# Patient Record
Sex: MALE | Race: WHITE | NOT HISPANIC OR LATINO | Employment: FULL TIME | URBAN - METROPOLITAN AREA
[De-identification: names, ages, dates, MRNs, and addresses within clinical notes are randomized per-mention and may not be internally consistent; named-entity substitution may affect disease eponyms.]

---

## 2017-01-17 ENCOUNTER — OFFICE VISIT (OUTPATIENT)
Dept: INTERNAL MEDICINE | Facility: CLINIC | Age: 47
End: 2017-01-17
Payer: COMMERCIAL

## 2017-01-17 VITALS
WEIGHT: 264 LBS | OXYGEN SATURATION: 96 % | RESPIRATION RATE: 12 BRPM | BODY MASS INDEX: 36.96 KG/M2 | DIASTOLIC BLOOD PRESSURE: 78 MMHG | SYSTOLIC BLOOD PRESSURE: 138 MMHG | HEART RATE: 68 BPM | TEMPERATURE: 96.8 F | HEIGHT: 71 IN

## 2017-01-17 DIAGNOSIS — J45.30 MILD PERSISTENT ASTHMA WITHOUT COMPLICATION: Primary | ICD-10-CM

## 2017-01-17 PROCEDURE — 99213 OFFICE O/P EST LOW 20 MIN: CPT | Performed by: NURSE PRACTITIONER

## 2017-01-17 RX ORDER — ALBUTEROL SULFATE 90 UG/1
2 AEROSOL, METERED RESPIRATORY (INHALATION) EVERY 6 HOURS PRN
Qty: 3 INHALER | Refills: 3 | Status: SHIPPED | OUTPATIENT
Start: 2017-01-17 | End: 2017-01-26

## 2017-01-17 NOTE — NURSING NOTE
"Chief Complaint   Patient presents with     Forms     FMLA paper work.        Initial Temp(Src) 96.8  F (36  C) (Oral)  Resp 12  Ht 5' 11\" (1.803 m)  Wt 264 lb (119.75 kg)  BMI 36.84 kg/m2  SpO2 96% Estimated body mass index is 36.84 kg/(m^2) as calculated from the following:    Height as of this encounter: 5' 11\" (1.803 m).    Weight as of this encounter: 264 lb (119.75 kg).  BP completed using cuff size: gina Wright LPN      "

## 2017-01-17 NOTE — PROGRESS NOTES
"  SUBJECTIVE:                                                    Thai Mancia is a 46 year old male who presents to clinic today for the following health issues:    FMLA paperwork. Asthma - sometimes has to leave work if having a flare   Works in a cooler loading milk    Taking nyquil- and did albuterol and advair this am   No HTN history   Initial blood pressure high 152/78  Recheck 138/78  Discussed blood pressure     Steroid injection right knee for arthritis and DJD - workman's comp   Still has some discomfort in knee at times                 Problem list and histories reviewed & adjusted, as indicated.  Additional history: as documented    Patient Active Problem List   Diagnosis     CARDIOVASCULAR SCREENING; LDL GOAL LESS THAN 160     Mild persistent asthma without complication     Obesity due to excess calories, unspecified obesity severity     Past Surgical History   Procedure Laterality Date     Knee surgery  1985       Social History   Substance Use Topics     Smoking status: Never Smoker      Smokeless tobacco: Never Used     Alcohol Use: 0.0 oz/week     0 Standard drinks or equivalent per week      Comment: alcohol 2 x per week     Family History   Problem Relation Age of Onset     Unknown/Adopted Mother      adopted     Unknown/Adopted Father            ROS:  Constitutional, HEENT, cardiovascular, pulmonary, GI, , musculoskeletal, neuro, skin, endocrine and psych systems are negative, except as otherwise noted.    OBJECTIVE:                                                    /78 mmHg  Pulse 68  Temp(Src) 96.8  F (36  C) (Oral)  Resp 12  Ht 5' 11\" (1.803 m)  Wt 264 lb (119.75 kg)  BMI 36.84 kg/m2  SpO2 96%  Body mass index is 36.84 kg/(m^2).  GENERAL: alert and no distress  RESP: lungs clear to auscultation - no rales, rhonchi or wheezes  CV: regular rate and rhythm, normal S1 S2, no S3 or S4, no murmur, click or rub, no peripheral edema   ABDOMEN: soft, nontender, a and bowel sounds " normal  MS: no gross musculoskeletal defects noted, no edema  NEURO: Normal strength and tone, mentation intact and speech normal  PSYCH: mentation appears normal, affect normal/bright    Diagnostic Test Results:  none      ASSESSMENT/PLAN:                                                              ICD-10-CM    1. Mild persistent asthma without complication J45.30 albuterol (PROAIR HFA/PROVENTIL HFA/VENTOLIN HFA) 108 (90 BASE) MCG/ACT Inhaler       Patient Instructions   FMLA form completed          LAYLA Adames CNP  [facility]

## 2017-01-17 NOTE — MR AVS SNAPSHOT
"              After Visit Summary   1/17/2017    Thai Mancia    MRN: [id number]           Patient Information     Date Of Birth          1970        Visit Information        Provider Department      1/17/2017 9:40 AM Sara Elias APRN CNP [facility]        Today's Diagnoses     Mild persistent asthma without complication    -  1       Care Instructions    FMLA form completed          Follow-ups after your visit        Who to contact     If you have questions or need follow up information about today's clinic visit or your schedule please contact [facility] directly at [phone].  Normal or non-critical lab and imaging results will be communicated to you by [employer], letter or phone within 4 business days after the clinic has received the results. If you do not hear from us within 7 days, please contact the clinic through Aktin[employer]art or phone. If you have a critical or abnormal lab result, we will notify you by phone as soon as possible.  Submit refill requests through [employer] or call your pharmacy and they will forward the refill request to us. Please allow 3 business days for your refill to be completed.          Additional Information About Your Visit        MyChart Information     [employer] gives you secure access to your electronic health record. If you see a primary care provider, you can also send messages to your care team and make appointments. If you have questions, please call your primary care clinic.  If you do not have a primary care provider, please call [phone] and they will assist you.        Care EveryWhere ID     This is your Care EveryWhere ID. This could be used by other organizations to access your [place] medical records  [id number]        Your Vitals Were     Pulse Temperature Respirations Height BMI (Body Mass Index) Pulse Oximetry    68 96.8  F (36  C) (Oral) 12 5' 11\" (1.803 m) 36.84 kg/m2 96%       Blood Pressure from Last 3 " Encounters:   01/17/17 138/78   09/07/16 130/84   08/26/16 126/78    Weight from Last 3 Encounters:   01/17/17 264 lb (119.75 kg)   09/07/16 278 lb (126.1 kg)   08/26/16 278 lb (126.1 kg)              Today, you had the following     No orders found for display         Where to get your medicines      These medications were sent to [place], [place] - [facility] [address]  [facility] [address]     Phone:  [phone]    - albuterol 108 (90 BASE) MCG/ACT Inhaler       Primary Care Provider Office Phone # Fax #    Kelsea Lee [phone]       [facility] [address] [facility] [address]        Thank you!     Thank you for choosing [facility]  for your care. Our goal is always to provide you with excellent care. Hearing back from our patients is one way we can continue to improve our services. Please take a few minutes to complete the written survey that you may receive in the mail after your visit with us. Thank you!             Your Updated Medication List - Protect others around you: Learn how to safely use, store and throw away your medicines at www.disposemymeds.org.          This list is accurate as of: 1/17/17 10:08 AM.  Always use your most recent med list.                   Brand Name Dispense Instructions for use    albuterol 108 (90 BASE) MCG/ACT Inhaler    PROAIR HFA/PROVENTIL HFA/VENTOLIN HFA    3 Inhaler    Inhale 2 puffs into the lungs every 6 hours as needed for shortness of breath / dyspnea       fluticasone-salmeterol 250-50 MCG/DOSE diskus inhaler    ADVAIR DISKUS    1 Inhaler    Inhale 1 puff into the lungs every 12 hours

## 2017-01-18 ASSESSMENT — ASTHMA QUESTIONNAIRES: ACT_TOTALSCORE: 17

## 2017-01-26 ENCOUNTER — OFFICE VISIT (OUTPATIENT)
Dept: INTERNAL MEDICINE | Facility: CLINIC | Age: 47
End: 2017-01-26
Payer: COMMERCIAL

## 2017-01-26 VITALS
OXYGEN SATURATION: 96 % | HEIGHT: 71 IN | SYSTOLIC BLOOD PRESSURE: 136 MMHG | WEIGHT: 260.2 LBS | HEART RATE: 86 BPM | TEMPERATURE: 97.5 F | BODY MASS INDEX: 36.43 KG/M2 | DIASTOLIC BLOOD PRESSURE: 90 MMHG

## 2017-01-26 DIAGNOSIS — R05.9 COUGH: ICD-10-CM

## 2017-01-26 DIAGNOSIS — J06.9 UPPER RESPIRATORY TRACT INFECTION, UNSPECIFIED TYPE: Primary | ICD-10-CM

## 2017-01-26 DIAGNOSIS — J98.01 ACUTE BRONCHOSPASM: ICD-10-CM

## 2017-01-26 DIAGNOSIS — J45.30 MILD PERSISTENT ASTHMA WITHOUT COMPLICATION: ICD-10-CM

## 2017-01-26 PROCEDURE — 99214 OFFICE O/P EST MOD 30 MIN: CPT | Performed by: NURSE PRACTITIONER

## 2017-01-26 RX ORDER — ALBUTEROL SULFATE 90 UG/1
2 AEROSOL, METERED RESPIRATORY (INHALATION) EVERY 6 HOURS PRN
Qty: 3 INHALER | Refills: 3 | Status: SHIPPED | OUTPATIENT
Start: 2017-01-26 | End: 2018-02-06

## 2017-01-26 RX ORDER — PREDNISONE 10 MG/1
TABLET ORAL
Qty: 18 TABLET | Refills: 0 | Status: SHIPPED
Start: 2017-01-26 | End: 2017-09-01

## 2017-01-26 RX ORDER — AZITHROMYCIN 250 MG/1
TABLET, FILM COATED ORAL
Qty: 6 TABLET | Refills: 0 | Status: SHIPPED | OUTPATIENT
Start: 2017-01-26 | End: 2017-09-01

## 2017-01-26 NOTE — MR AVS SNAPSHOT
After Visit Summary   1/26/2017    Thai Mancia    MRN: [id number]           Patient Information     Date Of Birth          1970        Visit Information        Provider Department      1/26/2017 1:20 PM Sara Elias APRN CNP [facility]        Today's Diagnoses     Upper respiratory tract infection, unspecified type    -  1     Acute bronchospasm         Cough         Mild persistent asthma without complication           Care Instructions    Will treat with Zithromax 250mg, 2 tabs by mouth day 1, then 1 tab by mouth days 2-5. He is to watch for GI upset, diarrhea or rash.    Prednisone take in morning with food   Take 3 tabs daily for 3 days   Take 2 tabs daily for 3 days   Take 1 tab daily for 3 days   Then stop       Advair as directed     Albuterol as needed     Please, call or return to clinic if signs or symptoms worsen or fail to improve as anticipated          Follow-ups after your visit        Who to contact     If you have questions or need follow up information about today's clinic visit or your schedule please contact [facility] directly at [phone].  Normal or non-critical lab and imaging results will be communicated to you by MyChart, letter or phone within 4 business days after the clinic has received the results. If you do not hear from us within 7 days, please contact the clinic through [employer] or phone. If you have a critical or abnormal lab result, we will notify you by phone as soon as possible.  Submit refill requests through [employer] or call your pharmacy and they will forward the refill request to us. Please allow 3 business days for your refill to be completed.          Additional Information About Your Visit        MyChart Information     [employer] gives you secure access to your electronic health record. If you see a primary care provider, you can also send messages to your care team and make appointments. If you have  "questions, please call your primary care clinic.  If you do not have a primary care provider, please call [phone] and they will assist you.        Care EveryWhere ID     This is your Care EveryWhere ID. This could be used by other organizations to access your [place] medical records  [id number]        Your Vitals Were     Pulse Temperature Height BMI (Body Mass Index) Pulse Oximetry       86 97.5  F (36.4  C) (Oral) 5' 11\" (1.803 m) 36.31 kg/m2 96%        Blood Pressure from Last 3 Encounters:   01/26/17 136/90   01/17/17 138/78   09/07/16 130/84    Weight from Last 3 Encounters:   01/26/17 260 lb 3.2 oz (118.026 kg)   01/17/17 264 lb (119.75 kg)   09/07/16 278 lb (126.1 kg)              Today, you had the following     No orders found for display         Today's Medication Changes          These changes are accurate as of: 1/26/17  2:04 PM.  If you have any questions, ask your nurse or doctor.               Start taking these medicines.        Dose/Directions    azithromycin 250 MG tablet   Commonly known as:  ZITHROMAX   Used for:  Upper respiratory tract infection, unspecified type, Acute bronchospasm, Cough   Started by:  Sara Elias APRN CNP        Two tablets first day, then one tablet daily for four days.   Quantity:  6 tablet   Refills:  0       predniSONE 10 MG tablet   Commonly known as:  DELTASONE   Used for:  Upper respiratory tract infection, unspecified type, Acute bronchospasm, Cough   Started by:  Sara Elias APRN CNP        Take 3 tabs daily for 3 days  Take 2 tabs daily for 3 days  Take 1 tab daily for 3 days  Then stop   Quantity:  18 tablet   Refills:  0            Where to get your medicines      These medications were sent to [facility] - [place], [place] - [address], [address]  [address], [facility], [place] [address]     Phone:  [phone]    - albuterol 108 (90 BASE) MCG/ACT Inhaler  - azithromycin 250 MG tablet  - " fluticasone-salmeterol 250-50 MCG/DOSE diskus inhaler  - predniSONE 10 MG tablet             Primary Care Provider Office Phone # Fax #    Kelsea Lee [phone]       [facility] [address] [facility] [address]        Thank you!     Thank you for choosing [facility]  for your care. Our goal is always to provide you with excellent care. Hearing back from our patients is one way we can continue to improve our services. Please take a few minutes to complete the written survey that you may receive in the mail after your visit with us. Thank you!             Your Updated Medication List - Protect others around you: Learn how to safely use, store and throw away your medicines at www.disposemymeds.org.          This list is accurate as of: 1/26/17  2:04 PM.  Always use your most recent med list.                   Brand Name Dispense Instructions for use    albuterol 108 (90 BASE) MCG/ACT Inhaler    PROAIR HFA/PROVENTIL HFA/VENTOLIN HFA    3 Inhaler    Inhale 2 puffs into the lungs every 6 hours as needed for shortness of breath / dyspnea       azithromycin 250 MG tablet    ZITHROMAX    6 tablet    Two tablets first day, then one tablet daily for four days.       fluticasone-salmeterol 250-50 MCG/DOSE diskus inhaler    ADVAIR DISKUS    1 Inhaler    Inhale 1 puff into the lungs every 12 hours       predniSONE 10 MG tablet    DELTASONE    18 tablet    Take 3 tabs daily for 3 days  Take 2 tabs daily for 3 days  Take 1 tab daily for 3 days  Then stop

## 2017-01-26 NOTE — PROGRESS NOTES
"  SUBJECTIVE:                                                    Thai Mancia is a 46 year old male who presents to clinic today for the following health issues:    Cough x 10 days.    Gets cold and it goes to chest   Usually needs a z pack     mucinex and nyquil and helps with head congestion   Cough productive yellow brown and similar out nose         Problem list and histories reviewed & adjusted, as indicated.  Additional history:     Patient Active Problem List   Diagnosis     CARDIOVASCULAR SCREENING; LDL GOAL LESS THAN 160     Mild persistent asthma without complication     Obesity due to excess calories, unspecified obesity severity     Past Surgical History   Procedure Laterality Date     Knee surgery  1985       Social History   Substance Use Topics     Smoking status: Never Smoker      Smokeless tobacco: Never Used     Alcohol Use: 0.0 oz/week     0 Standard drinks or equivalent per week      Comment: alcohol 2 x per week     Family History   Problem Relation Age of Onset     Unknown/Adopted Mother      adopted     Unknown/Adopted Father            ROS:  Constitutional, HEENT, cardiovascular, pulmonary, GI, , musculoskeletal, neuro, skin, endocrine and psych systems are negative, except as otherwise noted.    OBJECTIVE:                                                    /90 mmHg  Pulse 86  Temp(Src) 97.5  F (36.4  C) (Oral)  Ht 5' 11\" (1.803 m)  Wt 260 lb 3.2 oz (118.026 kg)  BMI 36.31 kg/m2  SpO2 96%  Body mass index is 36.31 kg/(m^2).  GENERAL: alert and no distress  HENT: ear canals and TM's normal, nose and mouth without ulcers or lesions  RESP: lungs  With exp wheezes through out - declines a neb when offered   CV: regular rate and rhythm  ABDOMEN: soft, nontender,  and bowel sounds normal  MS: no gross musculoskeletal defects noted, no edema  NEURO: Normal strength and tone, mentation intact and speech normal  PSYCH: mentation appears normal, affect normal/bright    Diagnostic Test " Results:  none      ASSESSMENT/PLAN:                                                              ICD-10-CM    1. Upper respiratory tract infection, unspecified type J06.9 azithromycin (ZITHROMAX) 250 MG tablet     predniSONE (DELTASONE) 10 MG tablet   2. Acute bronchospasm J98.01 azithromycin (ZITHROMAX) 250 MG tablet     predniSONE (DELTASONE) 10 MG tablet   3. Cough R05 azithromycin (ZITHROMAX) 250 MG tablet     predniSONE (DELTASONE) 10 MG tablet   4. Mild persistent asthma without complication J45.30 fluticasone-salmeterol (ADVAIR DISKUS) 250-50 MCG/DOSE diskus inhaler     albuterol (PROAIR HFA/PROVENTIL HFA/VENTOLIN HFA) 108 (90 BASE) MCG/ACT Inhaler       Patient Instructions   Will treat with Zithromax 250mg, 2 tabs by mouth day 1, then 1 tab by mouth days 2-5. He is to watch for GI upset, diarrhea or rash.    Prednisone take in morning with food   Take 3 tabs daily for 3 days   Take 2 tabs daily for 3 days   Take 1 tab daily for 3 days   Then stop       Advair as directed     Albuterol as needed     Please, call or return to clinic if signs or symptoms worsen or fail to improve as anticipated          LAYLA Adames [facility]

## 2017-01-26 NOTE — NURSING NOTE
"Chief Complaint   Patient presents with     Cough       Initial /90 mmHg  Pulse 86  Temp(Src) 97.5  F (36.4  C) (Oral)  Ht 5' 11\" (1.803 m)  Wt 260 lb 3.2 oz (118.026 kg)  BMI 36.31 kg/m2  SpO2 96% Estimated body mass index is 36.31 kg/(m^2) as calculated from the following:    Height as of this encounter: 5' 11\" (1.803 m).    Weight as of this encounter: 260 lb 3.2 oz (118.026 kg).  BP completed using cuff size: large    "

## 2017-02-13 ENCOUNTER — TELEPHONE (OUTPATIENT)
Dept: INTERNAL MEDICINE | Facility: CLINIC | Age: 47
End: 2017-02-13

## 2017-02-13 NOTE — LETTER
[facility] [place] [address] [place] [address]  [phone]    3/24/2017    Thai Mancia  [address]  [facility] [address]      Dear Thai    At [facility] we care about your health and well-being.  A review of your chart has indicated that you are due for a Asthma questionnaire.      Enclosed is a list of questions for you to answer. Please give us a call back to go over the questions with the nurse.      [facility] Internal Medicine  Healthcare Team

## 2017-02-13 NOTE — TELEPHONE ENCOUNTER
Panel Management Review      Patient has the following on his problem list:     Asthma review     ACT Total Scores 1/17/2017   ACT TOTAL SCORE (Goal Greater than or Equal to 20) 17   In the past 12 months, how many times did you visit the emergency room for your asthma without being admitted to the hospital? 0   In the past 12 months, how many times were you hospitalized overnight because of your asthma? 0      1. Is Asthma diagnosis on the Problem List? Yes    2. Is Asthma listed on Health Maintenance? Yes    3. Patient is due for:  ACT      Composite cancer screening  Chart review shows that this patient is due/due soon for the following None  Summary:    Patient is due/failing the following:   ACT    Action needed:   Patient needs to do ACT.    Type of outreach:    Phone, left message for patient to call back.     Questions for provider review:    None                                                                                                                                      MARY Wright       Chart routed to None .

## 2017-06-27 ENCOUNTER — TELEPHONE (OUTPATIENT)
Dept: INTERNAL MEDICINE | Facility: CLINIC | Age: 47
End: 2017-06-27

## 2017-07-28 ENCOUNTER — OFFICE VISIT (OUTPATIENT)
Dept: INTERNAL MEDICINE | Facility: CLINIC | Age: 47
End: 2017-07-28
Payer: COMMERCIAL

## 2017-07-28 ENCOUNTER — RADIANT APPOINTMENT (OUTPATIENT)
Dept: GENERAL RADIOLOGY | Facility: CLINIC | Age: 47
End: 2017-07-28
Attending: INTERNAL MEDICINE
Payer: COMMERCIAL

## 2017-07-28 VITALS
HEIGHT: 71 IN | OXYGEN SATURATION: 98 % | WEIGHT: 254.3 LBS | SYSTOLIC BLOOD PRESSURE: 134 MMHG | HEART RATE: 83 BPM | TEMPERATURE: 97.8 F | BODY MASS INDEX: 35.6 KG/M2 | DIASTOLIC BLOOD PRESSURE: 88 MMHG

## 2017-07-28 DIAGNOSIS — J45.30 MILD PERSISTENT ASTHMA WITHOUT COMPLICATION: Primary | ICD-10-CM

## 2017-07-28 DIAGNOSIS — M25.511 RIGHT SHOULDER PAIN, UNSPECIFIED CHRONICITY: ICD-10-CM

## 2017-07-28 PROCEDURE — 73030 X-RAY EXAM OF SHOULDER: CPT | Mod: RT

## 2017-07-28 PROCEDURE — 99213 OFFICE O/P EST LOW 20 MIN: CPT | Performed by: INTERNAL MEDICINE

## 2017-07-28 NOTE — NURSING NOTE
"Chief Complaint   Patient presents with     Forms       Initial /88 (BP Location: Left arm, Patient Position: Chair, Cuff Size: Adult Large)  Pulse 83  Temp 97.8  F (36.6  C) (Oral)  Ht 5' 11\" (1.803 m)  Wt 254 lb 4.8 oz (115.3 kg)  SpO2 98%  BMI 35.47 kg/m2 Estimated body mass index is 35.47 kg/(m^2) as calculated from the following:    Height as of this encounter: 5' 11\" (1.803 m).    Weight as of this encounter: 254 lb 4.8 oz (115.3 kg).  Medication Reconciliation: complete   Brandi Box MA    "

## 2017-07-28 NOTE — MR AVS SNAPSHOT
"              After Visit Summary   7/28/2017    Thai Mancia    MRN: [id number]           Patient Information     Date Of Birth          1970        Visit Information        Provider Department      7/28/2017 4:00 PM Kelsea Lee MD [facility]        Today's Diagnoses     Mild persistent asthma without complication    -  1    Right shoulder pain, unspecified chronicity           Follow-ups after your visit        Who to contact     If you have questions or need follow up information about today's clinic visit or your schedule please contact [facility] directly at [phone].  Normal or non-critical lab and imaging results will be communicated to you by [employer], letter or phone within 4 business days after the clinic has received the results. If you do not hear from us within 7 days, please contact the clinic through [employer] or phone. If you have a critical or abnormal lab result, we will notify you by phone as soon as possible.  Submit refill requests through [employer] or call your pharmacy and they will forward the refill request to us. Please allow 3 business days for your refill to be completed.          Additional Information About Your Visit        MyChart Information     [employer] gives you secure access to your electronic health record. If you see a primary care provider, you can also send messages to your care team and make appointments. If you have questions, please call your primary care clinic.  If you do not have a primary care provider, please call [phone] and they will assist you.        Care EveryWhere ID     This is your Care EveryWhere ID. This could be used by other organizations to access your [place] medical records  [id number]        Your Vitals Were     Pulse Temperature Height Pulse Oximetry BMI (Body Mass Index)       83 97.8  F (36.6  C) (Oral) 5' 11\" (1.803 m) 98% 35.47 kg/m2        Blood Pressure from Last 3 Encounters:   07/28/17 " 134/88   01/26/17 136/90   01/17/17 138/78    Weight from Last 3 Encounters:   07/28/17 254 lb 4.8 oz (115.3 kg)   01/26/17 260 lb 3.2 oz (118 kg)   01/17/17 264 lb (119.7 kg)              Today, you had the following     No orders found for display       Primary Care Provider Office Phone # Fax #    Kelsea Skip Lee [phone]       [facility] [address] [facility] [address]        Equal Access to Services     [facility] Hospital[facility]HAD : Hadii aad ku hadasho Somikey, waaxda luqadaha, qaybta kaalmada adealayna, vilma sawyer . So [facility] [phone].    ATENCIÓN: Si habla español, tiene a mcclure disposición servicios gratuitos de asistencia lingüística. Llame al [phone].    We comply with applicable federal civil rights laws and [place] laws. We do not discriminate on the basis of race, color, national origin, age, disability sex, sexual orientation or gender identity.            Thank you!     Thank you for choosing [facility]  for your care. Our goal is always to provide you with excellent care. Hearing back from our patients is one way we can continue to improve our services. Please take a few minutes to complete the written survey that you may receive in the mail after your visit with us. Thank you!             Your Updated Medication List - Protect others around you: Learn how to safely use, store and throw away your medicines at www.disposemymeds.org.          This list is accurate as of: 7/28/17 11:59 PM.  Always use your most recent med list.                   Brand Name Dispense Instructions for use Diagnosis    albuterol 108 (90 BASE) MCG/ACT Inhaler    PROAIR HFA/PROVENTIL HFA/VENTOLIN HFA    3 Inhaler    Inhale 2 puffs into the lungs every 6 hours as needed for shortness of breath / dyspnea    Mild persistent asthma without complication       azithromycin 250 MG tablet    ZITHROMAX    6 tablet    Two tablets first day, then one  tablet daily for four days.    Upper respiratory tract infection, unspecified type, Acute bronchospasm, Cough       fluticasone-salmeterol 250-50 MCG/DOSE diskus inhaler    ADVAIR DISKUS    1 Inhaler    Inhale 1 puff into the lungs every 12 hours    Mild persistent asthma without complication       predniSONE 10 MG tablet    DELTASONE    18 tablet    Take 3 tabs daily for 3 days  Take 2 tabs daily for 3 days  Take 1 tab daily for 3 days  Then stop    Upper respiratory tract infection, unspecified type, Acute bronchospasm, Cough

## 2017-07-28 NOTE — PROGRESS NOTES
"  SUBJECTIVE:                                                    Thai Mancia is a 47 year old male who presents to clinic today for the following health issues:    Asthma Follow-Up    Was ACT completed today?    Yes    ACT Total Scores 1/17/2017   ACT TOTAL SCORE -   ASTHMA ER VISITS -   ASTHMA HOSPITALIZATIONS -   ACT TOTAL SCORE (Goal Greater than or Equal to 20) 17   In the past 12 months, how many times did you visit the emergency room for your asthma without being admitted to the hospital? 0   In the past 12 months, how many times were you hospitalized overnight because of your asthma? 0       Recent asthma triggers that patient is dealing with: cold air and dog and cat dander    He works in a cooler at times, and has flares.  Sometimes this occurs 1-2 times per month.  This might occur before or during work.    Right shoulder pain.  He feels like there is a crack in the right shoulder.    He lifts heavy objects at work.  He does not feel that his arm is weak.       Amount of exercise or physical activity: None; works 5 days per week at work    Problems taking medications regularly: No    Medication side effects: none  Diet: regular (no restrictions)      PROBLEMS TO ADD ON...    Problem list and histories reviewed & adjusted, as indicated.  Additional history: as documented    Labs reviewed in EPIC    Reviewed and updated as needed this visit by clinical staffTobacco  Allergies  Med Hx  Surg Hx  Fam Hx  Soc Hx      Reviewed and updated as needed this visit by Provider         ROS:  C: NEGATIVE for fever, chills, change in weight  R: NEGATIVE for significant cough or SOB  CV: NEGATIVE for chest pain, palpitations or peripheral edema  MSK: right shoulder pain    OBJECTIVE:     /88 (BP Location: Left arm, Patient Position: Chair, Cuff Size: Adult Large)  Pulse 83  Temp 97.8  F (36.6  C) (Oral)  Ht 5' 11\" (1.803 m)  Wt 254 lb 4.8 oz (115.3 kg)  SpO2 98%  BMI 35.47 kg/m2  Body mass index is " 35.47 kg/(m^2).  GENERAL: healthy, alert and no distress  RESP: lungs clear to auscultation - no rales, rhonchi or wheezes  CV: regular rate and rhythm, normal S1 S2, no S3 or S4, no murmur, click or rub, no peripheral edema and peripheral pulses strong  MSK: no pain upon palpation; NEG NEER; MUHAMMAD slightly positive; upper arm strength intact bilaterally    ASSESSMENT/PLAN:       (J45.30) Mild persistent asthma without complication  (primary encounter diagnosis)  Comment:   Plan: continue on inhalers  FMLA form filled out for work    (M25.511) Right shoulder pain, unspecified chronicity  Comment:   Plan: CANCELED: XR Shoulder Right 2 Views                  Kelsea Lee MD  [facility]

## 2017-08-01 ASSESSMENT — ASTHMA QUESTIONNAIRES: ACT_TOTALSCORE: 21

## 2017-08-08 ENCOUNTER — TELEPHONE (OUTPATIENT)
Dept: INTERNAL MEDICINE | Facility: CLINIC | Age: 47
End: 2017-08-08

## 2017-08-08 NOTE — TELEPHONE ENCOUNTER
"Pt states regarding the FMLA, the line that asks for the date of when the pt was treated, Dr Lee stated \"as needed.\"   Pt states his Employer is asking for the actual date of office visits to be written in there.   He is going to have them fax this back to Dr Lee to addend. He is asking for her to write the last 2 or 3 OV in this line area. And then fax. .            "

## 2017-08-09 NOTE — TELEPHONE ENCOUNTER
Pt calls, checking on status of below. Found FMLA form faxed to our office requesting OV dates. Listed OV with dx of asthma or bronchitis. Faxed back to: [phone] per coversheet provided. Pt aware.

## 2017-08-28 ENCOUNTER — APPOINTMENT (OUTPATIENT)
Dept: CT IMAGING | Facility: CLINIC | Age: 47
End: 2017-08-28
Attending: EMERGENCY MEDICINE
Payer: COMMERCIAL

## 2017-08-28 ENCOUNTER — TELEPHONE (OUTPATIENT)
Dept: INTERNAL MEDICINE | Facility: CLINIC | Age: 47
End: 2017-08-28

## 2017-08-28 ENCOUNTER — HOSPITAL ENCOUNTER (EMERGENCY)
Facility: CLINIC | Age: 47
Discharge: HOME | End: 2017-08-28
Attending: EMERGENCY MEDICINE | Admitting: EMERGENCY MEDICINE
Payer: COMMERCIAL

## 2017-08-28 VITALS
SYSTOLIC BLOOD PRESSURE: 143 MMHG | BODY MASS INDEX: 34.87 KG/M2 | DIASTOLIC BLOOD PRESSURE: 76 MMHG | TEMPERATURE: 98.6 F | OXYGEN SATURATION: 97 % | RESPIRATION RATE: 18 BRPM | WEIGHT: 250 LBS

## 2017-08-28 DIAGNOSIS — K57.32 DIVERTICULITIS OF COLON: ICD-10-CM

## 2017-08-28 LAB
ALBUMIN SERPL-MCNC: 3.5 G/DL (ref 3.4–5)
ALBUMIN UR-MCNC: NEGATIVE MG/DL
ALP SERPL-CCNC: 83 U/L (ref 40–150)
ALT SERPL W P-5'-P-CCNC: 30 U/L (ref 0–70)
ANION GAP SERPL CALCULATED.3IONS-SCNC: 7 MMOL/L (ref 3–14)
APPEARANCE UR: CLEAR
AST SERPL W P-5'-P-CCNC: 15 U/L (ref 0–45)
BASOPHILS # BLD AUTO: 0 10E9/L (ref 0–0.2)
BASOPHILS NFR BLD AUTO: 0.3 %
BILIRUB SERPL-MCNC: 0.5 MG/DL (ref 0.2–1.3)
BILIRUB UR QL STRIP: NEGATIVE
BUN SERPL-MCNC: 9 MG/DL (ref 7–30)
CALCIUM SERPL-MCNC: 8.7 MG/DL (ref 8.5–10.1)
CHLORIDE SERPL-SCNC: 107 MMOL/L (ref 94–109)
CO2 SERPL-SCNC: 26 MMOL/L (ref 20–32)
COLOR UR AUTO: ABNORMAL
CREAT SERPL-MCNC: 0.88 MG/DL (ref 0.66–1.25)
DIFFERENTIAL METHOD BLD: NORMAL
EOSINOPHIL # BLD AUTO: 0.2 10E9/L (ref 0–0.7)
EOSINOPHIL NFR BLD AUTO: 1.7 %
ERYTHROCYTE [DISTWIDTH] IN BLOOD BY AUTOMATED COUNT: 13.9 % (ref 10–15)
GFR SERPL CREATININE-BSD FRML MDRD: >90 ML/MIN/1.7M2
GLUCOSE SERPL-MCNC: 156 MG/DL (ref 70–99)
GLUCOSE UR STRIP-MCNC: NEGATIVE MG/DL
HCT VFR BLD AUTO: 43.6 % (ref 40–53)
HGB BLD-MCNC: 15.1 G/DL (ref 13.3–17.7)
HGB UR QL STRIP: NEGATIVE
IMM GRANULOCYTES # BLD: 0 10E9/L (ref 0–0.4)
IMM GRANULOCYTES NFR BLD: 0.1 %
KETONES UR STRIP-MCNC: NEGATIVE MG/DL
LEUKOCYTE ESTERASE UR QL STRIP: NEGATIVE
LYMPHOCYTES # BLD AUTO: 1.6 10E9/L (ref 0.8–5.3)
LYMPHOCYTES NFR BLD AUTO: 18.4 %
MCH RBC QN AUTO: 30.8 PG (ref 26.5–33)
MCHC RBC AUTO-ENTMCNC: 34.6 G/DL (ref 31.5–36.5)
MCV RBC AUTO: 89 FL (ref 78–100)
MONOCYTES # BLD AUTO: 0.4 10E9/L (ref 0–1.3)
MONOCYTES NFR BLD AUTO: 4.6 %
NEUTROPHILS # BLD AUTO: 6.5 10E9/L (ref 1.6–8.3)
NEUTROPHILS NFR BLD AUTO: 74.9 %
NITRATE UR QL: NEGATIVE
NRBC # BLD AUTO: 0 10*3/UL
NRBC BLD AUTO-RTO: 0 /100
PH UR STRIP: 6.5 PH (ref 5–7)
PLATELET # BLD AUTO: 285 10E9/L (ref 150–450)
POTASSIUM SERPL-SCNC: 3.4 MMOL/L (ref 3.4–5.3)
PROT SERPL-MCNC: 7.6 G/DL (ref 6.8–8.8)
RBC # BLD AUTO: 4.91 10E12/L (ref 4.4–5.9)
RBC #/AREA URNS AUTO: 0 /HPF (ref 0–2)
SODIUM SERPL-SCNC: 140 MMOL/L (ref 133–144)
SOURCE: ABNORMAL
SP GR UR STRIP: 1.05 (ref 1–1.03)
UROBILINOGEN UR STRIP-MCNC: NORMAL MG/DL (ref 0–2)
WBC # BLD AUTO: 8.7 10E9/L (ref 4–11)
WBC #/AREA URNS AUTO: 0 /HPF (ref 0–2)

## 2017-08-28 PROCEDURE — 96365 THER/PROPH/DIAG IV INF INIT: CPT | Mod: 59

## 2017-08-28 PROCEDURE — 25000128 H RX IP 250 OP 636: Performed by: EMERGENCY MEDICINE

## 2017-08-28 PROCEDURE — 99285 EMERGENCY DEPT VISIT HI MDM: CPT | Mod: 25

## 2017-08-28 PROCEDURE — 25000125 ZZHC RX 250: Performed by: EMERGENCY MEDICINE

## 2017-08-28 PROCEDURE — 85025 COMPLETE CBC W/AUTO DIFF WBC: CPT | Performed by: EMERGENCY MEDICINE

## 2017-08-28 PROCEDURE — 74177 CT ABD & PELVIS W/CONTRAST: CPT

## 2017-08-28 PROCEDURE — 81001 URINALYSIS AUTO W/SCOPE: CPT | Performed by: EMERGENCY MEDICINE

## 2017-08-28 PROCEDURE — 80053 COMPREHEN METABOLIC PANEL: CPT | Performed by: EMERGENCY MEDICINE

## 2017-08-28 RX ORDER — OXYCODONE HYDROCHLORIDE 5 MG/1
5-10 TABLET ORAL EVERY 4 HOURS PRN
Qty: 15 TABLET | Refills: 0 | Status: SHIPPED | OUTPATIENT
Start: 2017-08-28 | End: 2017-10-03

## 2017-08-28 RX ORDER — CIPROFLOXACIN 500 MG/1
500 TABLET, FILM COATED ORAL 2 TIMES DAILY
Qty: 20 TABLET | Refills: 0 | Status: SHIPPED | OUTPATIENT
Start: 2017-08-28 | End: 2021-08-16

## 2017-08-28 RX ORDER — ERTAPENEM 1 G/1
1 INJECTION, POWDER, LYOPHILIZED, FOR SOLUTION INTRAMUSCULAR; INTRAVENOUS ONCE
Status: COMPLETED | OUTPATIENT
Start: 2017-08-28 | End: 2017-08-28

## 2017-08-28 RX ORDER — IOPAMIDOL 755 MG/ML
125 INJECTION, SOLUTION INTRAVASCULAR ONCE
Status: COMPLETED | OUTPATIENT
Start: 2017-08-28 | End: 2017-08-28

## 2017-08-28 RX ORDER — METRONIDAZOLE 500 MG/1
500 TABLET ORAL 3 TIMES DAILY
Qty: 30 TABLET | Refills: 0 | Status: SHIPPED | OUTPATIENT
Start: 2017-08-28 | End: 2021-08-16

## 2017-08-28 RX ADMIN — SODIUM CHLORIDE 1000 ML: 9 INJECTION, SOLUTION INTRAVENOUS at 09:58

## 2017-08-28 RX ADMIN — IOPAMIDOL 125 ML: 755 INJECTION, SOLUTION INTRAVENOUS at 10:52

## 2017-08-28 RX ADMIN — ERTAPENEM SODIUM 1 G: 1 INJECTION, POWDER, LYOPHILIZED, FOR SOLUTION INTRAMUSCULAR; INTRAVENOUS at 11:36

## 2017-08-28 RX ADMIN — SODIUM CHLORIDE 78 ML: 9 INJECTION, SOLUTION INTRAVENOUS at 10:51

## 2017-08-28 ASSESSMENT — ENCOUNTER SYMPTOMS
ABDOMINAL PAIN: 1
NAUSEA: 0
CONSTIPATION: 1
VOMITING: 0

## 2017-08-28 NOTE — ED AVS SNAPSHOT
Emergency Department    [address] [place] [id number]    Phone:  [phone]    Fax:  [phone]                                       Thai Mancia   MRN: [id number]    Department:   Emergency Department   Date of Visit:  8/28/2017           After Visit Summary Signature Page     I have received my discharge instructions, and my questions have been answered. I have discussed any challenges I see with this plan with the nurse or doctor.    ..........................................................................................................................................  Patient/Patient Representative Signature      ..........................................................................................................................................  Patient Representative Print Name and Relationship to Patient    ..................................................               ................................................  Date                                            Time    ..........................................................................................................................................  Reviewed by Signature/Title    ...................................................              ..............................................  Date                                                            Time

## 2017-08-28 NOTE — DISCHARGE INSTRUCTIONS
Fluids for the next 24-48 hours, soft bland diet if tolerated. Cipro and Flagyl for 10 days. Tylenol and/or Roxicodone as needed for pain. See your doctor in the clinic in 2 days. If you get worse with high fevers vomiting worsening pain, return to the emergency room.    Discharge Instructions  Diverticulitis  Your doctor has diagnosed you with diverticulitis.  Diverticulitis is an infection of a diverticulum, which is a tiny sack-like structure that protrudes off the wall of the colon.  These sacks are created over years of increased pressure in the colon - usually as a result of a diet without enough fruits, vegetables and whole grains. Because these sacks are small, bacteria can get trapped inside them and cause an infection.  This infection often causes abdominal pain, fever, nausea and vomiting. Diverticulitis is usually treated at home.  However, sometimes diverticulitis needs treatment in the hospital and may even need surgery.    Return to the Emergency Department if:     You get an oral temperature above 102oF or as directed by your doctor.    You have blood in your stools (bright red or black, tarry stools), or have blood in your vomit.    You keep throwing up or can t drink liquids or can t keep your medicine down.    You can t have a bowel movement or you can t pass gas.    Your stomach gets bloated or bigger.    You faint or become very weak.    Your pain is too bad to tolerate.    You have new symptoms or anything that worries you.    What can I do to help myself?    Fill any antibiotic prescriptions the doctor gave you and take them right away. Be sure to finish the whole antibiotic prescription.    For the first day or two at home drink only clear liquids.  This lets your intestines rest.    If your pain has improved after one or two days, you may start eating mild foods. Soda crackers, toast, plain noodles, gelatin, applesauce and bananas are good first choices.  Avoid foods that have acid, are  "spicy, fatty or fibrous (such as meats, coarse grains, vegetables). You may start eating these foods again in about 3-4 days when you are better.    Once you are back to normal, eat a high fiber diet of fruits, vegetables and whole grains. Some people think you should avoid eating nuts, seeds, and corn, but there is no definite proof this makes any difference in whether you will get diverticulitis again.     Pain and fever can be treated with Tylenol  (acetaminophen) or with the prescription pain medication given to you by your doctor.  If the prescription pain medication has acetaminophen in it, don t use acetaminophen with it. If you have been given a narcotic pain medication, you should not drive for 4 hours after taking it.    Probiotics: If you have been given an antibiotic, you may want to also take a probiotic pill or eat yogurt with live cultures. Probiotics have \"good bacteria\" to help your intestines stay healthy. Studies have shown that probiotics help prevent diarrhea and other intestine problems (including C. diff infection) when you take antibiotics. You can buy these without a prescription in the pharmacy section of the store.   FOLLOW UP WITH YOUR REGULAR DOCTOR IN 2 - 3 DAYS.  This is important as further testing may be needed to determine how to treat your diverticulitis in the future.  If you were given a prescription for medicine here today, be sure to read all of the information (including the package insert) that comes with your prescription.  This will include important information about the medicine, its side effects, and any warnings that you need to know about.  The pharmacist who fills the prescription can provide more information and answer questions you may have about the medicine.  If you have questions or concerns that the pharmacist cannot address, please call or return to the Emergency Department.     Opioid Medication Information    Pain medications are among the most commonly " prescribed medicines, so we are including this information for all our patients. If you did not receive pain medication or get a prescription for pain medicine, you can ignore it.     You may have been given a prescription for an opioid (narcotic) pain medicine and/or have received a pain medicine while here in the Emergency Department. These medicines can make you drowsy or impaired. You must not drive, operate dangerous equipment, or engage in any other dangerous activities while taking these medications. If you drive while taking these medications, you could be arrested for DUI, or driving under the influence. Do not drink any alcohol while you are taking these medications.     Opioid pain medications can cause addiction. If you have a history of chemical dependency of any type, you are at a higher risk of becoming addicted to pain medications.  Only take these prescribed medications to treat your pain when all other options have been tried. Take it for as short a time and as few doses as possible. Store your pain pills in a secure place, as they are frequently stolen and provide a dangerous opportunity for children or visitors in your house to start abusing these powerful medications. We will not replace any lost or stolen medicine.  As soon as your pain is better, you should flush all your remaining medication.     Many prescription pain medications contain Tylenol  (acetaminophen), including Vicodin , Tylenol #3 , Norco , Lortab , and Percocet .  You should not take any extra pills of Tylenol  if you are using these prescription medications or you can get very sick.  Do not ever take more than 3000 mg of acetaminophen in any 24 hour period.    All opioids tend to cause constipation. Drink plenty of water and eat foods that have a lot of fiber, such as fruits, vegetables, prune juice, apple juice and high fiber cereal.  Take a laxative if you don t move your bowels at least every other day. Miralax , Milk of  Magnesia, Colace , or Senna  can be used to keep you regular.      Remember that you can always come back to the Emergency Department if you are not able to see your regular doctor in the amount of time listed above, if you get any new symptoms, or if there is anything that worries you.

## 2017-08-28 NOTE — TELEPHONE ENCOUNTER
DEBBY:  Pt says he has LLQ abd pain since 8/26/17 and thinks he has a bowel obstruction.  He says he has been taking Miralax and prune juice and still hasn' t had a normal bowel movement.      He was advised that a bowel obstruction is an emergency and he needs to go to ER where they can tell if he does have one.     He said he understood.

## 2017-08-28 NOTE — ED AVS SNAPSHOT
Emergency Department    [address] [facility] [address]    Phone:  [phone]    Fax:  [phone]                                       Thai Mancia   MRN: [id number]    Department:   Emergency Department   Date of Visit:  8/28/2017           Patient Information     Date Of Birth          1970        Your diagnoses for this visit were:     Diverticulitis of colon        You were seen by Amna Cmapbell MD.      Follow-up Information     Follow up with Kelsea Lee MD. Schedule an appointment as soon as possible for a visit in 2 days.    Specialty:  Internal Medicine    Contact information:    [address]  [facility] [address]  [phone]          Discharge Instructions       Fluids for the next 24-48 hours, soft bland diet if tolerated. Cipro and Flagyl for 10 days. Tylenol and/or Roxicodone as needed for pain. See your doctor in the clinic in 2 days. If you get worse with high fevers vomiting worsening pain, return to the emergency room.    Discharge Instructions  Diverticulitis  Your doctor has diagnosed you with diverticulitis.  Diverticulitis is an infection of a diverticulum, which is a tiny sack-like structure that protrudes off the wall of the colon.  These sacks are created over years of increased pressure in the colon - usually as a result of a diet without enough fruits, vegetables and whole grains. Because these sacks are small, bacteria can get trapped inside them and cause an infection.  This infection often causes abdominal pain, fever, nausea and vomiting. Diverticulitis is usually treated at home.  However, sometimes diverticulitis needs treatment in the hospital and may even need surgery.    Return to the Emergency Department if:     You get an oral temperature above 102oF or as directed by your doctor.    You have blood in your stools (bright red or black, tarry stools), or have blood in your vomit.    You keep throwing up or can t drink  "liquids or can t keep your medicine down.    You can t have a bowel movement or you can t pass gas.    Your stomach gets bloated or bigger.    You faint or become very weak.    Your pain is too bad to tolerate.    You have new symptoms or anything that worries you.    What can I do to help myself?    Fill any antibiotic prescriptions the doctor gave you and take them right away. Be sure to finish the whole antibiotic prescription.    For the first day or two at home drink only clear liquids.  This lets your intestines rest.    If your pain has improved after one or two days, you may start eating mild foods. Soda crackers, toast, plain noodles, gelatin, applesauce and bananas are good first choices.  Avoid foods that have acid, are spicy, fatty or fibrous (such as meats, coarse grains, vegetables). You may start eating these foods again in about 3-4 days when you are better.    Once you are back to normal, eat a high fiber diet of fruits, vegetables and whole grains. Some people think you should avoid eating nuts, seeds, and corn, but there is no definite proof this makes any difference in whether you will get diverticulitis again.     Pain and fever can be treated with Tylenol  (acetaminophen) or with the prescription pain medication given to you by your doctor.  If the prescription pain medication has acetaminophen in it, don t use acetaminophen with it. If you have been given a narcotic pain medication, you should not drive for 4 hours after taking it.    Probiotics: If you have been given an antibiotic, you may want to also take a probiotic pill or eat yogurt with live cultures. Probiotics have \"good bacteria\" to help your intestines stay healthy. Studies have shown that probiotics help prevent diarrhea and other intestine problems (including C. diff infection) when you take antibiotics. You can buy these without a prescription in the pharmacy section of the store.   FOLLOW UP WITH YOUR REGULAR DOCTOR IN 2 - 3 " DAYS.  This is important as further testing may be needed to determine how to treat your diverticulitis in the future.  If you were given a prescription for medicine here today, be sure to read all of the information (including the package insert) that comes with your prescription.  This will include important information about the medicine, its side effects, and any warnings that you need to know about.  The pharmacist who fills the prescription can provide more information and answer questions you may have about the medicine.  If you have questions or concerns that the pharmacist cannot address, please call or return to the Emergency Department.     Opioid Medication Information    Pain medications are among the most commonly prescribed medicines, so we are including this information for all our patients. If you did not receive pain medication or get a prescription for pain medicine, you can ignore it.     You may have been given a prescription for an opioid (narcotic) pain medicine and/or have received a pain medicine while here in the Emergency Department. These medicines can make you drowsy or impaired. You must not drive, operate dangerous equipment, or engage in any other dangerous activities while taking these medications. If you drive while taking these medications, you could be arrested for DUI, or driving under the influence. Do not drink any alcohol while you are taking these medications.     Opioid pain medications can cause addiction. If you have a history of chemical dependency of any type, you are at a higher risk of becoming addicted to pain medications.  Only take these prescribed medications to treat your pain when all other options have been tried. Take it for as short a time and as few doses as possible. Store your pain pills in a secure place, as they are frequently stolen and provide a dangerous opportunity for children or visitors in your house to start abusing these powerful medications. We  will not replace any lost or stolen medicine.  As soon as your pain is better, you should flush all your remaining medication.     Many prescription pain medications contain Tylenol  (acetaminophen), including Vicodin , Tylenol #3 , Norco , Lortab , and Percocet .  You should not take any extra pills of Tylenol  if you are using these prescription medications or you can get very sick.  Do not ever take more than 3000 mg of acetaminophen in any 24 hour period.    All opioids tend to cause constipation. Drink plenty of water and eat foods that have a lot of fiber, such as fruits, vegetables, prune juice, apple juice and high fiber cereal.  Take a laxative if you don t move your bowels at least every other day. Miralax , Milk of Magnesia, Colace , or Senna  can be used to keep you regular.      Remember that you can always come back to the Emergency Department if you are not able to see your regular doctor in the amount of time listed above, if you get any new symptoms, or if there is anything that worries you.        24 Hour Appointment Hotline       To make an appointment at any [facility] clinic, call [phone] ([phone]). If you don't have a family doctor or clinic, we will help you find one. [facility] clinics are conveniently located to serve the needs of you and your family.             Review of your medicines      START taking        Dose / Directions Last dose taken    ciprofloxacin 500 MG tablet   Commonly known as:  CIPRO   Dose:  500 mg   Quantity:  20 tablet        Take 1 tablet (500 mg) by mouth 2 times daily for 10 days   Refills:  0        metroNIDAZOLE 500 MG tablet   Commonly known as:  FLAGYL   Dose:  500 mg   Quantity:  30 tablet        Take 1 tablet (500 mg) by mouth 3 times daily for 10 days   Refills:  0        oxyCODONE 5 MG IR tablet   Commonly known as:  ROXICODONE   Dose:  5-10 mg   Quantity:  15 tablet        Take 1-2 tablets (5-10 mg) by mouth every 4 hours as needed for moderate to  severe pain   Refills:  0          Our records show that you are taking the medicines listed below. If these are incorrect, please call your family doctor or clinic.        Dose / Directions Last dose taken    albuterol 108 (90 BASE) MCG/ACT Inhaler   Commonly known as:  PROAIR HFA/PROVENTIL HFA/VENTOLIN HFA   Dose:  2 puff   Quantity:  3 Inhaler        Inhale 2 puffs into the lungs every 6 hours as needed for shortness of breath / dyspnea   Refills:  3        azithromycin 250 MG tablet   Commonly known as:  ZITHROMAX   Quantity:  6 tablet        Two tablets first day, then one tablet daily for four days.   Refills:  0        fluticasone-salmeterol 250-50 MCG/DOSE diskus inhaler   Commonly known as:  ADVAIR DISKUS   Dose:  1 puff   Quantity:  1 Inhaler        Inhale 1 puff into the lungs every 12 hours   Refills:  11        predniSONE 10 MG tablet   Commonly known as:  DELTASONE   Quantity:  18 tablet        Take 3 tabs daily for 3 days  Take 2 tabs daily for 3 days  Take 1 tab daily for 3 days  Then stop   Refills:  0                Prescriptions were sent or printed at these locations (3 Prescriptions)                   Other Prescriptions                Printed at Department/Unit printer (3 of 3)         ciprofloxacin (CIPRO) 500 MG tablet               metroNIDAZOLE (FLAGYL) 500 MG tablet               oxyCODONE (ROXICODONE) 5 MG IR tablet                Procedures and tests performed during your visit     CBC with platelets differential    CT Abdomen Pelvis w Contrast    Comprehensive metabolic panel    UA with Microscopic      Orders Needing Specimen Collection     None      Pending Results     Date and Time Order Name Status Description    8/28/2017 1012 CT Abdomen Pelvis w Contrast Preliminary     8/28/2017 0957 UA with Microscopic In process             Pending Culture Results     Date and Time Order Name Status Description    8/28/2017 0957 UA with Microscopic In process             Pending Results  Instructions     If you had any lab results that were not finalized at the time of your Discharge, you can call the ED Lab Result RN at [phone]. You will be contacted by this team for any positive Lab results or changes in treatment. The nurses are available 7 days a week from 10A to 6:30P.  You can leave a message 24 hours per day and they will return your call.        Test Results From Your Hospital Stay        8/28/2017 12:07 PM         8/28/2017 10:06 AM      Component Results     Component Value Ref Range & Units Status    WBC 8.7 4.0 - 11.0 10e9/L Final    RBC Count 4.91 4.4 - 5.9 10e12/L Final    Hemoglobin 15.1 13.3 - 17.7 g/dL Final    Hematocrit 43.6 40.0 - 53.0 % Final    MCV 89 78 - 100 fl Final    MCH 30.8 26.5 - 33.0 pg Final    MCHC 34.6 31.5 - 36.5 g/dL Final    RDW 13.9 10.0 - 15.0 % Final    Platelet Count 285 150 - 450 10e9/L Final    Diff Method Automated Method  Final    % Neutrophils 74.9 % Final    % Lymphocytes 18.4 % Final    % Monocytes 4.6 % Final    % Eosinophils 1.7 % Final    % Basophils 0.3 % Final    % Immature Granulocytes 0.1 % Final    Nucleated RBCs 0 0 /100 Final    Absolute Neutrophil 6.5 1.6 - 8.3 10e9/L Final    Absolute Lymphocytes 1.6 0.8 - 5.3 10e9/L Final    Absolute Monocytes 0.4 0.0 - 1.3 10e9/L Final    Absolute Eosinophils 0.2 0.0 - 0.7 10e9/L Final    Absolute Basophils 0.0 0.0 - 0.2 10e9/L Final    Abs Immature Granulocytes 0.0 0 - 0.4 10e9/L Final    Absolute Nucleated RBC 0.0  Final         8/28/2017 10:24 AM      Component Results     Component Value Ref Range & Units Status    Sodium 140 133 - 144 mmol/L Final    Potassium 3.4 3.4 - 5.3 mmol/L Final    Chloride 107 94 - 109 mmol/L Final    Carbon Dioxide 26 20 - 32 mmol/L Final    Anion Gap 7 3 - 14 mmol/L Final    Glucose 156 (H) 70 - 99 mg/dL Final    Urea Nitrogen 9 7 - 30 mg/dL Final    Creatinine 0.88 0.66 - 1.25 mg/dL Final    GFR Estimate >90 >60 mL/min/1.7m2 Final    Non  GFR Calc     GFR Estimate If Black >90 >60 mL/min/1.7m2 Final    African American GFR Calc    Calcium 8.7 8.5 - 10.1 mg/dL Final    Bilirubin Total 0.5 0.2 - 1.3 mg/dL Final    Albumin 3.5 3.4 - 5.0 g/dL Final    Protein Total 7.6 6.8 - 8.8 g/dL Final    Alkaline Phosphatase 83 40 - 150 U/L Final    ALT 30 0 - 70 U/L Final    AST 15 0 - 45 U/L Final         8/28/2017 11:02 AM      Narrative     CT ABDOMEN AND PELVIS WITH CONTRAST   8/28/2017 10:54 AM     HISTORY: Left lower quadrant abdomen pain. Evaluate for diverticulitis  or other.    TECHNIQUE:  CT abdomen and pelvis with 125 mL Isovue-370 IV. Radiation  dose for this scan was reduced using automated exposure control,  adjustment of the mA and/or kV according to patient size, or iterative  reconstruction technique.    COMPARISON: CT abdomen and pelvis 4/15/2015.    FINDINGS: There is mild to moderate acute diverticulitis identified at  the left mid to low abdomen involving the descending colon, series 2  image 49. Adjacent trace fluid is noted. No abscess or free air.  Normal appendix. No bowel obstruction. No acute aortic abnormality  noted. Liver, gallbladder, adrenals, spleen, pancreas, and kidneys no  acute abnormalities.        Impression     IMPRESSION: Acute diverticulitis involving the descending colon at the  left flank. No abscess or free air at this time. When the patient has  improved, consider correlation for colonoscopy to assess for any  underlying colonic lesion.                Clinical Quality Measure: Blood Pressure Screening     Your blood pressure was checked while you were in the emergency department today. The last reading we obtained was  BP: 143/76 . Please read the guidelines below about what these numbers mean and what you should do about them.  If your systolic blood pressure (the top number) is less than 120 and your diastolic blood pressure (the bottom number) is less than 80, then your blood pressure is normal. There is nothing more that you  need to do about it.  If your systolic blood pressure (the top number) is 120-139 or your diastolic blood pressure (the bottom number) is 80-89, your blood pressure may be higher than it should be. You should have your blood pressure rechecked within a year by a primary care provider.  If your systolic blood pressure (the top number) is 140 or greater or your diastolic blood pressure (the bottom number) is 90 or greater, you may have high blood pressure. High blood pressure is treatable, but if left untreated over time it can put you at risk for heart attack, stroke, or kidney failure. You should have your blood pressure rechecked by a primary care provider within the next 4 weeks.  If your provider in the emergency department today gave you specific instructions to follow-up with your doctor or provider even sooner than that, you should follow that instruction and not wait for up to 4 weeks for your follow-up visit.        Thank you for choosing [facility]       Thank you for choosing [facility] for your care. Our goal is always to provide you with excellent care. Hearing back from our patients is one way we can continue to improve our services. Please take a few minutes to complete the written survey that you may receive in the mail after you visit with us. Thank you!        [facility] Information     [facility] gives you secure access to your electronic health record. If you see a primary care provider, you can also send messages to your care team and make appointments. If you have questions, please call your primary care clinic.  If you do not have a primary care provider, please call [phone] and they will assist you.        Care EveryWhere ID     This is your Care EveryWhere ID. This could be used by other organizations to access your [facility] medical records  [id number]        Equal Access to Services     KARSTEN STONE AH: samantha Hutchison qaybta kaalmada adeegyada, waxay idiin hayaan  sunil sawyer ah. So [facility] [phone].    ATENCIÓN: Si habla español, tiene a mcclure disposición servicios gratuitos de asistencia lingüística. Llame al [phone].    We comply with applicable federal civil rights laws and [place] laws. We do not discriminate on the basis of race, color, national origin, age, disability sex, sexual orientation or gender identity.            After Visit Summary       This is your record. Keep this with you and show to your community pharmacist(s) and doctor(s) at your next visit.

## 2017-08-28 NOTE — ED PROVIDER NOTES
"  History     Chief Complaint:  Abdominal Pain    HPI   Thai Mancia is a 47 year old male who presents to the ED for evaluation of abdominal pain. The patient states he has had a \"knot\" on the left side of his abdomen for the past two days. He states his pain is a \"crampy\" feeling and he rates it 6-7/10 here in the ED. It worsens with coughing, deep breathing and going up the stairs. He mentions he has not had a bowel movement in four days. He has been drinking prune juice and taking Miralax, without any resolution of his cramping. He denies any nausea, vomiting, testicular pain or appetite change. He called into a nurse line earlier this morning and was directed to the ED. He expressed concern about bowel obstruction.    Allergies:  Cats and Dogs  Pollen Extract    Medications:    Deltasone  Advair  Albuterol    Past Medical History:    Asthma    Past Surgical History:    Knee Surgery    Family History:    Family history unknown. Patient is adopted.    Social History:  The patient was in the ED alone.  Smoking Status: Never  Smokeless Tobacco: No  Alcohol Use: Yes  Marital Status:    The patient works at [employer].    Review of Systems   Gastrointestinal: Positive for abdominal pain and constipation. Negative for nausea and vomiting.   Genitourinary: Negative for testicular pain.   All other systems reviewed and are negative.    Physical Exam   First Vitals:  BP: 143/76  Temp: 98.6F (37C) Oral  HR: 105  Resp: 18  SpO2: 97%       Physical Exam  Nursing note and vitals reviewed.  Constitutional:  Appears well-developed and well-nourished, moderately uncomfortable.   HENT:   Head:   No evidence of facial or scalp trauma.  Nose:    Nose normal.   Mouth/Throat:  Mucosa is moist.  Eyes:    Conjunctivae are normal.      Pupils are equal, round, and reactive to light.      Right eye exhibits no discharge. Left eye exhibits no discharge.      No scleral icterus.   Cardiovascular:  Normal rate, regular rhythm. "      Normal heart sounds and intact distal pulses.       No murmur heard.  Pulmonary/Chest:  Effort normal and breath sounds normal. No respiratory distress.     No wheezes. No rales. No chest wall tenderness. No stridor.   Abdominal:   Left Lower Quadrant and Left Lateral Quadrant abdominal tenderness.     Soft. No distension and no mass.      No rebound. Mild guarding. No flank pain.  Musculoskeletal:  Normal range of motion.      No edema and no tenderness.                                       Neck supple, no midline cervical tenderness.   Neurological:   Alert and oriented to person, place, and year.      No cranial nerve deficit.      Exhibits normal muscle tone. Coordination normal.      GCS eye subscore is 4. GCS verbal subscore is 5.      GCS motor subscore is 6.   Skin:    Skin is warm and dry. No rash noted. No diaphoresis.      No erythema. No pallor.   Psychiatric:   Behavior is normal. Judgment and thought content normal.       Emergency Department Course     Imaging:  Radiographic findings were communicated with the patient who voiced understanding of the findings.    CT Abdomen Pelvis with Contrast:  IMPRESSION: Acute diverticulitis involving the descending colon at the  left flank. No abscess or free air at this time. When the patient has  improved, consider correlation for colonoscopy to assess for any  underlying colonic lesion.  Preliminary result, per Radiology.    Laboratory:  CBC: WBC 8.7, HGB 15.1,   CMP: Glucose 156(H) o/w WNL (Creat 0.88)    UA: pending    Interventions:  0958 NS Bolus IV    1105 Invanz 1g vial attached to NS 100mL IV bag    Emergency Department Course:  Nursing notes and vitals reviewed.  I performed an exam of the patient as documented above.     1104 I updated the patient about his CT imaging results.    Findings and plan explained to the patient. Patient discharged home with instructions regarding supportive care, medications, and reasons to return. The  importance of close follow-up was reviewed. The patient was prescribed Cipro, Flagyl and Oxycodone.    Impression & Plan      Medical Decision Making:  Thai Mancia is a 47 year old male who presents with left-sided abdominal pain. His CBC and comprehensive metabolic panel are normal other than an elevated glucose of 156.  CT scan of the abdomen and pelvis shows acute diverticulitis involving the descending colon. I talked to the patient about the results and he was given a gram of Invanz IV and will be started on Cipro and Flagyl. I did discuss the risk of Achilles tendon rupture with Cipro with the patient. He can discuss whether or not he needs to have a colonoscopy after he recovers from this infection. He did not want anything for pain here in the emergency room. He did receive a liter of normal saline.    Diagnosis:  (K57.32) Diverticulitis of colon    Disposition:  The patient will be discharged home.  Fluids for the next 24-48 hours, soft bland diet if tolerated. Cipro and Flagyl for 10 days. Tylenol and/or Roxicodone as needed for pain. See your doctor in the clinic in 2 days. If you get worse with high fevers vomiting worsening pain, return to the emergency room.    Discharge Medications:  New Prescriptions    CIPROFLOXACIN (CIPRO) 500 MG TABLET    Take 1 tablet (500 mg) by mouth 2 times daily for 10 days    METRONIDAZOLE (FLAGYL) 500 MG TABLET    Take 1 tablet (500 mg) by mouth 3 times daily for 10 days    OXYCODONE (ROXICODONE) 5 MG IR TABLET    Take 1-2 tablets (5-10 mg) by mouth every 4 hours as needed for moderate to severe pain       8/28/2017    EMERGENCY DEPARTMENT    DOUGLAS, Keyonna Nick, am serving as a scribe at 0940 on August 28, 2017  to document services personally performed by Dr. Campbell, based on my observations and the provider's statements to me.       Amna Campbell MD  08/28/17 [id number]

## 2017-09-01 ENCOUNTER — OFFICE VISIT (OUTPATIENT)
Dept: INTERNAL MEDICINE | Facility: CLINIC | Age: 47
End: 2017-09-01
Payer: COMMERCIAL

## 2017-09-01 VITALS
TEMPERATURE: 98.5 F | WEIGHT: 247 LBS | OXYGEN SATURATION: 97 % | SYSTOLIC BLOOD PRESSURE: 120 MMHG | BODY MASS INDEX: 34.58 KG/M2 | DIASTOLIC BLOOD PRESSURE: 72 MMHG | HEIGHT: 71 IN | HEART RATE: 89 BPM

## 2017-09-01 DIAGNOSIS — Z87.19 H/O DIVERTICULITIS OF COLON: Primary | ICD-10-CM

## 2017-09-01 DIAGNOSIS — Z12.11 SCREEN FOR COLON CANCER: ICD-10-CM

## 2017-09-01 PROCEDURE — 99213 OFFICE O/P EST LOW 20 MIN: CPT | Performed by: INTERNAL MEDICINE

## 2017-09-01 NOTE — NURSING NOTE
"Chief Complaint   Patient presents with     Hospital F/U     FVr 8/28/17 diverticulitis       Initial /72 (BP Location: Left arm, Cuff Size: Adult Large)  Pulse 89  Temp 98.5  F (36.9  C) (Oral)  Ht 5' 11\" (1.803 m)  Wt 247 lb (112 kg)  SpO2 97%  BMI 34.45 kg/m2 Estimated body mass index is 34.45 kg/(m^2) as calculated from the following:    Height as of this encounter: 5' 11\" (1.803 m).    Weight as of this encounter: 247 lb (112 kg).  Medication Reconciliation: complete   Nina Gomez CMA      "

## 2017-09-01 NOTE — MR AVS SNAPSHOT
After Visit Summary   9/1/2017    Thai Mancia    MRN: [id number]           Patient Information     Date Of Birth          1970        Visit Information        Provider Department      9/1/2017 11:00 AM Kelsea Lee MD [facility]        Today's Diagnoses     H/O diverticulitis of colon    -  1    Screen for colon cancer          Care Instructions    Liquid diet until feeling better, usually 3 days  Then have low fiber foods until better    Then slowly introduce fiber again.           Follow-ups after your visit        Additional Services     GASTROENTEROLOGY ADULT REF PROCEDURE ONLY       Last Lab Result: Creatinine (mg/dL)       Date                     Value                 08/28/2017               0.88             ----------  Body mass index is 34.45 kg/(m^2).     Needed:  No  Language:  English    Patient will be contacted to schedule procedure.     Please be aware that coverage of these services is subject to the terms and limitations of your health insurance plan.  Call member services at your health plan with any benefit or coverage questions.  Any procedures must be performed at a [place] facility OR coordinated by your clinic's referral office.    Please bring the following with you to your appointment:    (1) Any X-Rays, CTs or MRIs which have been performed.  Contact the facility where they were done to arrange for  prior to your scheduled appointment.    (2) List of current medications   (3) This referral request   (4) Any documents/labs given to you for this referral                  Who to contact     If you have questions or need follow up information about today's clinic visit or your schedule please contact [facility] directly at [phone].  Normal or non-critical lab and imaging results will be communicated to you by MyChart, letter or phone within 4 business days after the clinic has received the results. If you  "do not hear from us within 7 days, please contact the clinic through Wrike or phone. If you have a critical or abnormal lab result, we will notify you by phone as soon as possible.  Submit refill requests through Wrike or call your pharmacy and they will forward the refill request to us. Please allow 3 business days for your refill to be completed.          Additional Information About Your Visit        Roadster[employer]Jamclouds Information     Wrike gives you secure access to your electronic health record. If you see a primary care provider, you can also send messages to your care team and make appointments. If you have questions, please call your primary care clinic.  If you do not have a primary care provider, please call [phone] and they will assist you.        Care EveryWhere ID     This is your Care EveryWhere ID. This could be used by other organizations to access your [place] medical records  [id number]        Your Vitals Were     Pulse Temperature Height Pulse Oximetry BMI (Body Mass Index)       89 98.5  F (36.9  C) (Oral) 5' 11\" (1.803 m) 97% 34.45 kg/m2        Blood Pressure from Last 3 Encounters:   09/01/17 120/72   08/28/17 143/76   07/28/17 134/88    Weight from Last 3 Encounters:   09/01/17 247 lb (112 kg)   08/28/17 250 lb (113.4 kg)   07/28/17 254 lb 4.8 oz (115.3 kg)              We Performed the Following     GASTROENTEROLOGY ADULT REF PROCEDURE ONLY        Primary Care Provider Office Phone # Fax #    Kelsea Lee [phone]       [address] [facility] [address]        Equal Access to Services     [facility]: Hadii aad ku hadasho Soomaali, waaxda luqadaha, qaybta kaalmada adeegyaelissa, vilma sawyer . So [facility] [phone].    ATENCIÓN: Si habla español, tiene a mcclure disposición servicios gratuitos de asistencia lingüística. Llame al [phone].    We comply with applicable federal civil rights laws and [place] laws. We do not " discriminate on the basis of race, color, national origin, age, disability sex, sexual orientation or gender identity.            Thank you!     Thank you for choosing [facility]  for your care. Our goal is always to provide you with excellent care. Hearing back from our patients is one way we can continue to improve our services. Please take a few minutes to complete the written survey that you may receive in the mail after your visit with us. Thank you!             Your Updated Medication List - Protect others around you: Learn how to safely use, store and throw away your medicines at www.disposemymeds.org.          This list is accurate as of: 9/1/17 11:43 AM.  Always use your most recent med list.                   Brand Name Dispense Instructions for use Diagnosis    albuterol 108 (90 BASE) MCG/ACT Inhaler    PROAIR HFA/PROVENTIL HFA/VENTOLIN HFA    3 Inhaler    Inhale 2 puffs into the lungs every 6 hours as needed for shortness of breath / dyspnea    Mild persistent asthma without complication       ciprofloxacin 500 MG tablet    CIPRO    20 tablet    Take 1 tablet (500 mg) by mouth 2 times daily for 10 days        fluticasone-salmeterol 250-50 MCG/DOSE diskus inhaler    ADVAIR DISKUS    1 Inhaler    Inhale 1 puff into the lungs every 12 hours    Mild persistent asthma without complication       metroNIDAZOLE 500 MG tablet    FLAGYL    30 tablet    Take 1 tablet (500 mg) by mouth 3 times daily for 10 days        oxyCODONE 5 MG IR tablet    ROXICODONE    15 tablet    Take 1-2 tablets (5-10 mg) by mouth every 4 hours as needed for moderate to severe pain

## 2017-09-01 NOTE — PATIENT INSTRUCTIONS
Liquid diet until feeling better, usually 3 days  Then have low fiber foods until better    Then slowly introduce fiber again.

## 2017-09-01 NOTE — PROGRESS NOTES
"  SUBJECTIVE:   Thai Mancia is a 47 year old male who presents to clinic today for the following health issues:      ED/UC Followup:    Facility:  [facility]  Date of visit: 8/28/17  Reason for visit: diverticulitis  Current Status: BM not normal yet       Diverticultis. The patient reports that he had LLQ pain.  Low grade fever and chills.  He had diarrhea.   CT scan revealed diverticulitis.   He has been on the antibiotics for 4 days.  The diarrhea slowed down slightly. No fever or chills.  He is having a liquid diet.   He is taking both antibiotics- cipro and flagyll.          Problem list and histories reviewed & adjusted, as indicated.    Reviewed and updated as needed this visit by clinical staff  Allergies  Meds       Reviewed and updated as needed this visit by Provider         ROS:  C: NEGATIVE for fever, chills, change in weight  R: NEGATIVE for significant cough or SOB  CV: NEGATIVE for chest pain, palpitations or peripheral edema  GI: see above    OBJECTIVE:     /72 (BP Location: Left arm, Cuff Size: Adult Large)  Pulse 89  Temp 98.5  F (36.9  C) (Oral)  Ht 5' 11\" (1.803 m)  Wt 247 lb (112 kg)  SpO2 97%  BMI 34.45 kg/m2  Body mass index is 34.45 kg/(m^2).  GENERAL: healthy, alert and no distress  RESP: lungs clear to auscultation - no rales, rhonchi or wheezes  CV: regular rate and rhythm, normal S1 S2, no S3 or S4, no murmur, click or rub, no peripheral edema and peripheral pulses strong  GI: soft, nontender, normal bowel sounds    ASSESSMENT/PLAN:     (Z87.19) H/O diverticulitis of colon  (primary encounter diagnosis)  Comment:   Plan: GASTROENTEROLOGY ADULT REF PROCEDURE ONLY        -continue on liquid diet to low fiber diet; continue on antibiotics  Colonoscopy 2 months after symptoms resolved    (Z12.11) Screen for colon cancer  Comment: he is adopted- no family history  Plan: GASTROENTEROLOGY ADULT REF PROCEDURE ONLY                Kelsea Lee MD  [facility] " [place]

## 2017-09-05 ENCOUNTER — TELEPHONE (OUTPATIENT)
Dept: INTERNAL MEDICINE | Facility: CLINIC | Age: 47
End: 2017-09-05

## 2017-09-05 NOTE — TELEPHONE ENCOUNTER
Pt calls. He is being treated for diverticulitis with Cipro and Flagyl. Pt states abdominal pain and pressure is gone, but stools are cycling between constipation and diarrhea. He is taking Miralax to help with constipation, but has not had a formed stool for several weeks. He did have chills on Sunday, but this has resolved. Pt states that both medications upset his stomach when he takes them, states that symptoms aren't as bad if he spaces them out a little. He has eaten a few things that might be higher in fiber, but mostly trying to follow low residue diet. Pt asks if this could be side effect from medications and if he should wait until he is done to see if symptoms improve. Sent to provider to review.

## 2017-09-05 NOTE — TELEPHONE ENCOUNTER
Recommend that he complete the antibiotics.    If he still has diarrhea after the antibiotics are done, could check for C. difficile

## 2017-10-03 ENCOUNTER — OFFICE VISIT (OUTPATIENT)
Dept: INTERNAL MEDICINE | Facility: CLINIC | Age: 47
End: 2017-10-03
Payer: COMMERCIAL

## 2017-10-03 VITALS
TEMPERATURE: 98.8 F | DIASTOLIC BLOOD PRESSURE: 86 MMHG | HEIGHT: 70 IN | WEIGHT: 245 LBS | BODY MASS INDEX: 35.07 KG/M2 | OXYGEN SATURATION: 97 % | SYSTOLIC BLOOD PRESSURE: 140 MMHG | HEART RATE: 77 BPM

## 2017-10-03 DIAGNOSIS — J20.9 MILD PERSISTENT ASTHMA WITH ACUTE BRONCHITIS AND ACUTE EXACERBATION: Primary | ICD-10-CM

## 2017-10-03 DIAGNOSIS — J45.31 MILD PERSISTENT ASTHMA WITH ACUTE BRONCHITIS AND ACUTE EXACERBATION: Primary | ICD-10-CM

## 2017-10-03 PROCEDURE — 99214 OFFICE O/P EST MOD 30 MIN: CPT | Performed by: FAMILY MEDICINE

## 2017-10-03 RX ORDER — PREDNISONE 20 MG/1
TABLET ORAL
Qty: 18 TABLET | Refills: 0 | Status: SHIPPED | OUTPATIENT
Start: 2017-10-03 | End: 2018-02-06

## 2017-10-03 RX ORDER — AZITHROMYCIN 500 MG/1
500 TABLET, FILM COATED ORAL DAILY
Qty: 5 TABLET | Refills: 0 | Status: SHIPPED | OUTPATIENT
Start: 2017-10-03 | End: 2017-10-08

## 2017-10-03 NOTE — NURSING NOTE
"Chief Complaint   Patient presents with     URI   Symptoms  x 3 weeks , congestion now in chest , some difficulty with breathing , no noted fevers     Initial /86  Pulse 77  Temp 98.8  F (37.1  C) (Oral)  Ht 5' 10\" (1.778 m)  Wt 245 lb (111.1 kg)  SpO2 97%  BMI 35.15 kg/m2 Estimated body mass index is 35.15 kg/(m^2) as calculated from the following:    Height as of this encounter: 5' 10\" (1.778 m).    Weight as of this encounter: 245 lb (111.1 kg).  Medication Reconciliation: complete    "

## 2017-10-03 NOTE — MR AVS SNAPSHOT
"              After Visit Summary   10/3/2017    Thai Mancia    MRN: [id number]           Patient Information     Date Of Birth          1970        Visit Information        Provider Department      10/3/2017 4:00 PM [facility]        Today's Diagnoses     Mild persistent asthma with acute bronchitis and acute exacerbation    -  1      Care Instructions      Take prescribed medication as directed.    Use Albuterol Inhaler 4 times daily.          Follow-ups after your visit        Who to contact     If you have questions or need follow up information about today's clinic visit or your schedule please contact [facility] directly at [phone].  Normal or non-critical lab and imaging results will be communicated to you by MyChart, letter or phone within 4 business days after the clinic has received the results. If you do not hear from us within 7 days, please contact the clinic through [employer] or phone. If you have a critical or abnormal lab result, we will notify you by phone as soon as possible.  Submit refill requests through Reissued or call your pharmacy and they will forward the refill request to us. Please allow 3 business days for your refill to be completed.          Additional Information About Your Visit        MyChart Information     Reissued gives you secure access to your electronic health record. If you see a primary care provider, you can also send messages to your care team and make appointments. If you have questions, please call your primary care clinic.  If you do not have a primary care provider, please call [phone] and they will assist you.        Care EveryWhere ID     This is your Care EveryWhere ID. This could be used by other organizations to access your [place] medical records  [id number]        Your Vitals Were     Pulse Temperature Height Pulse Oximetry BMI (Body Mass Index)       77 98.8  F (37.1  C) (Oral) 5' 10\" " (1.778 m) 97% 35.15 kg/m2        Blood Pressure from Last 3 Encounters:   10/03/17 140/86   09/01/17 120/72   08/28/17 143/76    Weight from Last 3 Encounters:   10/03/17 245 lb (111.1 kg)   09/01/17 247 lb (112 kg)   08/28/17 250 lb (113.4 kg)              Today, you had the following     No orders found for display         Today's Medication Changes          These changes are accurate as of: 10/3/17  4:14 PM.  If you have any questions, ask your nurse or doctor.               Start taking these medicines.        Dose/Directions    azithromycin 500 MG tablet   Commonly known as:  ZITHROMAX   Used for:  Mild persistent asthma with acute bronchitis and acute exacerbation   Started by:  Travis Bee MD        Dose:  500 mg   Take 1 tablet (500 mg) by mouth daily for 5 days   Quantity:  5 tablet   Refills:  0       predniSONE 20 MG tablet   Commonly known as:  DELTASONE   Used for:  Mild persistent asthma with acute bronchitis and acute exacerbation   Started by:  Travis Bee MD        Take 3 daily for 3 days, then 2 daily for 3 days, then 1 daily for 3 days.   Quantity:  18 tablet   Refills:  0         Stop taking these medicines if you haven't already. Please contact your care team if you have questions.     oxyCODONE 5 MG IR tablet   Commonly known as:  ROXICODONE   Stopped by:  Travis Bee MD                Where to get your medicines      These medications were sent to [facility] [place], [place] - [facility] [address]  [facility] [address] [address]     Phone:  [phone]     azithromycin 500 MG tablet    predniSONE 20 MG tablet                Primary Care Provider Office Phone # Fax #    Kelsea Lee [phone]       [address]  [facility] [address]        Equal Access to Services     [facility]: Paola Salter, waaxda luqadaha, qaybta kaalmada adepatricioyada, vilma coker. So [employer]  [phone].    ATENCIÓN: Si alvina schultz, tiene a mcclure disposición servicios gratuitos de asistencia lingüística. Gale patterson [phone].    We comply with applicable federal civil rights laws and [place] laws. We do not discriminate on the basis of race, color, national origin, age, disability, sex, sexual orientation, or gender identity.            Thank you!     Thank you for choosing [facility]  for your care. Our goal is always to provide you with excellent care. Hearing back from our patients is one way we can continue to improve our services. Please take a few minutes to complete the written survey that you may receive in the mail after your visit with us. Thank you!             Your Updated Medication List - Protect others around you: Learn how to safely use, store and throw away your medicines at www.disposemymeds.org.          This list is accurate as of: 10/3/17  4:14 PM.  Always use your most recent med list.                   Brand Name Dispense Instructions for use Diagnosis    albuterol 108 (90 BASE) MCG/ACT Inhaler    PROAIR HFA/PROVENTIL HFA/VENTOLIN HFA    3 Inhaler    Inhale 2 puffs into the lungs every 6 hours as needed for shortness of breath / dyspnea    Mild persistent asthma without complication       azithromycin 500 MG tablet    ZITHROMAX    5 tablet    Take 1 tablet (500 mg) by mouth daily for 5 days    Mild persistent asthma with acute bronchitis and acute exacerbation       fluticasone-salmeterol 250-50 MCG/DOSE diskus inhaler    ADVAIR DISKUS    1 Inhaler    Inhale 1 puff into the lungs every 12 hours    Mild persistent asthma without complication       predniSONE 20 MG tablet    DELTASONE    18 tablet    Take 3 daily for 3 days, then 2 daily for 3 days, then 1 daily for 3 days.    Mild persistent asthma with acute bronchitis and acute exacerbation

## 2017-10-04 NOTE — PROGRESS NOTES
"CHIEF COMPLAINT    Congestion      HISTORY    He has cough for over a week. Using his inhalers prn. He has h/o persistent asthma. SX began as URI. Now has cough with sputum. Some head congestion.    Non smoker.    Patient Active Problem List   Diagnosis     CARDIOVASCULAR SCREENING; LDL GOAL LESS THAN 160     Mild persistent asthma without complication     Obesity due to excess calories, unspecified obesity severity       REVIEW OF SYSTEMS    No fever  No CP  Mild SOB  No nausea  No edema      Past Medical History:   Diagnosis Date     Asthma        EXAM  /86  Pulse 77  Temp 98.8  F (37.1  C) (Oral)  Ht 5' 10\" (1.778 m)  Wt 245 lb (111.1 kg)  SpO2 97%  BMI 35.15 kg/m2    NAD, sat normal  Phar: neg  Neck: neg  Chest: moderate exp and insp wheezes  CV RSR w/o murmur  No edema        (J20.9,  J45.31) Mild persistent asthma with acute bronchitis and acute exacerbation  (primary encounter diagnosis)  Comment:   Plan: azithromycin (ZITHROMAX) 500 MG tablet,         predniSONE (DELTASONE) 20 MG tablet            Take prescribed medication as directed.    Use Albuterol Inhaler 4 times daily.      "

## 2018-02-06 ENCOUNTER — OFFICE VISIT (OUTPATIENT)
Dept: INTERNAL MEDICINE | Facility: CLINIC | Age: 48
End: 2018-02-06
Payer: COMMERCIAL

## 2018-02-06 VITALS
HEART RATE: 78 BPM | SYSTOLIC BLOOD PRESSURE: 128 MMHG | TEMPERATURE: 98 F | BODY MASS INDEX: 34.07 KG/M2 | WEIGHT: 238 LBS | HEIGHT: 70 IN | OXYGEN SATURATION: 96 % | DIASTOLIC BLOOD PRESSURE: 80 MMHG

## 2018-02-06 DIAGNOSIS — G89.29 CHRONIC RIGHT SHOULDER PAIN: ICD-10-CM

## 2018-02-06 DIAGNOSIS — M25.511 CHRONIC RIGHT SHOULDER PAIN: ICD-10-CM

## 2018-02-06 DIAGNOSIS — J45.30 MILD PERSISTENT ASTHMA WITHOUT COMPLICATION: Primary | ICD-10-CM

## 2018-02-06 PROCEDURE — 99213 OFFICE O/P EST LOW 20 MIN: CPT | Performed by: INTERNAL MEDICINE

## 2018-02-06 RX ORDER — ALBUTEROL SULFATE 90 UG/1
2 AEROSOL, METERED RESPIRATORY (INHALATION) EVERY 6 HOURS PRN
Qty: 3 INHALER | Refills: 3 | Status: SHIPPED | OUTPATIENT
Start: 2018-02-06 | End: 2019-03-04

## 2018-02-06 NOTE — MR AVS SNAPSHOT
After Visit Summary   2/6/2018    Thai Mancia    MRN: [id number]           Patient Information     Date Of Birth          1970        Visit Information        Provider Department      2/6/2018 2:00 PM Kelsea Lee MD [facility]        Today's Diagnoses     Chronic right shoulder pain    -  1    Mild persistent asthma without complication          Care Instructions    Glucosamine or fish oil or tumeric  Vitamin D 1000 IU daily          Follow-ups after your visit        Additional Services     ORTHO  REFERRAL       [facility] is referring you to the Orthopedic  Services at [facility].       The  Representative will assist you in the coordination of your Orthopedic and Musculoskeletal Care as prescribed by your physician.    The  Representative will call you within 1 business day to help schedule your appointment, or you may contact the  Representative at:    All areas ~ [phone]     Type of Referral : Non Surgical       Timeframe requested: Routine    Coverage of these services is subject to the terms and limitations of your health insurance plan.  Please call member services at your health plan with any benefit or coverage questions.      If X-rays, CT or MRI's have been performed, please contact the facility where they were done to arrange for , prior to your scheduled appointment.  Please bring this referral request to your appointment and present it to your specialist.                  Who to contact     If you have questions or need follow up information about today's clinic visit or your schedule please contact [facility] directly at [phone].  Normal or non-critical lab and imaging results will be communicated to you by MyChart, letter or phone within 4 business days after the clinic has received the results. If you do not hear from us within 7  "days, please contact the clinic through Piper or phone. If you have a critical or abnormal lab result, we will notify you by phone as soon as possible.  Submit refill requests through Piper or call your pharmacy and they will forward the refill request to us. Please allow 3 business days for your refill to be completed.          Additional Information About Your Visit        W[employer]ch[employer]azr Information     Piper gives you secure access to your electronic health record. If you see a primary care provider, you can also send messages to your care team and make appointments. If you have questions, please call your primary care clinic.  If you do not have a primary care provider, please call [phone] and they will assist you.        Care EveryWhere ID     This is your Care EveryWhere ID. This could be used by other organizations to access your [place] medical records  [id number]        Your Vitals Were     Pulse Temperature Height Pulse Oximetry BMI (Body Mass Index)       78 98  F (36.7  C) (Oral) 5' 10\" (1.778 m) 96% 34.15 kg/m2        Blood Pressure from Last 3 Encounters:   02/06/18 128/80   10/03/17 140/86   09/01/17 120/72    Weight from Last 3 Encounters:   02/06/18 238 lb (108 kg)   10/03/17 245 lb (111.1 kg)   09/01/17 247 lb (112 kg)              We Performed the Following     ORTHO  REFERRAL          Where to get your medicines      These medications were sent to [facility] [address], [address]  [address], [facility] [address], [facility] [address]     Phone:  [phone]     albuterol 108 (90 BASE) MCG/ACT Inhaler    fluticasone-salmeterol 250-50 MCG/DOSE diskus inhaler          Primary Care Provider Office Phone # Fax #    Kelsea Lee [phone]       [address]  [facility] [address]        Equal Access to Services     [facility]: Hadii oswaldo durand hadasho Soomaali, waaxda luqadaha, qaybta kaalmada adanegyada, vilma gonzalez " amy watsonnicole lalonnierenato ah. So [facility] [phone].    ATENCIÓN: Si habla marion, tiene a mcclure disposición servicios gratuitos de asistencia lingüística. Gale al [phone].    We comply with applicable federal civil rights laws and [place] laws. We do not discriminate on the basis of race, color, national origin, age, disability, sex, sexual orientation, or gender identity.            Thank you!     Thank you for choosing [facility]  for your care. Our goal is always to provide you with excellent care. Hearing back from our patients is one way we can continue to improve our services. Please take a few minutes to complete the written survey that you may receive in the mail after your visit with us. Thank you!             Your Updated Medication List - Protect others around you: Learn how to safely use, store and throw away your medicines at www.disposemymeds.org.          This list is accurate as of 2/6/18  2:47 PM.  Always use your most recent med list.                   Brand Name Dispense Instructions for use Diagnosis    albuterol 108 (90 BASE) MCG/ACT Inhaler    PROAIR HFA/PROVENTIL HFA/VENTOLIN HFA    3 Inhaler    Inhale 2 puffs into the lungs every 6 hours as needed for shortness of breath / dyspnea    Mild persistent asthma without complication       fluticasone-salmeterol 250-50 MCG/DOSE diskus inhaler    ADVAIR DISKUS    1 Inhaler    Inhale 1 puff into the lungs every 12 hours    Mild persistent asthma without complication

## 2018-02-06 NOTE — NURSING NOTE
"Chief Complaint   Patient presents with     Forms     FMLA-have done before-for asthma       Initial /80 (BP Location: Right arm, Cuff Size: Adult Large)  Pulse 78  Temp 98  F (36.7  C) (Oral)  Ht 5' 10\" (1.778 m)  Wt 238 lb (108 kg)  SpO2 96%  BMI 34.15 kg/m2 Estimated body mass index is 34.15 kg/(m^2) as calculated from the following:    Height as of this encounter: 5' 10\" (1.778 m).    Weight as of this encounter: 238 lb (108 kg).  Medication Reconciliation: complete   Nina Gomez CMA      "

## 2018-02-06 NOTE — PROGRESS NOTES
"  SUBJECTIVE:   Thai Mancia is a 47 year old male who presents to clinic today for the following health issues:      Asthma [id number]-[id number] forms for asthma    Was ACT completed today?    Yes    ACT Total Scores 7/31/2017   ACT TOTAL SCORE -   ASTHMA ER VISITS -   ASTHMA HOSPITALIZATIONS -   ACT TOTAL SCORE (Goal Greater than or Equal to 20) 21   In the past 12 months, how many times did you visit the emergency room for your asthma without being admitted to the hospital? 0   In the past 12 months, how many times were you hospitalized overnight because of your asthma? 0       Recent asthma triggers that patient is dealing with: cold air and allergens      Right shoulder pain.  Patient continues to have right shoulder pain.  The patient had an xray revealing AC joint arthritis.   Patient reports that sometimes he feels like his joints are achy everywhere.         Amount of exercise or physical activity: through work    Problems taking medications regularly: No    Medication side effects: none    Diet: regular (no restrictions)            Problem list and histories reviewed & adjusted, as indicated.  Additional history: as documented    Labs reviewed in EPIC    Reviewed and updated as needed this visit by clinical staff  Tobacco  Allergies  Meds  Med Hx  Surg Hx  Fam Hx  Soc Hx      Reviewed and updated as needed this visit by Provider         ROS:  C: NEGATIVE for fever, chills, change in weight  E/M: NEGATIVE for ear, mouth and throat problems  R: NEGATIVE for significant cough or SOB  CV: NEGATIVE for chest pain, palpitations or peripheral edema  MSK: right shoulder pain    OBJECTIVE:     /80 (BP Location: Right arm, Cuff Size: Adult Large)  Pulse 78  Temp 98  F (36.7  C) (Oral)  Ht 5' 10\" (1.778 m)  Wt 238 lb (108 kg)  SpO2 96%  BMI 34.15 kg/m2  Body mass index is 34.15 kg/(m^2).     GENERAL: healthy, alert and no distress  RESP: lungs clear to auscultation - no rales, rhonchi or wheezes  CV: " regular rate and rhythm, normal S1 S2, no S3 or S4, no murmur, click or rub, no peripheral edema and peripheral pulses strong      ASSESSMENT/PLAN:       (J45.30) Mild persistent asthma without complication  (primary encounter diagnosis)  Comment: stable  Plan: fluticasone-salmeterol (ADVAIR DISKUS) 250-50         MCG/DOSE diskus inhaler, albuterol (PROAIR         HFA/PROVENTIL HFA/VENTOLIN HFA) 108 (90 BASE)         MCG/ACT Inhaler            (M25.511,  G89.29) Chronic right shoulder pain  Comment:continues to cause pain  Plan: ORTHO  REFERRAL         Joint ache. Patient could trial vitamin D. Also can trial glucosamine and/or fish oil.    Patient due for an annual physical.  Recommend he follow up within 6 months for a physical exam with fasting labs.      Kelsea Lee MD  [facility]

## 2018-02-20 ENCOUNTER — E-VISIT (OUTPATIENT)
Dept: INTERNAL MEDICINE | Facility: CLINIC | Age: 48
End: 2018-02-20
Payer: COMMERCIAL

## 2018-02-20 ENCOUNTER — TELEPHONE (OUTPATIENT)
Dept: INTERNAL MEDICINE | Facility: CLINIC | Age: 48
End: 2018-02-20

## 2018-02-20 DIAGNOSIS — H10.32 ACUTE BACTERIAL CONJUNCTIVITIS OF LEFT EYE: Primary | ICD-10-CM

## 2018-02-20 PROCEDURE — 99444 ZZC PHYSICIAN ONLINE EVALUATION & MANAGEMENT SERVICE: CPT | Performed by: INTERNAL MEDICINE

## 2018-02-20 RX ORDER — POLYMYXIN B SULFATE AND TRIMETHOPRIM 1; 10000 MG/ML; [USP'U]/ML
1 SOLUTION OPHTHALMIC
Qty: 1 BOTTLE | Refills: 0 | Status: SHIPPED | OUTPATIENT
Start: 2018-02-20 | End: 2018-02-27

## 2018-02-20 NOTE — TELEPHONE ENCOUNTER
RN Conjunctivitis Protocol: Ages 2 and older  Thai Mancia is a 47 year old male is having symptoms reviewed for possible conjunctivitis.    NURSING ASSESSMENT:  Conjunctival symptoms: redness, mattering (yellow/green) and burning   Location: left eye  Onset: 1 day ago  In addition notes: None  Contact Lens use?: No  Complicating factors: none  Denies:Sensitivity to light, Severe eye pain   Allergy to Sulfa?  No     NURSING PLAN: Patient meets virtual visit criteria:  Yes,  Advised patient to schedule for eVisit via [employer].    EDUCATION:  Education regarding contact precautions, hand washing, avoid wearing contacts until finished with drops or until symptoms resolve, contact clinic if there is no improvement of symptoms within 3 days and if develops eye pain or sensitivity to light.     RECOMMENDED DISPOSITION:  eVisit (through MyChart)  Will comply with recommendation: Yes  Nurse Triage     Nadeen Monet RN

## 2018-07-30 ENCOUNTER — HOSPITAL ENCOUNTER (EMERGENCY)
Facility: CLINIC | Age: 48
Discharge: HOME | End: 2018-07-30
Attending: NURSE PRACTITIONER | Admitting: NURSE PRACTITIONER
Payer: COMMERCIAL

## 2018-07-30 ENCOUNTER — APPOINTMENT (OUTPATIENT)
Dept: GENERAL RADIOLOGY | Facility: CLINIC | Age: 48
End: 2018-07-30
Attending: NURSE PRACTITIONER
Payer: COMMERCIAL

## 2018-07-30 ENCOUNTER — OFFICE VISIT (OUTPATIENT)
Dept: URGENT CARE | Facility: URGENT CARE | Age: 48
End: 2018-07-30
Payer: COMMERCIAL

## 2018-07-30 VITALS
TEMPERATURE: 98.5 F | DIASTOLIC BLOOD PRESSURE: 111 MMHG | HEIGHT: 71 IN | HEART RATE: 86 BPM | SYSTOLIC BLOOD PRESSURE: 189 MMHG | WEIGHT: 245 LBS | BODY MASS INDEX: 34.3 KG/M2 | OXYGEN SATURATION: 98 % | RESPIRATION RATE: 16 BRPM

## 2018-07-30 VITALS
OXYGEN SATURATION: 97 % | HEART RATE: 82 BPM | RESPIRATION RATE: 12 BRPM | DIASTOLIC BLOOD PRESSURE: 90 MMHG | TEMPERATURE: 97.5 F | WEIGHT: 249.9 LBS | BODY MASS INDEX: 35.86 KG/M2 | SYSTOLIC BLOOD PRESSURE: 145 MMHG

## 2018-07-30 DIAGNOSIS — S61.211A LACERATION OF LEFT INDEX FINGER WITHOUT FOREIGN BODY WITHOUT DAMAGE TO NAIL, INITIAL ENCOUNTER: ICD-10-CM

## 2018-07-30 DIAGNOSIS — S61.219A LACERATION OF FINGER WITHOUT FOREIGN BODY WITHOUT DAMAGE TO NAIL, UNSPECIFIED FINGER, UNSPECIFIED LATERALITY, INITIAL ENCOUNTER: Primary | ICD-10-CM

## 2018-07-30 PROCEDURE — 12002 RPR S/N/AX/GEN/TRNK2.6-7.5CM: CPT

## 2018-07-30 PROCEDURE — 73140 X-RAY EXAM OF FINGER(S): CPT | Mod: LT

## 2018-07-30 PROCEDURE — 25000128 H RX IP 250 OP 636: Performed by: NURSE PRACTITIONER

## 2018-07-30 PROCEDURE — 90471 IMMUNIZATION ADMIN: CPT

## 2018-07-30 PROCEDURE — 90715 TDAP VACCINE 7 YRS/> IM: CPT | Performed by: NURSE PRACTITIONER

## 2018-07-30 PROCEDURE — 99283 EMERGENCY DEPT VISIT LOW MDM: CPT | Mod: 25

## 2018-07-30 RX ORDER — CEPHALEXIN 500 MG/1
500 CAPSULE ORAL 4 TIMES DAILY
Qty: 28 CAPSULE | Refills: 0 | Status: SHIPPED | OUTPATIENT
Start: 2018-07-30 | End: 2018-08-06

## 2018-07-30 RX ADMIN — CLOSTRIDIUM TETANI TOXOID ANTIGEN (FORMALDEHYDE INACTIVATED), CORYNEBACTERIUM DIPHTHERIAE TOXOID ANTIGEN (FORMALDEHYDE INACTIVATED), BORDETELLA PERTUSSIS TOXOID ANTIGEN (GLUTARALDEHYDE INACTIVATED), BORDETELLA PERTUSSIS FILAMENTOUS HEMAGGLUTININ ANTIGEN (FORMALDEHYDE INACTIVATED), BORDETELLA PERTUSSIS PERTACTIN ANTIGEN, AND BORDETELLA PERTUSSIS FIMBRIAE 2/3 ANTIGEN 0.5 ML: 5; 2; 2.5; 5; 3; 5 INJECTION, SUSPENSION INTRAMUSCULAR at 20:45

## 2018-07-30 ASSESSMENT — ENCOUNTER SYMPTOMS
WOUND: 1
NUMBNESS: 0

## 2018-07-30 NOTE — ED AVS SNAPSHOT
[facility] Emergency Department    [address]    [facility] [address]    Phone:  [phone]    Fax:  [phone]                                       Thai Mancia   MRN: [id number]    Department:  [facility] Emergency Department   Date of Visit:  7/30/2018           After Visit Summary Signature Page     I have received my discharge instructions, and my questions have been answered. I have discussed any challenges I see with this plan with the nurse or doctor.    ..........................................................................................................................................  Patient/Patient Representative Signature      ..........................................................................................................................................  Patient Representative Print Name and Relationship to Patient    ..................................................               ................................................  Date                                            Time    ..........................................................................................................................................  Reviewed by Signature/Title    ...................................................              ..............................................  Date                                                            Time

## 2018-07-30 NOTE — MR AVS SNAPSHOT
After Visit Summary   7/30/2018    Thai Mancia    MRN: [id number]           Patient Information     Date Of Birth          1970        Visit Information        Provider Department      7/30/2018 6:10 PM Provider, Nancy Celis MD [facility]        Today's Diagnoses     Laceration of finger without foreign body without damage to nail, unspecified finger, unspecified laterality, initial encounter    -  1       Follow-ups after your visit        Who to contact     If you have questions or need follow up information about today's clinic visit or your schedule please contact [facility] directly at [phone].  Normal or non-critical lab and imaging results will be communicated to you by MyChart, letter or phone within 4 business days after the clinic has received the results. If you do not hear from us within 7 days, please contact the clinic through [employer] or phone. If you have a critical or abnormal lab result, we will notify you by phone as soon as possible.  Submit refill requests through [email or web address] or call your pharmacy and they will forward the refill request to us. Please allow 3 business days for your refill to be completed.          Additional Information About Your Visit        MyChart Information     [email or web address] gives you secure access to your electronic health record. If you see a primary care provider, you can also send messages to your care team and make appointments. If you have questions, please call your primary care clinic.  If you do not have a primary care provider, please call [phone] and they will assist you.        Care EveryWhere ID     This is your Care EveryWhere ID. This could be used by other organizations to access your [facility] medical records  [id number]        Your Vitals Were     Pulse Temperature Respirations Pulse Oximetry BMI (Body Mass Index)       82 97.5  F (36.4  C) (Oral) 12 97% 35.86 kg/m2         Blood Pressure from Last 3 Encounters:   07/30/18 145/90   02/06/18 128/80   10/03/17 140/86    Weight from Last 3 Encounters:   07/30/18 249 lb 14.4 oz (113.4 kg)   02/06/18 238 lb (108 kg)   10/03/17 245 lb (111.1 kg)              Today, you had the following     No orders found for display       Primary Care Provider Office Phone # Fax #    Kelsea Lee [phone]       [address] [facility] [address]        Equal Access to Services     [facility]: Hadii aad ku hadasho Soomaali, waaxda luqadaha, qaybta kaalmada adeegyada, vilma sawyer . So [facility] [phone].    ATENCIÓN: Si habla español, tiene a mcclure disposición servicios gratuitos de asistencia lingüística. [facility] [phone].    We comply with applicable federal civil rights laws and [place] laws. We do not discriminate on the basis of race, color, national origin, age, disability, sex, sexual orientation, or gender identity.            Thank you!     Thank you for choosing [facility]  for your care. Our goal is always to provide you with excellent care. Hearing back from our patients is one way we can continue to improve our services. Please take a few minutes to complete the written survey that you may receive in the mail after your visit with us. Thank you!             Your Updated Medication List - Protect others around you: Learn how to safely use, store and throw away your medicines at www.disposemymeds.org.          This list is accurate as of 7/30/18  6:38 PM.  Always use your most recent med list.                   Brand Name Dispense Instructions for use Diagnosis    albuterol 108 (90 Base) MCG/ACT Inhaler    PROAIR HFA/PROVENTIL HFA/VENTOLIN HFA    3 Inhaler    Inhale 2 puffs into the lungs every 6 hours as needed for shortness of breath / dyspnea    Mild persistent asthma without complication       fluticasone-salmeterol 250-50 MCG/DOSE diskus  inhaler    ADVAIR DISKUS    1 Inhaler    Inhale 1 puff into the lungs every 12 hours    Mild persistent asthma without complication

## 2018-07-30 NOTE — ED AVS SNAPSHOT
[facility] Emergency Department    [address]    [facility] [address]    Phone:  [phone]    Fax:  [phone]                                       Thai Mancia   MRN: [id number]    Department:  [facility] Emergency Department   Date of Visit:  7/30/2018           Patient Information     Date Of Birth          1970        Your diagnoses for this visit were:     Laceration of left index finger without foreign body without damage to nail, initial encounter        You were seen by Addie Szymanski APRN CNP.      Follow-up Information     Follow up with Hand Trauma MD.    Why:  Call if you have not heard from them by tomorrow afternoon    Contact information:    [phone]        Follow up with [facility] Emergency Department.    Specialty:  EMERGENCY MEDICINE    Why:  As needed, If symptoms worsen    Contact information:    [address]  [facility] [id number] [phone]        Discharge Instructions       Discharge Instructions  Laceration (Cut)    You were seen today for a laceration (cut).  Your provider examined your laceration for any problems such a buried foreign body (like glass, a splinter, or gravel), or injury to blood vessels, tendons, and nerves.  Your provider may have also rinsed and/or scrubbed your laceration to help prevent an infection. It may not be possible to find all problems with your laceration on the first visit; occasionally foreign bodies or a tendon injury can go undetected.    Your laceration may have been closed in one of several ways:    No closure: many wounds will heal just fine without closure.    Stitches: regular stitches that require removal.    Staples: skin staples are often used in the scalp/head.    Wound adhesive (glue): skin glue can be used for certain lacerations and doesn t require removal.    Wound strips (aka Butterfly bandages or steri-strips): these are bandages that help to  close a wound.    Absorbable stitches:  dissolving  stitches that go away on their own and usually don t require removal.    A small percentage of wounds will develop an infection regardless of how well the wound is cared for. Antibiotics are generally not indicated to prevent an infection so are only given for a small number of high-risk wounds. Some lacerations are too high risk to close, and are left open to heal because closure can increase the likelihood that an infection will develop.    Remember that all lacerations, no matter how expertly repaired, will cause scarring. We consider many factors, techniques, and materials, in our efforts to provide the best possible cosmetic outcome.    Generally, every Emergency Department visit should have a follow-up clinic visit with either a primary or a specialty clinic/provider. Please follow-up as instructed by your emergency provider today.     Return to the Emergency Department right away if:    You have more redness, swelling, pain, drainage (pus), a bad smell, or red streaking from your laceration as these symptoms could indicate an infection.    You have a fever of 100.4 F or more.    You have bleeding that you cannot stop at home. If your cut starts to bleed, hold pressure on the bleeding area with a clean cloth or put pressure over the bandage.  If the bleeding does not stop after using constant pressure for 30 minutes, you should return to the Emergency Department for further treatment.    An area past the laceration is cool, pale, or blue compared with the other side, or has a slower return of color when squeezed.    Your dressing seems too tight or starts to get uncomfortable or painful. For children, signs of a problem might be irritability or restlessness.    You have loss of normal function or use of an area, such as being unable to straighten or bend a finger normally.    You have a numb area past the laceration.    Return to the Emergency Department or  see your regular provider if:    The laceration starts to come open.     You have something coming out of the cut or a feeling that there is something in the laceration.    Your wound will not heal, or keeps breaking open. There can always be glass, wood, dirt or other things in any wound.  They will not always show up, even on x-rays.  If a wound does not heal, this may be why, and it is important to follow-up with your regular provider.    Home Care:    Take your dressing off in 12-24 hours, or as instructed by your provider, to check your laceration. Remove the dressing sooner if it seems too tight or painful, or if it is getting numb, tingly, or pale past the dressing.    Gently wash your laceration 1-2 times daily with clean water and mild soap. It is okay to shower or run clean water over the laceration, but do not let the laceration soak in water (no swimming).    If your laceration was closed with wound adhesive or strips: pat it dry and leave it open to the air. For all other repairs: after you wash your laceration, or at least 2 times a day, apply antibiotic ointment (such as Neosporin  or Bacitracin ) to the laceration, then cover it with a Band-Aid  or gauze.    Keep the laceration clean. Wear gloves or other protective clothing if you are around dirt.    Follow-up for removal:    If your wound was closed with staples or regular stitches, they need to be removed according to the instructions and timeline specified by your provider today.    If your wound was closed with absorbable ( dissolving ) sutures, they should fall out, dissolve, or not be visible in about one week. If they are still visible, then they should be removed according to the instructions and timeline specified by your provider today.    Scars:  To help minimize scarring:    Wear sunscreen over the healed laceration when out in the sun.    Massage the area regularly once healed.    You may apply Vitamin E to the healed wound.    Wait.  Scars improve in appearance over months and years.    If you were given a prescription for medicine here today, be sure to read all of the information (including the package insert) that comes with your prescription.  This will include important information about the medicine, its side effects, and any warnings that you need to know about.  The pharmacist who fills the prescription can provide more information and answer questions you may have about the medicine.  If you have questions or concerns that the pharmacist cannot address, please call or return to the Emergency Department.       Remember that you can always come back to the Emergency Department if you are not able to see your regular provider in the amount of time listed above, if you get any new symptoms, or if there is anything that worries you.      24 Hour Appointment Hotline       To make an appointment at any [facility], call [phone] ([phone]). If you don't have a family doctor or clinic, we will help you find one. [facility] are conveniently located to serve the needs of you and your family.             Review of your medicines      START taking        Dose / Directions Last dose taken    cephALEXin 500 MG capsule   Commonly known as:  KEFLEX   Dose:  500 mg   Quantity:  28 capsule        Take 1 capsule (500 mg) by mouth 4 times daily for 7 days   Refills:  0          Our records show that you are taking the medicines listed below. If these are incorrect, please call your family doctor or clinic.        Dose / Directions Last dose taken    albuterol 108 (90 Base) MCG/ACT Inhaler   Commonly known as:  PROAIR HFA/PROVENTIL HFA/VENTOLIN HFA   Dose:  2 puff   Quantity:  3 Inhaler        Inhale 2 puffs into the lungs every 6 hours as needed for shortness of breath / dyspnea   Refills:  3        fluticasone-salmeterol 250-50 MCG/DOSE diskus inhaler   Commonly known as:  ADVAIR DISKUS   Dose:  1 puff   Quantity:  1 Inhaler         Inhale 1 puff into the lungs every 12 hours   Refills:  11                Prescriptions were sent or printed at these locations (1 Prescription)                   Other Prescriptions                Printed at Department/Unit printer (1 of 1)         cephALEXin (KEFLEX) 500 MG capsule                Procedures and tests performed during your visit     Fingers XR, 2-3 views, left      Orders Needing Specimen Collection     None      Pending Results     Date and Time Order Name Status Description    7/30/2018 1933 Fingers XR, 2-3 views, left Preliminary             Pending Culture Results     No orders found from 7/28/2018 to 7/31/2018.            Pending Results Instructions     If you had any lab results that were not finalized at the time of your Discharge, you can call the ED Lab Result RN at [phone]. You will be contacted by this team for any positive Lab results or changes in treatment. The nurses are available 7 days a week from 10A to 6:30P.  You can leave a message 24 hours per day and they will return your call.        Test Results From Your Hospital Stay        7/30/2018  8:03 PM      Narrative     LEFT FINGER TWO OR MORE VIEWS     7/30/2018 7:52 PM     HISTORY: Laceration; evaluate for bony involvement.     COMPARISON: None.         Impression     IMPRESSION: Three views of left index finger. No fracture or  dislocation. No radiopaque foreign body.                   Clinical Quality Measure: Blood Pressure Screening     Your blood pressure was checked while you were in the emergency department today. The last reading we obtained was  BP: (!) 189/111 . Please read the guidelines below about what these numbers mean and what you should do about them.  If your systolic blood pressure (the top number) is less than 120 and your diastolic blood pressure (the bottom number) is less than 80, then your blood pressure is normal. There is nothing more that you need to do about it.  If your systolic blood pressure  (the top number) is 120-139 or your diastolic blood pressure (the bottom number) is 80-89, your blood pressure may be higher than it should be. You should have your blood pressure rechecked within a year by a primary care provider.  If your systolic blood pressure (the top number) is 140 or greater or your diastolic blood pressure (the bottom number) is 90 or greater, you may have high blood pressure. High blood pressure is treatable, but if left untreated over time it can put you at risk for heart attack, stroke, or kidney failure. You should have your blood pressure rechecked by a primary care provider within the next 4 weeks.  If your provider in the emergency department today gave you specific instructions to follow-up with your doctor or provider even sooner than that, you should follow that instruction and not wait for up to 4 weeks for your follow-up visit.        Thank you for choosing [facility]       Thank you for choosing [facility] for your care. Our goal is always to provide you with excellent care. Hearing back from our patients is one way we can continue to improve our services. Please take a few minutes to complete the written survey that you may receive in the mail after you visit with us. Thank you!        [employer] Information     [employer] gives you secure access to your electronic health record. If you see a primary care provider, you can also send messages to your care team and make appointments. If you have questions, please call your primary care clinic.  If you do not have a primary care provider, please call [phone] and they will assist you.        Care EveryWhere ID     This is your Care EveryWhere ID. This could be used by other organizations to access your [facility] medical records  [id number]        Equal Access to Services     KARSTEN STONE : samantha Hutchison, vilma blanco. So [facility]  [phone].    ATENCIÓN: Si habla español, tiene a mcclure disposición servicios gratuitos de asistencia lingüística. Llame al [phone].    We comply with applicable federal civil rights laws and [place] laws. We do not discriminate on the basis of race, color, national origin, age, disability, sex, sexual orientation, or gender identity.            After Visit Summary       This is your record. Keep this with you and show to your community pharmacist(s) and doctor(s) at your next visit.

## 2018-07-31 NOTE — ED PROVIDER NOTES
"  History     Chief Complaint:  Finger Laceration    HPI   Thai Mancia is a 48 year old male, left handed, who presents with finger laceration. Patient states he was cutting corn using a alisha when he cut his left index finger. He immediately went to [facility] who sent him here for laceration repair and evaluation for tendon injury. Patient denies any numbness or tingling, but notes some stinging. Unknown last tetanus.    Allergies:  No Known Drug Allergies    Medications:    Albuterol  Advair diskus    Past Medical History:    Asthma  Obesity     Past Surgical History:    Knee surgery    Family History:    The patient denies any relevant family medical history.    Social History:  Smoking Status: No  Smokeless Tobacco: No  Alcohol Use: Yes  Marital Status:   [2]    Review of Systems   Skin: Positive for wound.   Neurological: Negative for numbness.   All other systems reviewed and are negative.    Physical Exam   Vitals:  Patient Vitals for the past 24 hrs:   BP Temp Temp src Pulse Resp SpO2 Height Weight   07/30/18 1909 (!) 189/111 98.5  F (36.9  C) Oral 86 16 98 % 1.803 m (5' 11\") 111.1 kg (245 lb)       Physical Exam  Constitutional: Alert, attentive, GCS 15.    HEENT: Conjunctiva normal. Mucous membranes moist  CV: regular rate and rhythm; no murmurs, rubs or gallups. Cap refill <2seconds.  Respiratory: Effort normal. Lungs clear to auscultation bilaterally. No crackles/rubs/wheezes.  Good air movement.  MSK: Normal range of motion. No peripheral edema or calf tenderness.  Neurological: Alert, attentive.  Skin: Left index finer 5 cm V-shaped laceration over ventral and lateral aspect extending minimally to palmar service.  Full extension against resistance without increased pain.  Marked increase in pain with flexion of digit but good strength.  Sensation and finger tip intact.  Cap refill less than 2 seconds.  Psychiatric: Normal affect.  Emergency Department Course   Imaging:  Radiology " findings were communicated with the patient who voiced understanding of the findings.  Fingers XR  IMPRESSION: Three views of left index finger. No fracture or  dislocation. No radiopaque foreign body.  Reading per radiology.    Procedures:    Narrative: Procedure: Laceration Repair        LACERATION:  A simple clean 5 cm laceration.      LOCATION:  Left index finger      FUNCTION:  Distally sensation, circulation and motor are intact. Pain with flexion.       ANESTHESIA:  Digital block using 1% lidocaine total of 3 mLs      PREPARATION:  Irrigation and Scrubbing with Normal Saline and Shur Clens      DEBRIDEMENT:  no debridement and wound explored, no foreign body found      CLOSURE:  Wound was closed with One Layer.  Skin closed with 11 x 5.0 Ethylon using interrupted sutures.    Interventions:  2045 Adacel 0.5 mL intramuscular     Emergency Department Course:  Nursing notes and vitals reviewed.  I performed an exam of the patient as documented above.     The patient was sent for a fingers XR while in the emergency department, results above.     2001 check in with the patient.    I personally answered all related questions prior to discharge.    Findings and plan explained to the patient. Patient discharged home with instructions regarding supportive care, medications, and reasons to return. The importance of close follow-up was reviewed.   Impression & Plan      Medical Decision Making:  Thai Mancia is a 48 year old male who presents for evaluation of a laceration to the left index finger.  The wound was carefully evaluated and explored.  The laceration was closed with sutures as noted above.  There is no evidence of muscular or bony damage with this laceration.  No signs of foreign body.  Marked increased pain with flexion is concerning for possible tendon involvement.  As patient does work with his hands, a referral was placed for him to follow-up with a hand trauma doctor for further assessment.  I did  place a finger in an Alumafoam splint and started patient on Keflex.  Possible complications (infection, scarring) were reviewed with the patient.  Follow up with hand trauma as noted in the discharge section.  All questions answered prior to discharge.      Diagnosis:    ICD-10-CM    1. Laceration of left index finger without foreign body without damage to nail, initial encounter S61.211A        Disposition:   Discharged    CMS Diagnoses: None     Discharge Medications:  New Prescriptions    CEPHALEXIN (KEFLEX) 500 MG CAPSULE    Take 1 capsule (500 mg) by mouth 4 times daily for 7 days     Scribe Disclosure:  IMilan, am serving as a scribe at 7:23 PM on 7/30/2018 to document services personally performed by Addie Szymanski APRN *, based on my observations and the provider's statements to me.  [facility] EMERGENCY DEPARTMENT       Addie Szymanski APRN CNP  07/31/18 0034       Addie Szymanski APRN CNP  07/31/18 0035

## 2018-07-31 NOTE — ED TRIAGE NOTES
Patient was cutting corn with a alisha and lacerated his left index finger. The cut goes across his joint.  He was sent from [facility] for laceration repair and evaluation for tendon injury.  ABCs intact.  Patient is alert and oriented x3.

## 2018-08-07 ENCOUNTER — ALLIED HEALTH/NURSE VISIT (OUTPATIENT)
Dept: NURSING | Facility: CLINIC | Age: 48
End: 2018-08-07
Payer: COMMERCIAL

## 2018-08-07 DIAGNOSIS — Z48.02 VISIT FOR SUTURE REMOVAL: Primary | ICD-10-CM

## 2018-08-07 PROCEDURE — 99207 ZZC NO CHARGE NURSE ONLY: CPT

## 2018-08-07 NOTE — NURSING NOTE
Sutures were assessed by RN Inocencia Spivey and Dr. Jain before they were removed.    Thai Mancia presents to the clinic for removal of sutures. The patient has had sutures in place for 8 days. There has been no patient reported signs or symptoms of infection or drainage. 11 sutures are seen and located on the right index finger. Tetanus status is up to date. All sutures were easily removed today. Routine wound care discussed by the RN or provider. The patient will follow up as needed.    Patient will be going out of town tomorrow and not returning for a week. Was advised that he should keep the area clean and dry and continue to use the foam/metal splint to keep his finger straight for a few more days. Was advised on signs of infection to watch out for.

## 2018-08-07 NOTE — MR AVS SNAPSHOT
After Visit Summary   8/7/2018    Thai Mancia    MRN: [id number]           Patient Information     Date Of Birth          1970        Visit Information        Provider Department      8/7/2018 2:30 PM RI IM NURSE [facility]        Today's Diagnoses     Visit for suture removal    -  1       Follow-ups after your visit        Who to contact     If you have questions or need follow up information about today's clinic visit or your schedule please contact [facility] directly at [phone].  Normal or non-critical lab and imaging results will be communicated to you by IntYhart, letter or phone within 4 business days after the clinic has received the results. If you do not hear from us within 7 days, please contact the clinic through IntYhart or phone. If you have a critical or abnormal lab result, we will notify you by phone as soon as possible.  Submit refill requests through [employer] or call your pharmacy and they will forward the refill request to us. Please allow 3 business days for your refill to be completed.          Additional Information About Your Visit        MyChart Information     [employer] gives you secure access to your electronic health record. If you see a primary care provider, you can also send messages to your care team and make appointments. If you have questions, please call your primary care clinic.  If you do not have a primary care provider, please call [phone] and they will assist you.        Care EveryWhere ID     This is your Care EveryWhere ID. This could be used by other organizations to access your [place] medical records  [id number]         Blood Pressure from Last 3 Encounters:   07/30/18 (!) 189/111   07/30/18 145/90   02/06/18 128/80    Weight from Last 3 Encounters:   07/30/18 245 lb (111.1 kg)   07/30/18 249 lb 14.4 oz (113.4 kg)   02/06/18 238 lb (108 kg)              Today, you had the following     No orders found for  display       Primary Care Provider Office Phone # Fax #    Kelsea Lee [phone]       [address] [facility] [address]        Equal Access to Services     KARSTEN STONE : Hadii oswaldo ku robertoo Soaramisali, waaxda luqadaha, qaybta kaalmada adeleathada, vilma chongsavanah coker. So [facility] [phone].    ATENCIÓN: Si habla español, tiene a mcclure disposición servicios gratuitos de asistencia lingüística. Llame al [phone].    We comply with applicable federal civil rights laws and [place] laws. We do not discriminate on the basis of race, color, national origin, age, disability, sex, sexual orientation, or gender identity.            Thank you!     Thank you for choosing [facility]  for your care. Our goal is always to provide you with excellent care. Hearing back from our patients is one way we can continue to improve our services. Please take a few minutes to complete the written survey that you may receive in the mail after your visit with us. Thank you!             Your Updated Medication List - Protect others around you: Learn how to safely use, store and throw away your medicines at www.disposemymeds.org.          This list is accurate as of 8/7/18  3:25 PM.  Always use your most recent med list.                   Brand Name Dispense Instructions for use Diagnosis    albuterol 108 (90 Base) MCG/ACT Inhaler    PROAIR HFA/PROVENTIL HFA/VENTOLIN HFA    3 Inhaler    Inhale 2 puffs into the lungs every 6 hours as needed for shortness of breath / dyspnea    Mild persistent asthma without complication       fluticasone-salmeterol 250-50 MCG/DOSE diskus inhaler    ADVAIR DISKUS    1 Inhaler    Inhale 1 puff into the lungs every 12 hours    Mild persistent asthma without complication

## 2018-08-20 ENCOUNTER — TRANSFERRED RECORDS (OUTPATIENT)
Dept: HEALTH INFORMATION MANAGEMENT | Facility: CLINIC | Age: 48
End: 2018-08-20

## 2018-08-31 ENCOUNTER — TELEPHONE (OUTPATIENT)
Dept: INTERNAL MEDICINE | Facility: CLINIC | Age: 48
End: 2018-08-31

## 2018-10-22 ENCOUNTER — OFFICE VISIT (OUTPATIENT)
Dept: INTERNAL MEDICINE | Facility: CLINIC | Age: 48
End: 2018-10-22
Payer: COMMERCIAL

## 2018-10-22 VITALS
WEIGHT: 253.4 LBS | HEART RATE: 81 BPM | DIASTOLIC BLOOD PRESSURE: 78 MMHG | OXYGEN SATURATION: 97 % | TEMPERATURE: 98.3 F | RESPIRATION RATE: 12 BRPM | SYSTOLIC BLOOD PRESSURE: 120 MMHG | BODY MASS INDEX: 35.34 KG/M2

## 2018-10-22 DIAGNOSIS — J45.31 MILD PERSISTENT ASTHMA WITH EXACERBATION: Primary | ICD-10-CM

## 2018-10-22 DIAGNOSIS — J20.9 ACUTE BRONCHITIS WITH SYMPTOMS > 10 DAYS: ICD-10-CM

## 2018-10-22 PROCEDURE — 99214 OFFICE O/P EST MOD 30 MIN: CPT | Performed by: PHYSICIAN ASSISTANT

## 2018-10-22 RX ORDER — PREDNISONE 20 MG/1
20 TABLET ORAL DAILY
Qty: 5 TABLET | Refills: 0 | Status: SHIPPED | OUTPATIENT
Start: 2018-10-22 | End: 2019-04-10

## 2018-10-22 RX ORDER — AZITHROMYCIN 250 MG/1
TABLET, FILM COATED ORAL
Qty: 6 TABLET | Refills: 0 | Status: SHIPPED | OUTPATIENT
Start: 2018-10-22 | End: 2019-04-10

## 2018-10-22 NOTE — MR AVS SNAPSHOT
After Visit Summary   10/22/2018    Thai Mancia    MRN: [id number]           Patient Information     Date Of Birth          1970        Visit Information        Provider Department      10/22/2018 1:20 PM Saumya Amado PA-C [facility]        Today's Diagnoses     Mild persistent asthma with exacerbation    -  1    Acute bronchitis with symptoms > 10 days           Follow-ups after your visit        Who to contact     If you have questions or need follow up information about today's clinic visit or your schedule please contact [facility] directly at [phone].  Normal or non-critical lab and imaging results will be communicated to you by [employer] Way[employer], letter or phone within 4 business days after the clinic has received the results. If you do not hear from us within 7 days, please contact the clinic through [employer] or phone. If you have a critical or abnormal lab result, we will notify you by phone as soon as possible.  Submit refill requests through [employer] or call your pharmacy and they will forward the refill request to us. Please allow 3 business days for your refill to be completed.          Additional Information About Your Visit        MyChart Information     [employer] gives you secure access to your electronic health record. If you see a primary care provider, you can also send messages to your care team and make appointments. If you have questions, please call your primary care clinic.  If you do not have a primary care provider, please call [phone] and they will assist you.        Care EveryWhere ID     This is your Care EveryWhere ID. This could be used by other organizations to access your [place] medical records  [id number]        Your Vitals Were     Pulse Temperature Respirations Pulse Oximetry BMI (Body Mass Index)       81 98.3  F (36.8  C) (Oral) 12 97% 35.34 kg/m2        Blood Pressure from Last 3 Encounters:    10/22/18 120/78   07/30/18 (!) 189/111   07/30/18 145/90    Weight from Last 3 Encounters:   10/22/18 253 lb 6.4 oz (114.9 kg)   07/30/18 245 lb (111.1 kg)   07/30/18 249 lb 14.4 oz (113.4 kg)              Today, you had the following     No orders found for display         Today's Medication Changes          These changes are accurate as of 10/22/18  1:56 PM.  If you have any questions, ask your nurse or doctor.               Start taking these medicines.        Dose/Directions    azithromycin 250 MG tablet   Commonly known as:  ZITHROMAX   Used for:  Acute bronchitis with symptoms > 10 days   Started by:  Saumya Amado PA-C        Two tablets first day, then one tablet daily for four days.   Quantity:  6 tablet   Refills:  0       predniSONE 20 MG tablet   Commonly known as:  DELTASONE   Used for:  Mild persistent asthma with exacerbation, Acute bronchitis with symptoms > 10 days   Started by:  Saumya Amado PA-C        Dose:  20 mg   Take 1 tablet (20 mg) by mouth daily for 5 days   Quantity:  5 tablet   Refills:  0            Where to get your medicines      These medications were sent to [facility] [address] [address]     Phone:  [phone]     azithromycin 250 MG tablet    predniSONE 20 MG tablet                Primary Care Provider Office Phone # Fax #    Kelsea Lee [phone]       [address] [facility] [address]        Equal Access to Services     [facility] CHASE AH: Hadii aad ku hadasho Soomaali, waaxda luqadaha, qaybta kaalmada adeegyada, waxay carlos coker. So [facility] [phone].    ATENCIÓN: Si habla español, tiene a mcclure disposición servicios gratuitos de asistencia lingüística. Llame al [phone].    We comply with applicable federal civil rights laws and [place] laws. We do not discriminate on the basis of race, color, national origin, age,  disability, sex, sexual orientation, or gender identity.            Thank you!     Thank you for choosing [facility]  for your care. Our goal is always to provide you with excellent care. Hearing back from our patients is one way we can continue to improve our services. Please take a few minutes to complete the written survey that you may receive in the mail after your visit with us. Thank you!             Your Updated Medication List - Protect others around you: Learn how to safely use, store and throw away your medicines at www.disposemymeds.org.          This list is accurate as of 10/22/18  1:56 PM.  Always use your most recent med list.                   Brand Name Dispense Instructions for use Diagnosis    albuterol 108 (90 Base) MCG/ACT inhaler    PROAIR HFA/PROVENTIL HFA/VENTOLIN HFA    3 Inhaler    Inhale 2 puffs into the lungs every 6 hours as needed for shortness of breath / dyspnea    Mild persistent asthma without complication       azithromycin 250 MG tablet    ZITHROMAX    6 tablet    Two tablets first day, then one tablet daily for four days.    Acute bronchitis with symptoms > 10 days       fluticasone-salmeterol 250-50 MCG/DOSE diskus inhaler    ADVAIR DISKUS    1 Inhaler    Inhale 1 puff into the lungs every 12 hours    Mild persistent asthma without complication       predniSONE 20 MG tablet    DELTASONE    5 tablet    Take 1 tablet (20 mg) by mouth daily for 5 days    Mild persistent asthma with exacerbation, Acute bronchitis with symptoms > 10 days

## 2018-10-22 NOTE — PROGRESS NOTES
SUBJECTIVE:   Thai Mancia is a 48 year old male who presents to clinic today for the following health issues:      RESPIRATORY SYMPTOMS      Duration: 2 weeks    Description  Cough phlegm and colored. and wheezing chest congestion  Started with head cold and ear congestion - that is better.       Severity: moderate    Accompanying signs and symptoms: None    History (predisposing factors):  Asthma    And seasonal allergies     Precipitating or alleviating factors: None    Therapies tried and outcome:  Delsym, drinks hot tea      -------------------------------------    Problem list and histories reviewed & adjusted, as indicated.  Additional history: as documented    Labs reviewed in EPIC    Reviewed and updated as needed this visit by clinical staff  Tobacco  Allergies  Soc Hx      Reviewed and updated as needed this visit by Provider  Allergies  Meds         ROS:  Constitutional, HEENT, cardiovascular, pulmonary, gi and gu systems are negative, except as otherwise noted.    OBJECTIVE:     /78  Pulse 81  Temp 98.3  F (36.8  C) (Oral)  Resp 12  Wt 253 lb 6.4 oz (114.9 kg)  SpO2 97%  BMI 35.34 kg/m2  Body mass index is 35.34 kg/(m^2).  GENERAL: healthy, alert and no distress  HENT: normal cephalic/atraumatic, ear canals and TM's normal, nose and mouth without ulcers or lesions, oropharynx clear and oral mucous membranes moist  NECK: no adenopathy, no asymmetry, masses, or scars and thyroid normal to palpation  RESP: no rales , no rhonchi and forced expiratory wheeze upper airways   CV: regular rates and rhythm, normal S1 S2, no S3 or S4 and no murmur, click or rub  MS: no gross musculoskeletal defects noted, no edema  SKIN: no suspicious lesions or rashes    Diagnostic Test Results:  none     ASSESSMENT/PLAN:             1. Mild persistent asthma with exacerbation    - predniSONE (DELTASONE) 20 MG tablet; Take 1 tablet (20 mg) by mouth daily for 5 days  Dispense: 5 tablet; Refill: 0    2. Acute  bronchitis with symptoms > 10 days    - azithromycin (ZITHROMAX) 250 MG tablet; Two tablets first day, then one tablet daily for four days.  Dispense: 6 tablet; Refill: 0  - predniSONE (DELTASONE) 20 MG tablet; Take 1 tablet (20 mg) by mouth daily for 5 days  Dispense: 5 tablet; Refill: 0    Fluids rest   Monitor  Recheck prn not improving 2 weeks     Saumya Amado PA-C  [facility]

## 2019-02-27 DIAGNOSIS — J45.30 MILD PERSISTENT ASTHMA WITHOUT COMPLICATION: ICD-10-CM

## 2019-02-27 NOTE — TELEPHONE ENCOUNTER
Last fill on each 9-12-18     Thanks,  Malissa Jones, [facility]   [phone]

## 2019-03-04 RX ORDER — ALBUTEROL SULFATE 90 UG/1
2 AEROSOL, METERED RESPIRATORY (INHALATION) EVERY 6 HOURS PRN
Qty: 1 INHALER | Refills: 0 | Status: SHIPPED | OUTPATIENT
Start: 2019-03-04 | End: 2019-05-01

## 2019-03-04 NOTE — TELEPHONE ENCOUNTER
"Requested Prescriptions   Pending Prescriptions Disp Refills     albuterol (PROAIR HFA/PROVENTIL HFA/VENTOLIN HFA) 108 (90 Base) MCG/ACT inhaler  Last Written Prescription Date:  2/6/18  Last Fill Quantity: 3,  # refills: 3   Last office visit: 2/08/2018 with prescribing provider:  Jesus   Future Office Visit:    3 Inhaler 3     Sig: Inhale 2 puffs into the lungs every 6 hours as needed for shortness of breath / dyspnea    Asthma Maintenance Inhalers - Anticholinergics Failed - 2/27/2019 10:34 AM       Failed - Asthma control assessment score within normal limits in last 6 months    Please review ACT score.   ACT Total Scores 7/31/2017   ACT TOTAL SCORE -   ASTHMA ER VISITS -   ASTHMA HOSPITALIZATIONS -   ACT TOTAL SCORE (Goal Greater than or Equal to 20) 21   In the past 12 months, how many times did you visit the emergency room for your asthma without being admitted to the hospital? 0   In the past 12 months, how many times were you hospitalized overnight because of your asthma? 0           Passed - Patient is age 12 years or older       Passed - Medication is active on med list       Passed - Recent (6 mo) or future (30 days) visit within the authorizing provider's specialty    Patient had office visit in the last 6 months or has a visit in the next 30 days with authorizing provider or within the authorizing provider's specialty.  See \"Patient Info\" tab in inbasket, or \"Choose Columns\" in Meds & Orders section of the refill encounter.            fluticasone-salmeterol (ADVAIR DISKUS) 250-50 MCG/DOSE inhaler  Last Written Prescription Date:  2/6/18  Last Fill Quantity: 1,  # refills: 11   Last office visit: 2/06/2018 with prescribing provider:  Jesus   Future Office Visit:    1 Inhaler 11     Sig: Inhale 1 puff into the lungs every 12 hours    Inhaled Steroids Protocol Failed - 2/27/2019 10:34 AM       Failed - Asthma control assessment score within normal limits in last 6 months    Please review ACT " "score.   ACT Total Scores 7/31/2017   ACT TOTAL SCORE -   ASTHMA ER VISITS -   ASTHMA HOSPITALIZATIONS -   ACT TOTAL SCORE (Goal Greater than or Equal to 20) 21   In the past 12 months, how many times did you visit the emergency room for your asthma without being admitted to the hospital? 0   In the past 12 months, how many times were you hospitalized overnight because of your asthma? 0             Passed - Patient is age 12 or older       Passed - Medication is active on med list       Passed - Recent (6 mo) or future (30 days) visit within the authorizing provider's specialty    Patient had office visit in the last 6 months or has a visit in the next 30 days with authorizing provider or within the authorizing provider's specialty.  See \"Patient Info\" tab in inbasket, or \"Choose Columns\" in Meds & Orders section of the refill encounter.          [employer] message was sent to patient on 8/31/18 advising him to schedule an appointment. Patient has not read message. Attempted to contact patient. Left voice message to call back to schedule appointment with Dr. Lee.     Medication is being filled for 1 time refill only due to:  Patient needs to be seen because it has been more than one year since last visit.      "

## 2019-03-20 ENCOUNTER — OFFICE VISIT (OUTPATIENT)
Dept: INTERNAL MEDICINE | Facility: CLINIC | Age: 49
End: 2019-03-20
Payer: COMMERCIAL

## 2019-03-20 ENCOUNTER — ANCILLARY PROCEDURE (OUTPATIENT)
Dept: GENERAL RADIOLOGY | Facility: CLINIC | Age: 49
End: 2019-03-20
Attending: INTERNAL MEDICINE
Payer: COMMERCIAL

## 2019-03-20 VITALS
TEMPERATURE: 98.5 F | WEIGHT: 251.8 LBS | BODY MASS INDEX: 35.12 KG/M2 | HEART RATE: 89 BPM | SYSTOLIC BLOOD PRESSURE: 136 MMHG | DIASTOLIC BLOOD PRESSURE: 84 MMHG | OXYGEN SATURATION: 94 %

## 2019-03-20 DIAGNOSIS — R00.2 PALPITATIONS: ICD-10-CM

## 2019-03-20 DIAGNOSIS — R07.89 CHEST TIGHTNESS OR PRESSURE: ICD-10-CM

## 2019-03-20 DIAGNOSIS — J45.30 MILD PERSISTENT ASTHMA WITHOUT COMPLICATION: Primary | ICD-10-CM

## 2019-03-20 DIAGNOSIS — Z13.220 SCREENING FOR CHOLESTEROL LEVEL: ICD-10-CM

## 2019-03-20 DIAGNOSIS — I10 ESSENTIAL HYPERTENSION: ICD-10-CM

## 2019-03-20 LAB
BASOPHILS # BLD AUTO: 0 10E9/L (ref 0–0.2)
BASOPHILS NFR BLD AUTO: 0.5 %
DIFFERENTIAL METHOD BLD: NORMAL
EOSINOPHIL # BLD AUTO: 0.2 10E9/L (ref 0–0.7)
EOSINOPHIL NFR BLD AUTO: 2.6 %
ERYTHROCYTE [DISTWIDTH] IN BLOOD BY AUTOMATED COUNT: 14.4 % (ref 10–15)
HCT VFR BLD AUTO: 44.3 % (ref 40–53)
HGB BLD-MCNC: 14.7 G/DL (ref 13.3–17.7)
LYMPHOCYTES # BLD AUTO: 2 10E9/L (ref 0.8–5.3)
LYMPHOCYTES NFR BLD AUTO: 26 %
MCH RBC QN AUTO: 30.3 PG (ref 26.5–33)
MCHC RBC AUTO-ENTMCNC: 33.2 G/DL (ref 31.5–36.5)
MCV RBC AUTO: 91 FL (ref 78–100)
MONOCYTES # BLD AUTO: 0.6 10E9/L (ref 0–1.3)
MONOCYTES NFR BLD AUTO: 7.9 %
NEUTROPHILS # BLD AUTO: 4.8 10E9/L (ref 1.6–8.3)
NEUTROPHILS NFR BLD AUTO: 63 %
PLATELET # BLD AUTO: 312 10E9/L (ref 150–450)
RBC # BLD AUTO: 4.85 10E12/L (ref 4.4–5.9)
WBC # BLD AUTO: 7.6 10E9/L (ref 4–11)

## 2019-03-20 PROCEDURE — 36415 COLL VENOUS BLD VENIPUNCTURE: CPT | Performed by: INTERNAL MEDICINE

## 2019-03-20 PROCEDURE — 80048 BASIC METABOLIC PNL TOTAL CA: CPT | Performed by: INTERNAL MEDICINE

## 2019-03-20 PROCEDURE — 71046 X-RAY EXAM CHEST 2 VIEWS: CPT

## 2019-03-20 PROCEDURE — 84443 ASSAY THYROID STIM HORMONE: CPT | Performed by: INTERNAL MEDICINE

## 2019-03-20 PROCEDURE — 93000 ELECTROCARDIOGRAM COMPLETE: CPT | Performed by: INTERNAL MEDICINE

## 2019-03-20 PROCEDURE — 85025 COMPLETE CBC W/AUTO DIFF WBC: CPT | Performed by: INTERNAL MEDICINE

## 2019-03-20 PROCEDURE — 99214 OFFICE O/P EST MOD 30 MIN: CPT | Performed by: INTERNAL MEDICINE

## 2019-03-20 NOTE — NURSING NOTE
/84   Pulse 89   Temp 98.5  F (36.9  C) (Oral)   Wt 114.2 kg (251 lb 12.8 oz)   SpO2 94%   BMI 35.12 kg/m    Светлана Ivan MA on 3/20/2019 at 1:12 PM

## 2019-03-20 NOTE — PROGRESS NOTES
SUBJECTIVE:   Thai Mancia is a 48 year old male who presents to clinic today for the following health issues:      Asthma Follow-Up    Was ACT completed today?    Yes    ACT Total Scores 3/20/2019   ACT TOTAL SCORE -   ASTHMA ER VISITS -   ASTHMA HOSPITALIZATIONS -   ACT TOTAL SCORE (Goal Greater than or Equal to 20) 15   In the past 12 months, how many times did you visit the emergency room for your asthma without being admitted to the hospital? 0   In the past 12 months, how many times were you hospitalized overnight because of your asthma? 0     He ran out of the inhaler, and just restarted this.      Recent asthma triggers that patient is dealing with: exercise or sports    Palpitations.  The patient has had palpitations in the past.  He had cut down on caffeine and this had improved.  The patient had palpitations last week.  He had restarted mountain dew one year ago.   He has had a chest ache approximately 6 times over the past year.  The pain lasted approximately 1 minute.  He does not think its heartburn.   Does not associated this with food or exercise.   He is unsure if this is secondary to anxiety.  He was stressed at work once, and the symptoms started.     He does not smoke.  BP is borderline.  LDL in 2011 was normal, but not checked since.  Does not know family history- adopted.        Amount of exercise or physical activity: Work- 20,000 steps    Problems taking medications regularly: No    Medication side effects: none    Diet: regular (no restrictions)      Problem list and histories reviewed & adjusted, as indicated.      Reviewed and updated as needed this visit by clinical staff  Tobacco  Allergies  Meds  Fam Hx  Soc Hx      Reviewed and updated as needed this visit by Provider         ROS:  CONSTITUTIONAL: NEGATIVE for fever, chills, change in weight  RESP: POS SOB with wheezing-improving when he has his inhaler  CV: POS chest pain and palpitations    OBJECTIVE:     /84   Pulse  89   Temp 98.5  F (36.9  C) (Oral)   Wt 114.2 kg (251 lb 12.8 oz)   SpO2 94%   BMI 35.12 kg/m    Body mass index is 35.12 kg/m .  GENERAL: healthy, alert and no distress  RESP: lungs clear to auscultation - no rales, rhonchi or wheezes  CV: regular rate and rhythm, normal S1 S2, no S3 or S4, no murmur, click or rub    EKG revealing LVH.    ASSESSMENT/PLAN:       (J45.30) Mild persistent asthma without complication  (primary encounter diagnosis)  Comment: not at goal, but had been out of inhalers  Plan: fluticasone-salmeterol (ADVAIR DISKUS) 250-50         MCG/DOSE inhaler            (R00.2) Palpitations  Comment: assess for metabolic etiology; assess for cardiac etiology; consider stress-related   Plan: TSH with free T4 reflex, CBC with platelets         differential, Basic metabolic panel, EKG         12-lead complete w/read - Clinics, CARDIOLOGY         EVAL ADULT REFERRAL, CANCELED: CARDIOLOGY EVAL         ADULT REFERRAL            (R07.89) Chest tightness or pressure  Comment: consider asthma versus stress versus cardiac versus bp-related given LVH on EKG  Plan: XR Chest 2 Views, CARDIOLOGY EVAL ADULT         REFERRAL            (Z13.220) Screening for cholesterol level  Comment: due for assessment- 2011 LDL was normal   Plan: Lipid panel reflex to direct LDL Fasting            Addendum: EKG revealing LVH.  On review, he has had intermittent elevate bps  (I10) Essential hypertension  Comment: at goal today- but with newer guidelines of less than 130/80, not at goal  Plan: discussed with colleague and since EKG with LVH, recommend starting a bp medication  Will start losartan and patient can have bp check with bmp and microalbumin check in 2 weeks        Kelsea Lee MD  [facility]

## 2019-03-21 ENCOUNTER — TELEPHONE (OUTPATIENT)
Dept: INTERNAL MEDICINE | Facility: CLINIC | Age: 49
End: 2019-03-21

## 2019-03-21 DIAGNOSIS — I10 ESSENTIAL HYPERTENSION: Primary | ICD-10-CM

## 2019-03-21 LAB
ANION GAP SERPL CALCULATED.3IONS-SCNC: 6 MMOL/L (ref 3–14)
BUN SERPL-MCNC: 17 MG/DL (ref 7–30)
CALCIUM SERPL-MCNC: 8.7 MG/DL (ref 8.5–10.1)
CHLORIDE SERPL-SCNC: 108 MMOL/L (ref 94–109)
CO2 SERPL-SCNC: 27 MMOL/L (ref 20–32)
CREAT SERPL-MCNC: 0.91 MG/DL (ref 0.66–1.25)
GFR SERPL CREATININE-BSD FRML MDRD: >90 ML/MIN/{1.73_M2}
GLUCOSE SERPL-MCNC: 76 MG/DL (ref 70–99)
POTASSIUM SERPL-SCNC: 3.9 MMOL/L (ref 3.4–5.3)
SODIUM SERPL-SCNC: 141 MMOL/L (ref 133–144)
TSH SERPL DL<=0.005 MIU/L-ACNC: 1.13 MU/L (ref 0.4–4)

## 2019-03-21 RX ORDER — LOSARTAN POTASSIUM 25 MG/1
25 TABLET ORAL DAILY
Qty: 90 TABLET | Refills: 0 | Status: SHIPPED | OUTPATIENT
Start: 2019-03-21 | End: 2019-04-10

## 2019-03-21 ASSESSMENT — ASTHMA QUESTIONNAIRES: ACT_TOTALSCORE: 15

## 2019-03-21 NOTE — TELEPHONE ENCOUNTER
RN task:    Please call the patient to let him know that I am sending losartan to his pharmacy for blood pressure.  After reviewing with colleague and with his intermittent elevated blood pressures and EKG revealing left ventricular hypertrophy (I mentioned this to patient at his visit), we should start medication.  Furthermore, new guidelines for bp are less than 130/80.      He will need labs checked and bp checked in 2 weeks.  Need to make sure potassium and kidney function are okay after staring medication.  Also we should check a microalbumin at that visit.    Common side effect is a cough, but many people do fine.      Thanks!

## 2019-03-21 NOTE — TELEPHONE ENCOUNTER
Patient returned call, but as call was transferred I believe he was disconnected.  Tried to call patient back again, but had to leave another message to call office.  Ayesha Marx RN

## 2019-03-26 NOTE — TELEPHONE ENCOUNTER
Patient called back and informed on message which patient stated that he received this message already. Patient stated that he called back a couple days ago and was informed of message, writer did not see any notes about this but patient was confident that he knew all the information relayed to him.

## 2019-04-10 ENCOUNTER — OFFICE VISIT (OUTPATIENT)
Dept: INTERNAL MEDICINE | Facility: CLINIC | Age: 49
End: 2019-04-10
Payer: COMMERCIAL

## 2019-04-10 VITALS
DIASTOLIC BLOOD PRESSURE: 70 MMHG | WEIGHT: 248 LBS | BODY MASS INDEX: 34.72 KG/M2 | SYSTOLIC BLOOD PRESSURE: 130 MMHG | TEMPERATURE: 97.9 F | HEIGHT: 71 IN | HEART RATE: 68 BPM | OXYGEN SATURATION: 95 % | RESPIRATION RATE: 14 BRPM

## 2019-04-10 DIAGNOSIS — J45.30 MILD PERSISTENT ASTHMA WITHOUT COMPLICATION: ICD-10-CM

## 2019-04-10 DIAGNOSIS — I10 ESSENTIAL HYPERTENSION: Primary | ICD-10-CM

## 2019-04-10 PROCEDURE — 80048 BASIC METABOLIC PNL TOTAL CA: CPT | Performed by: INTERNAL MEDICINE

## 2019-04-10 PROCEDURE — 36415 COLL VENOUS BLD VENIPUNCTURE: CPT | Performed by: INTERNAL MEDICINE

## 2019-04-10 PROCEDURE — 99214 OFFICE O/P EST MOD 30 MIN: CPT | Performed by: INTERNAL MEDICINE

## 2019-04-10 RX ORDER — LOSARTAN POTASSIUM 50 MG/1
50 TABLET ORAL DAILY
Qty: 90 TABLET | Refills: 0 | Status: SHIPPED | OUTPATIENT
Start: 2019-04-10 | End: 2019-09-11

## 2019-04-10 ASSESSMENT — MIFFLIN-ST. JEOR: SCORE: 2017.05

## 2019-04-10 NOTE — NURSING NOTE
"/70   Pulse 68   Temp 97.9  F (36.6  C) (Oral)   Resp 14   Ht 1.803 m (5' 11\")   Wt 112.5 kg (248 lb)   SpO2 95%   BMI 34.59 kg/m      "

## 2019-04-10 NOTE — PATIENT INSTRUCTIONS
Increase Losartan to 50mg (2 pills)  I sent a script of losartan 50mg to call to pharmacy when you run out of the losartan 25mg pills.    Goal blood pressure <130/80 and bp of 12/80 even better

## 2019-04-10 NOTE — PROGRESS NOTES
"  SUBJECTIVE:   Thai Mancia is a 48 year old male who presents to clinic today for the following   health issues:      Hypertension Follow-up    Outpatient blood pressures are not being checked.    Low Salt Diet: low salt  Pt blood pressure has improved since starting on losartan (started given LVH seen on EKG). Notes that he has no family hx of blood pressure. Reports he discontinued the intake of caffeine and soft drinks. He has lost 3 lbs since last visit.    Asthma Follow-Up  Was ACT completed today?    Yes    ACT Total Scores 3/20/2019   ACT TOTAL SCORE -   ASTHMA ER VISITS -   ASTHMA HOSPITALIZATIONS -   ACT TOTAL SCORE (Goal Greater than or Equal to 20) 15   In the past 12 months, how many times did you visit the emergency room for your asthma without being admitted to the hospital? 0   In the past 12 months, how many times were you hospitalized overnight because of your asthma? 0       Recent asthma triggers that patient is dealing with: cold weather and infections    Notes that his asthma has improved. Pt was at 15 but has increased to  17 since last time. Notes he uses his inhaler intermittently. Pt reports that his chest cold has improved as well.       Amount of exercise or physical activity: active at work, minimal outside of work    Problems taking medications regularly: No    Medication side effects: pt noting some leg cramping-possibly lack of fluids he said    Diet: regular (no restrictions)         Other Problems...  -Palpitations and \"twinges\" have improved.  Notes he discontinued caffeine and soft drinks.   He has a cardiology appointment later this month.     Additional history: as documented    Reviewed  and updated as needed this visit by clinical staff  Tobacco  Allergies  Meds  Med Hx  Surg Hx  Fam Hx  Soc Hx        Reviewed and updated as needed this visit by Provider         Recent Labs   Lab Test 03/20/19  1354 08/28/17  1000 04/08/15  1511 01/28/14  1400 11/11/11  1444   A1C  " "--   --  5.8  --   --    LDL  --   --   --   --  106   HDL  --   --   --   --  44   TRIG  --   --   --   --  84   ALT  --  30 40  --  62   CR 0.91 0.88 0.92  --  0.88   GFRESTIMATED >90 >90 89  --  >90   GFRESTBLACK >90 >90 >90  African American GFR Calc    --  >90   POTASSIUM 3.9 3.4 4.0  --  4.0   TSH 1.13  --   --  1.04  --         ROS:  CONSTITUTIONAL: NEGATIVE for fever, chills, change in weight  RESP: NEGATIVE for significant cough or SOB  CV: NEGATIVE for chest pain, palpitations or peripheral edema  NEURO: NEGATIVE for weakness, dizziness or paresthesias  PSYCHIATRIC: NEGATIVE for changes in mood or affect    This document serves as a record of the services and decisions personally performed and made by Kelsea Lee MD. It was created on his behalf by Vijaya Haider, a trained medical scribe. The creation of this document is based on the provider's statements to the medical scribe.  Vijaya Haider April 10, 2019 3:15 PM   OBJECTIVE:     /70   Pulse 68   Temp 97.9  F (36.6  C) (Oral)   Resp 14   Ht 1.803 m (5' 11\")   Wt 112.5 kg (248 lb)   SpO2 95%   BMI 34.59 kg/m    Body mass index is 34.59 kg/m .  GENERAL: healthy, alert and no distress  RESP: lungs clear to auscultation - no rales, rhonchi or wheezes  CV: regular rate and rhythm, normal S1 S2, no S3 or S4, no murmur, click or rub  Diagnostic Test Results:  No results found for this or any previous visit (from the past 24 hour(s)).    ASSESSMENT/PLAN:   (I10) Essential hypertension  (primary encounter diagnosis)  Comment: Blood pressure borderline at goal   Plan: Basic metabolic panel, losartan (COZAAR) 50 MG         Tablet.   Increase Losartan to 50mg (2 pills)  I sent a script of losartan 50mg to call to pharmacy when you run out of the losartan 25mg pills.    (J45.30) Mild persistent asthma without complication  Comment: Improved from 15 to 17  Plan: Continue with current medications, as patient reports the score is low since he uses his " inhaler before symptoms daily (he works for [employer] in the [place])    FUTURE APPOINTMENTS:       - Follow-up visit in 6 months     The information in this document, created by the medical scribe for me, accurately reflects the services I personally performed and the decisions made by me. I have reviewed and approved this document for accuracy.   April 10, 2019 3:26 PM     Kelsea Lee MD  [facility]

## 2019-04-11 LAB
ANION GAP SERPL CALCULATED.3IONS-SCNC: 6 MMOL/L (ref 3–14)
BUN SERPL-MCNC: 12 MG/DL (ref 7–30)
CALCIUM SERPL-MCNC: 8.6 MG/DL (ref 8.5–10.1)
CHLORIDE SERPL-SCNC: 107 MMOL/L (ref 94–109)
CO2 SERPL-SCNC: 27 MMOL/L (ref 20–32)
CREAT SERPL-MCNC: 0.81 MG/DL (ref 0.66–1.25)
GFR SERPL CREATININE-BSD FRML MDRD: >90 ML/MIN/{1.73_M2}
GLUCOSE SERPL-MCNC: 94 MG/DL (ref 70–99)
POTASSIUM SERPL-SCNC: 3.6 MMOL/L (ref 3.4–5.3)
SODIUM SERPL-SCNC: 140 MMOL/L (ref 133–144)

## 2019-04-13 ASSESSMENT — ASTHMA QUESTIONNAIRES: ACT_TOTALSCORE: 17

## 2019-04-30 ENCOUNTER — OFFICE VISIT (OUTPATIENT)
Dept: CARDIOLOGY | Facility: CLINIC | Age: 49
End: 2019-04-30
Attending: INTERNAL MEDICINE
Payer: COMMERCIAL

## 2019-04-30 VITALS
HEART RATE: 76 BPM | HEIGHT: 71 IN | SYSTOLIC BLOOD PRESSURE: 114 MMHG | BODY MASS INDEX: 35.98 KG/M2 | DIASTOLIC BLOOD PRESSURE: 74 MMHG | WEIGHT: 257 LBS

## 2019-04-30 DIAGNOSIS — R07.2 PRECORDIAL PAIN: Primary | ICD-10-CM

## 2019-04-30 PROCEDURE — 99203 OFFICE O/P NEW LOW 30 MIN: CPT | Performed by: INTERNAL MEDICINE

## 2019-04-30 ASSESSMENT — MIFFLIN-ST. JEOR: SCORE: 2057.87

## 2019-04-30 NOTE — LETTER
2019      Kelsea Lee MD  [address]  [facility] [address]      RE: Marianne Mancia       Dear Colleague,    I had the pleasure of seeing Marianne Mancia in the [facility].    Service Date: 2019      CLINIC NOTE      HISTORY OF PRESENT ILLNESS:  It is a pleasure for me to see this very pleasant 48-year-old gentleman at the request of Dr. Kelesa Lee for evaluation of chest pain.  This has been ongoing for about 6 months.  It is intermittent.  It can be a left parasternal or upper sternal.  It lasts for 20 seconds.  No specific relieving or precipitating factors.  Not related to exertion.  No nausea, vomiting, diaphoresis or shortness of breath.        SOCIAL HISTORY:  No history of smoking, alcohol abuse or drug abuse.        PAST MEDICAL HISTORY:  Not diabetic.  Has controlled hypertension.      FAMILY HISTORY:  Unknown, as he is adopted.      PHYSICAL EXAMINATION: Cardiovascular system examination is normal.      RADIOLOGIC STUDIES:  EKG shows benign repolarization.      The patient admits to having some stressors in his life.      IMPRESSION:  Highly unlikely to be cardiac pains.  Probably related to stress.  We will request stress echocardiogram.  If normal, no further evaluation is needed.         BRAYDON LEIJA MD, [facility]             D: 2019   T: 2019   MT: SAVANAH      Name:     MARIANNE MANCIA   MRN:      [id number]        Account:      [id number]   :      1970           Service Date: 2019      Document: [id number]         Outpatient Encounter Medications as of 2019   Medication Sig Dispense Refill     fluticasone-salmeterol (ADVAIR DISKUS) 250-50 MCG/DOSE inhaler Inhale 1 puff into the lungs every 12 hours Profile Rx: patient will contact pharmacy when needed 1 Inhaler 5     losartan (COZAAR) 50 MG tablet Take 1 tablet (50 mg) by mouth daily 90 tablet 0     [DISCONTINUED] albuterol (PROAIR HFA/PROVENTIL HFA/VENTOLIN  HFA) 108 (90 Base) MCG/ACT inhaler Inhale 2 puffs into the lungs every 6 hours as needed for shortness of breath / dyspnea 1 Inhaler 0     No facility-administered encounter medications on file as of 4/30/2019.        Again, thank you for allowing me to participate in the care of your patient.      Sincerely,    DR BRAYDON LEIJA MD     [facility]

## 2019-04-30 NOTE — PROGRESS NOTES
HPI and Plan:   See dictation    Orders Placed This Encounter   Procedures     Exercise Stress Echocardiogram       No orders of the defined types were placed in this encounter.      Encounter Diagnosis   Name Primary?     Precordial pain Yes       CURRENT MEDICATIONS:  Current Outpatient Medications   Medication Sig Dispense Refill     albuterol (PROAIR HFA/PROVENTIL HFA/VENTOLIN HFA) 108 (90 Base) MCG/ACT inhaler Inhale 2 puffs into the lungs every 6 hours as needed for shortness of breath / dyspnea 1 Inhaler 0     fluticasone-salmeterol (ADVAIR DISKUS) 250-50 MCG/DOSE inhaler Inhale 1 puff into the lungs every 12 hours Profile Rx: patient will contact pharmacy when needed 1 Inhaler 5     losartan (COZAAR) 50 MG tablet Take 1 tablet (50 mg) by mouth daily 90 tablet 0       ALLERGIES     Allergies   Allergen Reactions     Cats      Itchy eyes, runny nose, wheezing     Dogs      Itchy eyes, runny nose, wheezing     Pollen Extract      Runny nose       PAST MEDICAL HISTORY:  Past Medical History:   Diagnosis Date     Asthma        PAST SURGICAL HISTORY:  Past Surgical History:   Procedure Laterality Date     KNEE SURGERY  1985       FAMILY HISTORY:  Family History   Problem Relation Age of Onset     Unknown/Adopted Mother         adopted     Unknown/Adopted Father        SOCIAL HISTORY:  Social History     Socioeconomic History     Marital status:      Spouse name: None     Number of children: None     Years of education: None     Highest education level: None   Occupational History     None   Social Needs     Financial resource strain: None     Food insecurity:     Worry: None     Inability: None     Transportation needs:     Medical: None     Non-medical: None   Tobacco Use     Smoking status: Never Smoker     Smokeless tobacco: Never Used   Substance and Sexual Activity     Alcohol use: No     Alcohol/week: 0.0 oz     Comment: quit 08/17     Drug use: No     Sexual activity: Yes     Partners: Female  "  Lifestyle     Physical activity:     Days per week: None     Minutes per session: None     Stress: None   Relationships     Social connections:     Talks on phone: None     Gets together: None     Attends Sikh service: None     Active member of club or organization: None     Attends meetings of clubs or organizations: None     Relationship status: None     Intimate partner violence:     Fear of current or ex partner: None     Emotionally abused: None     Physically abused: None     Forced sexual activity: None   Other Topics Concern     Parent/sibling w/ CABG, MI or angioplasty before 65F 55M? Not Asked   Social History Narrative     None       Review of Systems:  Skin:  Negative     Eyes:  Negative    ENT:  Negative    Respiratory:  Negative shortness of breath  Cardiovascular:  edema;palpitations;Negative;syncope or near-syncope;cyanosis;exercise intolerance;lightheadedness;dizziness Positive for;chest pain;fatigue  Gastroenterology: Positive for nausea  Genitourinary:  Negative    Musculoskeletal:  Negative    Neurologic:  Negative    Psychiatric:  Negative    Heme/Lymph/Imm:  Negative    Endocrine:  Negative      Physical Exam:  Vitals: /74   Pulse 76   Ht 1.803 m (5' 11\")   Wt 116.6 kg (257 lb)   BMI 35.84 kg/m      Constitutional:  cooperative, alert and oriented, well developed, well nourished, in no acute distress        Skin:  warm and dry to the touch, no apparent skin lesions or masses noted          Head:  normocephalic, no masses or lesions        Eyes:  pupils equal and round, conjunctivae and lids unremarkable, sclera white, no xanthalasma, EOMS intact, no nystagmus        Lymph:No Cervical lymphadenopathy present     ENT:  no pallor or cyanosis, dentition good        Neck:  carotid pulses are full and equal bilaterally, JVP normal, no carotid bruit        Respiratory:  normal breath sounds, clear to auscultation, normal A-P diameter, normal symmetry, normal respiratory excursion, no " use of accessory muscles         Cardiac: regular rhythm, normal S1/S2, no S3 or S4, apical impulse not displaced, no murmurs, gallops or rubs                pulses full and equal, no bruits auscultated                                        GI:  abdomen soft, non-tender, BS normoactive, no mass, no HSM, no bruits        Extremities and Muscular Skeletal:  no deformities, clubbing, cyanosis, erythema observed              Neurological:  no gross motor deficits        Psych:  Alert and Oriented x 3        Recent Lab Results:  LIPID RESULTS:  Lab Results   Component Value Date    CHOL 167 11/11/2011    HDL 44 11/11/2011     11/11/2011    TRIG 84 11/11/2011    CHOLHDLRATIO 3.8 11/11/2011       LIVER ENZYME RESULTS:  Lab Results   Component Value Date    AST 15 08/28/2017    ALT 30 08/28/2017       CBC RESULTS:  Lab Results   Component Value Date    WBC 7.6 03/20/2019    RBC 4.85 03/20/2019    HGB 14.7 03/20/2019    HCT 44.3 03/20/2019    MCV 91 03/20/2019    MCH 30.3 03/20/2019    MCHC 33.2 03/20/2019    RDW 14.4 03/20/2019     03/20/2019       BMP RESULTS:  Lab Results   Component Value Date     04/10/2019    POTASSIUM 3.6 04/10/2019    CHLORIDE 107 04/10/2019    CO2 27 04/10/2019    ANIONGAP 6 04/10/2019    GLC 94 04/10/2019    BUN 12 04/10/2019    CR 0.81 04/10/2019    GFRESTIMATED >90 04/10/2019    GFRESTBLACK >90 04/10/2019    FRANCK 8.6 04/10/2019        A1C RESULTS:  Lab Results   Component Value Date    A1C 5.8 04/08/2015       INR RESULTS:  No results found for: INR        CC  Kelsea Lee MD  [address] [place] [address]

## 2019-04-30 NOTE — PROGRESS NOTES
Service Date: 2019      CLINIC NOTE      HISTORY OF PRESENT ILLNESS:  It is a pleasure for me to see this very pleasant 48-year-old gentleman at the request of Dr. Kelsea Lee for evaluation of chest pain.  This has been ongoing for about 6 months.  It is intermittent.  It can be a left parasternal or upper sternal.  It lasts for 20 seconds.  No specific relieving or precipitating factors.  Not related to exertion.  No nausea, vomiting, diaphoresis or shortness of breath.        SOCIAL HISTORY:  No history of smoking, alcohol abuse or drug abuse.        PAST MEDICAL HISTORY:  Not diabetic.  Has controlled hypertension.      FAMILY HISTORY:  Unknown, as he is adopted.      PHYSICAL EXAMINATION: Cardiovascular system examination is normal.      RADIOLOGIC STUDIES:  EKG shows benign repolarization.      The patient admits to having some stressors in his life.      IMPRESSION:  Highly unlikely to be cardiac pains.  Probably related to stress.  We will request stress echocardiogram.  If normal, no further evaluation is needed.         BRAYDON LEIJA MD, FACC             D: 2019   T: 2019   MT: SAVANAH      Name:     MARIANNE SARAVIA   MRN:      [id number]        Account:      [id number]   :      1970           Service Date: 2019      Document: [id number]

## 2019-04-30 NOTE — LETTER
4/30/2019    Kelsea Lee MD  [address]  [facility] [address]    RE: Thai Mancia       Dear Colleague,    I had the pleasure of seeing Thai Mancia in the [facility].    HPI and Plan:   See dictation    Orders Placed This Encounter   Procedures     Exercise Stress Echocardiogram       No orders of the defined types were placed in this encounter.      Encounter Diagnosis   Name Primary?     Precordial pain Yes       CURRENT MEDICATIONS:  Current Outpatient Medications   Medication Sig Dispense Refill     albuterol (PROAIR HFA/PROVENTIL HFA/VENTOLIN HFA) 108 (90 Base) MCG/ACT inhaler Inhale 2 puffs into the lungs every 6 hours as needed for shortness of breath / dyspnea 1 Inhaler 0     fluticasone-salmeterol (ADVAIR DISKUS) 250-50 MCG/DOSE inhaler Inhale 1 puff into the lungs every 12 hours Profile Rx: patient will contact pharmacy when needed 1 Inhaler 5     losartan (COZAAR) 50 MG tablet Take 1 tablet (50 mg) by mouth daily 90 tablet 0       ALLERGIES     Allergies   Allergen Reactions     Cats      Itchy eyes, runny nose, wheezing     Dogs      Itchy eyes, runny nose, wheezing     Pollen Extract      Runny nose       PAST MEDICAL HISTORY:  Past Medical History:   Diagnosis Date     Asthma        PAST SURGICAL HISTORY:  Past Surgical History:   Procedure Laterality Date     KNEE SURGERY  1985       FAMILY HISTORY:  Family History   Problem Relation Age of Onset     Unknown/Adopted Mother         adopted     Unknown/Adopted Father        SOCIAL HISTORY:  Social History     Socioeconomic History     Marital status:      Spouse name: None     Number of children: None     Years of education: None     Highest education level: None   Occupational History     None   Social Needs     Financial resource strain: None     Food insecurity:     Worry: None     Inability: None     Transportation needs:     Medical: None     Non-medical: None   Tobacco Use     Smoking  "status: Never Smoker     Smokeless tobacco: Never Used   Substance and Sexual Activity     Alcohol use: No     Alcohol/week: 0.0 oz     Comment: quit 08/17     Drug use: No     Sexual activity: Yes     Partners: Female   Lifestyle     Physical activity:     Days per week: None     Minutes per session: None     Stress: None   Relationships     Social connections:     Talks on phone: None     Gets together: None     Attends Gnosticism service: None     Active member of club or organization: None     Attends meetings of clubs or organizations: None     Relationship status: None     Intimate partner violence:     Fear of current or ex partner: None     Emotionally abused: None     Physically abused: None     Forced sexual activity: None   Other Topics Concern     Parent/sibling w/ CABG, MI or angioplasty before 65F 55M? Not Asked   Social History Narrative     None       Review of Systems:  Skin:  Negative     Eyes:  Negative    ENT:  Negative    Respiratory:  Negative shortness of breath  Cardiovascular:  edema;palpitations;Negative;syncope or near-syncope;cyanosis;exercise intolerance;lightheadedness;dizziness Positive for;chest pain;fatigue  Gastroenterology: Positive for nausea  Genitourinary:  Negative    Musculoskeletal:  Negative    Neurologic:  Negative    Psychiatric:  Negative    Heme/Lymph/Imm:  Negative    Endocrine:  Negative      Physical Exam:  Vitals: /74   Pulse 76   Ht 1.803 m (5' 11\")   Wt 116.6 kg (257 lb)   BMI 35.84 kg/m       Constitutional:  cooperative, alert and oriented, well developed, well nourished, in no acute distress        Skin:  warm and dry to the touch, no apparent skin lesions or masses noted          Head:  normocephalic, no masses or lesions        Eyes:  pupils equal and round, conjunctivae and lids unremarkable, sclera white, no xanthalasma, EOMS intact, no nystagmus        Lymph:No Cervical lymphadenopathy present     ENT:  no pallor or cyanosis, dentition good    "     Neck:  carotid pulses are full and equal bilaterally, JVP normal, no carotid bruit        Respiratory:  normal breath sounds, clear to auscultation, normal A-P diameter, normal symmetry, normal respiratory excursion, no use of accessory muscles         Cardiac: regular rhythm, normal S1/S2, no S3 or S4, apical impulse not displaced, no murmurs, gallops or rubs                pulses full and equal, no bruits auscultated                                        GI:  abdomen soft, non-tender, BS normoactive, no mass, no HSM, no bruits        Extremities and Muscular Skeletal:  no deformities, clubbing, cyanosis, erythema observed              Neurological:  no gross motor deficits        Psych:  Alert and Oriented x 3        Recent Lab Results:  LIPID RESULTS:  Lab Results   Component Value Date    CHOL 167 11/11/2011    HDL 44 11/11/2011     11/11/2011    TRIG 84 11/11/2011    CHOLHDLRATIO 3.8 11/11/2011       LIVER ENZYME RESULTS:  Lab Results   Component Value Date    AST 15 08/28/2017    ALT 30 08/28/2017       CBC RESULTS:  Lab Results   Component Value Date    WBC 7.6 03/20/2019    RBC 4.85 03/20/2019    HGB 14.7 03/20/2019    HCT 44.3 03/20/2019    MCV 91 03/20/2019    MCH 30.3 03/20/2019    MCHC 33.2 03/20/2019    RDW 14.4 03/20/2019     03/20/2019       BMP RESULTS:  Lab Results   Component Value Date     04/10/2019    POTASSIUM 3.6 04/10/2019    CHLORIDE 107 04/10/2019    CO2 27 04/10/2019    ANIONGAP 6 04/10/2019    GLC 94 04/10/2019    BUN 12 04/10/2019    CR 0.81 04/10/2019    GFRESTIMATED >90 04/10/2019    GFRESTBLACK >90 04/10/2019    FRANCK 8.6 04/10/2019        A1C RESULTS:  Lab Results   Component Value Date    A1C 5.8 04/08/2015       INR RESULTS:  No results found for: INR        CC  Kelsea Lee MD  [address] [place] [address]                  Service Date: 04/30/2019      CLINIC NOTE      HISTORY OF PRESENT ILLNESS:  It is a pleasure for me to see this very  pleasant 48-year-old gentleman at the request of Dr. Kelsea Lee for evaluation of chest pain.  This has been ongoing for about 6 months.  It is intermittent.  It can be a left parasternal or upper sternal.  It lasts for 20 seconds.  No specific relieving or precipitating factors.  Not related to exertion.  No nausea, vomiting, diaphoresis or shortness of breath.        SOCIAL HISTORY:  No history of smoking, alcohol abuse or drug abuse.        PAST MEDICAL HISTORY:  Not diabetic.  Has controlled hypertension.      FAMILY HISTORY:  Unknown, as he is adopted.      PHYSICAL EXAMINATION: Cardiovascular system examination is normal.      RADIOLOGIC STUDIES:  EKG shows benign repolarization.      The patient admits to having some stressors in his life.      IMPRESSION:  Highly unlikely to be cardiac pains.  Probably related to stress.  We will request stress echocardiogram.  If normal, no further evaluation is needed.         BRAYDON LEIJA MD, [facility]             D: 2019   T: 2019   MT: DW      Name:     MARIANNE SARAVIA   MRN:      [id number]        Account:      [id number]   :      1970           Service Date: 2019      Document: [id number]         Thank you for allowing me to participate in the care of your patient.      Sincerely,     DR BRAYDON LEIJA MD     [facility]    cc:   Kelsea Lee MD  [address] [place] [address]

## 2019-05-01 ENCOUNTER — HOSPITAL ENCOUNTER (OUTPATIENT)
Dept: CARDIOLOGY | Facility: CLINIC | Age: 49
Discharge: HOME | End: 2019-05-01
Attending: INTERNAL MEDICINE | Admitting: INTERNAL MEDICINE
Payer: COMMERCIAL

## 2019-05-01 DIAGNOSIS — R07.2 PRECORDIAL PAIN: ICD-10-CM

## 2019-05-01 DIAGNOSIS — J45.30 MILD PERSISTENT ASTHMA WITHOUT COMPLICATION: ICD-10-CM

## 2019-05-01 PROCEDURE — 93350 STRESS TTE ONLY: CPT | Mod: 26 | Performed by: INTERNAL MEDICINE

## 2019-05-01 PROCEDURE — 25500064 ZZH RX 255 OP 636: Performed by: INTERNAL MEDICINE

## 2019-05-01 PROCEDURE — 93325 DOPPLER ECHO COLOR FLOW MAPG: CPT | Mod: 26 | Performed by: INTERNAL MEDICINE

## 2019-05-01 PROCEDURE — 93018 CV STRESS TEST I&R ONLY: CPT | Performed by: INTERNAL MEDICINE

## 2019-05-01 PROCEDURE — 93321 DOPPLER ECHO F-UP/LMTD STD: CPT | Mod: 26 | Performed by: INTERNAL MEDICINE

## 2019-05-01 PROCEDURE — 93016 CV STRESS TEST SUPVJ ONLY: CPT | Performed by: INTERNAL MEDICINE

## 2019-05-01 PROCEDURE — 40000264 ECHO STRESS ECHOCARDIOGRAM

## 2019-05-01 RX ADMIN — HUMAN ALBUMIN MICROSPHERES AND PERFLUTREN 9 ML: 10; .22 INJECTION, SOLUTION INTRAVENOUS at 16:47

## 2019-05-01 NOTE — TELEPHONE ENCOUNTER
"Requested Prescriptions   Pending Prescriptions Disp Refills     VENTOLIN  (90 Base) MCG/ACT inhaler [Pharmacy Med Name: VENTOLIN HFA 108MCG/ACT AERS] 18 g 0     Sig: INHALE 2 PUFFS INTO THE LUNGS EVERY 6 HOURS AS NEEDED FOR SHORTNESS OF BREATH, DIFFICULTY BREATHING OR WHEEZING.   Last Written Prescription Date:  03/04/2019  Last Fill Quantity: 1 inhaler,  # refills: 0   Last office visit: 4/10/2019 with prescribing provider:     Future Office Visit:      Asthma Maintenance Inhalers - Anticholinergics Failed - 5/1/2019  4:42 PM        Failed - Asthma control assessment score within normal limits in last 6 months     Please review ACT score.           Passed - Patient is age 12 years or older        Passed - Medication is active on med list        Passed - Recent (6 mo) or future (30 days) visit within the authorizing provider's specialty     Patient had office visit in the last 6 months or has a visit in the next 30 days with authorizing provider or within the authorizing provider's specialty.  See \"Patient Info\" tab in inbasket, or \"Choose Columns\" in Meds & Orders section of the refill encounter.            "

## 2019-05-02 RX ORDER — ALBUTEROL SULFATE 90 UG/1
AEROSOL, METERED RESPIRATORY (INHALATION)
Qty: 18 G | Refills: 0 | Status: SHIPPED | OUTPATIENT
Start: 2019-05-02 | End: 2019-07-22

## 2019-05-06 ENCOUNTER — TELEPHONE (OUTPATIENT)
Dept: CARDIOLOGY | Facility: CLINIC | Age: 49
End: 2019-05-06

## 2019-05-06 NOTE — TELEPHONE ENCOUNTER
Called back concerning results of the Stress exercise Echo which was done and reviewed by Dr. Moe with normal test results.  Reviewed stress test results and why they are considered normal, and normal results indicate no evidence of stress induced ischemia.Normal baseline electrocardiogram. No regional wall motion abnormalities noted. The visual ejection fraction is estimated at 60-65%.    Patient expressed concern that it had to be his heart as the discomfort he felt was directly over his heart. Explained that it could have been issues with muscles or lungs or rhythm issue, however the stress test does not indicate that there is issues with his coronary arteries. Did offer him second opinion if he would like of if he was having further issues. Will think about getting a second opinion.    Time spent on phone call was 10 minutes.

## 2019-07-22 ENCOUNTER — TELEPHONE (OUTPATIENT)
Dept: INTERNAL MEDICINE | Facility: CLINIC | Age: 49
End: 2019-07-22

## 2019-07-22 DIAGNOSIS — J45.30 MILD PERSISTENT ASTHMA WITHOUT COMPLICATION: ICD-10-CM

## 2019-07-22 RX ORDER — ALBUTEROL SULFATE 90 UG/1
AEROSOL, METERED RESPIRATORY (INHALATION)
Qty: 18 G | Refills: 0 | Status: SHIPPED | OUTPATIENT
Start: 2019-07-22 | End: 2019-10-17

## 2019-07-22 NOTE — LETTER
[facility]  [address] [place]  [address]  [phone]                  July 25, 2019     Thai Mancia  [address] [id number]      Dear Thai,    In order to optimize your Asthma management, we recently reviewed your medical record and found that you are due for Asthma followup.  Please take the time to fill out the attached  Asthma Control Test  and then send it back or call to go over the questions over the phone.  If your score is less than 20, then a followup exam is recommended and you can call [phone] to schedule that.      Thank you very much for choosing [facility].   We appreciate the opportunity to serve you and look forward to supporting your healthcare needs in the future.    Best Regards,  [facility]

## 2019-07-22 NOTE — TELEPHONE ENCOUNTER
"Requested Prescriptions   Pending Prescriptions Disp Refills     VENTOLIN  (90 Base) MCG/ACT inhaler [Pharmacy Med Name: VENTOLIN HFA 108MCG/ACT AERS] 18 g 0     Sig: INHALE 2 PUFFS INTO THE LUNGS EVERY 6 HOURS AS NEEDED FOR SHORTNESS OF BREATH, DIFFICULTY BREATHING OR WHEEZING.   Last Written Prescription Date:  05/02/2019  Last Fill Quantity: 18 g,  # refills: 0  Last office visit: 4/10/2019 with prescribing provider:     Future Office Visit:      Asthma Maintenance Inhalers - Anticholinergics Failed - 7/22/2019  8:37 AM        Failed - Asthma control assessment score within normal limits in last 6 months     Please review ACT score.           Passed - Patient is age 12 years or older        Passed - Medication is active on med list        Passed - Recent (6 mo) or future (30 days) visit within the authorizing provider's specialty     Patient had office visit in the last 6 months or has a visit in the next 30 days with authorizing provider or within the authorizing provider's specialty.  See \"Patient Info\" tab in inbasket, or \"Choose Columns\" in Meds & Orders section of the refill encounter.            "

## 2019-07-22 NOTE — TELEPHONE ENCOUNTER
Routing refill request to provider for review/approval because:  Act score is 17 on 4/10/19.

## 2019-08-21 ENCOUNTER — OFFICE VISIT (OUTPATIENT)
Dept: INTERNAL MEDICINE | Facility: CLINIC | Age: 49
End: 2019-08-21
Payer: COMMERCIAL

## 2019-08-21 VITALS
WEIGHT: 250.8 LBS | OXYGEN SATURATION: 98 % | SYSTOLIC BLOOD PRESSURE: 118 MMHG | RESPIRATION RATE: 24 BRPM | DIASTOLIC BLOOD PRESSURE: 70 MMHG | TEMPERATURE: 99 F | BODY MASS INDEX: 35.11 KG/M2 | HEART RATE: 80 BPM | HEIGHT: 71 IN

## 2019-08-21 DIAGNOSIS — R10.32 ABDOMINAL PAIN, LEFT LOWER QUADRANT: Primary | ICD-10-CM

## 2019-08-21 LAB
ERYTHROCYTE [DISTWIDTH] IN BLOOD BY AUTOMATED COUNT: 14.1 % (ref 10–15)
HCT VFR BLD AUTO: 41.4 % (ref 40–53)
HGB BLD-MCNC: 13.8 G/DL (ref 13.3–17.7)
MCH RBC QN AUTO: 30.1 PG (ref 26.5–33)
MCHC RBC AUTO-ENTMCNC: 33.3 G/DL (ref 31.5–36.5)
MCV RBC AUTO: 90 FL (ref 78–100)
PLATELET # BLD AUTO: 253 10E9/L (ref 150–450)
RBC # BLD AUTO: 4.59 10E12/L (ref 4.4–5.9)
WBC # BLD AUTO: 11 10E9/L (ref 4–11)

## 2019-08-21 PROCEDURE — 36415 COLL VENOUS BLD VENIPUNCTURE: CPT | Performed by: INTERNAL MEDICINE

## 2019-08-21 PROCEDURE — 85027 COMPLETE CBC AUTOMATED: CPT | Performed by: INTERNAL MEDICINE

## 2019-08-21 PROCEDURE — 80053 COMPREHEN METABOLIC PANEL: CPT | Performed by: INTERNAL MEDICINE

## 2019-08-21 PROCEDURE — 99214 OFFICE O/P EST MOD 30 MIN: CPT | Performed by: INTERNAL MEDICINE

## 2019-08-21 RX ORDER — METRONIDAZOLE 500 MG/1
500 TABLET ORAL 3 TIMES DAILY
Qty: 21 TABLET | Refills: 0 | Status: SHIPPED | OUTPATIENT
Start: 2019-08-21 | End: 2020-01-15

## 2019-08-21 RX ORDER — CIPROFLOXACIN 500 MG/1
500 TABLET, FILM COATED ORAL 2 TIMES DAILY
Qty: 20 TABLET | Refills: 0 | Status: SHIPPED | OUTPATIENT
Start: 2019-08-21 | End: 2020-01-15

## 2019-08-21 ASSESSMENT — MIFFLIN-ST. JEOR: SCORE: 2024.75

## 2019-08-21 NOTE — PROGRESS NOTES
"Subjective     Thai Mancia is a 49 year old male who presents to clinic today for the following health issues:    HPI     LLQ abdominal pain, nausea, decreased appetite, chills and low grade fever,  for 4 days. Diverticulitis once 2017.    HPI:   Thai Mancia a 49 year old male here for above. He has not eaten anything likely to cause him diverticulitis. Is slightly nauseated but no vomiting. Had normal bowel movement on Saturday but nothing significant since. Started Murelax Sunday and prune juice today with the Murelax. Night sweats and fever up to 100.       BP Readings from Last 3 Encounters:   08/21/19 118/70   04/30/19 114/74   04/10/19 130/70    Wt Readings from Last 3 Encounters:   08/21/19 113.8 kg (250 lb 12.8 oz)   04/30/19 116.6 kg (257 lb)   04/10/19 112.5 kg (248 lb)                 Reviewed and updated as needed this visit by Provider         Review of Systems   ROS COMP: Constitutional, HEENT, cardiovascular, pulmonary, GI, , musculoskeletal, neuro, skin, endocrine and psych systems are negative, except as otherwise noted.      Objective    /70 (BP Location: Left arm, Patient Position: Chair, Cuff Size: Adult Large)   Pulse 80   Temp 99  F (37.2  C) (Oral)   Resp 24   Ht 1.803 m (5' 11\")   Wt 113.8 kg (250 lb 12.8 oz)   SpO2 98%   BMI 34.98 kg/m    Body mass index is 34.98 kg/m .  Physical Exam   GENERAL: healthy, alert and no distress  RESP: lungs clear to auscultation - no rales, rhonchi or wheezes  CV: regular rate and rhythm, normal S1 S2, no S3 or S4, no murmur, click or rub, no peripheral edema and peripheral pulses strong  ABDOMEN: bowel sounds normal and abdomen soft but very tender left lower quadrant with slight guarding. nontender other quadrants.  MS: no gross musculoskeletal defects noted, no edema  PSYCH: mentation appears normal, affect normal/bright          Assessment & Plan     1. Abdominal pain, left lower quadrant  most likely diverticulitis RX with Cipro " "and flagyl, recheck in 2 days.   - CBC with platelets  - Comprehensive metabolic panel  - ciprofloxacin (CIPRO) 500 MG tablet; Take 1 tablet (500 mg) by mouth 2 times daily  Dispense: 20 tablet; Refill: 0  - metroNIDAZOLE (FLAGYL) 500 MG tablet; Take 1 tablet (500 mg) by mouth 3 times daily for 7 days  Dispense: 21 tablet; Refill: 0     BMI:   Estimated body mass index is 34.98 kg/m  as calculated from the following:    Height as of this encounter: 1.803 m (5' 11\").    Weight as of this encounter: 113.8 kg (250 lb 12.8 oz).       Return in about 2 days (around 8/23/2019).    Tarah Guadalupe MD  [facility]      "

## 2019-08-22 LAB
ALBUMIN SERPL-MCNC: 3.2 G/DL (ref 3.4–5)
ALP SERPL-CCNC: 81 U/L (ref 40–150)
ALT SERPL W P-5'-P-CCNC: 28 U/L (ref 0–70)
ANION GAP SERPL CALCULATED.3IONS-SCNC: 7 MMOL/L (ref 3–14)
AST SERPL W P-5'-P-CCNC: 16 U/L (ref 0–45)
BILIRUB SERPL-MCNC: 0.4 MG/DL (ref 0.2–1.3)
BUN SERPL-MCNC: 13 MG/DL (ref 7–30)
CALCIUM SERPL-MCNC: 8.3 MG/DL (ref 8.5–10.1)
CHLORIDE SERPL-SCNC: 108 MMOL/L (ref 94–109)
CO2 SERPL-SCNC: 27 MMOL/L (ref 20–32)
CREAT SERPL-MCNC: 1.02 MG/DL (ref 0.66–1.25)
GFR SERPL CREATININE-BSD FRML MDRD: 86 ML/MIN/{1.73_M2}
GLUCOSE SERPL-MCNC: 87 MG/DL (ref 70–99)
POTASSIUM SERPL-SCNC: 3.4 MMOL/L (ref 3.4–5.3)
PROT SERPL-MCNC: 6.9 G/DL (ref 6.8–8.8)
SODIUM SERPL-SCNC: 142 MMOL/L (ref 133–144)

## 2019-08-22 ASSESSMENT — ASTHMA QUESTIONNAIRES: ACT_TOTALSCORE: 19

## 2019-08-23 ENCOUNTER — OFFICE VISIT (OUTPATIENT)
Dept: INTERNAL MEDICINE | Facility: CLINIC | Age: 49
End: 2019-08-23
Payer: COMMERCIAL

## 2019-08-23 VITALS
RESPIRATION RATE: 16 BRPM | TEMPERATURE: 98 F | OXYGEN SATURATION: 97 % | SYSTOLIC BLOOD PRESSURE: 120 MMHG | HEART RATE: 76 BPM | DIASTOLIC BLOOD PRESSURE: 70 MMHG

## 2019-08-23 DIAGNOSIS — K57.32 DIVERTICULITIS OF COLON: Primary | ICD-10-CM

## 2019-08-23 DIAGNOSIS — R10.32 LLQ ABDOMINAL PAIN: ICD-10-CM

## 2019-08-23 PROCEDURE — 99213 OFFICE O/P EST LOW 20 MIN: CPT | Performed by: NURSE PRACTITIONER

## 2019-08-23 NOTE — PROGRESS NOTES
Subjective     Thai Mancia is a 49 year old male who presents to clinic today for the following health issues:    HPI   He was seen by Dr Guadalupe and diagnosed with diverticulitis   Patient had diverticulitis in past and this felt similar to him   He has been taking the cipro and flagyl since he saw Dr Guadalupe and has been improving  enough he was able to work for the day today  He is trying to do liquids, but did eat some soup with noodles in and rice  No issues with eating and pain is lessening   Stool has not totally normalized but he has not been eating   He can touch lll and push without a lot of pain             Patient Active Problem List   Diagnosis     CARDIOVASCULAR SCREENING; LDL GOAL LESS THAN 160     Mild persistent asthma without complication     Obesity due to excess calories, unspecified obesity severity     Essential hypertension     Past Surgical History:   Procedure Laterality Date     KNEE SURGERY  1985       Social History     Tobacco Use     Smoking status: Never Smoker     Smokeless tobacco: Never Used   Substance Use Topics     Alcohol use: No     Alcohol/week: 0.0 oz     Comment: quit 08/17     Family History   Problem Relation Age of Onset     Unknown/Adopted Mother         adopted     Unknown/Adopted Father      Diabetes No family hx of              Reviewed and updated as needed this visit by Provider  Tobacco  Allergies  Meds  Problems  Med Hx  Surg Hx  Fam Hx         Review of Systems   ROS COMP: Constitutional, HEENT, cardiovascular, pulmonary, GI, , musculoskeletal, neuro, skin, endocrine and psych systems are negative, except as otherwise noted.      Objective    /70   Pulse 76   Temp 98  F (36.7  C) (Oral)   Resp 16   SpO2 97%   There is no height or weight on file to calculate BMI.  Physical Exam   GENERAL: alert and no distress  RESP: lungs clear to auscultation - no rales, rhonchi or wheezes  CV: regular rate and rhythm  ABDOMEN: soft, LLQ tender- no rebound and  "bowel sounds normal  PSYCH: mentation appears normal, affect normal/bright    Diagnostic Test Results:  Labs reviewed in Epic  Reviewed previous notes        Assessment & Plan     1. Diverticulitis of colon  Improving with medication and low diet  Increase diet as tolerated slowly   Colonoscopy in 2-3 months   White count was 11 when checked a coupe days ago. With improved symptoms, will not check white count today- patient prefer no lab   - GASTROENTEROLOGY ADULT REF PROCEDURE ONLY [facility] [phone]; No Provider Preference    2. LLQ abdominal pain         BMI:   Estimated body mass index is 34.98 kg/m  as calculated from the following:    Height as of 8/21/19: 1.803 m (5' 11\").    Weight as of 8/21/19: 113.8 kg (250 lb 12.8 oz).           Patient Instructions   Colonoscopy in 2-3 months to follow up on diverticulitis    [place] food   Low residue diet    Increase diet as tolerated     No nuts, corn, popcorn,  Fruits with seeds- raspberry, strawberry etc      If you have increased pain, inability to eat or drink or blood in stool you need to be seen in clinic or ER          Return in about 3 months (around 11/23/2019) for colonoscopy .    LAYLA Adames [facility]        "

## 2019-08-23 NOTE — PATIENT INSTRUCTIONS
Colonoscopy in 2-3 months to follow up on diverticulitis    [place] food   Low residue diet    Increase diet as tolerated     No nuts, corn, popcorn,  Fruits with seeds- raspberry, strawberry etc      If you have increased pain, inability to eat or drink or blood in stool you need to be seen in clinic or ER

## 2019-09-11 DIAGNOSIS — I10 ESSENTIAL HYPERTENSION: ICD-10-CM

## 2019-09-12 RX ORDER — LOSARTAN POTASSIUM 50 MG/1
TABLET ORAL
Qty: 90 TABLET | Refills: 0 | Status: SHIPPED | OUTPATIENT
Start: 2019-09-12 | End: 2020-01-05

## 2019-09-12 NOTE — TELEPHONE ENCOUNTER
"Requested Prescriptions   Pending Prescriptions Disp Refills     losartan (COZAAR) 50 MG tablet [Pharmacy Med Name: LOSARTAN POTASSIUM 50MG TABS] 90 tablet 0     Sig: TAKE ONE TABLET BY MOUTH ONCE DAILY   Last Written Prescription Date:  04/10/2019  Last Fill Quantity: 90,  # refills: 0   Last office visit: 8/23/2019 with prescribing provider:     Future Office Visit:      Angiotensin-II Receptors Passed - 9/11/2019  8:32 PM        Passed - Last blood pressure under 140/90 in past 12 months     BP Readings from Last 3 Encounters:   08/23/19 120/70   08/21/19 118/70   04/30/19 114/74                 Passed - Recent (12 mo) or future (30 days) visit within the authorizing provider's specialty     Patient had office visit in the last 12 months or has a visit in the next 30 days with authorizing provider or within the authorizing provider's specialty.  See \"Patient Info\" tab in inbasket, or \"Choose Columns\" in Meds & Orders section of the refill encounter.              Passed - Medication is active on med list        Passed - Patient is age 18 or older        Passed - Normal serum creatinine on file in past 12 months     Recent Labs   Lab Test 08/21/19  1507   CR 1.02             Passed - Normal serum potassium on file in past 12 months     Recent Labs   Lab Test 08/21/19  1507   POTASSIUM 3.4                    "

## 2019-09-16 NOTE — PATIENT INSTRUCTIONS
Will treat with Zithromax 250mg, 2 tabs by mouth day 1, then 1 tab by mouth days 2-5. He is to watch for GI upset, diarrhea or rash.    Prednisone take in morning with food   Take 3 tabs daily for 3 days   Take 2 tabs daily for 3 days   Take 1 tab daily for 3 days   Then stop       Advair as directed     Albuterol as needed     Please, call or return to clinic if signs or symptoms worsen or fail to improve as anticipated     DISPLAY PLAN FREE TEXT

## 2019-10-11 ENCOUNTER — NURSE TRIAGE (OUTPATIENT)
Dept: INTERNAL MEDICINE | Facility: CLINIC | Age: 49
End: 2019-10-11

## 2019-10-11 NOTE — TELEPHONE ENCOUNTER
Patient calls reporting symptoms of abdominal pain in LLQ. Stated that abdominal pain started yesterday and it is similar to the pain patient has with diverticulitis except he has been able to have a bowel movement. Patient denies fever, nausea, vomiting or diarrhea. States that he has a colonoscopy planned but they want him free from diverticulitis symptoms for 6 weeks before proceeding. Patient assisted in scheduling appointment and given care advice per protocol.     Additional Information    Negative: Passed out (i.e., fainted, collapsed and was not responding)    Negative: Shock suspected (e.g., cold/pale/clammy skin, too weak to stand, low BP, rapid pulse)    Negative: Sounds like a life-threatening emergency to the triager    Negative: Chest pain    Negative: Pain is mainly in upper abdomen (if needed ask: 'is it mainly above the belly button?')    Negative: SEVERE abdominal pain (e.g., excruciating)    Negative: Vomiting red blood or black (coffee ground) material    Negative: Bloody, black, or tarry bowel movements    Negative: Unable to urinate (or only a few drops) and bladder feels very full    Negative: Pain in scrotum persists > 1 hour    Negative: Constant abdominal pain lasting > 2 hours    Negative: Vomiting bile (green color)    Negative: Patient sounds very sick or weak to the triager    Negative: Vomiting and abdomen looks much more swollen than usual    Negative: White of the eyes have turned yellow (i.e., jaundice)    Negative: Blood in urine (red, pink, or tea-colored)    Negative: Fever > 103 F (39.4 C)    Negative: Fever > 101 F (38.3 C) and over 60 years of age    Negative: Fever > 100.0 F (37.8 C) and has diabetes mellitus or a weak immune system (e.g., HIV positive, cancer chemotherapy, organ transplant, splenectomy, chronic steroids)    Negative: Fever > 100.0 F (37.8 C) and bedridden (e.g., nursing home patient, stroke, chronic illness, recovering from surgery)    Abdominal pain is a  "chronic symptom (recurrent or ongoing AND lasting > 4 weeks)    Negative: MILD pain that comes and goes (cramps) lasts > 24 hours    Negative: Age > 60 years    Negative: Patient wants to be seen    Answer Assessment - Initial Assessment Questions  1. LOCATION: \"Where does it hurt?\"       LLQ  2. RADIATION: \"Does the pain shoot anywhere else?\" (e.g., chest, back)      Across stomach   3. ONSET: \"When did the pain begin?\" (Minutes, hours or days ago)       Yesterday AM  4. SUDDEN: \"Gradual or sudden onset?\"      Sudden   5. PATTERN \"Does the pain come and go, or is it constant?\"     - If constant: \"Is it getting better, staying the same, or worsening?\"       (Note: Constant means the pain never goes away completely; most serious pain is constant and it progresses)      - If intermittent: \"How long does it last?\" \"Do you have pain now?\"      (Note: Intermittent means the pain goes away completely between bouts)      Constant, dull can be intermittent   6. SEVERITY: \"How bad is the pain?\"  (e.g., Scale 1-10; mild, moderate, or severe)     - MILD (1-3): doesn't interfere with normal activities, abdomen soft and not tender to touch      - MODERATE (4-7): interferes with normal activities or awakens from sleep, tender to touch      - SEVERE (8-10): excruciating pain, doubled over, unable to do any normal activities        6/10  7. RECURRENT SYMPTOM: \"Have you ever had this type of abdominal pain before?\" If so, ask: \"When was the last time?\" and \"What happened that time?\"       Yes about a week ago when patient was seen for office visit. Patient was seen for office visit for diverticulitis. Plans to get surgery for colonoscopy   8. CAUSE: \"What do you think is causing the abdominal pain?\"      Diagnosis of diverticulitis  9. RELIEVING/AGGRAVATING FACTORS: \"What makes it better or worse?\" (e.g., movement, antacids, bowel movement)      Resting makes it better but not much    10. OTHER SYMPTOMS: \"Has there been any " "vomiting, diarrhea, constipation, or urine problems?\"        No, had normal bowel movement which patient states is unusual with his diverticulitis    Protocols used: ABDOMINAL PAIN - MALE-A-OH      "

## 2019-10-17 DIAGNOSIS — J45.30 MILD PERSISTENT ASTHMA WITHOUT COMPLICATION: ICD-10-CM

## 2019-10-17 NOTE — TELEPHONE ENCOUNTER
"Requested Prescriptions   Pending Prescriptions Disp Refills     fluticasone-salmeterol (ADVAIR DISKUS) 250-50 MCG/DOSE inhaler 1 Inhaler 5     Sig: Inhale 1 puff into the lungs every 12 hours Profile Rx: patient will contact pharmacy when needed       Inhaled Steroids Protocol Failed - 10/17/2019  2:15 PM        Failed - Asthma control assessment score within normal limits in last 6 months     Please review ACT score.           Passed - Patient is age 12 or older        Passed - Medication is active on med list        Passed - Recent (6 mo) or future (30 days) visit within the authorizing provider's specialty     Patient had office visit in the last 6 months or has a visit in the next 30 days with authorizing provider or within the authorizing provider's specialty.  See \"Patient Info\" tab in inbasket, or \"Choose Columns\" in Meds & Orders section of the refill encounter.              Last office visit 8-23-19    Last ACT = 19 on 8-21-19    Routing refill request to provider for review/approval because:  Most recent ACT ouside standing order parameters.    Please advise, thanks.        "

## 2019-10-17 NOTE — TELEPHONE ENCOUNTER
"Requested Prescriptions   Pending Prescriptions Disp Refills     albuterol (VENTOLIN HFA) 108 (90 Base) MCG/ACT inhaler  Last Written Prescription Date:  7/22/19  Last Fill Quantity: 18g,  # refills: 0   Last office visit: 8/23/2019 with prescribing provider:  Jada   Future Office Visit:   18 g 0       Asthma Maintenance Inhalers - Anticholinergics Failed - 10/17/2019  1:25 PM        Failed - Asthma control assessment score within normal limits in last 6 months     Please review ACT score.           Passed - Patient is age 12 years or older        Passed - Medication is active on med list        Passed - Recent (6 mo) or future (30 days) visit within the authorizing provider's specialty     Patient had office visit in the last 6 months or has a visit in the next 30 days with authorizing provider or within the authorizing provider's specialty.  See \"Patient Info\" tab in inbasket, or \"Choose Columns\" in Meds & Orders section of the refill encounter.            "

## 2019-10-17 NOTE — TELEPHONE ENCOUNTER
Advair      Last Written Prescription Date:  3/20/19  Last Fill Quantity: 1,   # refills: 5  Last Office Visit: 8/23/19 Jada  Future Office visit:       Routing refill request to provider for review/approval because:  Drug not active on patient's medication list

## 2019-10-18 RX ORDER — ALBUTEROL SULFATE 90 UG/1
AEROSOL, METERED RESPIRATORY (INHALATION)
Qty: 18 G | Refills: 0 | Status: SHIPPED | OUTPATIENT
Start: 2019-10-18 | End: 2020-01-05

## 2020-01-02 DIAGNOSIS — J45.30 MILD PERSISTENT ASTHMA WITHOUT COMPLICATION: ICD-10-CM

## 2020-01-02 DIAGNOSIS — I10 ESSENTIAL HYPERTENSION: ICD-10-CM

## 2020-01-03 NOTE — TELEPHONE ENCOUNTER
"Requested Prescriptions   Pending Prescriptions Disp Refills     albuterol (VENTOLIN HFA) 108 (90 Base) MCG/ACT inhaler [Pharmacy Med Name:  Last Written Prescription Date:  10/18/2019  Last Fill Quantity: 18g,  # refills: 0   Last office visit: 8/23/2019 with prescribing provider:     Future Office Visit:   VENTOLIN HFA 108MCG/ACT AERS] 18 g 0     Sig: INHALE 2 PUFFS INTO THE LUNGS EVERY 6 HOURS AS NEEDED FOR SHORTNESS OF BREATH, DIFFICULTY BREATHING OR WHEEZING.       Asthma Maintenance Inhalers - Anticholinergics Failed - 1/2/2020  5:08 PM        Failed - Asthma control assessment score within normal limits in last 6 months     Please review ACT score.           Passed - Patient is age 12 years or older        Passed - Medication is active on med list        Passed - Recent (6 mo) or future (30 days) visit within the authorizing provider's specialty     Patient had office visit in the last 6 months or has a visit in the next 30 days with authorizing provider or within the authorizing provider's specialty.  See \"Patient Info\" tab in inbasket, or \"Choose Columns\" in Meds & Orders section of the refill encounter.            losartan (COZAAR) 50 MG tablet [Pharmacy Med Name: LOSARTAN POTASSIUM  Last Written Prescription Date:  9/12/2019  Last Fill Quantity: 90,  # refills: 0   Last office visit: 8/23/2019 with prescribing provider:     Future Office Visit:   50MG TABS] 90 tablet 0     Sig: TAKE ONE TABLET BY MOUTH ONCE DAILY       Angiotensin-II Receptors Passed - 1/2/2020  5:08 PM        Passed - Last blood pressure under 140/90 in past 12 months     BP Readings from Last 3 Encounters:   08/23/19 120/70   08/21/19 118/70   04/30/19 114/74                 Passed - Recent (12 mo) or future (30 days) visit within the authorizing provider's specialty     Patient has had an office visit with the authorizing provider or a provider within the authorizing providers department within the previous 12 mos or has a future " "within next 30 days. See \"Patient Info\" tab in inbasket, or \"Choose Columns\" in Meds & Orders section of the refill encounter.              Passed - Medication is active on med list        Passed - Patient is age 18 or older        Passed - Normal serum creatinine on file in past 12 months     Recent Labs   Lab Test 08/21/19  1507   CR 1.02             Passed - Normal serum potassium on file in past 12 months     Recent Labs   Lab Test 08/21/19  1507   POTASSIUM 3.4                    "

## 2020-01-05 RX ORDER — LOSARTAN POTASSIUM 50 MG/1
TABLET ORAL
Qty: 90 TABLET | Refills: 0 | Status: SHIPPED | OUTPATIENT
Start: 2020-01-05 | End: 2020-04-24

## 2020-01-05 RX ORDER — ALBUTEROL SULFATE 90 UG/1
AEROSOL, METERED RESPIRATORY (INHALATION)
Qty: 18 G | Refills: 0 | Status: SHIPPED | OUTPATIENT
Start: 2020-01-05 | End: 2020-02-28

## 2020-01-05 NOTE — TELEPHONE ENCOUNTER
Routing refill request to provider for review/approval because:  Asthma Control Test 8/21/2019   ACT Total Score (Goal Greater than or Equal to 20) 19

## 2020-01-06 ENCOUNTER — TELEPHONE (OUTPATIENT)
Dept: INTERNAL MEDICINE | Facility: CLINIC | Age: 50
End: 2020-01-06

## 2020-01-06 NOTE — TELEPHONE ENCOUNTER
Patient can not afford the fluticasone-salmeterol (ADVAIR DISKUS) 250-50 MCG/DOSE inhaler which is almost 400 dollars. Patient wanting to know what else he can try. Ok to call and  [phone]

## 2020-01-08 NOTE — TELEPHONE ENCOUNTER
Left detailed voicemail message asking patient to contact his insurance to see what formulary alternatives patient can use instead of Advair.  KERI Lopez R.N.

## 2020-01-15 ENCOUNTER — ANCILLARY PROCEDURE (OUTPATIENT)
Dept: GENERAL RADIOLOGY | Facility: CLINIC | Age: 50
End: 2020-01-15
Attending: NURSE PRACTITIONER
Payer: COMMERCIAL

## 2020-01-15 ENCOUNTER — OFFICE VISIT (OUTPATIENT)
Dept: INTERNAL MEDICINE | Facility: CLINIC | Age: 50
End: 2020-01-15
Payer: COMMERCIAL

## 2020-01-15 VITALS
DIASTOLIC BLOOD PRESSURE: 80 MMHG | WEIGHT: 247.4 LBS | HEIGHT: 71 IN | OXYGEN SATURATION: 96 % | BODY MASS INDEX: 34.64 KG/M2 | HEART RATE: 93 BPM | TEMPERATURE: 97.5 F | RESPIRATION RATE: 18 BRPM | SYSTOLIC BLOOD PRESSURE: 130 MMHG

## 2020-01-15 DIAGNOSIS — M25.562 LEFT KNEE PAIN, UNSPECIFIED CHRONICITY: ICD-10-CM

## 2020-01-15 DIAGNOSIS — M17.11 ARTHRITIS OF KNEE, RIGHT: ICD-10-CM

## 2020-01-15 DIAGNOSIS — M25.562 LEFT KNEE PAIN, UNSPECIFIED CHRONICITY: Primary | ICD-10-CM

## 2020-01-15 PROCEDURE — 99213 OFFICE O/P EST LOW 20 MIN: CPT | Performed by: NURSE PRACTITIONER

## 2020-01-15 PROCEDURE — 73560 X-RAY EXAM OF KNEE 1 OR 2: CPT | Mod: LT

## 2020-01-15 ASSESSMENT — MIFFLIN-ST. JEOR: SCORE: 2009.33

## 2020-01-15 ASSESSMENT — PAIN SCALES - GENERAL: PAINLEVEL: MODERATE PAIN (5)

## 2020-01-15 NOTE — PROGRESS NOTES
Subjective     Thai Mancia is a 49 year old male who presents to clinic today for the following health issues:    He has pain in his left knee for about a month.    Playing basketball and hurt left knee then later slipped on ice and fell on this same knee about 2 weeks ago.  Swollen and painful x 1 week and now getting some better. Taking Ibuprofen and helps. Using rub on knee. Already doing the exercises from my right knee that has moderate arthritis per x-ray 8/2016 (told too young for total knee replacement at this time).  Asking for an x-ray and referral to Orthopedics.         Patient Active Problem List   Diagnosis     CARDIOVASCULAR SCREENING; LDL GOAL LESS THAN 160     Mild persistent asthma without complication     Obesity due to excess calories, unspecified obesity severity     Essential hypertension     Past Surgical History:   Procedure Laterality Date     KNEE SURGERY  1985    left       Social History     Tobacco Use     Smoking status: Never Smoker     Smokeless tobacco: Never Used   Substance Use Topics     Alcohol use: No     Alcohol/week: 0.0 standard drinks     Comment: quit 08/17     Family History   Problem Relation Age of Onset     Unknown/Adopted Mother         adopted     Unknown/Adopted Father      Diabetes No family hx of          Current Outpatient Medications   Medication Sig Dispense Refill     albuterol (VENTOLIN HFA) 108 (90 Base) MCG/ACT inhaler INHALE 2 PUFFS INTO THE LUNGS EVERY 6 HOURS AS NEEDED FOR SHORTNESS OF BREATH, DIFFICULTY BREATHING OR WHEEZING. 18 g 0     fluticasone-salmeterol (ADVAIR DISKUS) 250-50 MCG/DOSE inhaler Inhale 1 puff into the lungs every 12 hours Profile Rx: patient will contact pharmacy when needed 1 Inhaler 3     losartan (COZAAR) 50 MG tablet TAKE ONE TABLET BY MOUTH ONCE DAILY 90 tablet 0     Allergies   Allergen Reactions     Cats      Itchy eyes, runny nose, wheezing     Dogs      Itchy eyes, runny nose, wheezing     Pollen Extract      Runny nose  "      Reviewed and updated as needed this visit by Provider  Tobacco  Allergies  Meds  Med Hx  Surg Hx  Fam Hx  Soc Hx        Review of Systems   ROS COMP: Constitutional, HEENT, cardiovascular, pulmonary, gi and gu systems are negative, except as otherwise noted.      Objective    /80 (BP Location: Right arm, Patient Position: Sitting, Cuff Size: Adult Large)   Pulse 93   Temp 97.5  F (36.4  C) (Oral)   Resp 18   Ht 1.803 m (5' 11\")   Wt 112.2 kg (247 lb 6.4 oz)   SpO2 96%   BMI 34.51 kg/m    Body mass index is 34.51 kg/m .     Physical Exam   GENERAL: alert and no distress  MS/knee: no edema; left knee with noted hard calcification mid patella.  No laxity.  Able to squat without difficulty; good strenth  SKIN: no suspicious lesions or rashes or bruising      Diagnostic Test Results:        Assessment & Plan     1. Left knee pain, unspecified chronicity  - XR Knee Standing Left 2 Views; Future  - ORTHO  REFERRAL  - ok to take OTC Ibuprofen prn for pain and swelling  - continue with exercises you were taught with right knee    2. Arthritis of knee, right  - moderate arthritis and told TKA but holding off due to age.       BMI:   Estimated body mass index is 34.51 kg/m  as calculated from the following:    Height as of this encounter: 1.803 m (5' 11\").    Weight as of this encounter: 112.2 kg (247 lb 6.4 oz).   Weight management plan: Discussed healthy diet and exercise guidelines        Patient Instructions   Continue to take over the counter Ibuprofen for swelling and pain along with analgesic cream.    Ordered x-ray to be done today with referral to Orthopedics.      Return in about 6 months (around 7/15/2020).    Michelle Mckenna CNP  [facility]        "

## 2020-01-15 NOTE — PATIENT INSTRUCTIONS
Continue to take over the counter Ibuprofen for swelling and pain along with analgesic cream.    Ordered x-ray to be done today with referral to Orthopedics.

## 2020-01-16 ASSESSMENT — ASTHMA QUESTIONNAIRES: ACT_TOTALSCORE: 16

## 2020-02-27 DIAGNOSIS — I10 ESSENTIAL HYPERTENSION: ICD-10-CM

## 2020-02-27 DIAGNOSIS — J45.30 MILD PERSISTENT ASTHMA WITHOUT COMPLICATION: ICD-10-CM

## 2020-02-28 RX ORDER — ALBUTEROL SULFATE 90 UG/1
AEROSOL, METERED RESPIRATORY (INHALATION)
Qty: 18 G | Refills: 0 | Status: SHIPPED | OUTPATIENT
Start: 2020-02-28 | End: 2020-03-24

## 2020-02-28 RX ORDER — LOSARTAN POTASSIUM 50 MG/1
TABLET ORAL
Qty: 90 TABLET | Refills: 0 | OUTPATIENT
Start: 2020-02-28

## 2020-02-28 NOTE — TELEPHONE ENCOUNTER
"Requested Prescriptions   Pending Prescriptions Disp Refills     albuterol (VENTOLIN HFA) 108 (90 Base) MCG/ACT inhaler [Pharmacy Med Name: VENTOLIN HFA 108MCG/ACT AERS] 18 g 0     Sig: INHALE 2 PUFFS INTO THE LUNGS EVERY 6 HOURS AS NEEDED FOR SHORTNESS OF BREATH, DIFFICULTY BREATHING OR WHEEZING.       Asthma Maintenance Inhalers - Anticholinergics Failed - 2/27/2020  4:17 PM        Failed - Asthma control assessment score within normal limits in last 6 months     Please review ACT score.           Passed - Patient is age 12 years or older        Passed - Medication is active on med list        Passed - Recent (6 mo) or future (30 days) visit within the authorizing provider's specialty     Patient had office visit in the last 6 months or has a visit in the next 30 days with authorizing provider or within the authorizing provider's specialty.  See \"Patient Info\" tab in inbasket, or \"Choose Columns\" in Meds & Orders section of the refill encounter.            losartan (COZAAR) 50 MG tablet [Pharmacy Med Name: LOSARTAN POTASSIUM 50MG TABS] 90 tablet 0     Sig: TAKE ONE TABLET BY MOUTH ONCE DAILY       Angiotensin-II Receptors Passed - 2/27/2020  4:17 PM        Passed - Last blood pressure under 140/90 in past 12 months     BP Readings from Last 3 Encounters:   01/15/20 130/80   08/23/19 120/70   08/21/19 118/70                 Passed - Recent (12 mo) or future (30 days) visit within the authorizing provider's specialty     Patient has had an office visit with the authorizing provider or a provider within the authorizing providers department within the previous 12 mos or has a future within next 30 days. See \"Patient Info\" tab in inbasket, or \"Choose Columns\" in Meds & Orders section of the refill encounter.              Passed - Medication is active on med list        Passed - Patient is age 18 or older        Passed - Normal serum creatinine on file in past 12 months     Recent Labs   Lab Test 08/21/19  1507   CR " 1.02             Passed - Normal serum potassium on file in past 12 months     Recent Labs   Lab Test 08/21/19  1507   POTASSIUM 3.4                      Last office visit 1-15-20    Losartan duplicate--e-scribed 1-5-20 #90 tabs.    Routing Albuterol inhaler refill request to provider for review/approval because:  Last ACT done 1-15-20 = 16 is ouside standing order parameters.    Please advise, thanks.

## 2020-03-15 ENCOUNTER — HEALTH MAINTENANCE LETTER (OUTPATIENT)
Age: 50
End: 2020-03-15

## 2020-03-24 DIAGNOSIS — J45.30 MILD PERSISTENT ASTHMA WITHOUT COMPLICATION: ICD-10-CM

## 2020-03-24 RX ORDER — ALBUTEROL SULFATE 90 UG/1
AEROSOL, METERED RESPIRATORY (INHALATION)
Qty: 18 G | Refills: 0 | Status: SHIPPED | OUTPATIENT
Start: 2020-03-24 | End: 2020-09-10

## 2020-03-24 NOTE — TELEPHONE ENCOUNTER
"Requested Prescriptions   Pending Prescriptions Disp Refills     albuterol (VENTOLIN HFA) 108 (90 Base) MCG/ACT inhaler [Pharmacy Med Name: VENTOLIN HFA 108MCG/ACT AERS]  Last Written Prescription Date:  2/28/200  Last Fill Quantity: 18g,  # refills: 0   Last office visit: 1/15/2020 with prescribing provider:  Rosa Maria Mckenna   Future Office Visit:    18 g 0     Sig: INHALE 2 PUFFS INTO THE LUNGS EVERY 6 HOURS AS NEEDED FOR SHORTNESS OF BREATH, DIFFICULTY BREATHING OR WHEEZING.       Asthma Maintenance Inhalers - Anticholinergics Failed - 3/24/2020  3:52 PM        Failed - Asthma control assessment score within normal limits in last 6 months     Please review ACT score.   ACT Total Scores 1/15/2020   ACT TOTAL SCORE -   ASTHMA ER VISITS -   ASTHMA HOSPITALIZATIONS -   ACT TOTAL SCORE (Goal Greater than or Equal to 20) 16   In the past 12 months, how many times did you visit the emergency room for your asthma without being admitted to the hospital? 0   In the past 12 months, how many times were you hospitalized overnight because of your asthma? 0              Passed - Patient is age 12 years or older        Passed - Medication is active on med list        Passed - Recent (6 mo) or future (30 days) visit within the authorizing provider's specialty     Patient had office visit in the last 6 months or has a visit in the next 30 days with authorizing provider or within the authorizing provider's specialty.  See \"Patient Info\" tab in inbasket, or \"Choose Columns\" in Meds & Orders section of the refill encounter.           Short-Acting Beta Agonist Inhalers Protocol  Failed - 3/24/2020  3:52 PM        Failed - Asthma control assessment score within normal limits in last 6 months     Please review ACT score.           Passed - Patient is age 12 or older        Passed - Medication is active on med list        Passed - Recent (6 mo) or future (30 days) visit within the authorizing provider's specialty     Patient had office visit in " "the last 6 months or has a visit in the next 30 days with authorizing provider or within the authorizing provider's specialty.  See \"Patient Info\" tab in inbasket, or \"Choose Columns\" in Meds & Orders section of the refill encounter.               Routing refill request to provider for review/approval because:  ACT low, please advise if virtual visit needed for follow-up or if ACT should be repeated.       "

## 2020-04-23 DIAGNOSIS — I10 ESSENTIAL HYPERTENSION: ICD-10-CM

## 2020-04-24 RX ORDER — LOSARTAN POTASSIUM 50 MG/1
TABLET ORAL
Qty: 90 TABLET | Refills: 1 | Status: SHIPPED | OUTPATIENT
Start: 2020-04-24 | End: 2020-11-10

## 2020-09-10 ENCOUNTER — VIRTUAL VISIT (OUTPATIENT)
Dept: INTERNAL MEDICINE | Facility: CLINIC | Age: 50
End: 2020-09-10
Payer: COMMERCIAL

## 2020-09-10 ENCOUNTER — NURSE TRIAGE (OUTPATIENT)
Dept: INTERNAL MEDICINE | Facility: CLINIC | Age: 50
End: 2020-09-10

## 2020-09-10 DIAGNOSIS — J45.21 MILD INTERMITTENT ASTHMA WITH EXACERBATION: Primary | ICD-10-CM

## 2020-09-10 DIAGNOSIS — J45.30 MILD PERSISTENT ASTHMA WITHOUT COMPLICATION: ICD-10-CM

## 2020-09-10 PROCEDURE — 99214 OFFICE O/P EST MOD 30 MIN: CPT | Mod: GT | Performed by: INTERNAL MEDICINE

## 2020-09-10 RX ORDER — ALBUTEROL SULFATE 90 UG/1
AEROSOL, METERED RESPIRATORY (INHALATION)
Qty: 18 G | Refills: 0 | Status: SHIPPED | OUTPATIENT
Start: 2020-09-10 | End: 2020-11-10

## 2020-09-10 RX ORDER — ALBUTEROL SULFATE 90 UG/1
AEROSOL, METERED RESPIRATORY (INHALATION)
Qty: 18 G | Refills: 0 | Status: CANCELLED | OUTPATIENT
Start: 2020-09-10

## 2020-09-10 RX ORDER — PREDNISONE 20 MG/1
20 TABLET ORAL DAILY
Qty: 5 TABLET | Refills: 0 | Status: SHIPPED | OUTPATIENT
Start: 2020-09-10 | End: 2021-06-30

## 2020-09-10 NOTE — TELEPHONE ENCOUNTER
Attempted to contact pt. Left message to call clinic.     Dr Lee could add on at 2:00 or 3:00, since she already working on those video slots.     Otherwise check with other providers too.

## 2020-09-10 NOTE — TELEPHONE ENCOUNTER
I can add patient to this afternoon if no virtual openings or if patient declines seeing another provider    thanks

## 2020-09-10 NOTE — PROGRESS NOTES
"Thai Mancia is a 50 year old male who is being evaluated via a billable video visit.      The patient has been notified of following:     \"This video visit will be conducted via a call between you and your physician/provider. We have found that certain health care needs can be provided without the need for an in-person physical exam.  This service lets us provide the care you need with a video conversation.  If a prescription is necessary we can send it directly to your pharmacy.  If lab work is needed we can place an order for that and you can then stop by our lab to have the test done at a later time.    Video visits are billed at different rates depending on your insurance coverage.  Please reach out to your insurance provider with any questions.    If during the course of the call the physician/provider feels a video visit is not appropriate, you will not be charged for this service.\"    Patient has given verbal consent for Video visit? Yes  How would you like to obtain your AVS? MyChart  If you are dropped from the video visit, the video invite should be resent to: Text to cell phone: .  Will anyone else be joining your video visit? No    Subjective     Thai Mancia is a 50 year old male who presents today via video visit for the following health issues:    HPI    URI.  The symptoms started for one week. The patient reports that he has been wheezing after doing a drywall project. The patient has had wheezing over the past. Reports that it feels like asthma flare.   No shortness of breath. He has been coughing up clear phlegm.  No fevers or chills.   No leg swelling. No chest pain.  The inhaler helped.      Reports similar to his asthma exacerbations.    His boss was positive for COVID so patient was tested and negative for COVID.     Asthma Follow-Up    Was ACT completed today?    Yes    ACT Total Scores 1/15/2020   ACT TOTAL SCORE -   ASTHMA ER VISITS -   ASTHMA HOSPITALIZATIONS -   ACT TOTAL SCORE " (Goal Greater than or Equal to 20) 16   In the past 12 months, how many times did you visit the emergency room for your asthma without being admitted to the hospital? 0   In the past 12 months, how many times were you hospitalized overnight because of your asthma? 0     Price of advair was elevated    How many days per week do you miss taking your asthma controller medication?  I do not have an asthma controller medication    Please describe any recent triggers for your asthma: cats, dogs, pollen    Have you had any Emergency Room Visits, Urgent Care Visits, or Hospital Admissions since your last office visit?  No      How many servings of fruits and vegetables do you eat daily?  0-1    On average, how many sweetened beverages do you drink each day (Examples: soda, juice, sweet tea, etc.  Do NOT count diet or artificially sweetened beverages)?   0    18,000 steps per day    How many days per week do you miss taking your medication? Not taking advair        Video Start Time: 1:32pm        Review of Systems   CONSTITUTIONAL: NEGATIVE for fever, chills, change in weight  ENT/MOUTH: NEGATIVE for ear, mouth and throat problems  RESP: NEGATIVE for or SOB; POS wheezing; POS cough  CV: NEGATIVE for chest pain, palpitations or peripheral edema      Objective           Vitals:  No vitals were obtained today due to virtual visit.    Physical Exam     GENERAL: Healthy, alert and no distress  EYES: Eyes grossly normal to inspection.  No discharge or erythema, or obvious scleral/conjunctival abnormalities.  RESP: No audible wheeze, cough, or visible cyanosis.  No visible retractions or increased work of breathing.    SKIN: Visible skin clear. No significant rash, abnormal pigmentation or lesions.  NEURO: Cranial nerves grossly intact.  Mentation and speech appropriate for age.  PSYCH: Mentation appears normal, affect normal/bright, judgement and insight intact, normal speech and appearance well-groomed.              Assessment &  "Plan     (J45.21) Mild intermittent asthma with exacerbation  (primary encounter diagnosis)  Comment:   Plan: predniSONE (DELTASONE) 20 MG tablet        -pt to call if symptoms do not improve    (J45.30) Mild persistent asthma without complication  Comment:   Plan: fluticasone-salmeterol (ADVAIR DISKUS) 250-50         MCG/DOSE inhaler, albuterol (VENTOLIN HFA) 108         (90 Base) MCG/ACT inhaler              BMI:   Estimated body mass index is 34.51 kg/m  as calculated from the following:    Height as of 1/15/20: 1.803 m (5' 11\").    Weight as of 1/15/20: 112.2 kg (247 lb 6.4 oz).         See Patient Instructions    No follow-ups on file.    Kelsea Lee MD  [facility]      Video-Visit Details    Type of service:  Video Visit    Video End Time:1:41pm    Originating Location (pt. Location): home    Distant Location (provider location):  [facility]     Platform used for Video Visit: lulú Lee MD      "

## 2020-09-10 NOTE — TELEPHONE ENCOUNTER
Please order alternative for Advair Diskus. This is too expensive. Over $300.     Pended the regular Advair that is Tier 1.

## 2020-09-10 NOTE — TELEPHONE ENCOUNTER
Pt calls stating he has been having Asthma attack for one week that is worsening. He did drywall remodeling on his basement recently and he thinks this is how it all started. Chest congestion and wheezing. No other symptoms.   Does have a Little Clear and white Phlegm with coughing.     He is asking for Prednisone.   He is also out of Albuterol inhaler. His Advair Diskus is too costly. The Advair inhaler looks like might be covered.     Please advise. He can do Virtual visit if needed, but no openings today.   He is at work currently.     Additional Information    Negative: Severe difficulty breathing (e.g., struggling for each breath, speaks in single words)    Negative: Bluish (or gray) lips or face    Negative: Wheezing started suddenly after medicine, an allergic food, or bee sting    Negative: Passed out (i.e., fainted, collapsed and was not responding)    Negative: Sounds like a life-threatening emergency to the triager    Negative: SEVERE asthma attack (e.g., very SOB at rest, speaks in single words, loud wheezes)    Negative: SEVERE wheezing or coughing and doesn't have nebulizer or inhaler available    Negative: Peak flow rate less than 50% of baseline level (RED zone)    Negative: Hospitalized before with asthma; now feels same    Negative: Chest pain    Negative: Patient sounds very sick or weak to the triager    Negative: MODERATE asthma attack (e.g., SOB at rest, speaks in phrases, audible wheezes) and not resolved after 2 nebulizer or inhaler treatments given 20 minutes apart    Negative: Peak flow rate 50-80% of baseline level (YELLOW zone) after using 2 nebulizer or inhaler treatments given 20 minutes) apart    Negative: Fever > 103 F (39.4 C)    Negative: Fever > 101 F (38.3 C) and over 60 years of age    Negative: Continuous (nonstop) coughing that keeps patient from working or sleeping, and not improved after inhaler or nebulizer    Asthma medicine (nebulizer or inhaler) is needed more frequently  "than every 4 hours to keep you comfortable    Answer Assessment - Initial Assessment Questions  1. RESPIRATORY STATUS: \"Describe your breathing?\" (e.g., wheezing, shortness of breath, unable to speak, severe coughing)       Wheezing, little sob. Not coughing. Coughing up a little clear mucus.   2. ONSET: \"When did this asthma attack begin?\"       Over one week. Progressively worse.   3. TRIGGER: \"What do you think triggered this attack?\" (e.g., URI, exposure to pollen or other allergen, tobacco smoke)       Dry wall remodel in the basement.   5. SEVERITY: \"How bad is this attack?\"     - MILD: No SOB at rest, mild SOB with walking, speaks normally in sentences, can lay down, no retractions, pulse < 100. (GREEN Zone: PEFR %)    - MODERATE: SOB at rest, SOB with minimal exertion and prefers to sit, cannot lie down flat, speaks in phrases, mild retractions, audible wheezing, pulse 100-120. (YELLOW Zone: PEFR 50-80%)     - SEVERE: Very SOB at rest, speaks in single words, struggling to breathe, sitting hunched forward, retractions, usually loud wheezing, sometimes minimal wheezing because of decreased air movement, pulse > 120. (RED Zone: PEFR < 50%).       Moderate. Has to lie on side to sleep, it is hard. Woke up at 2 AM with wheezing.   6. MEDICATIONS (Inhaler or nebs): \"What are your asthma medications?\" and \"What treatments have you given so far?\"     - Quick-relief: albuterol, metaproterenol, salbutamol, or other inhaled or nebulized beta-agonist medicines    - Long-term-control: steroids, cromolyn, or other anti-inflammatory medicines.      I am out of Albuterol, I used it all. I am out of Advair because it is too expensive.   7. OTHER SYMPTOMS: \"Do you have any other symptoms? (e.g., runny nose, chest pain, fever)      Chest congestion. No fever. No runny nose. Rattling with exhaling.    Protocols used: ASTHMA ATTACK-A-OH      "

## 2020-09-11 ASSESSMENT — ASTHMA QUESTIONNAIRES: ACT_TOTALSCORE: 22

## 2020-09-21 ENCOUNTER — TELEPHONE (OUTPATIENT)
Dept: INTERNAL MEDICINE | Facility: CLINIC | Age: 50
End: 2020-09-21

## 2020-09-21 DIAGNOSIS — J45.31 MILD PERSISTENT ASTHMA WITH EXACERBATION: Primary | ICD-10-CM

## 2020-09-21 RX ORDER — AZITHROMYCIN 250 MG/1
TABLET, FILM COATED ORAL
Qty: 6 TABLET | Refills: 0 | Status: SHIPPED | OUTPATIENT
Start: 2020-09-21 | End: 2020-09-26

## 2020-09-21 RX ORDER — PREDNISONE 20 MG/1
20 TABLET ORAL DAILY
Qty: 5 TABLET | Refills: 0 | Status: SHIPPED | OUTPATIENT
Start: 2020-09-21 | End: 2021-06-30

## 2020-09-21 NOTE — TELEPHONE ENCOUNTER
Patient is calling because he recently had a virtual visit with us 9/10/20. He said he was feeling better but now has developed a cough and cold with colored drainage. He says recently at work he was working in a freezer so it was cold and also there is bacteria present and the day after he started with these symptoms. He is wondering if Dr Sue would prescribe a rx for prednisone and a z gus as she has done this for him in the past.

## 2020-09-25 ENCOUNTER — TELEPHONE (OUTPATIENT)
Dept: INTERNAL MEDICINE | Facility: CLINIC | Age: 50
End: 2020-09-25

## 2020-09-25 NOTE — TELEPHONE ENCOUNTER
Call to patient. Took last dose today. Patient has had slight improvement but is still coughing up a lot of mucus. Mucus is white in color. States usually only takes a couple of days for improvement. COVID test negative. Advised Azithromycin continues to work for a total of 10 days. Advised patient continue to monitor and call back if no improvement by middle of next week. Patient agrees.

## 2020-11-06 DIAGNOSIS — J45.30 MILD PERSISTENT ASTHMA WITHOUT COMPLICATION: ICD-10-CM

## 2020-11-06 DIAGNOSIS — I10 ESSENTIAL HYPERTENSION: ICD-10-CM

## 2020-11-10 RX ORDER — ALBUTEROL SULFATE 90 UG/1
AEROSOL, METERED RESPIRATORY (INHALATION)
Qty: 1 INHALER | Refills: 2 | Status: SHIPPED | OUTPATIENT
Start: 2020-11-10 | End: 2021-06-30

## 2020-11-10 RX ORDER — LOSARTAN POTASSIUM 50 MG/1
TABLET ORAL
Qty: 90 TABLET | Refills: 1 | Status: SHIPPED | OUTPATIENT
Start: 2020-11-10 | End: 2021-07-23

## 2020-11-10 NOTE — TELEPHONE ENCOUNTER
Routing refill request to provider for review/approval because:  Labs not current:  luiz Armednariz RN, BSN

## 2020-11-19 ENCOUNTER — TELEPHONE (OUTPATIENT)
Dept: INTERNAL MEDICINE | Facility: CLINIC | Age: 50
End: 2020-11-19

## 2020-11-19 DIAGNOSIS — J45.31 MILD PERSISTENT ASTHMA WITH ACUTE EXACERBATION: ICD-10-CM

## 2020-11-19 DIAGNOSIS — J06.9 UPPER RESPIRATORY TRACT INFECTION, UNSPECIFIED TYPE: Primary | ICD-10-CM

## 2020-11-19 RX ORDER — PREDNISONE 20 MG/1
40 TABLET ORAL DAILY
Qty: 10 TABLET | Refills: 0 | Status: SHIPPED | OUTPATIENT
Start: 2020-11-19 | End: 2021-04-20

## 2020-11-19 RX ORDER — AZITHROMYCIN 250 MG/1
TABLET, FILM COATED ORAL
Qty: 6 TABLET | Refills: 0 | Status: SHIPPED | OUTPATIENT
Start: 2020-11-19 | End: 2020-11-24

## 2020-11-19 NOTE — TELEPHONE ENCOUNTER
Please see message below.    Last virtual visit 9-10-20    Please advise, thanks.  Routed to covering provider.

## 2020-11-19 NOTE — TELEPHONE ENCOUNTER
Patient tested positive for Covid yesterday and the only symptoms he still has is coughing up thick yellowish phlegm and rattling in his chest. He is requesting an RX for prednisone and a Z Diaz since he has asthma and that is the only way to get rid of those symptoms. Please send RX's to [facility] at the [facility] in [place].  Call to advise at [phone].

## 2021-03-23 ENCOUNTER — MYC MEDICAL ADVICE (OUTPATIENT)
Dept: FAMILY MEDICINE | Facility: CLINIC | Age: 51
End: 2021-03-23

## 2021-03-23 DIAGNOSIS — N48.89 PENILE PAIN: Primary | ICD-10-CM

## 2021-03-23 NOTE — TELEPHONE ENCOUNTER
Dr. Lee- Please see [employer] message below. Please advise. Thanks    Debbie Michaels  Referral Coordinator

## 2021-03-27 ENCOUNTER — IMMUNIZATION (OUTPATIENT)
Dept: NURSING | Facility: CLINIC | Age: 51
End: 2021-03-27
Payer: COMMERCIAL

## 2021-03-27 PROCEDURE — 0011A PR COVID VAC MODERNA 100 MCG/0.5 ML IM: CPT

## 2021-03-27 PROCEDURE — 91301 PR COVID VAC MODERNA 100 MCG/0.5 ML IM: CPT

## 2021-04-20 ENCOUNTER — OFFICE VISIT (OUTPATIENT)
Dept: UROLOGY | Facility: CLINIC | Age: 51
End: 2021-04-20
Attending: INTERNAL MEDICINE
Payer: COMMERCIAL

## 2021-04-20 VITALS
DIASTOLIC BLOOD PRESSURE: 93 MMHG | OXYGEN SATURATION: 96 % | TEMPERATURE: 97.9 F | SYSTOLIC BLOOD PRESSURE: 194 MMHG | HEART RATE: 95 BPM

## 2021-04-20 DIAGNOSIS — E66.09 OBESITY DUE TO EXCESS CALORIES, UNSPECIFIED OBESITY SEVERITY: Primary | ICD-10-CM

## 2021-04-20 DIAGNOSIS — N48.1 BALANITIS: ICD-10-CM

## 2021-04-20 DIAGNOSIS — I10 ESSENTIAL HYPERTENSION: ICD-10-CM

## 2021-04-20 PROCEDURE — 99203 OFFICE O/P NEW LOW 30 MIN: CPT | Performed by: UROLOGY

## 2021-04-20 RX ORDER — BETAMETHASONE DIPROPIONATE 0.5 MG/G
CREAM TOPICAL 2 TIMES DAILY
Qty: 45 G | Refills: 0 | Status: SHIPPED | OUTPATIENT
Start: 2021-04-20 | End: 2021-06-30

## 2021-04-20 RX ORDER — BETAMETHASONE DIPROPIONATE 0.5 MG/G
CREAM TOPICAL 2 TIMES DAILY
Qty: 45 G | Refills: 0 | Status: SHIPPED | OUTPATIENT
Start: 2021-04-20 | End: 2021-04-20

## 2021-04-20 NOTE — PROGRESS NOTES
S: Patient is a pleasant 50-year-old male who was request to be seen by Dr. Lee for a consultation with regard to patient's penile discomfort.  Patient complains of irritation on his foreskin for several months.  Patient has tried over-the-counter antiyeast medication along with Vaseline with some relief but not completely.  He noticed some cracks in his foreskin also.  He has no penile discharge.  He has no history of diabetes mellitus.  He is obese.  Current Outpatient Medications   Medication Sig Dispense Refill     albuterol (VENTOLIN HFA) 108 (90 Base) MCG/ACT inhaler INHALE 2 PUFFS INTO THE LUNGS EVERY 6 HOURS AS NEEDED FOR SHORTNESS OF BREATH, DIFFICULTY BREATHING OR WHEEZING. 1 Inhaler 2     betamethasone dipropionate (DIPROSONE) 0.05 % external cream Apply topically 2 times daily 45 g 0     fluticasone-salmeterol (ADVAIR DISKUS) 250-50 MCG/DOSE inhaler Inhale 1 puff into the lungs every 12 hours 1 Inhaler 3     fluticasone-salmeterol (ADVAIR) 250-50 MCG/DOSE inhaler Inhale 1 puff into the lungs every 12 hours 3 Inhaler 0     losartan (COZAAR) 50 MG tablet TAKE ONE TABLET BY MOUTH ONCE DAILY 90 tablet 1     predniSONE (DELTASONE) 20 MG tablet Take 1 tablet (20 mg) by mouth daily 5 tablet 0     predniSONE (DELTASONE) 20 MG tablet Take 1 tablet (20 mg) by mouth daily 5 tablet 0     Allergies   Allergen Reactions     Cats      Itchy eyes, runny nose, wheezing     Dogs      Itchy eyes, runny nose, wheezing     Pollen Extract      Runny nose     Past Medical History:   Diagnosis Date     Asthma      Diverticulitis      Past Surgical History:   Procedure Laterality Date     KNEE SURGERY  1985    left      Family History   Problem Relation Age of Onset     Unknown/Adopted Mother         adopted     Unknown/Adopted Father      Diabetes No family hx of      Social History     Socioeconomic History     Marital status:      Spouse name: None     Number of children: None     Years of education: None      Highest education level: None   Occupational History     None   Social Needs     Financial resource strain: None     Food insecurity     Worry: None     Inability: None     Transportation needs     Medical: None     Non-medical: None   Tobacco Use     Smoking status: Never Smoker     Smokeless tobacco: Never Used   Substance and Sexual Activity     Alcohol use: No     Alcohol/week: 0.0 standard drinks     Comment: quit 08/17     Drug use: No     Sexual activity: Yes     Partners: Female   Lifestyle     Physical activity     Days per week: None     Minutes per session: None     Stress: None   Relationships     Social connections     Talks on phone: None     Gets together: None     Attends Alevism service: None     Active member of club or organization: None     Attends meetings of clubs or organizations: None     Relationship status: None     Intimate partner violence     Fear of current or ex partner: None     Emotionally abused: None     Physically abused: None     Forced sexual activity: None   Other Topics Concern     Parent/sibling w/ CABG, MI or angioplasty before 65F 55M? Not Asked   Social History Narrative     None       REVIEW OF SYSTEMS  =================  C: NEGATIVE for fever, chills, change in weight  I: NEGATIVE for worrisome rashes, moles or lesions  E/M: NEGATIVE for ear, mouth and throat problems  R: NEGATIVE for significant cough or SHORTNESS OF BREATH  CV:  NEGATIVE for chest pain, palpitations or peripheral edema  GI: NEGATIVE for nausea, abdominal pain, heartburn, or change in bowel habits  NEURO: NEGATIVE numbness/weakness  : see HPI  PSYCH: NEGATIVE depression/anxiety  LYmph: no new enlarged lymph nodes  Ortho: no new trauma/movements      Physical Exam:  BP (!) 194/93 (BP Location: Right arm, Patient Position: Chair, Cuff Size: Adult Large)   Pulse 95   Temp 97.9  F (36.6  C) (Tympanic)   SpO2 96%    Patient is pleasant, in no acute distress, good general condition.  Heart:  negative,  PMI normal  Lung: no evidence of respiratory distress    Abdomen: Soft, nondistended, non tender. No masses. No rebound or guarding.   Exam: Penis with early phimosis but still able to retract his foreskin back without any difficulty.  No irritation.  Testes no masses.  No scrotal skin lesion.  Skin: Warm and dry.  No redness.  Neuro: grossly normal  Musculaskeletal: moving all extremities  Psych normal mood and affect  Musculoskeletal  moving all extremities  Hematologic/Lymphatic/Immunologic: normal ant/post cervical, axillary, supraclavicular and inguinal nodes    Assessment/Plan:   Pleasant 50-year-old male with history of balanitis causing foreskin changes.  I discussed the etiology of this problem with patient at length.  I discussed weight loss with patient.  I will start patient on betamethasone cream to be applied to the foreskin twice daily for several weeks.    Hypertension: Follow-up with primary care MD

## 2021-04-24 ENCOUNTER — IMMUNIZATION (OUTPATIENT)
Dept: NURSING | Facility: CLINIC | Age: 51
End: 2021-04-24
Attending: INTERNAL MEDICINE
Payer: COMMERCIAL

## 2021-04-24 PROCEDURE — 91301 PR COVID VAC MODERNA 100 MCG/0.5 ML IM: CPT

## 2021-04-24 PROCEDURE — 0012A PR COVID VAC MODERNA 100 MCG/0.5 ML IM: CPT

## 2021-05-09 ENCOUNTER — HEALTH MAINTENANCE LETTER (OUTPATIENT)
Age: 51
End: 2021-05-09

## 2021-05-10 DIAGNOSIS — J45.30 MILD PERSISTENT ASTHMA WITHOUT COMPLICATION: ICD-10-CM

## 2021-05-12 NOTE — TELEPHONE ENCOUNTER
Medication is being filled for 1 time refill only due to:  Patient needs to be seen because due for appointment.     Due for asthma follow-up appointment, please contact patient and advise to schedule appointment. Patient will need to establish care with new provider due to Dr. Lee leaving.

## 2021-05-18 NOTE — TELEPHONE ENCOUNTER
Patient responded to [facility] message stating:    Jean-Claude Mancia  to Jana Laboy, RN          5/13/21 12:09 PM  Never mind...I found it.  Jean-Claude Mancia  to Jana Laboy, RN          5/13/21 12:07 PM  What type of appointment do I need to schedule?    No appointment scheduled yet.

## 2021-06-30 ENCOUNTER — OFFICE VISIT (OUTPATIENT)
Dept: INTERNAL MEDICINE | Facility: CLINIC | Age: 51
End: 2021-06-30
Payer: COMMERCIAL

## 2021-06-30 VITALS
DIASTOLIC BLOOD PRESSURE: 91 MMHG | HEART RATE: 111 BPM | BODY MASS INDEX: 39.66 KG/M2 | TEMPERATURE: 98.3 F | RESPIRATION RATE: 18 BRPM | OXYGEN SATURATION: 95 % | SYSTOLIC BLOOD PRESSURE: 151 MMHG | HEIGHT: 71 IN | WEIGHT: 283.3 LBS

## 2021-06-30 DIAGNOSIS — L03.115 CELLULITIS OF RIGHT LOWER EXTREMITY: ICD-10-CM

## 2021-06-30 DIAGNOSIS — E66.01 MORBID OBESITY (H): ICD-10-CM

## 2021-06-30 DIAGNOSIS — J20.9 ACUTE BRONCHITIS, UNSPECIFIED ORGANISM: Primary | ICD-10-CM

## 2021-06-30 DIAGNOSIS — J45.30 MILD PERSISTENT ASTHMA WITHOUT COMPLICATION: ICD-10-CM

## 2021-06-30 PROCEDURE — 99214 OFFICE O/P EST MOD 30 MIN: CPT | Performed by: NURSE PRACTITIONER

## 2021-06-30 RX ORDER — ALBUTEROL SULFATE 90 UG/1
AEROSOL, METERED RESPIRATORY (INHALATION)
Qty: 8 G | Refills: 1 | Status: SHIPPED | OUTPATIENT
Start: 2021-06-30 | End: 2021-08-10

## 2021-06-30 RX ORDER — DOXYCYCLINE HYCLATE 100 MG
100 TABLET ORAL 2 TIMES DAILY
Qty: 14 TABLET | Refills: 0 | Status: SHIPPED | OUTPATIENT
Start: 2021-06-30 | End: 2021-07-07

## 2021-06-30 RX ORDER — PREDNISONE 20 MG/1
20 TABLET ORAL DAILY
Qty: 5 TABLET | Refills: 0 | Status: SHIPPED | OUTPATIENT
Start: 2021-06-30 | End: 2022-01-04

## 2021-06-30 ASSESSMENT — MIFFLIN-ST. JEOR: SCORE: 2162.17

## 2021-06-30 NOTE — PATIENT INSTRUCTIONS
For bronchitis and cellulitis of the right leg will treat with antibiotic called Doxycycline 100 mg twice daily x 7 days.  Use socks that are not too tight in rubber boots to help with skin infection.    For asthma, refilled Albuterol and encouraged to increase Advair inhaler to twice daily + rinse mouth out after each use.    Will treat the wheezing related to Asthma with Prednisone dose 1 tablet daily x 5 days with food and water.  Don't take after 4 pm as you will be wide awake    Rechecked 02 sats ranging from 95 to 97 %    Follow up in 1 month for physical and fasting labs.

## 2021-06-30 NOTE — PROGRESS NOTES
"    Assessment & Plan     Acute bronchitis, unspecified organism  - will treat with antibiotic that covers both bronchitis and cellulitis:  - doxycycline hyclate (VIBRA-TABS) 100 MG tablet; Take 1 tablet (100 mg) by mouth 2 times daily for 7 days  - for wheezing will treat with predniSONE (DELTASONE) 20 MG tablet; Take 1 tablet (20 mg) by mouth daily for 5 days    Mild persistent asthma without complication    - albuterol (VENTOLIN HFA) 108 (90 Base) MCG/ACT inhaler; INHALE 2 PUFFS INTO THE LUNGS EVERY 6 HOURS AS NEEDED FOR SHORTNESS OF BREATH, DIFFICULTY BREATHING OR WHEEZING.  - fluticasone-salmeterol (ADVAIR DISKUS) 250-50 MCG/DOSE inhaler; Inhale 1 puff into the lungs every 12 hours  - for wheezing will treat with predniSONE (DELTASONE) 20 MG tablet; Take 1 tablet (20 mg) by mouth daily for 5 days    Cellulitis of right lower extremity  - doxycycline hyclate (VIBRA-TABS) 100 MG tablet; Take 1 tablet (100 mg) by mouth 2 times daily for 7 days    Morbid obesity (H)  - diet and exercise    Annual review of   - follow up with physical and fasting labs.               BMI:   Estimated body mass index is 39.51 kg/m  as calculated from the following:    Height as of this encounter: 1.803 m (5' 11\").    Weight as of this encounter: 128.5 kg (283 lb 4.8 oz).           Return in about 4 weeks (around 7/28/2021) for Routine preventive, with me, in person with fasting labs.    Michelle Mckenna [facility]    Kera Bermeo is a 51 year old who presents for the following health issues  accompanied by himself:    HPI     He has cellulitis on both ankles for couple of weeks. He is a former patient of Dr. Lee and need refills on his inhalers. He has chest congestion and was coughing up stuff and wheezing.      See above.  Needs to establish with a new provider as he saw Dr. Lee in past who left.  Needs Rx refills on inhalers Albuterol prn and Advair only daily.  Has chest congestion with " "discolored phlegm x 24 hours. Some wheezing but no nasal congestion.  In the past given Z Pac and Prednisone.  Also concerned about cellulitis on shins with right worse than left bothersome for couple of weeks. Wearing rubber boots and I sweat a lot.    No fever.  Some cough and shortness of breathe with wheezing.  No chest pain.  No stomach or urination.    Review of Systems   Constitutional, HEENT, cardiovascular, pulmonary, gi and gu systems are negative, except as otherwise noted.      Objective    BP (!) 151/91 (BP Location: Left arm, Patient Position: Sitting, Cuff Size: Adult Large)   Pulse 111   Temp 98.3  F (36.8  C) (Oral)   Resp 18   Ht 1.803 m (5' 11\")   Wt 128.5 kg (283 lb 4.8 oz)   SpO2 95%   BMI 39.51 kg/m    Body mass index is 39.51 kg/m .     Physical Exam   GENERAL: alert and no distress  RESP: lungs with inspiratory and expiratory wheezing to auscultation - no rales, rhonchi.  CV: regular rate and rhythm, normal S1 S2, no S3 or S4, no murmur, click or rub, no peripheral edema and peripheral pulses strong  MS: no gross musculoskeletal defects noted, trace edema in lower legs and ankles  SKIN: no suspicious lesions; has erythematous right shin area with warmth to touch while left shin area is just starting.  No open areas.             "

## 2021-07-21 ENCOUNTER — TELEPHONE (OUTPATIENT)
Dept: INTERNAL MEDICINE | Facility: CLINIC | Age: 51
End: 2021-07-21

## 2021-07-21 DIAGNOSIS — J45.30 MILD PERSISTENT ASTHMA WITHOUT COMPLICATION: ICD-10-CM

## 2021-07-21 DIAGNOSIS — R73.03 PREDIABETES: ICD-10-CM

## 2021-07-21 DIAGNOSIS — E66.01 MORBID OBESITY (H): Primary | ICD-10-CM

## 2021-07-21 DIAGNOSIS — Z13.6 CARDIOVASCULAR SCREENING; LDL GOAL LESS THAN 160: ICD-10-CM

## 2021-07-21 DIAGNOSIS — I10 ESSENTIAL HYPERTENSION: ICD-10-CM

## 2021-07-21 DIAGNOSIS — Z12.5 SCREENING FOR PROSTATE CANCER: ICD-10-CM

## 2021-07-21 NOTE — TELEPHONE ENCOUNTER
Reason for Call:  Other call back    Detailed comments: patient has a physical appt set up for 8-10 at 9:20 would like some labs done, ie A1C or other routine ones    Phone Number Patient can be reached at: Home number on file [phone] (home)    Best Time: anytime    Can we leave a detailed message on this number? YES    Call taken on 7/21/2021 at 4:40 PM by India Hernandez

## 2021-07-23 DIAGNOSIS — I10 ESSENTIAL HYPERTENSION: ICD-10-CM

## 2021-07-23 NOTE — TELEPHONE ENCOUNTER
Pending Prescriptions:                       Disp   Refills    losartan (COZAAR) 50 MG tablet             90 tab*1        Sig: Take 1 tablet (50 mg) by mouth daily    Routing refill request to provider for review/approval because:  BP Readings from Last 3 Encounters:   06/30/21 (!) 151/91   04/20/21 (!) 194/93   01/15/20 130/80       Next 5 appointments (look out 90 days)      Aug 10, 2021  9:20 AM  PHYSICAL with LAYLA Adames CNP  [facility] ([facility] ) [address]  [facility] [address]  [phone]

## 2021-07-25 RX ORDER — LOSARTAN POTASSIUM 50 MG/1
50 TABLET ORAL DAILY
Qty: 30 TABLET | Refills: 0 | Status: SHIPPED | OUTPATIENT
Start: 2021-07-25 | End: 2021-11-08 | Stop reason: ALTCHOICE

## 2021-08-09 ENCOUNTER — LAB (OUTPATIENT)
Dept: LAB | Facility: CLINIC | Age: 51
End: 2021-08-09
Payer: COMMERCIAL

## 2021-08-09 DIAGNOSIS — Z12.5 SCREENING FOR PROSTATE CANCER: ICD-10-CM

## 2021-08-09 DIAGNOSIS — R73.03 PREDIABETES: ICD-10-CM

## 2021-08-09 DIAGNOSIS — I10 ESSENTIAL HYPERTENSION: ICD-10-CM

## 2021-08-09 DIAGNOSIS — E66.01 MORBID OBESITY (H): ICD-10-CM

## 2021-08-09 DIAGNOSIS — Z13.6 CARDIOVASCULAR SCREENING; LDL GOAL LESS THAN 160: ICD-10-CM

## 2021-08-09 LAB
BASOPHILS # BLD AUTO: 0 10E3/UL (ref 0–0.2)
BASOPHILS NFR BLD AUTO: 0 %
EOSINOPHIL # BLD AUTO: 0.2 10E3/UL (ref 0–0.7)
EOSINOPHIL NFR BLD AUTO: 3 %
ERYTHROCYTE [DISTWIDTH] IN BLOOD BY AUTOMATED COUNT: 14.9 % (ref 10–15)
HBA1C MFR BLD: 5.7 % (ref 0–5.6)
HCT VFR BLD AUTO: 44.6 % (ref 40–53)
HGB BLD-MCNC: 14.5 G/DL (ref 13.3–17.7)
LYMPHOCYTES # BLD AUTO: 1.6 10E3/UL (ref 0.8–5.3)
LYMPHOCYTES NFR BLD AUTO: 25 %
MCH RBC QN AUTO: 29.6 PG (ref 26.5–33)
MCHC RBC AUTO-ENTMCNC: 32.5 G/DL (ref 31.5–36.5)
MCV RBC AUTO: 91 FL (ref 78–100)
MONOCYTES # BLD AUTO: 0.5 10E3/UL (ref 0–1.3)
MONOCYTES NFR BLD AUTO: 8 %
NEUTROPHILS # BLD AUTO: 4.3 10E3/UL (ref 1.6–8.3)
NEUTROPHILS NFR BLD AUTO: 65 %
PLATELET # BLD AUTO: 299 10E3/UL (ref 150–450)
RBC # BLD AUTO: 4.9 10E6/UL (ref 4.4–5.9)
WBC # BLD AUTO: 6.7 10E3/UL (ref 4–11)

## 2021-08-09 PROCEDURE — 80050 GENERAL HEALTH PANEL: CPT

## 2021-08-09 PROCEDURE — G0103 PSA SCREENING: HCPCS

## 2021-08-09 PROCEDURE — 36415 COLL VENOUS BLD VENIPUNCTURE: CPT

## 2021-08-09 PROCEDURE — 83036 HEMOGLOBIN GLYCOSYLATED A1C: CPT

## 2021-08-09 PROCEDURE — 80061 LIPID PANEL: CPT

## 2021-08-10 ENCOUNTER — OFFICE VISIT (OUTPATIENT)
Dept: INTERNAL MEDICINE | Facility: CLINIC | Age: 51
End: 2021-08-10
Payer: COMMERCIAL

## 2021-08-10 VITALS
OXYGEN SATURATION: 96 % | HEART RATE: 70 BPM | BODY MASS INDEX: 38.78 KG/M2 | DIASTOLIC BLOOD PRESSURE: 88 MMHG | RESPIRATION RATE: 18 BRPM | HEIGHT: 71 IN | WEIGHT: 277 LBS | SYSTOLIC BLOOD PRESSURE: 146 MMHG | TEMPERATURE: 97.7 F

## 2021-08-10 DIAGNOSIS — I10 ESSENTIAL HYPERTENSION: ICD-10-CM

## 2021-08-10 DIAGNOSIS — M25.532 LEFT WRIST PAIN: ICD-10-CM

## 2021-08-10 DIAGNOSIS — J45.30 MILD PERSISTENT ASTHMA WITHOUT COMPLICATION: ICD-10-CM

## 2021-08-10 DIAGNOSIS — Z12.11 SPECIAL SCREENING FOR MALIGNANT NEOPLASMS, COLON: ICD-10-CM

## 2021-08-10 DIAGNOSIS — Z00.00 ROUTINE GENERAL MEDICAL EXAMINATION AT A HEALTH CARE FACILITY: Primary | ICD-10-CM

## 2021-08-10 DIAGNOSIS — E66.01 MORBID OBESITY (H): ICD-10-CM

## 2021-08-10 DIAGNOSIS — Z13.6 CARDIOVASCULAR SCREENING; LDL GOAL LESS THAN 160: ICD-10-CM

## 2021-08-10 DIAGNOSIS — N47.8 FORESKIN PROBLEM: ICD-10-CM

## 2021-08-10 LAB
ALBUMIN SERPL-MCNC: 3.3 G/DL (ref 3.4–5)
ALP SERPL-CCNC: 79 U/L (ref 40–150)
ALT SERPL W P-5'-P-CCNC: 40 U/L (ref 0–70)
ANION GAP SERPL CALCULATED.3IONS-SCNC: 3 MMOL/L (ref 3–14)
AST SERPL W P-5'-P-CCNC: 22 U/L (ref 0–45)
BILIRUB SERPL-MCNC: 0.4 MG/DL (ref 0.2–1.3)
BUN SERPL-MCNC: 14 MG/DL (ref 7–30)
CALCIUM SERPL-MCNC: 8.7 MG/DL (ref 8.5–10.1)
CHLORIDE BLD-SCNC: 108 MMOL/L (ref 94–109)
CHOLEST SERPL-MCNC: 167 MG/DL
CO2 SERPL-SCNC: 28 MMOL/L (ref 20–32)
CREAT SERPL-MCNC: 1.01 MG/DL (ref 0.66–1.25)
FASTING STATUS PATIENT QL REPORTED: YES
GFR SERPL CREATININE-BSD FRML MDRD: 86 ML/MIN/1.73M2
GLUCOSE BLD-MCNC: 100 MG/DL (ref 70–99)
HDLC SERPL-MCNC: 47 MG/DL
LDLC SERPL CALC-MCNC: 109 MG/DL
NONHDLC SERPL-MCNC: 120 MG/DL
POTASSIUM BLD-SCNC: 4.3 MMOL/L (ref 3.4–5.3)
PROT SERPL-MCNC: 7 G/DL (ref 6.8–8.8)
PSA SERPL-MCNC: 0.37 UG/L (ref 0–4)
SODIUM SERPL-SCNC: 139 MMOL/L (ref 133–144)
TRIGL SERPL-MCNC: 57 MG/DL
TSH SERPL DL<=0.005 MIU/L-ACNC: 1.59 MU/L (ref 0.4–4)

## 2021-08-10 PROCEDURE — 99396 PREV VISIT EST AGE 40-64: CPT | Performed by: NURSE PRACTITIONER

## 2021-08-10 RX ORDER — LOSARTAN POTASSIUM AND HYDROCHLOROTHIAZIDE 12.5; 1 MG/1; MG/1
1 TABLET ORAL DAILY
Qty: 90 TABLET | Refills: 3 | Status: SHIPPED | OUTPATIENT
Start: 2021-08-10 | End: 2022-07-28

## 2021-08-10 RX ORDER — ALBUTEROL SULFATE 90 UG/1
AEROSOL, METERED RESPIRATORY (INHALATION)
Qty: 8 G | Refills: 11 | Status: SHIPPED | OUTPATIENT
Start: 2021-08-10 | End: 2022-10-07

## 2021-08-10 ASSESSMENT — ENCOUNTER SYMPTOMS
ABDOMINAL PAIN: 0
HEMATURIA: 0
FEVER: 0
FREQUENCY: 0
ARTHRALGIAS: 1
EYE PAIN: 0
SORE THROAT: 0
COUGH: 0
HEMATOCHEZIA: 0
PALPITATIONS: 0
MYALGIAS: 1
HEADACHES: 0
DYSURIA: 0
PARESTHESIAS: 0
WEAKNESS: 0
SHORTNESS OF BREATH: 0
JOINT SWELLING: 1
DIZZINESS: 0
CHILLS: 0
CONSTIPATION: 0
DIARRHEA: 0
HEARTBURN: 0
NERVOUS/ANXIOUS: 0

## 2021-08-10 ASSESSMENT — MIFFLIN-ST. JEOR: SCORE: 2133.59

## 2021-08-10 NOTE — PROGRESS NOTES
SUBJECTIVE:   CC: Thai Mancia is an 51 year old male who presents for preventative health visit.       Patient has been advised of split billing requirements and indicates understanding:   Healthy Habits:     Getting at least 3 servings of Calcium per day:  Yes    Bi-annual eye exam:  NO    Dental care twice a year:  NO    Sleep apnea or symptoms of sleep apnea:  Excessive snoring    Diet:  Regular (no restrictions)    Frequency of exercise:  None    Taking medications regularly:  Yes    Medication side effects:  None    PHQ-2 Total Score: 0    Additional concerns today:  Yes          Today's PHQ-2 Score:   PHQ-2 ( 1999 Pfizer) 8/10/2021   Q1: Little interest or pleasure in doing things 0   Q2: Feeling down, depressed or hopeless 0   PHQ-2 Score 0   Q1: Little interest or pleasure in doing things Not at all   Q2: Feeling down, depressed or hopeless Not at all   PHQ-2 Score 0       Abuse: Current or Past(Physical, Sexual or Emotional)- No  Do you feel safe in your environment? Yes    Have you ever done Advance Care Planning? (For example, a Health Directive, POLST, or a discussion with a medical provider or your loved ones about your wishes): No, advance care planning information given to patient to review.  Patient declined advance care planning discussion at this time.    Social History     Tobacco Use     Smoking status: Never Smoker     Smokeless tobacco: Never Used   Substance Use Topics     Alcohol use: No     Alcohol/week: 0.0 standard drinks     Comment: quit 08/17         Alcohol Use 8/10/2021   Prescreen: >3 drinks/day or >7 drinks/week? No   Prescreen: >3 drinks/day or >7 drinks/week? -       Last PSA:   Prostate Specific Antigen Screen   Date Value Ref Range Status   08/09/2021 0.37 0.00 - 4.00 ug/L Final       Reviewed orders with patient. Reviewed health maintenance and updated orders accordingly - Yes      Reviewed and updated as needed this visit by clinical staff  Tobacco  Allergies  Meds   "Problems  Med Hx  Surg Hx  Fam Hx  Soc Hx          Reviewed and updated as needed this visit by Provider   Allergies                  Review of Systems   Constitutional: Negative for chills and fever.   HENT: Negative for congestion, ear pain, hearing loss and sore throat.    Eyes: Negative for pain and visual disturbance.   Respiratory: Negative for cough and shortness of breath.    Cardiovascular: Negative for chest pain, palpitations and peripheral edema.   Gastrointestinal: Negative for abdominal pain, constipation, diarrhea, heartburn and hematochezia.   Genitourinary: Negative for dysuria, frequency, genital sores, hematuria and urgency.   Musculoskeletal: Positive for arthralgias, joint swelling and myalgias.   Skin: Negative for rash.   Neurological: Negative for dizziness, weakness, headaches and paresthesias.   Psychiatric/Behavioral: Negative for mood changes. The patient is not nervous/anxious.      Tears in foreskin with sex   See urology     OBJECTIVE:   BP (!) 146/88   Pulse 70   Temp 97.7  F (36.5  C) (Oral)   Resp 18   Ht 1.803 m (5' 11\")   Wt 125.6 kg (277 lb)   SpO2 96%   BMI 38.63 kg/m      Physical Exam  GENERAL: healthy, alert and no distress  EYES: Eyes grossly normal to inspection, PERRL and conjunctivae and sclerae normal  HENT: ear canals and TM's normal, nose and mouth without ulcers or lesions  NECK: no adenopathy, no asymmetry, masses, or scars and thyroid normal to palpation  RESP: lungs clear to auscultation - no rales, rhonchi or wheezes  CV: regular rate and rhythm, normal S1 S2, no S3 or S4, no murmur, click or rub, no peripheral edema and peripheral pulses strong  ABDOMEN: soft, nontender, no hepatosplenomegaly, no masses and bowel sounds normal   (male): normal male genitalia without lesions or urethral discharge, no hernia  MS: no gross musculoskeletal defects noted, no edema  SKIN: no suspicious lesions or rashes  NEURO: Normal strength and tone, mentation intact " "and speech normal  PSYCH: mentation appears normal, affect normal/bright    Diagnostic Test Results:  Labs reviewed in Epic  Done yesterday     ASSESSMENT/PLAN:   1. Routine general medical examination at a health care facility      2. Morbid obesity (H)  Discussed     3. Mild persistent asthma without complication  Stable   - albuterol (VENTOLIN HFA) 108 (90 Base) MCG/ACT inhaler; INHALE 2 PUFFS INTO THE LUNGS EVERY 6 HOURS AS NEEDED FOR SHORTNESS OF BREATH, DIFFICULTY BREATHING OR WHEEZING.  Dispense: 8 g; Refill: 11  - fluticasone-salmeterol (ADVAIR DISKUS) 250-50 MCG/DOSE inhaler; Inhale 1 puff into the lungs every 12 hours ### DO NOT FILL NOW.  Please update patient's profile to reflect additional refills.  ####  Dispense: 60 each; Refill: 11    4. Foreskin problem  Needs to see urology and discuss possible circumcision related to frequent recurrent fissure after sex   Foreskin is tight   - Adult Urology Referral; Future    5. Special screening for malignant neoplasms, colon    - Adult Gastro Ref - Procedure Only; Future    6. Essential hypertension  Needs improvement   - losartan-hydrochlorothiazide (HYZAAR) 100-12.5 MG tablet; Take 1 tablet by mouth daily  Dispense: 90 tablet; Refill: 3    7. CARDIOVASCULAR SCREENING; LDL GOAL LESS THAN 160    8.left wrist pain   Discussed ice and heat and voltaren gel   Also discussed possible brace and referral to hand specialist- he want to wait a month   Left handed       Patient has been advised of split billing requirements and indicates understanding:   COUNSELING:   Reviewed preventive health counseling, as reflected in patient instructions       Regular exercise       Healthy diet/nutrition       Colon cancer screening       Prostate cancer screening       Osteoporosis prevention/bone health    Estimated body mass index is 38.63 kg/m  as calculated from the following:    Height as of this encounter: 1.803 m (5' 11\").    Weight as of this encounter: 125.6 kg (277 " lb).     Weight management plan: Discussed healthy diet and exercise guidelines    He reports that he has never smoked. He has never used smokeless tobacco.      Counseling Resources:  ATP IV Guidelines  Pooled Cohorts Equation Calculator  FRAX Risk Assessment  ICSI Preventive Guidelines  Dietary Guidelines for Americans, 2010  USDA's MyPlate  ASA Prophylaxis  Lung CA Screening    LAYLA Adames CNP  [facility]

## 2021-08-10 NOTE — NURSING NOTE
"Chief Complaint   Patient presents with     Physical      labs done yesterday     initial BP (!) 146/88   Pulse 70   Temp 97.7  F (36.5  C) (Oral)   Resp 18   Ht 1.803 m (5' 11\")   Wt 125.6 kg (277 lb)   SpO2 96%   BMI 38.63 kg/m   Estimated body mass index is 38.63 kg/m  as calculated from the following:    Height as of this encounter: 1.803 m (5' 11\").    Weight as of this encounter: 125.6 kg (277 lb)..  bp completed using cuff size large  HILDA CHAVARRIA LPN  "

## 2021-08-10 NOTE — PATIENT INSTRUCTIONS
losaratan/ hydrochlorothiazide 100 mg / 12.5 mg   1 tab daily    Follow up in a month     Urology referral   [facility] [address]   [address]   [facility] [address]   Phone: [phone]   Fax: [phone]         colonoscopy   They will call you

## 2021-08-11 ASSESSMENT — ASTHMA QUESTIONNAIRES: ACT_TOTALSCORE: 21

## 2021-08-16 ENCOUNTER — HOSPITAL ENCOUNTER (OUTPATIENT)
Facility: CLINIC | Age: 51
End: 2021-08-16
Attending: INTERNAL MEDICINE | Admitting: INTERNAL MEDICINE
Payer: COMMERCIAL

## 2021-08-16 ENCOUNTER — TELEPHONE (OUTPATIENT)
Dept: GASTROENTEROLOGY | Facility: CLINIC | Age: 51
End: 2021-08-16

## 2021-08-16 ENCOUNTER — TELEPHONE (OUTPATIENT)
Dept: INTERNAL MEDICINE | Facility: CLINIC | Age: 51
End: 2021-08-16

## 2021-08-16 DIAGNOSIS — Z11.59 ENCOUNTER FOR SCREENING FOR OTHER VIRAL DISEASES: ICD-10-CM

## 2021-08-16 DIAGNOSIS — K57.32 DIVERTICULITIS OF COLON: Primary | ICD-10-CM

## 2021-08-16 RX ORDER — METRONIDAZOLE 500 MG/1
500 TABLET ORAL 3 TIMES DAILY
Qty: 30 TABLET | Refills: 0 | Status: SHIPPED | OUTPATIENT
Start: 2021-08-16 | End: 2021-10-27

## 2021-08-16 RX ORDER — CIPROFLOXACIN 500 MG/1
500 TABLET, FILM COATED ORAL 2 TIMES DAILY
Qty: 20 TABLET | Refills: 0 | Status: SHIPPED | OUTPATIENT
Start: 2021-08-16 | End: 2021-10-27

## 2021-08-16 NOTE — TELEPHONE ENCOUNTER
Screening Questions  1. Are you active on mychart?    2. What insurance is in the chart? [employer]     2.  Ordering/Referring Provider: Sara Elias APRN CNP    3. BMI 37.7    4. Are you on daily home oxygen? no    5. Do you have a history of difficult airway?  no    6. Have you had a heart, lung, or liver transplant? no    7. Are you currently on dialysis? no    8. Have you had a stroke or Transient ischemic atttack (TIA) within 6 months? no    9. In the past 6 months, have you had any heart related issues including cardiomyopathy or heart attack?         If yes, did it require cardiac stenting or other implantable device?no    10. Do you have any implantable devices in your body (pacemaker, defib, LVAD)?  no    11. Do you take nitroglycerin? If yes, how often?  no    12. Are you currently taking any blood thinners? no    13. Are you a diabetic?  no    14. (Females) Are you currently pregnant?   If yes, how many weeks?    15. Have you had a procedure in the past that was difficult to tolerate with conscious sedation? Any allergies to Fentanyl or Versed  no    16. Are you taking any scheduled prescription narcotics more than once daily?  no    17. Do you have any chemical dependencies such as alcohol, street drugs, or methadone?  no    18. Do you have any history of post-traumatic stress syndrome or mental health issues?  no    19. Do you transfer independently?  yes    20.  Do you have any issues with constipation? No     21. Preferred Pharmacy for Pre Prescription  On chart    Scheduling Details    Which Colonoscopy Prep was Sent?: Miralax  Procedure Scheduled: Colon  Provider/Surgeon: Gwen  Date of Procedure: 8/23/2021  Location:   Caller (Please ask for phone number if not scheduled by patient): Jean-Claude Mancia      Sedation Type: CS  Conscious Sedation- Needs  for 6 hours after the procedure  MAC/General-Needs  for 24 hours after procedure    Pre-op Required at [facility] [place]  [place] and OR for MAC sedation:   (if yes advise patient they will need a pre-op prior to procedure)      Is patient on blood thinners? -no (If yes- inform patient to follow up with PCP or provider for follow up instructions)     Informed patient they will need an adult  yes  Cannot take any type of public or medical transportation alone    Informed Patient of COVID Test Requirement yes    Confirmed Nurse will call to complete assessment yes    Additional comments: no

## 2021-08-16 NOTE — TELEPHONE ENCOUNTER
Call received from patient stating he has had episodes of diverticulitis in the past. States starting Friday he began having symptoms again. States symptoms were bad yesterday including chills and abdominal pain. Pain is in the exact same area it has been in the past when he has had an episode. Reports low grade fever of 99.9. States he has not had a BM for the past 1 day but has been passing gas. Has taken Miralax with no results. Patient has been drinking well. Patient asking if since he has had these same symptoms several times in the past and was recently seen if he could get a prescription for Cipro and Flagyl called in without being seen. Please advise.

## 2021-08-16 NOTE — TELEPHONE ENCOUNTER
Patient advised of Sara's recommendations.     Patient states he is scheduled for colonoscopy for 8/23, he asks if he should reschedule this for a later time due to symptoms.

## 2021-08-17 ENCOUNTER — TELEPHONE (OUTPATIENT)
Dept: GASTROENTEROLOGY | Facility: CLINIC | Age: 51
End: 2021-08-17

## 2021-08-17 NOTE — TELEPHONE ENCOUNTER
Caller: DEZ    Procedure: Colonoscopy    Date of Procedure Cancelled: 8/23    Ordering Provider:Sara Elias APRN CNP    Reason for cancel: PCP provider advised to push out procedure by at least 8 weeks    Rescheduled: y     If rescheduled:    Date: 11/01/21   Location: [place]   Note any change or update to original order/sedation:

## 2021-08-24 ENCOUNTER — PRE VISIT (OUTPATIENT)
Dept: UROLOGY | Facility: CLINIC | Age: 51
End: 2021-08-24

## 2021-10-15 ENCOUNTER — PRE VISIT (OUTPATIENT)
Dept: UROLOGY | Facility: CLINIC | Age: 51
End: 2021-10-15

## 2021-10-15 NOTE — TELEPHONE ENCOUNTER
Reason for visit: consult for recurrent fissure of the foreskin     Dx/Hx/Sx: fissure of the foreskin after sex, foreskin is noted as being tight by referring provider     Records/imaging/labs/orders: in epic     At Rooming: undress, out on gown

## 2021-10-18 NOTE — PROGRESS NOTES
Chief Complaint:   Foreskin issue         History of Present Illness:    Thai Mancia is a 51 year old male with a history of hypospadias repair in early childhood and morbid obesity who presents for consult regarding tight foreskin. This has become an issue with intercourse, as he experiences frequent foreskin fissure that can sometimes take a full week to heal. He has been  for 14 years and did not have an issue with this until the past 1-2 years. He saw an outside urologist and was prescribed betamethasone cream. He has found that this helps with any mild itching or irritation but does not help prevent the fissures and pain. He has also tried an OTC antifungal medication but this also has not made a difference. He does otherwise able to retract his foreskin fairly easily.          Past Medical History:     Past Medical History:   Diagnosis Date     Asthma      Diverticulitis      Essential hypertension             Past Surgical History:     Past Surgical History:   Procedure Laterality Date     KNEE SURGERY  1985    left            Medications     Current Outpatient Medications   Medication     albuterol (VENTOLIN HFA) 108 (90 Base) MCG/ACT inhaler     betamethasone dipropionate (DIPROSONE) 0.05 % external cream     ciprofloxacin (CIPRO) 500 MG tablet     fluticasone-salmeterol (ADVAIR DISKUS) 250-50 MCG/DOSE inhaler     losartan-hydrochlorothiazide (HYZAAR) 100-12.5 MG tablet     metroNIDAZOLE (FLAGYL) 500 MG tablet     losartan (COZAAR) 50 MG tablet     No current facility-administered medications for this visit.            Family History:     Family History   Problem Relation Age of Onset     Unknown/Adopted Mother         adopted     Unknown/Adopted Father      Diabetes No family hx of             Social History:     Social History     Socioeconomic History     Marital status:      Spouse name: Not on file     Number of children: Not on file     Years of education: Not on file      "Highest education level: Not on file   Occupational History     Not on file   Tobacco Use     Smoking status: Never Smoker     Smokeless tobacco: Never Used   Substance and Sexual Activity     Alcohol use: No     Alcohol/week: 0.0 standard drinks     Comment: quit 08/17     Drug use: No     Sexual activity: Yes     Partners: Female   Other Topics Concern     Parent/sibling w/ CABG, MI or angioplasty before 65F 55M? Not Asked   Social History Narrative     Not on file     Social Determinants of Health     Financial Resource Strain:      Difficulty of Paying Living Expenses:    Food Insecurity:      Worried About Running Out of Food in the Last Year:      Ran Out of Food in the Last Year:    Transportation Needs:      Lack of Transportation (Medical):      Lack of Transportation (Non-Medical):    Physical Activity:      Days of Exercise per Week:      Minutes of Exercise per Session:    Stress:      Feeling of Stress :    Social Connections:      Frequency of Communication with Friends and Family:      Frequency of Social Gatherings with Friends and Family:      Attends Church Services:      Active Member of Clubs or Organizations:      Attends Club or Organization Meetings:      Marital Status:    Intimate Partner Violence:      Fear of Current or Ex-Partner:      Emotionally Abused:      Physically Abused:      Sexually Abused:             Allergies:   Cats, Dogs, and Pollen extract         Review of Systems:  From intake questionnaire   Negative 14 system review except as noted on HPI, nurse's note.         Physical Exam:   Patient is a 51 year old  male   Vitals: Blood pressure (!) 159/89, pulse 85, height 1.803 m (5' 11\"), weight 120.2 kg (265 lb).  General Appearance Adult: Alert, no acute distress, oriented  Lungs: no respiratory distress, or pursed lip breathing  Heart: No obvious jugular venous distension present  Abdomen: soft, nontender, no organomegaly or masses, Body mass index is 36.96 kg/m .  : " Foreskin easily retractable. Evidence of healed fissures across foreskin that the patient points out. Coronal exit of urethra.       Labs and Pathology:    I personally reviewed all applicable laboratory data and went over findings with patient  Significant for:    CBC RESULTS:  Recent Labs   Lab Test 08/09/21  0925 08/21/19  1507 03/20/19  1354 08/28/17  1000   WBC 6.7 11.0 7.6 8.7   HGB 14.5 13.8 14.7 15.1    253 312 285        BMP RESULTS:  Recent Labs   Lab Test 08/09/21  0925 08/21/19  1507 04/10/19  1526 03/20/19  1354 08/28/17  1000 08/28/17  1000    142 140 141   < > 140   POTASSIUM 4.3 3.4 3.6 3.9   < > 3.4   CHLORIDE 108 108 107 108   < > 107   CO2 28 27 27 27   < > 26   ANIONGAP 3 7 6 6   < > 7   * 87 94 76   < > 156*   BUN 14 13 12 17   < > 9   CR 1.01 1.02 0.81 0.91   < > 0.88   GFRESTIMATED 86 86 >90 >90   < > >90   GFRESTBLACK  --  >90 >90 >90  --  >90   FRANCK 8.7 8.3* 8.6 8.7   < > 8.7    < > = values in this interval not displayed.       UA RESULTS:   Recent Labs   Lab Test 08/28/17  1200 04/08/15  1340 02/04/15  1004   SG 1.050* 1.025 1.025   URINEPH 6.5 6.0 7.0   NITRITE Negative Negative Positive*   RBCU 0  --  2-5*   WBCU 0  --  25-50*       PSA RESULTS  Prostate Specific Antigen Screen   Date Value Ref Range Status   08/09/2021 0.37 0.00 - 4.00 ug/L Final            Assessment and Plan:     Assessment: 51 year old male with a history of hypospadias repair who has been experiencing pain and foreskin fissures with sexual activity. Has tried applying betamethasone to the area with no improvement. Foreskin easily retractable today so unclear if a procedure (dorsal slit vs circumcision) would be of benefit. Recommend that he follow up in the clinic with Dr. Felzi for repeat exam and consideration of next steps. He agrees with this plan.      Plan:  -Follow up with Dr. Feliz for in-person clinic visit.       Sola Johnson CNP  Department of Urology

## 2021-10-19 ENCOUNTER — OFFICE VISIT (OUTPATIENT)
Dept: UROLOGY | Facility: CLINIC | Age: 51
End: 2021-10-19
Attending: NURSE PRACTITIONER
Payer: COMMERCIAL

## 2021-10-19 VITALS
SYSTOLIC BLOOD PRESSURE: 159 MMHG | DIASTOLIC BLOOD PRESSURE: 89 MMHG | BODY MASS INDEX: 37.1 KG/M2 | HEART RATE: 85 BPM | HEIGHT: 71 IN | WEIGHT: 265 LBS

## 2021-10-19 DIAGNOSIS — N47.8 FORESKIN PROBLEM: ICD-10-CM

## 2021-10-19 PROCEDURE — 99203 OFFICE O/P NEW LOW 30 MIN: CPT | Performed by: NURSE PRACTITIONER

## 2021-10-19 RX ORDER — BETAMETHASONE DIPROPIONATE 0.5 MG/G
CREAM TOPICAL DAILY PRN
COMMUNITY
End: 2021-11-08 | Stop reason: ALTCHOICE

## 2021-10-19 ASSESSMENT — MIFFLIN-ST. JEOR: SCORE: 2079.16

## 2021-10-19 ASSESSMENT — PAIN SCALES - GENERAL: PAINLEVEL: NO PAIN (0)

## 2021-10-19 NOTE — PATIENT INSTRUCTIONS
UROLOGY CLINIC VISIT PATIENT INSTRUCTIONS    -Follow up with Dr. Feliz for an in-person clinic visit on Wednesday, 10/27/21 at 10:00 am.    If you have any issues, questions or concerns in the meantime, do not hesitate to contact us at [phone] or via [facility].     It was a pleasure meeting with you today.  Thank you for allowing me and my team the privilege of caring for you today.  YOU are the reason we are here, and I truly hope we provided you with the excellent service you deserve.  Please let us know if there is anything else we can do for you so that we can be sure you are leaving completely satisfied with your care experience.    Sola Johnson, CNP

## 2021-10-19 NOTE — NURSING NOTE
"Chief Complaint   Patient presents with     Consult     consult for recurrent fissure of the foreskin       Blood pressure (!) 159/89, pulse 85, height 1.803 m (5' 11\"), weight 120.2 kg (265 lb). Body mass index is 36.96 kg/m .    Patient Active Problem List   Diagnosis     CARDIOVASCULAR SCREENING; LDL GOAL LESS THAN 160     Mild persistent asthma without complication     Obesity due to excess calories, unspecified obesity severity     Essential hypertension     Morbid obesity (H)       Allergies   Allergen Reactions     Cats      Itchy eyes, runny nose, wheezing     Dogs      Itchy eyes, runny nose, wheezing     Pollen Extract      Runny nose       Current Outpatient Medications   Medication Sig Dispense Refill     albuterol (VENTOLIN HFA) 108 (90 Base) MCG/ACT inhaler INHALE 2 PUFFS INTO THE LUNGS EVERY 6 HOURS AS NEEDED FOR SHORTNESS OF BREATH, DIFFICULTY BREATHING OR WHEEZING. 8 g 11     betamethasone dipropionate (DIPROSONE) 0.05 % external cream Apply topically daily as needed       ciprofloxacin (CIPRO) 500 MG tablet Take 1 tablet (500 mg) by mouth 2 times daily 20 tablet 0     fluticasone-salmeterol (ADVAIR DISKUS) 250-50 MCG/DOSE inhaler Inhale 1 puff into the lungs every 12 hours ### DO NOT FILL NOW.  Please update patient's profile to reflect additional refills.  #### 60 each 11     losartan-hydrochlorothiazide (HYZAAR) 100-12.5 MG tablet Take 1 tablet by mouth daily 90 tablet 3     metroNIDAZOLE (FLAGYL) 500 MG tablet Take 1 tablet (500 mg) by mouth 3 times daily 30 tablet 0     losartan (COZAAR) 50 MG tablet Take 1 tablet (50 mg) by mouth daily (Patient not taking: Reported on 10/19/2021) 30 tablet 0       Social History     Tobacco Use     Smoking status: Never Smoker     Smokeless tobacco: Never Used   Substance Use Topics     Alcohol use: No     Alcohol/week: 0.0 standard drinks     Comment: quit 08/17     Drug use: No       Laura Hernandez  10/19/2021  10:29 AM  "

## 2021-10-19 NOTE — LETTER
10/19/2021       RE: Thai Mancia  [address]  [facility] [address]     Dear Colleague,    Thank you for referring your patient, Thai Mancia, to the [facility] [place] at [facility]. Please see a copy of my visit note below.            Chief Complaint:   Foreskin issue         History of Present Illness:    Thai Mancia is a 51 year old male with a history of hypospadias repair in early childhood and morbid obesity who presents for consult regarding tight foreskin. This has become an issue with intercourse, as he experiences frequent foreskin fissure that can sometimes take a full week to heal. He has been  for 14 years and did not have an issue with this until the past 1-2 years. He saw an outside urologist and was prescribed betamethasone cream. He has found that this helps with any mild itching or irritation but does not help prevent the fissures and pain. He has also tried an OTC antifungal medication but this also has not made a difference. He does otherwise able to retract his foreskin fairly easily.          Past Medical History:     Past Medical History:   Diagnosis Date     Asthma      Diverticulitis      Essential hypertension             Past Surgical History:     Past Surgical History:   Procedure Laterality Date     KNEE SURGERY  1985    left            Medications     Current Outpatient Medications   Medication     albuterol (VENTOLIN HFA) 108 (90 Base) MCG/ACT inhaler     betamethasone dipropionate (DIPROSONE) 0.05 % external cream     ciprofloxacin (CIPRO) 500 MG tablet     fluticasone-salmeterol (ADVAIR DISKUS) 250-50 MCG/DOSE inhaler     losartan-hydrochlorothiazide (HYZAAR) 100-12.5 MG tablet     metroNIDAZOLE (FLAGYL) 500 MG tablet     losartan (COZAAR) 50 MG tablet     No current facility-administered medications for this visit.            Family History:     Family History   Problem  Relation Age of Onset     Unknown/Adopted Mother         adopted     Unknown/Adopted Father      Diabetes No family hx of             Social History:     Social History     Socioeconomic History     Marital status:      Spouse name: Not on file     Number of children: Not on file     Years of education: Not on file     Highest education level: Not on file   Occupational History     Not on file   Tobacco Use     Smoking status: Never Smoker     Smokeless tobacco: Never Used   Substance and Sexual Activity     Alcohol use: No     Alcohol/week: 0.0 standard drinks     Comment: quit 08/17     Drug use: No     Sexual activity: Yes     Partners: Female   Other Topics Concern     Parent/sibling w/ CABG, MI or angioplasty before 65F 55M? Not Asked   Social History Narrative     Not on file     Social Determinants of Health     Financial Resource Strain:      Difficulty of Paying Living Expenses:    Food Insecurity:      Worried About Running Out of Food in the Last Year:      Ran Out of Food in the Last Year:    Transportation Needs:      Lack of Transportation (Medical):      Lack of Transportation (Non-Medical):    Physical Activity:      Days of Exercise per Week:      Minutes of Exercise per Session:    Stress:      Feeling of Stress :    Social Connections:      Frequency of Communication with Friends and Family:      Frequency of Social Gatherings with Friends and Family:      Attends Nondenominational Services:      Active Member of Clubs or Organizations:      Attends Club or Organization Meetings:      Marital Status:    Intimate Partner Violence:      Fear of Current or Ex-Partner:      Emotionally Abused:      Physically Abused:      Sexually Abused:             Allergies:   Cats, Dogs, and Pollen extract         Review of Systems:  From intake questionnaire   Negative 14 system review except as noted on HPI, nurse's note.         Physical Exam:   Patient is a 51 year old  male   Vitals: Blood pressure (!) 159/89,  "pulse 85, height 1.803 m (5' 11\"), weight 120.2 kg (265 lb).  General Appearance Adult: Alert, no acute distress, oriented  Lungs: no respiratory distress, or pursed lip breathing  Heart: No obvious jugular venous distension present  Abdomen: soft, nontender, no organomegaly or masses, Body mass index is 36.96 kg/m .  : Foreskin easily retractable. Evidence of healed fissures across foreskin that the patient points out. Coronal exit of urethra.       Labs and Pathology:    I personally reviewed all applicable laboratory data and went over findings with patient  Significant for:    CBC RESULTS:  Recent Labs   Lab Test 08/09/21  0925 08/21/19  1507 03/20/19  1354 08/28/17  1000   WBC 6.7 11.0 7.6 8.7   HGB 14.5 13.8 14.7 15.1    253 312 285        BMP RESULTS:  Recent Labs   Lab Test 08/09/21  0925 08/21/19  1507 04/10/19  1526 03/20/19  1354 08/28/17  1000 08/28/17  1000    142 140 141   < > 140   POTASSIUM 4.3 3.4 3.6 3.9   < > 3.4   CHLORIDE 108 108 107 108   < > 107   CO2 28 27 27 27   < > 26   ANIONGAP 3 7 6 6   < > 7   * 87 94 76   < > 156*   BUN 14 13 12 17   < > 9   CR 1.01 1.02 0.81 0.91   < > 0.88   GFRESTIMATED 86 86 >90 >90   < > >90   GFRESTBLACK  --  >90 >90 >90  --  >90   FRANCK 8.7 8.3* 8.6 8.7   < > 8.7    < > = values in this interval not displayed.       UA RESULTS:   Recent Labs   Lab Test 08/28/17  1200 04/08/15  1340 02/04/15  1004   SG 1.050* 1.025 1.025   URINEPH 6.5 6.0 7.0   NITRITE Negative Negative Positive*   RBCU 0  --  2-5*   WBCU 0  --  25-50*       PSA RESULTS  Prostate Specific Antigen Screen   Date Value Ref Range Status   08/09/2021 0.37 0.00 - 4.00 ug/L Final            Assessment and Plan:     Assessment: 51 year old male with a history of hypospadias repair who has been experiencing pain and foreskin fissures with sexual activity. Has tried applying betamethasone to the area with no improvement. Foreskin easily retractable today so unclear if a procedure " (dorsal slit vs circumcision) would be of benefit. Recommend that he follow up in the clinic with Dr. Feliz for repeat exam and consideration of next steps. He agrees with this plan.      Plan:  -Follow up with Dr. Feliz for in-person clinic visit.       Sola Johnson CNP  Department of Urology

## 2021-10-26 ENCOUNTER — PRE VISIT (OUTPATIENT)
Dept: UROLOGY | Facility: CLINIC | Age: 51
End: 2021-10-26

## 2021-10-26 NOTE — TELEPHONE ENCOUNTER
Reason for visit: Follow up     Relevant information: Seen by Sola; foreskin fissure    Records/imaging/labs/orders: in EPIC    Pt called: no    At Rooming: normal

## 2021-10-27 ENCOUNTER — OFFICE VISIT (OUTPATIENT)
Dept: UROLOGY | Facility: CLINIC | Age: 51
End: 2021-10-27
Payer: COMMERCIAL

## 2021-10-27 ENCOUNTER — TELEPHONE (OUTPATIENT)
Dept: UROLOGY | Facility: CLINIC | Age: 51
End: 2021-10-27

## 2021-10-27 ENCOUNTER — TELEPHONE (OUTPATIENT)
Dept: INTERNAL MEDICINE | Facility: CLINIC | Age: 51
End: 2021-10-27

## 2021-10-27 VITALS
HEART RATE: 90 BPM | DIASTOLIC BLOOD PRESSURE: 95 MMHG | BODY MASS INDEX: 39.48 KG/M2 | WEIGHT: 282 LBS | HEIGHT: 71 IN | SYSTOLIC BLOOD PRESSURE: 153 MMHG

## 2021-10-27 DIAGNOSIS — N47.1 PHIMOSIS: Primary | ICD-10-CM

## 2021-10-27 DIAGNOSIS — Z11.59 ENCOUNTER FOR SCREENING FOR OTHER VIRAL DISEASES: ICD-10-CM

## 2021-10-27 PROCEDURE — 99213 OFFICE O/P EST LOW 20 MIN: CPT | Performed by: UROLOGY

## 2021-10-27 PROCEDURE — 99207 PR NO CHARGE NURSE ONLY: CPT

## 2021-10-27 RX ORDER — CEFAZOLIN SODIUM IN 0.9 % NACL 3 G/100 ML
3 INTRAVENOUS SOLUTION, PIGGYBACK (ML) INTRAVENOUS
Status: CANCELLED | OUTPATIENT
Start: 2021-10-27

## 2021-10-27 RX ORDER — CEFAZOLIN SODIUM IN 0.9 % NACL 3 G/100 ML
3 INTRAVENOUS SOLUTION, PIGGYBACK (ML) INTRAVENOUS SEE ADMIN INSTRUCTIONS
Status: CANCELLED | OUTPATIENT
Start: 2021-10-27

## 2021-10-27 ASSESSMENT — PAIN SCALES - GENERAL: PAINLEVEL: NO PAIN (0)

## 2021-10-27 ASSESSMENT — MIFFLIN-ST. JEOR: SCORE: 2156.27

## 2021-10-27 NOTE — LETTER
"10/27/2021       RE: Thai Mancia  [address]  [facility] [address]     Dear Colleague,    Thank you for referring your patient, Thai Mancia, to the [facility] [place] at [facility]. Please see a copy of my visit note below.    HPI:  Thai Mancia is a 51 year old male being seen with hypospadias.    He also has redundant foreskin and phimosis which repeatedly tears dorsally during sexual activity. He tried steroid cream which helped slightly. It helps with irritation.  He basically tears this every sexual activity and takes 2 weeks to heal.  It's affecting his marriage.  The tears have been occurring consistently for about one year.  It is currently healed but there is an obvious white scar where the skin is recently healed and fragile which he notes to be the site of frequent tearing/tightness.  Meatus is subcoronal but this doesn't bother him.    Exam:  BP (!) 153/95   Pulse 90   Ht 1.803 m (5' 11\")   Wt 127.9 kg (282 lb)   BMI 39.33 kg/m    Hypospadias - subcoronal.  Has redundant foreskin with dorsal tear.  Meatus at subcoronal position  Testicles and scrotum without abnormality    Review of Labs:  The following labs were reviewed by me and discussed with the patient:  PSA 0.37      Assessment & Plan   Hypospadias  Recurrent foreskin tear on phimosis.  Failed medications.    Recommend circumcision. Risks, benefits, alternatives discussed.  Wants to proceed.    Toy Feliz MD  Reconstructive Urology  [facility]    "

## 2021-10-27 NOTE — PROGRESS NOTES
"HPI:  Thai Mancia is a 51 year old male being seen with hypospadias.    He also has redundant foreskin and phimosis which repeatedly tears dorsally during sexual activity. He tried steroid cream which helped slightly. It helps with irritation.  He basically tears this every sexual activity and takes 2 weeks to heal.  It's affecting his marriage.  The tears have been occurring consistently for about one year.  It is currently healed but there is an obvious white scar where the skin is recently healed and fragile which he notes to be the site of frequent tearing/tightness.  Meatus is subcoronal but this doesn't bother him.    Exam:  BP (!) 153/95   Pulse 90   Ht 1.803 m (5' 11\")   Wt 127.9 kg (282 lb)   BMI 39.33 kg/m    Hypospadias - subcoronal.  Has redundant foreskin with dorsal tear.  Meatus at subcoronal position  Testicles and scrotum without abnormality    Review of Labs:  The following labs were reviewed by me and discussed with the patient:  PSA 0.37      Assessment & Plan   Hypospadias  Recurrent foreskin tear on phimosis.  Failed medications.    Recommend circumcision. Risks, benefits, alternatives discussed.  Wants to proceed.    oTy Feliz MD  Reconstructive Urology  [facility]      "

## 2021-10-27 NOTE — TELEPHONE ENCOUNTER
Patient calling   He had a px with Jada on 8/10/2021 and now is having surgery on 11/18/2021  Patient wants to know if he has to be seen again or if the 8/10/2021 can be addended for a pre op  Please advise.  Ok to call and pilar [phone]

## 2021-10-27 NOTE — CONFIDENTIAL NOTE
Patient is scheduled for surgery with Dr. Feliz     Spoke with: Marcelle MELÉNDEZ spoke to patient in clinic face to face in clinic 10/27/21     Date of Surgery: Thursday 11/18/21     Location: [facility] OR     Informed patient they will need an adult  Yes    Pre op with Provider jessica    H&P: Scheduled with Patient will schedule at PCP     Pre-procedure COVID-19 Test: Friday 12/03/21 [facility]     Additional imaging/appointments: na    Surgery packet: Marcelle MELÉNDEZ gave to patient in clinic 10/27/21     Additional comments: na

## 2021-10-27 NOTE — TELEPHONE ENCOUNTER
Spoke with patient.  Advised previous appointment is > 30 days from surgery.    Preop appointment scheduled.    Appointments in Next Year    Oct 27, 2021 10:00 AM  (Arrive by 9:45 AM)  Return Visit with Toy Feliz MD  [facility] ([facility] ) [phone]   Oct 27, 2021 10:40 AM  (Arrive by 10:25 AM)  Return Visit with  Prostate Cancer Ctr Nurse  [facility] ([facility] ) [phone]   Oct 28, 2021  4:25 PM  Pre-procedure Covid with BL COVID TESTING HUB 2  [facility] Urgent [facility] ([facility] ) [phone]   Nov 08, 2021  9:00 AM  Pre-Op physical with Meredith Marie PA-C  [facility] ([facility] ) [phone]   Nov 15, 2021  4:00 PM  Pre-procedure Covid with BL COVID TESTING HUB 1  [facility]gent [facility] ([facility] ) [phone]   Dec 03, 2021  9:00 AM  (Arrive by 8:45 AM)  Post-Op Visit with Toy Feliz MD  [facility] ([facility] ) [phone]

## 2021-10-28 NOTE — PROGRESS NOTES
Pre Op Teaching Flowsheet                                        Pre and Post op Patient Education  Relevant Diagnosis: Phimosis  Teaching Topic:  Pre and post op teaching for Circumcision  Person Involved in teaching: Patient        Motivation Level: High  Asks Questions: Yes  Eager to Learn:  Yes  Cooperative: Yes  Receptive (willing/able to accept information):  Yes  Patient demonstrates understanding of the following:  Date and time of surgery:  11/18/21  Location of surgery: [facility]  History and Physical and any other testing necessary prior to surgery Pre Op Physical with: PCP on to be scheduled  Required time line for completion of History and Physical and any pre-op testing: No more than 30 days prior to surgery date    NPO Guidelines: NPO per Anesthesia Guidelines : Reviewed (surgery packet for further information).    Patient demonstrates understanding of the following:  Patient understands the need for a responsible adult to drive them home and someone to stay with them for the first 24 hours post-operatively: Wife  Pre-op bowel prep: N/A  Pre-op showering/scrub information with Hibiclens Soap: Yes  Medications to take the day of surgery: Pre op Physical for instruction.   Blood thinner medications discussed and when to stop (if applicable):  Yes  Diabetes medication management (if applicable):  Patient to discuss with Primary Care Provider  Discussed pain control after surgery: pain scale, pain medications and pain management techniques  Infection Prevention: Patient demonstrates understanding of the following:  Patient instructed on hand hygiene:  Yes  Surgical procedure site care taught: Yes  Signs and symptoms of infection taught:  Yes  Wound care will be taught at the time of discharge.    Urine anylisis and Urine Culture ordered on:NA  Pre op Covid testing on: 11/15/21  Post-op follow-up: As needed   Discussed how to contact the hospital, nurse, and clinic scheduling staff if necessary.     Surgical  instructions given to patient in clinic: yes.   Instructional materials used/given/mailed: Before your surgery packet , Medications to avoid before surgery , Showering or Bathing instructions before surgery  and What to expect after surgery      Total time with patient: 10 minutes    op

## 2021-11-01 ENCOUNTER — TELEPHONE (OUTPATIENT)
Dept: GASTROENTEROLOGY | Facility: CLINIC | Age: 51
End: 2021-11-01

## 2021-11-01 NOTE — TELEPHONE ENCOUNTER
Caller: Jean-Claude    Procedure: colonoscopy     Date, Location, and Surgeon of Procedure Cancelled: 11/01/2021 at  with Dr. Hidalgo    Ordering Provider:Sara Elias APRN CNP in [facility]    Reason for cancel (please be detailed, any staff messages or encounters to note?): scheduling conflict        Rescheduled: YES     If rescheduled:    Date: 12/10/2021   Location:    Note any change or update to original order/sedation: NO

## 2021-11-02 DIAGNOSIS — Z11.59 ENCOUNTER FOR SCREENING FOR OTHER VIRAL DISEASES: ICD-10-CM

## 2021-11-08 ENCOUNTER — OFFICE VISIT (OUTPATIENT)
Dept: INTERNAL MEDICINE | Facility: CLINIC | Age: 51
End: 2021-11-08
Payer: COMMERCIAL

## 2021-11-08 VITALS
DIASTOLIC BLOOD PRESSURE: 94 MMHG | BODY MASS INDEX: 39.06 KG/M2 | WEIGHT: 279 LBS | HEIGHT: 71 IN | RESPIRATION RATE: 20 BRPM | OXYGEN SATURATION: 97 % | HEART RATE: 85 BPM | SYSTOLIC BLOOD PRESSURE: 162 MMHG | TEMPERATURE: 97.8 F

## 2021-11-08 DIAGNOSIS — Z23 NEED FOR PROPHYLACTIC VACCINATION AND INOCULATION AGAINST INFLUENZA: ICD-10-CM

## 2021-11-08 DIAGNOSIS — I10 BENIGN ESSENTIAL HYPERTENSION: ICD-10-CM

## 2021-11-08 DIAGNOSIS — Z01.818 PREOP GENERAL PHYSICAL EXAM: Primary | ICD-10-CM

## 2021-11-08 PROCEDURE — 99214 OFFICE O/P EST MOD 30 MIN: CPT | Mod: 25 | Performed by: PHYSICIAN ASSISTANT

## 2021-11-08 PROCEDURE — 90682 RIV4 VACC RECOMBINANT DNA IM: CPT | Performed by: PHYSICIAN ASSISTANT

## 2021-11-08 PROCEDURE — 90471 IMMUNIZATION ADMIN: CPT | Performed by: PHYSICIAN ASSISTANT

## 2021-11-08 RX ORDER — HYDROCHLOROTHIAZIDE 12.5 MG/1
12.5 TABLET ORAL DAILY
Qty: 90 TABLET | Refills: 0 | Status: SHIPPED | OUTPATIENT
Start: 2021-11-08 | End: 2022-01-21

## 2021-11-08 ASSESSMENT — MIFFLIN-ST. JEOR: SCORE: 2142.67

## 2021-11-08 NOTE — PROGRESS NOTES
[place] [address]  [facility] [address]  Phone: [phone]  Primary Provider: Sara Elias  Pre-op Performing Provider: JOSEPHINE STEPHEN      PREOPERATIVE EVALUATION:  Today's date: 11/8/2021    Thai Mancia is a 51 year old male who presents for a preoperative evaluation.    Surgical Information:  Surgery/Procedure: circumcision  Surgery Location: [facility]  Surgeon: Dr. Feliz  Surgery Date: 11-  Time of Surgery: AM  Where patient plans to recover: At home with family  Fax number for surgical facility: Note does not need to be faxed, will be available electronically in Epic.    Type of Anesthesia Anticipated: Monitored Anesthesia Care    1. No - Have you ever had a heart attack or stroke?  2. No - Have you ever had surgery on your heart or blood vessels, such as a stent, coronary (heart) bypass, or surgery on an artery in the head, neck, heart, or legs?  3. No - Do you have chest pain when you are physically active?  4. No - Do you have a history of heart failure?  5. No - Do you currently have a cold, bronchitis, or symptoms of other respiratory (head and chest) infections?  6. No - Do you have a cough, shortness of breath, or wheezing?  7. No - Do you or anyone in your family have a history of blood clots?  8. No - Do you or anyone in your family have a serious bleeding problem, such as long-lasting bleeding after surgeries or cuts?  9. No - Have you ever had anemia or been told to take iron pills?  10. No - Have you had any abnormal blood loss such as black, tarry or bloody stools, or abnormal vaginal bleeding?  11. No - Have you ever had a blood transfusion?  12. Yes - Are you willing to have a blood transfusion if it is medically needed before, during, or after your surgery?  13. No - Have you or anyone in your family ever had problems with anesthesia (sedation for surgery)?  14. No - Do you have sleep apnea, excessive snoring, or daytime  drowsiness?   15. No - Do you have any artifical heart valves or other implanted medical devices, such as a pacemaker, defibrillator, or continuous glucose monitor?  16. No - Do you have any artifical joints?  17. No - Are you allergic to latex?  18. No - Is there any chance that you may be pregnant?    Will be having a pre-procedural COVID test on 11/15    Assessment & Plan     The proposed surgical procedure is considered LOW risk.    Preop general physical exam  Normal screening questions and exam. Primary concern is uncontrolled hypertension. See plan below.  Can proceed with surgery if repeat BP measurement next week is normal.    Benign essential hypertension  BP not controlled on Hyzaar 100-12.5. Will add 12.5 mg of hydrochlorothiazide to see if this leads to better BP control. Wife is an RN; she can check his BP at home within the next week. If elevated (or if accuracy concerns with BP cuff), should have nurse-only BP check in clinic.  - hydrochlorothiazide (HYDRODIURIL) 12.5 MG tablet; Take 1 tablet (12.5 mg) by mouth daily    Need for prophylactic vaccination and inoculation against influenza  Flu shot given today. He plans to pursue COVID booster after his surgery, due to asthma history.  - INFLUENZA QUAD, RECOMBINANT, P-FREE (RIV4) (FLUBLOK)      Risks and Recommendations:  The patient has the following additional risks and recommendations for perioperative complications:  None    Medication Instructions:  Patient is to take all scheduled medications on the day of surgery EXCEPT for modifications listed below:   - ACE/ARB: May be continued on the day of surgery.    - Diuretics: HOLD on the day of surgery.   - LABA, inhaled corticosteroid, long-acting anticholinergics: Continue without modification.   - rescue Inhaler: Continue PRN. Bring to hospital on the day of surgery.   Takes BP medication in the afternoon. May take the day before surgery and may resume post-op.    RECOMMENDATION:  APPROVAL GIVEN to  proceed with proposed procedure, without further diagnostic evaluation.    35 minutes spent on the date of the encounter doing chart review, history and exam, documentation and further activities per the note        Subjective     HPI related to upcoming procedure: Has phimosis that has resulted in tearing with sexual activity. Has failed medical management, so will be proceeding with circumcision.    Health Care Directive:  Patient does not have a Health Care Directive or Living Will: Discussed advance care planning with patient; however, patient declined at this time.    Preoperative Review of :  Unable to access       Status of Chronic Conditions:  ASTHMA - Patient has a longstanding history of Asthma . Patient has been doing well overall noting occasional wheezing and continues on medication regimen consisting of Advair and albuterol without adverse reactions or side effects. He typically uses Advair and albuterol at night before bed. No recent asthma exacerbations or hospitalization related to asthma    HYPERTENSION - Patient has history of HTN , currently denies any symptoms referable to elevated blood pressure. Specifically denies chest pain, palpitations, dyspnea, orthopnea, PND or peripheral edema. Blood pressure readings have not been in normal range. Current medication regimen is as listed below. Patient denies any side effects of medication.   In August, was changed from 50mg losartan to Hyzaar (100-12.5).   /89 on 10/19/21  /95 on 10/27/21  /96 and 162/94 in clinic today      Review of Systems  CONSTITUTIONAL: NEGATIVE for fever, chills, change in weight  INTEGUMENTARY/SKIN: NEGATIVE for worrisome rashes, moles or lesions  EYES: NEGATIVE for vision changes or irritation  ENT/MOUTH: NEGATIVE for ear, mouth and throat problems  RESP: NEGATIVE for significant cough or SOB  CV: NEGATIVE for chest pain, palpitations or peripheral edema  GI: POSITIVE for heartburn or reflux and  NEGATIVE for abdominal pain any location, constipation and diarrhea  : NEGATIVE for frequency, dysuria, or hematuria  MUSCULOSKELETAL: NEGATIVE for significant arthralgias or myalgia  NEURO: NEGATIVE for weakness, dizziness or paresthesias  ENDOCRINE: NEGATIVE for temperature intolerance, skin/hair changes  HEME: NEGATIVE for bleeding problems  PSYCHIATRIC: NEGATIVE for changes in mood or affect     Reports occasional heartburn triggered by acidic foods  Reports history of arthritis in knees and hands. Has discussed this with PCP    Patient Active Problem List    Diagnosis Date Noted     Phimosis 10/27/2021     Priority: Medium     Added automatically from request for surgery [id number]       Morbid obesity (H) 06/30/2021     Priority: Medium     Essential hypertension 04/10/2019     Priority: Medium     Mild persistent asthma without complication 08/08/2016     Priority: Medium     Obesity due to excess calories, unspecified obesity severity 08/08/2016     Priority: Medium     CARDIOVASCULAR SCREENING; LDL GOAL LESS THAN 160 08/05/2011     Priority: Medium      Past Medical History:   Diagnosis Date     Asthma      Diverticulitis      Essential hypertension      Past Surgical History:   Procedure Laterality Date     KNEE SURGERY  1985    left     Current Outpatient Medications   Medication Sig Dispense Refill     albuterol (VENTOLIN HFA) 108 (90 Base) MCG/ACT inhaler INHALE 2 PUFFS INTO THE LUNGS EVERY 6 HOURS AS NEEDED FOR SHORTNESS OF BREATH, DIFFICULTY BREATHING OR WHEEZING. 8 g 11     betamethasone dipropionate (DIPROSONE) 0.05 % external cream Apply topically daily as needed       fluticasone-salmeterol (ADVAIR DISKUS) 250-50 MCG/DOSE inhaler Inhale 1 puff into the lungs every 12 hours ### DO NOT FILL NOW.  Please update patient's profile to reflect additional refills.  #### 60 each 11     losartan (COZAAR) 50 MG tablet Take 1 tablet (50 mg) by mouth daily (Patient not taking: Reported on 10/19/2021) 30 tablet  "0     losartan-hydrochlorothiazide (HYZAAR) 100-12.5 MG tablet Take 1 tablet by mouth daily 90 tablet 3       Allergies   Allergen Reactions     Cats      Itchy eyes, runny nose, wheezing     Dogs      Itchy eyes, runny nose, wheezing     Pollen Extract      Runny nose        Social History     Tobacco Use     Smoking status: Never Smoker     Smokeless tobacco: Never Used   Substance Use Topics     Alcohol use: No     Alcohol/week: 0.0 standard drinks     Comment: quit 08/17     FH unknown: patient is adopted  History   Drug Use No         Objective     BP (!) 162/94   Pulse 85   Temp 97.8  F (36.6  C) (Oral)   Resp 20   Ht 1.803 m (5' 11\")   Wt 126.6 kg (279 lb)   SpO2 97%   BMI 38.91 kg/m      Physical Exam    GENERAL APPEARANCE: healthy, alert and no distress     EYES: EOMI,  PERRL     HENT: ear canals and TM's normal and nose and mouth without ulcers or lesions     NECK: no adenopathy, no asymmetry, masses, or scars and thyroid normal to palpation     RESP: lungs clear to auscultation - no rales, rhonchi or wheezes     CV: regular rates and rhythm, normal S1 S2, no S3 or S4 and no murmur, click or rub     ABDOMEN:  soft, nontender, no HSM or masses and bowel sounds normal     MS: extremities normal- no gross deformities noted, no evidence of inflammation in joints, FROM in all extremities.     SKIN: no suspicious lesions or rashes     NEURO: Normal strength and tone, sensory exam grossly normal, mentation intact and speech normal     PSYCH: mentation appears normal. and affect normal/bright     LYMPHATICS: No cervical adenopathy    Recent Labs   Lab Test 08/09/21  0925   HGB 14.5         POTASSIUM 4.3   CR 1.01   A1C 5.7*        Diagnostics:  No labs were ordered during this visit.   No EKG required, no history of coronary heart disease, significant arrhythmia, peripheral arterial disease or other structural heart disease.    Revised Cardiac Risk Index (RCRI):  The patient has the following " serious cardiovascular risks for perioperative complications:   - No serious cardiac risks = 0 points     RCRI Interpretation: 0 points: Class I (very low risk - 0.4% complication rate)           Signed Electronically by: Meredith Marie PA-C  Copy of this evaluation report is provided to requesting physician.

## 2021-11-08 NOTE — PATIENT INSTRUCTIONS
Start 12.5 mg of hydrochlorothiazide  Check blood pressure in about a week  Come in for nurse-only BP check if your home reading is elevated (>140/90)

## 2021-11-08 NOTE — H&P (VIEW-ONLY)
[place] [address]  [facility] [address]  Phone: [phone]  Primary Provider: Sara Elias  Pre-op Performing Provider: JOSEPHINE STEPHEN      PREOPERATIVE EVALUATION:  Today's date: 11/8/2021    Thai Mancia is a 51 year old male who presents for a preoperative evaluation.    Surgical Information:  Surgery/Procedure: circumcision  Surgery Location: [facility]  Surgeon: Dr. Feliz  Surgery Date: 11-  Time of Surgery: AM  Where patient plans to recover: At home with family  Fax number for surgical facility: Note does not need to be faxed, will be available electronically in Epic.    Type of Anesthesia Anticipated: Monitored Anesthesia Care    1. No - Have you ever had a heart attack or stroke?  2. No - Have you ever had surgery on your heart or blood vessels, such as a stent, coronary (heart) bypass, or surgery on an artery in the head, neck, heart, or legs?  3. No - Do you have chest pain when you are physically active?  4. No - Do you have a history of heart failure?  5. No - Do you currently have a cold, bronchitis, or symptoms of other respiratory (head and chest) infections?  6. No - Do you have a cough, shortness of breath, or wheezing?  7. No - Do you or anyone in your family have a history of blood clots?  8. No - Do you or anyone in your family have a serious bleeding problem, such as long-lasting bleeding after surgeries or cuts?  9. No - Have you ever had anemia or been told to take iron pills?  10. No - Have you had any abnormal blood loss such as black, tarry or bloody stools, or abnormal vaginal bleeding?  11. No - Have you ever had a blood transfusion?  12. Yes - Are you willing to have a blood transfusion if it is medically needed before, during, or after your surgery?  13. No - Have you or anyone in your family ever had problems with anesthesia (sedation for surgery)?  14. No - Do you have sleep apnea, excessive snoring, or daytime  drowsiness?   15. No - Do you have any artifical heart valves or other implanted medical devices, such as a pacemaker, defibrillator, or continuous glucose monitor?  16. No - Do you have any artifical joints?  17. No - Are you allergic to latex?  18. No - Is there any chance that you may be pregnant?    Will be having a pre-procedural COVID test on 11/15    Assessment & Plan     The proposed surgical procedure is considered LOW risk.    Preop general physical exam  Normal screening questions and exam. Primary concern is uncontrolled hypertension. See plan below.  Can proceed with surgery if repeat BP measurement next week is normal.    Benign essential hypertension  BP not controlled on Hyzaar 100-12.5. Will add 12.5 mg of hydrochlorothiazide to see if this leads to better BP control. Wife is an RN; she can check his BP at home within the next week. If elevated (or if accuracy concerns with BP cuff), should have nurse-only BP check in clinic.  - hydrochlorothiazide (HYDRODIURIL) 12.5 MG tablet; Take 1 tablet (12.5 mg) by mouth daily    Need for prophylactic vaccination and inoculation against influenza  Flu shot given today. He plans to pursue COVID booster after his surgery, due to asthma history.  - INFLUENZA QUAD, RECOMBINANT, P-FREE (RIV4) (FLUBLOK)      Risks and Recommendations:  The patient has the following additional risks and recommendations for perioperative complications:  None    Medication Instructions:  Patient is to take all scheduled medications on the day of surgery EXCEPT for modifications listed below:   - ACE/ARB: May be continued on the day of surgery.    - Diuretics: HOLD on the day of surgery.   - LABA, inhaled corticosteroid, long-acting anticholinergics: Continue without modification.   - rescue Inhaler: Continue PRN. Bring to hospital on the day of surgery.   Takes BP medication in the afternoon. May take the day before surgery and may resume post-op.    RECOMMENDATION:  APPROVAL GIVEN to  proceed with proposed procedure, without further diagnostic evaluation.    35 minutes spent on the date of the encounter doing chart review, history and exam, documentation and further activities per the note        Subjective     HPI related to upcoming procedure: Has phimosis that has resulted in tearing with sexual activity. Has failed medical management, so will be proceeding with circumcision.    Health Care Directive:  Patient does not have a Health Care Directive or Living Will: Discussed advance care planning with patient; however, patient declined at this time.    Preoperative Review of :  Unable to access       Status of Chronic Conditions:  ASTHMA - Patient has a longstanding history of Asthma . Patient has been doing well overall noting occasional wheezing and continues on medication regimen consisting of Advair and albuterol without adverse reactions or side effects. He typically uses Advair and albuterol at night before bed. No recent asthma exacerbations or hospitalization related to asthma    HYPERTENSION - Patient has history of HTN , currently denies any symptoms referable to elevated blood pressure. Specifically denies chest pain, palpitations, dyspnea, orthopnea, PND or peripheral edema. Blood pressure readings have not been in normal range. Current medication regimen is as listed below. Patient denies any side effects of medication.   In August, was changed from 50mg losartan to Hyzaar (100-12.5).   /89 on 10/19/21  /95 on 10/27/21  /96 and 162/94 in clinic today      Review of Systems  CONSTITUTIONAL: NEGATIVE for fever, chills, change in weight  INTEGUMENTARY/SKIN: NEGATIVE for worrisome rashes, moles or lesions  EYES: NEGATIVE for vision changes or irritation  ENT/MOUTH: NEGATIVE for ear, mouth and throat problems  RESP: NEGATIVE for significant cough or SOB  CV: NEGATIVE for chest pain, palpitations or peripheral edema  GI: POSITIVE for heartburn or reflux and  NEGATIVE for abdominal pain any location, constipation and diarrhea  : NEGATIVE for frequency, dysuria, or hematuria  MUSCULOSKELETAL: NEGATIVE for significant arthralgias or myalgia  NEURO: NEGATIVE for weakness, dizziness or paresthesias  ENDOCRINE: NEGATIVE for temperature intolerance, skin/hair changes  HEME: NEGATIVE for bleeding problems  PSYCHIATRIC: NEGATIVE for changes in mood or affect     Reports occasional heartburn triggered by acidic foods  Reports history of arthritis in knees and hands. Has discussed this with PCP    Patient Active Problem List    Diagnosis Date Noted     Phimosis 10/27/2021     Priority: Medium     Added automatically from request for surgery [id number]       Morbid obesity (H) 06/30/2021     Priority: Medium     Essential hypertension 04/10/2019     Priority: Medium     Mild persistent asthma without complication 08/08/2016     Priority: Medium     Obesity due to excess calories, unspecified obesity severity 08/08/2016     Priority: Medium     CARDIOVASCULAR SCREENING; LDL GOAL LESS THAN 160 08/05/2011     Priority: Medium      Past Medical History:   Diagnosis Date     Asthma      Diverticulitis      Essential hypertension      Past Surgical History:   Procedure Laterality Date     KNEE SURGERY  1985    left     Current Outpatient Medications   Medication Sig Dispense Refill     albuterol (VENTOLIN HFA) 108 (90 Base) MCG/ACT inhaler INHALE 2 PUFFS INTO THE LUNGS EVERY 6 HOURS AS NEEDED FOR SHORTNESS OF BREATH, DIFFICULTY BREATHING OR WHEEZING. 8 g 11     betamethasone dipropionate (DIPROSONE) 0.05 % external cream Apply topically daily as needed       fluticasone-salmeterol (ADVAIR DISKUS) 250-50 MCG/DOSE inhaler Inhale 1 puff into the lungs every 12 hours ### DO NOT FILL NOW.  Please update patient's profile to reflect additional refills.  #### 60 each 11     losartan (COZAAR) 50 MG tablet Take 1 tablet (50 mg) by mouth daily (Patient not taking: Reported on 10/19/2021) 30 tablet  "0     losartan-hydrochlorothiazide (HYZAAR) 100-12.5 MG tablet Take 1 tablet by mouth daily 90 tablet 3       Allergies   Allergen Reactions     Cats      Itchy eyes, runny nose, wheezing     Dogs      Itchy eyes, runny nose, wheezing     Pollen Extract      Runny nose        Social History     Tobacco Use     Smoking status: Never Smoker     Smokeless tobacco: Never Used   Substance Use Topics     Alcohol use: No     Alcohol/week: 0.0 standard drinks     Comment: quit 08/17     FH unknown: patient is adopted  History   Drug Use No         Objective     BP (!) 162/94   Pulse 85   Temp 97.8  F (36.6  C) (Oral)   Resp 20   Ht 1.803 m (5' 11\")   Wt 126.6 kg (279 lb)   SpO2 97%   BMI 38.91 kg/m      Physical Exam    GENERAL APPEARANCE: healthy, alert and no distress     EYES: EOMI,  PERRL     HENT: ear canals and TM's normal and nose and mouth without ulcers or lesions     NECK: no adenopathy, no asymmetry, masses, or scars and thyroid normal to palpation     RESP: lungs clear to auscultation - no rales, rhonchi or wheezes     CV: regular rates and rhythm, normal S1 S2, no S3 or S4 and no murmur, click or rub     ABDOMEN:  soft, nontender, no HSM or masses and bowel sounds normal     MS: extremities normal- no gross deformities noted, no evidence of inflammation in joints, FROM in all extremities.     SKIN: no suspicious lesions or rashes     NEURO: Normal strength and tone, sensory exam grossly normal, mentation intact and speech normal     PSYCH: mentation appears normal. and affect normal/bright     LYMPHATICS: No cervical adenopathy    Recent Labs   Lab Test 08/09/21  0925   HGB 14.5         POTASSIUM 4.3   CR 1.01   A1C 5.7*        Diagnostics:  No labs were ordered during this visit.   No EKG required, no history of coronary heart disease, significant arrhythmia, peripheral arterial disease or other structural heart disease.    Revised Cardiac Risk Index (RCRI):  The patient has the following " serious cardiovascular risks for perioperative complications:   - No serious cardiac risks = 0 points     RCRI Interpretation: 0 points: Class I (very low risk - 0.4% complication rate)           Signed Electronically by: Meredith Marie PA-C  Copy of this evaluation report is provided to requesting physician.

## 2021-11-11 ENCOUNTER — PRE VISIT (OUTPATIENT)
Dept: UROLOGY | Facility: CLINIC | Age: 51
End: 2021-11-11
Payer: COMMERCIAL

## 2021-11-15 ENCOUNTER — LAB (OUTPATIENT)
Dept: URGENT CARE | Facility: URGENT CARE | Age: 51
End: 2021-11-15
Payer: COMMERCIAL

## 2021-11-15 DIAGNOSIS — Z11.59 ENCOUNTER FOR SCREENING FOR OTHER VIRAL DISEASES: ICD-10-CM

## 2021-11-15 LAB — SARS-COV-2 RNA RESP QL NAA+PROBE: NEGATIVE

## 2021-11-15 PROCEDURE — U0003 INFECTIOUS AGENT DETECTION BY NUCLEIC ACID (DNA OR RNA); SEVERE ACUTE RESPIRATORY SYNDROME CORONAVIRUS 2 (SARS-COV-2) (CORONAVIRUS DISEASE [COVID-19]), AMPLIFIED PROBE TECHNIQUE, MAKING USE OF HIGH THROUGHPUT TECHNOLOGIES AS DESCRIBED BY CMS-2020-01-R: HCPCS

## 2021-11-15 PROCEDURE — U0005 INFEC AGEN DETEC AMPLI PROBE: HCPCS

## 2021-11-17 ENCOUNTER — ANESTHESIA EVENT (OUTPATIENT)
Dept: SURGERY | Facility: AMBULATORY SURGERY CENTER | Age: 51
End: 2021-11-17
Payer: COMMERCIAL

## 2021-11-18 ENCOUNTER — HOSPITAL ENCOUNTER (OUTPATIENT)
Facility: AMBULATORY SURGERY CENTER | Age: 51
End: 2021-11-18
Attending: UROLOGY
Payer: COMMERCIAL

## 2021-11-18 ENCOUNTER — ANESTHESIA (OUTPATIENT)
Dept: SURGERY | Facility: AMBULATORY SURGERY CENTER | Age: 51
End: 2021-11-18
Payer: COMMERCIAL

## 2021-11-18 VITALS
BODY MASS INDEX: 37.8 KG/M2 | HEIGHT: 71 IN | SYSTOLIC BLOOD PRESSURE: 130 MMHG | DIASTOLIC BLOOD PRESSURE: 83 MMHG | TEMPERATURE: 96.7 F | WEIGHT: 270 LBS | RESPIRATION RATE: 16 BRPM | OXYGEN SATURATION: 99 %

## 2021-11-18 DIAGNOSIS — N47.1 PHIMOSIS: ICD-10-CM

## 2021-11-18 PROCEDURE — 88304 TISSUE EXAM BY PATHOLOGIST: CPT | Mod: 26 | Performed by: PATHOLOGY

## 2021-11-18 PROCEDURE — 88304 TISSUE EXAM BY PATHOLOGIST: CPT | Mod: TC | Performed by: UROLOGY

## 2021-11-18 PROCEDURE — 54161 CIRCUM 28 DAYS OR OLDER: CPT

## 2021-11-18 PROCEDURE — 54161 CIRCUM 28 DAYS OR OLDER: CPT | Mod: GC | Performed by: UROLOGY

## 2021-11-18 RX ORDER — FENTANYL CITRATE 50 UG/ML
25 INJECTION, SOLUTION INTRAMUSCULAR; INTRAVENOUS
Status: DISCONTINUED | OUTPATIENT
Start: 2021-11-18 | End: 2021-11-19 | Stop reason: HOSPADM

## 2021-11-18 RX ORDER — ONDANSETRON 2 MG/ML
4 INJECTION INTRAMUSCULAR; INTRAVENOUS EVERY 30 MIN PRN
Status: DISCONTINUED | OUTPATIENT
Start: 2021-11-18 | End: 2021-11-19 | Stop reason: HOSPADM

## 2021-11-18 RX ORDER — SODIUM CHLORIDE, SODIUM LACTATE, POTASSIUM CHLORIDE, CALCIUM CHLORIDE 600; 310; 30; 20 MG/100ML; MG/100ML; MG/100ML; MG/100ML
INJECTION, SOLUTION INTRAVENOUS CONTINUOUS
Status: DISCONTINUED | OUTPATIENT
Start: 2021-11-18 | End: 2021-11-19 | Stop reason: HOSPADM

## 2021-11-18 RX ORDER — HYDROMORPHONE HYDROCHLORIDE 1 MG/ML
0.2 INJECTION, SOLUTION INTRAMUSCULAR; INTRAVENOUS; SUBCUTANEOUS EVERY 5 MIN PRN
Status: DISCONTINUED | OUTPATIENT
Start: 2021-11-18 | End: 2021-11-18 | Stop reason: HOSPADM

## 2021-11-18 RX ORDER — MEPERIDINE HYDROCHLORIDE 25 MG/ML
12.5 INJECTION INTRAMUSCULAR; INTRAVENOUS; SUBCUTANEOUS
Status: DISCONTINUED | OUTPATIENT
Start: 2021-11-18 | End: 2021-11-19 | Stop reason: HOSPADM

## 2021-11-18 RX ORDER — OXYCODONE HYDROCHLORIDE 5 MG/1
5 TABLET ORAL EVERY 4 HOURS PRN
Status: DISCONTINUED | OUTPATIENT
Start: 2021-11-18 | End: 2021-11-19 | Stop reason: HOSPADM

## 2021-11-18 RX ORDER — ACETAMINOPHEN 325 MG/1
975 TABLET ORAL ONCE
Status: COMPLETED | OUTPATIENT
Start: 2021-11-18 | End: 2021-11-18

## 2021-11-18 RX ORDER — KETAMINE HYDROCHLORIDE 10 MG/ML
INJECTION INTRAMUSCULAR; INTRAVENOUS PRN
Status: DISCONTINUED | OUTPATIENT
Start: 2021-11-18 | End: 2021-11-18

## 2021-11-18 RX ORDER — CEFAZOLIN SODIUM IN 0.9 % NACL 3 G/100 ML
3 INTRAVENOUS SOLUTION, PIGGYBACK (ML) INTRAVENOUS
Status: DISCONTINUED | OUTPATIENT
Start: 2021-11-18 | End: 2021-11-18 | Stop reason: HOSPADM

## 2021-11-18 RX ORDER — DEXAMETHASONE SODIUM PHOSPHATE 4 MG/ML
INJECTION, SOLUTION INTRA-ARTICULAR; INTRALESIONAL; INTRAMUSCULAR; INTRAVENOUS; SOFT TISSUE PRN
Status: DISCONTINUED | OUTPATIENT
Start: 2021-11-18 | End: 2021-11-18

## 2021-11-18 RX ORDER — KETOROLAC TROMETHAMINE 30 MG/ML
INJECTION, SOLUTION INTRAMUSCULAR; INTRAVENOUS PRN
Status: DISCONTINUED | OUTPATIENT
Start: 2021-11-18 | End: 2021-11-18

## 2021-11-18 RX ORDER — PROPOFOL 10 MG/ML
INJECTION, EMULSION INTRAVENOUS CONTINUOUS PRN
Status: DISCONTINUED | OUTPATIENT
Start: 2021-11-18 | End: 2021-11-18

## 2021-11-18 RX ORDER — BUPIVACAINE HYDROCHLORIDE 5 MG/ML
INJECTION, SOLUTION PERINEURAL PRN
Status: DISCONTINUED | OUTPATIENT
Start: 2021-11-18 | End: 2021-11-18 | Stop reason: HOSPADM

## 2021-11-18 RX ORDER — SODIUM CHLORIDE, SODIUM LACTATE, POTASSIUM CHLORIDE, CALCIUM CHLORIDE 600; 310; 30; 20 MG/100ML; MG/100ML; MG/100ML; MG/100ML
INJECTION, SOLUTION INTRAVENOUS CONTINUOUS
Status: DISCONTINUED | OUTPATIENT
Start: 2021-11-18 | End: 2021-11-18 | Stop reason: HOSPADM

## 2021-11-18 RX ORDER — ONDANSETRON 2 MG/ML
INJECTION INTRAMUSCULAR; INTRAVENOUS PRN
Status: DISCONTINUED | OUTPATIENT
Start: 2021-11-18 | End: 2021-11-18

## 2021-11-18 RX ORDER — ONDANSETRON 4 MG/1
4 TABLET, ORALLY DISINTEGRATING ORAL EVERY 30 MIN PRN
Status: DISCONTINUED | OUTPATIENT
Start: 2021-11-18 | End: 2021-11-19 | Stop reason: HOSPADM

## 2021-11-18 RX ORDER — GLYCOPYRROLATE 0.2 MG/ML
INJECTION, SOLUTION INTRAMUSCULAR; INTRAVENOUS PRN
Status: DISCONTINUED | OUTPATIENT
Start: 2021-11-18 | End: 2021-11-18

## 2021-11-18 RX ORDER — LIDOCAINE HYDROCHLORIDE 20 MG/ML
INJECTION, SOLUTION INFILTRATION; PERINEURAL PRN
Status: DISCONTINUED | OUTPATIENT
Start: 2021-11-18 | End: 2021-11-18

## 2021-11-18 RX ORDER — AMOXICILLIN 250 MG
1 CAPSULE ORAL 2 TIMES DAILY
Qty: 20 TABLET | Refills: 0 | Status: SHIPPED | OUTPATIENT
Start: 2021-11-18 | End: 2021-11-28

## 2021-11-18 RX ORDER — FENTANYL CITRATE 50 UG/ML
25 INJECTION, SOLUTION INTRAMUSCULAR; INTRAVENOUS EVERY 5 MIN PRN
Status: DISCONTINUED | OUTPATIENT
Start: 2021-11-18 | End: 2021-11-18 | Stop reason: HOSPADM

## 2021-11-18 RX ORDER — CEFAZOLIN SODIUM IN 0.9 % NACL 3 G/100 ML
3 INTRAVENOUS SOLUTION, PIGGYBACK (ML) INTRAVENOUS SEE ADMIN INSTRUCTIONS
Status: DISCONTINUED | OUTPATIENT
Start: 2021-11-18 | End: 2021-11-18 | Stop reason: HOSPADM

## 2021-11-18 RX ORDER — OXYCODONE HYDROCHLORIDE 5 MG/1
5 TABLET ORAL EVERY 6 HOURS PRN
Qty: 12 TABLET | Refills: 0 | Status: SHIPPED | OUTPATIENT
Start: 2021-11-18 | End: 2021-11-21

## 2021-11-18 RX ADMIN — KETOROLAC TROMETHAMINE 15 MG: 30 INJECTION, SOLUTION INTRAMUSCULAR; INTRAVENOUS at 14:05

## 2021-11-18 RX ADMIN — LIDOCAINE HYDROCHLORIDE 100 MG: 20 INJECTION, SOLUTION INFILTRATION; PERINEURAL at 13:41

## 2021-11-18 RX ADMIN — SODIUM CHLORIDE, SODIUM LACTATE, POTASSIUM CHLORIDE, CALCIUM CHLORIDE: 600; 310; 30; 20 INJECTION, SOLUTION INTRAVENOUS at 13:32

## 2021-11-18 RX ADMIN — PROPOFOL 150 MCG/KG/MIN: 10 INJECTION, EMULSION INTRAVENOUS at 13:41

## 2021-11-18 RX ADMIN — GLYCOPYRROLATE 0.2 MG: 0.2 INJECTION, SOLUTION INTRAMUSCULAR; INTRAVENOUS at 13:35

## 2021-11-18 RX ADMIN — KETAMINE HYDROCHLORIDE 15 MG: 10 INJECTION INTRAMUSCULAR; INTRAVENOUS at 13:45

## 2021-11-18 RX ADMIN — DEXAMETHASONE SODIUM PHOSPHATE 4 MG: 4 INJECTION, SOLUTION INTRA-ARTICULAR; INTRALESIONAL; INTRAMUSCULAR; INTRAVENOUS; SOFT TISSUE at 13:45

## 2021-11-18 RX ADMIN — Medication 3 G: at 13:40

## 2021-11-18 RX ADMIN — ACETAMINOPHEN 975 MG: 325 TABLET ORAL at 10:30

## 2021-11-18 RX ADMIN — ONDANSETRON 4 MG: 2 INJECTION INTRAMUSCULAR; INTRAVENOUS at 13:45

## 2021-11-18 ASSESSMENT — MIFFLIN-ST. JEOR: SCORE: 2101.84

## 2021-11-18 NOTE — ANESTHESIA POSTPROCEDURE EVALUATION
Patient: Thai Mancia    Procedure: Procedure(s):  CIRCUMCISION       Diagnosis:Phimosis [N47.1]  Diagnosis Additional Information: No value filed.    Anesthesia Type:  MAC    Note:  Disposition: Outpatient   Postop Pain Control: Uneventful            Sign Out: Well controlled pain   PONV: No   Neuro/Psych: Uneventful            Sign Out: Acceptable/Baseline neuro status   Airway/Respiratory: Uneventful            Sign Out: Acceptable/Baseline resp. status   CV/Hemodynamics: Uneventful            Sign Out: Acceptable CV status; No obvious hypovolemia; No obvious fluid overload   Other NRE: NONE   DID A NON-ROUTINE EVENT OCCUR? No           Last vitals:  Vitals Value Taken Time   /66 11/18/21 1505   Temp 36.1  C (97  F) 11/18/21 1425   Pulse     Resp 16 11/18/21 1505   SpO2 99 % 11/18/21 1505       Electronically Signed By: Ronan Lomas MD, MD  November 18, 2021  3:07 PM

## 2021-11-18 NOTE — ANESTHESIA CARE TRANSFER NOTE
Patient: Thai Mancia    Procedure: Procedure(s):  CIRCUMCISION       Diagnosis: Phimosis [N47.1]  Diagnosis Additional Information: No value filed.    Anesthesia Type:   No value filed.     Note:    Oropharynx: oropharynx clear of all foreign objects  Level of Consciousness: awake  Oxygen Supplementation: room air    Independent Airway: airway patency satisfactory and stable  Dentition: dentition unchanged  Vital Signs Stable: post-procedure vital signs reviewed and stable  Report to RN Given: handoff report given  Patient transferred to: Phase II  Comments: VSS and WNL, comfortable, no PONV, report to Wilda RN  Handoff Report: Identifed the Patient, Identified the Reponsible Provider, Reviewed the pertinent medical history, Discussed the surgical course, Reviewed Intra-OP anesthesia mangement and issues during anesthesia, Set expectations for post-procedure period and Allowed opportunity for questions and acknowledgement of understanding      Vitals:  Vitals Value Taken Time   BP     Temp     Pulse     Resp     SpO2         Electronically Signed By: LAYLA Boone CRNA  November 18, 2021  2:25 PM

## 2021-11-18 NOTE — DISCHARGE INSTRUCTIONS
"[facility]  Home Care Following Anesthesia  For 24 hours after surgery:  1. Get plenty of rest.  A responsible adult must stay with you for at least 24 hours after you leave the surgery center.  2. Do not drive or use heavy equipment.  If you have weakness or tingling, don't drive or use heavy equipment until this feeling goes away.   3. Do not drink alcohol.   4. Avoid strenuous or risky activities.  Ask for help when climbing stairs.  5. You may feel lightheaded.  IF so, sit for a few minutes before standing.  Have someone help you get up.   6. If you have nausea (feel sick to your stomach): Drink only clear liquids such as apple juice, ginger ale, broth or 7-Up.  Rest may also help.  Be sure to drink enough fluids.  Move to a regular diet as you feel able.   7. You may have a slight fever.  Call the doctor if your fever is over 100 F (37.7 C) (taken under the tongue) or lasts longer than 24 hours.  8. You may have a dry mouth, a sore throat, muscle aches or trouble sleeping. These should go away after 24 hours.  9. Do not make important or legal decisions.   10. It is recommended to avoid smoking.        Today you received a Marcaine or bupivacaine block to numb the nerves near your surgery site.  This is a block using local anesthetic or \"numbing\" medication injected around the nerves to anesthetize or \"numb\" the area supplied by those nerves.  This block is injected into the muscle layer near your surgical site.  The medication may numb the location where you had surgery for 6-18 hours, but may last up to 24 hours.  If your surgical site is an arm or leg you should be careful with your affected limb, since it is possible to injure your limb without being aware of it due to the numbing.  Until full feeling returns, you should guard against bumping or hitting your limb, and avoid extreme hot or cold temperatures on the skin.  As the block wears off, the feeling will return as " a tingling or prickly sensation near your surgical site.  You will experience more discomfort from your incision as the feeling returns.  You may want to take a pain pill (a narcotic or Tylenol if this was prescribed by your surgeon) when you start to experience mild pain before the pain beccomes more severe.  If your pain medications do not control your pain you should notifiy your surgeon.    Tips for taking pain medications  To get the best pain relief possible, remember these points:    Take pain medications as directed, before pain becomes severe.    Pain medication can upset your stomach: taking it with food may help.    Constipation is a common side effect of pain medication. Drink plenty of  fluids.    Eat foods high in fiber. Take a stool softener if recommended by your doctor or pharmacist.    Do not drink alcohol, drive or operate machinery while taking pain medications.    Ask about other ways to control pain, such as with heat, ice or relaxation.    Tylenol/Acetaminophen Consumption  To help encourage the safe use of acetaminophen, the makers of TYLENOL  have lowered the maximum daily dose for single-ingredient Extra Strength TYLENOL  (acetaminophen) products sold in the [place] from 8 pills per day (4,000 mg) to 6 pills per day (3,000 mg). The dosing interval has also changed from 2 pills every 4-6 hours to 2 pills every 6 hours.    If you feel your pain relief is insufficient, you may take Tylenol/Acetaminophen in addition to your narcotic pain medication.     Be careful not to exceed 3,000 mg of Tylenol/Acetaminophen in a 24 hour period from all sources.    If you are taking extra strength Tylenol/acetaminophen (500 mg), the maximum dose is 6 tablets in 24 hours.    If you are taking regular strength acetaminophen (325 mg), the maximum dose is 9 tablets in 24 hours.  975 mg of Tylenol given at 10:30 am next dose may be given after 4:30pm    Call a doctor for any of the followin. Signs of infection  (fever, growing tenderness at the surgery site, a large amount of drainage or bleeding, severe pain, foul-smelling drainage, redness, swelling).  2. It has been over 8 to 10 hours since surgery and you are still not able to urinate (pass water).  3. Headache for over 24 hours.  4. Signs of Covid-19 infection (temperature over 100 degrees, shortness of breath, cough, loss of taste/smell, generalized body aches, persistent headache, chills, sore throat, nausea/vomiting/diarrhea)  Your doctor is:       Dr. Toy Feliz, Prostate and Urology: [phone]               Or dial [phone] and ask for the resident on call for:  Prostate Urology  For emergency care, call the:  [facility]:  [phone] (TTY for hearing impaired: [phone])

## 2021-11-18 NOTE — ANESTHESIA PREPROCEDURE EVALUATION
Anesthesia Pre-Procedure Evaluation    Patient: Thai Mancia   MRN: [id number] : 1970        Preoperative Diagnosis: Phimosis [N47.1]    Procedure : Procedure(s):  CIRCUMCISION          Past Medical History:   Diagnosis Date     Asthma      Diverticulitis      Essential hypertension       Past Surgical History:   Procedure Laterality Date     KNEE SURGERY  1985    left      Allergies   Allergen Reactions     Cats      Itchy eyes, runny nose, wheezing     Dogs      Itchy eyes, runny nose, wheezing     Pollen Extract      Runny nose      Social History     Tobacco Use     Smoking status: Never Smoker     Smokeless tobacco: Never Used   Substance Use Topics     Alcohol use: No     Alcohol/week: 0.0 standard drinks     Comment: quit       Wt Readings from Last 1 Encounters:   21 122.5 kg (270 lb)        Anesthesia Evaluation   Pt has had prior anesthetic.         ROS/MED HX  ENT/Pulmonary:     (+) asthma     Neurologic:  - neg neurologic ROS     Cardiovascular:     (+) hypertension-----    METS/Exercise Tolerance:     Hematologic:  - neg hematologic  ROS     Musculoskeletal:  - neg musculoskeletal ROS     GI/Hepatic:  - neg GI/hepatic ROS     Renal/Genitourinary:  - neg Renal ROS     Endo:  - neg endo ROS     Psychiatric/Substance Use:  - neg psychiatric ROS     Infectious Disease:  - neg infectious disease ROS     Malignancy:       Other:            Physical Exam    Airway  airway exam normal           Respiratory Devices and Support         Dental  no notable dental history         Cardiovascular   cardiovascular exam normal          Pulmonary   pulmonary exam normal                OUTSIDE LABS:  CBC:   Lab Results   Component Value Date    WBC 6.7 2021    WBC 11.0 2019    HGB 14.5 2021    HGB 13.8 2019    HCT 44.6 2021    HCT 41.4 2019     2021     2019     BMP:   Lab Results   Component Value Date     2021      08/21/2019    POTASSIUM 4.3 08/09/2021    POTASSIUM 3.4 08/21/2019    CHLORIDE 108 08/09/2021    CHLORIDE 108 08/21/2019    CO2 28 08/09/2021    CO2 27 08/21/2019    BUN 14 08/09/2021    BUN 13 08/21/2019    CR 1.01 08/09/2021    CR 1.02 08/21/2019     (H) 08/09/2021    GLC 87 08/21/2019     COAGS: No results found for: PTT, INR, FIBR  POC: No results found for: BGM, HCG, HCGS  HEPATIC:   Lab Results   Component Value Date    ALBUMIN 3.3 (L) 08/09/2021    PROTTOTAL 7.0 08/09/2021    ALT 40 08/09/2021    AST 22 08/09/2021    ALKPHOS 79 08/09/2021    BILITOTAL 0.4 08/09/2021     OTHER:   Lab Results   Component Value Date    A1C 5.7 (H) 08/09/2021    FRANCK 8.7 08/09/2021    TSH 1.59 08/09/2021       Anesthesia Plan    ASA Status:  2   NPO Status:  NPO Appropriate    Anesthesia Type: MAC.     - Reason for MAC: straight local not clinically adequate   Induction: Intravenous.           Consents    Anesthesia Plan(s) and associated risks, benefits, and realistic alternatives discussed. Questions answered and patient/representative(s) expressed understanding.     - Discussed: Risks, Benefits and Alternatives for BOTH SEDATION and the PROCEDURE were discussed     - Discussed with:  Patient         Postoperative Care    Pain management: Oral pain medications.   PONV prophylaxis: Ondansetron (or other 5HT-3), Dexamethasone or Solumedrol     Comments:                Ronan Lomas MD, MD

## 2021-11-18 NOTE — OP NOTE
OPERATIVE REPORT    PREOPERATIVE DIAGNOSIS:  Phimosis  POSTOPERATIVE DIAGNOSIS:  Same    PROCEDURES PERFORMED:   1. Circumcision     STAFF SURGEON: Toy Feliz MD  ASSISTANT: Brannon Amaya MD  ANESTHESIA: Local & MAC  ESTIMATED BLOOD LOSS: 5 mL.   IV FLUIDS: see dictated anesthesia record  COMPLICATIONS: None.   SPECIMEN:  Foreskin    SIGNIFICANT FINDINGS: Phimotic band, ~1.5 cm wide, excised    BRIEF OPERATIVE INDICATIONS: Thai Mancia is a 51 year old male with a history of phimosis who desired circumcision. He has a history of hypospadias. Urethral meatus is at subcoronal region and has somewhat of a megameatus appearance. Following a thorough discussion of the risks and benefits of surgical intervention, the patient elected to proceed with a circumcision revision.    DESCRIPTION OF OPERATION: After informed consent was obtained, the patient was taken to the operating room and placed in the supine position. Pneumoboots were applied and the genitals were prepped and draped in the normal fashion. A timeout was performed with the operating room staff.    The case was begun by performing a dorsal nerve and then a ring block at the base of the penis using 0.5% Marcaine. Markings were made just proximal and distal to the phimotic band, approximately 1 and 2.5 cm proximal to the coronal margin. The skin and subcutaneous tissue were incised with a 15 blade scalpel. The redundant skin was then excised with iris scissors and electrocautery and passed off. Hemostasis was achieved with a combination of direct pressure and electrocautery. The tissue defect was then closed with 3-0 vicryl sutures placed in quadrants with simple running sutures placed to close each quadrant. Bacitracin was applied and a gauze roll was placed. The patient tolerated the procedure well and was taken in stable condition to the recovery room.    As attending surgeon, I, Toy Feliz MD, was scrubbed and present for the entire  procedure.

## 2021-11-19 ENCOUNTER — PATIENT OUTREACH (OUTPATIENT)
Dept: UROLOGY | Facility: CLINIC | Age: 51
End: 2021-11-19
Payer: COMMERCIAL

## 2021-11-19 NOTE — TELEPHONE ENCOUNTER
Pain well controlled with OTC regimen, dressings to be kept clean and dry patient feeling good after procedure.  Marcelle Vaca RN

## 2021-11-23 LAB
PATH REPORT.COMMENTS IMP SPEC: NORMAL
PATH REPORT.COMMENTS IMP SPEC: NORMAL
PATH REPORT.FINAL DX SPEC: NORMAL
PATH REPORT.GROSS SPEC: NORMAL
PATH REPORT.MICROSCOPIC SPEC OTHER STN: NORMAL
PHOTO IMAGE: NORMAL

## 2021-11-29 ENCOUNTER — TELEPHONE (OUTPATIENT)
Dept: GASTROENTEROLOGY | Facility: CLINIC | Age: 51
End: 2021-11-29
Payer: COMMERCIAL

## 2021-11-29 DIAGNOSIS — Z11.59 ENCOUNTER FOR SCREENING FOR OTHER VIRAL DISEASES: ICD-10-CM

## 2021-11-29 NOTE — TELEPHONE ENCOUNTER
Caller: DEZ  Procedure: COLON    Date, Location, and Surgeon of Procedure Cancelled: 12/7/2021, [facility]    Ordering Provider:Sara Elias APRN CNP    Reason for cancel (please be detailed, any staff messages or encounters to note?): CHANGE IN JOB SCHEDULE         Rescheduled: YES     If rescheduled:    Date: 12/13/2021   Location: [facility]   Note any change or update to original order/sedation: NO STILL CS

## 2021-12-03 ENCOUNTER — TELEPHONE (OUTPATIENT)
Dept: UROLOGY | Facility: CLINIC | Age: 51
End: 2021-12-03

## 2021-12-03 ENCOUNTER — OFFICE VISIT (OUTPATIENT)
Dept: UROLOGY | Facility: CLINIC | Age: 51
End: 2021-12-03
Payer: COMMERCIAL

## 2021-12-03 VITALS
BODY MASS INDEX: 37.8 KG/M2 | SYSTOLIC BLOOD PRESSURE: 136 MMHG | DIASTOLIC BLOOD PRESSURE: 85 MMHG | WEIGHT: 270 LBS | HEIGHT: 71 IN | HEART RATE: 82 BPM

## 2021-12-03 DIAGNOSIS — N47.1 PHIMOSIS: Primary | ICD-10-CM

## 2021-12-03 PROCEDURE — 99213 OFFICE O/P EST LOW 20 MIN: CPT | Performed by: UROLOGY

## 2021-12-03 ASSESSMENT — PAIN SCALES - GENERAL: PAINLEVEL: MILD PAIN (3)

## 2021-12-03 ASSESSMENT — MIFFLIN-ST. JEOR: SCORE: 2101.84

## 2021-12-03 NOTE — TELEPHONE ENCOUNTER
Action 12/03/21 MMT   Action Taken  CSS emailed letter to patient's email [email or web address] per pt request.

## 2021-12-03 NOTE — TELEPHONE ENCOUNTER
[facility] Call Center    Phone Message    May a detailed message be left on voicemail: yes     Reason for Call: Form or Letter   Type or form/letter needing completion: Letter lifting work restrictions so pt can return at full duty.  Provider: Dr. Feliz  Date form needed: ASAP  Once completed: Mail form to Name: Jean-Clauderenato Aldridgeon, at Address: on file     Please EMAIL letter to pt at email on file: [email or web address]      Action Taken: Message routed to:  [facility] uro    Travel Screening: Not Applicable

## 2021-12-03 NOTE — NURSING NOTE
"Chief Complaint   Patient presents with     Surgical Followup     Circumcision       Blood pressure 136/85, pulse 82, height 1.803 m (5' 11\"), weight 122.5 kg (270 lb). Body mass index is 37.66 kg/m .    Patient Active Problem List   Diagnosis     CARDIOVASCULAR SCREENING; LDL GOAL LESS THAN 160     Mild persistent asthma without complication     Obesity due to excess calories, unspecified obesity severity     Essential hypertension     Morbid obesity (H)     Phimosis       Allergies   Allergen Reactions     Cats      Itchy eyes, runny nose, wheezing     Dogs      Itchy eyes, runny nose, wheezing     Pollen Extract      Runny nose       Current Outpatient Medications   Medication Sig Dispense Refill     albuterol (VENTOLIN HFA) 108 (90 Base) MCG/ACT inhaler INHALE 2 PUFFS INTO THE LUNGS EVERY 6 HOURS AS NEEDED FOR SHORTNESS OF BREATH, DIFFICULTY BREATHING OR WHEEZING. 8 g 11     fluticasone-salmeterol (ADVAIR DISKUS) 250-50 MCG/DOSE inhaler Inhale 1 puff into the lungs every 12 hours ### DO NOT FILL NOW.  Please update patient's profile to reflect additional refills.  #### 60 each 11     hydrochlorothiazide (HYDRODIURIL) 12.5 MG tablet Take 1 tablet (12.5 mg) by mouth daily 90 tablet 0     losartan-hydrochlorothiazide (HYZAAR) 100-12.5 MG tablet Take 1 tablet by mouth daily 90 tablet 3       Social History     Tobacco Use     Smoking status: Never Smoker     Smokeless tobacco: Never Used   Vaping Use     Vaping Use: Never used   Substance Use Topics     Alcohol use: No     Alcohol/week: 0.0 standard drinks     Comment: quit 08/17     Drug use: No       RADHA Lunsford  12/3/2021  9:09 AM  "

## 2021-12-03 NOTE — PATIENT INSTRUCTIONS
Please follow up on 1/7/21 at 7:15am.     It was a pleasure meeting with you today.  Thank you for allowing me and my team the privilege of caring for you today.  YOU are the reason we are here, and I truly hope we provided you with the excellent service you deserve.  Please let us know if there is anything else we can do for you so that we can be sure you are leaving completely satisfied with your care experience.

## 2021-12-03 NOTE — LETTER
12/3/2021       RE: Thai Mancia  [address]  [facility] [address]     Dear Colleague,    Thank you for referring your patient, Thai Mancia, to the [facility] [place] at [facility]. Please see a copy of my visit note below.    Thai Mancia is a 51 year old male with history of hypospadias.  Had recent circumcision  Has some postop swelling so there is a functional phimotic ring.  He is able to void.      Vital signs reviewed    Exam:  Incision clean, dry, intact  No tenderness or evidence of cellulitis  Mild swelling of distal preputial collar.      A/P   Doing ok after circumcision  Some swelling which limits full extension of penis  Can followup in 3-4 weeks.  If it persists, would consider steroid cream given LS on path specimen.  He may cancel if things improve on their own.    Toy Feliz MD

## 2021-12-03 NOTE — PROGRESS NOTES
Thai Mancia is a 51 year old male with history of hypospadias.  Had recent circumcision  Has some postop swelling so there is a functional phimotic ring.  He is able to void.      Vital signs reviewed    Exam:  Incision clean, dry, intact  No tenderness or evidence of cellulitis  Mild swelling of distal preputial collar.      A/P   Doing ok after circumcision  Some swelling which limits full extension of penis  Can followup in 3-4 weeks.  If it persists, would consider steroid cream given LS on path specimen.  He may cancel if things improve on their own.    Toy Feliz MD

## 2021-12-10 ENCOUNTER — LAB (OUTPATIENT)
Dept: URGENT CARE | Facility: URGENT CARE | Age: 51
End: 2021-12-10
Payer: COMMERCIAL

## 2021-12-10 DIAGNOSIS — Z11.59 ENCOUNTER FOR SCREENING FOR OTHER VIRAL DISEASES: ICD-10-CM

## 2021-12-10 PROCEDURE — U0003 INFECTIOUS AGENT DETECTION BY NUCLEIC ACID (DNA OR RNA); SEVERE ACUTE RESPIRATORY SYNDROME CORONAVIRUS 2 (SARS-COV-2) (CORONAVIRUS DISEASE [COVID-19]), AMPLIFIED PROBE TECHNIQUE, MAKING USE OF HIGH THROUGHPUT TECHNOLOGIES AS DESCRIBED BY CMS-2020-01-R: HCPCS

## 2021-12-10 PROCEDURE — U0005 INFEC AGEN DETEC AMPLI PROBE: HCPCS

## 2021-12-12 LAB — SARS-COV-2 RNA RESP QL NAA+PROBE: NEGATIVE

## 2021-12-12 RX ORDER — LIDOCAINE 40 MG/G
CREAM TOPICAL
Status: CANCELLED | OUTPATIENT
Start: 2021-12-12

## 2021-12-12 RX ORDER — ONDANSETRON 2 MG/ML
4 INJECTION INTRAMUSCULAR; INTRAVENOUS
Status: CANCELLED | OUTPATIENT
Start: 2021-12-12

## 2021-12-13 ENCOUNTER — HOSPITAL ENCOUNTER (OUTPATIENT)
Facility: CLINIC | Age: 51
Discharge: HOME | End: 2021-12-13
Attending: COLON & RECTAL SURGERY | Admitting: COLON & RECTAL SURGERY
Payer: COMMERCIAL

## 2021-12-13 VITALS
HEIGHT: 71 IN | RESPIRATION RATE: 9 BRPM | BODY MASS INDEX: 36.54 KG/M2 | WEIGHT: 261 LBS | HEART RATE: 74 BPM | SYSTOLIC BLOOD PRESSURE: 114 MMHG | DIASTOLIC BLOOD PRESSURE: 95 MMHG | OXYGEN SATURATION: 97 %

## 2021-12-13 LAB — COLONOSCOPY: NORMAL

## 2021-12-13 PROCEDURE — 45378 DIAGNOSTIC COLONOSCOPY: CPT | Performed by: COLON & RECTAL SURGERY

## 2021-12-13 PROCEDURE — G0500 MOD SEDAT ENDO SERVICE >5YRS: HCPCS | Performed by: COLON & RECTAL SURGERY

## 2021-12-13 PROCEDURE — 250N000011 HC RX IP 250 OP 636: Performed by: COLON & RECTAL SURGERY

## 2021-12-13 PROCEDURE — 258N000003 HC RX IP 258 OP 636: Performed by: COLON & RECTAL SURGERY

## 2021-12-13 RX ORDER — SODIUM CHLORIDE 9 MG/ML
INJECTION, SOLUTION INTRAVENOUS CONTINUOUS PRN
Status: COMPLETED | OUTPATIENT
Start: 2021-12-13 | End: 2021-12-13

## 2021-12-13 RX ORDER — FENTANYL CITRATE 50 UG/ML
INJECTION, SOLUTION INTRAMUSCULAR; INTRAVENOUS PRN
Status: COMPLETED | OUTPATIENT
Start: 2021-12-13 | End: 2021-12-13

## 2021-12-13 RX ADMIN — MIDAZOLAM 2 MG: 1 INJECTION INTRAMUSCULAR; INTRAVENOUS at 10:05

## 2021-12-13 RX ADMIN — FENTANYL CITRATE 100 MCG: 50 INJECTION, SOLUTION INTRAMUSCULAR; INTRAVENOUS at 10:05

## 2021-12-13 RX ADMIN — SODIUM CHLORIDE 125 ML/HR: 9 INJECTION, SOLUTION INTRAVENOUS at 09:58

## 2021-12-13 ASSESSMENT — MIFFLIN-ST. JEOR: SCORE: 2061.02

## 2021-12-13 NOTE — H&P
Colon & Rectal Surgery History and Physical  Pre-Endoscopy Procedure Note    History of Present Illness   I have been asked by Sara Elias to evaluate this 51 year old male for colorectal cancer screening. He currently denies any abdominal pain, weight loss, bleeding per rectum, or recent change in bowel habits.    Past Medical History  Diagnosis Date     Asthma      Diverticulitis      Essential hypertension        Past Surgical History  Procedure Laterality Date     CIRCUMCISION N/A 11/18/2021    Procedure: CIRCUMCISION;  Surgeon: Toy Feliz MD;  Location: [facility] OR     KNEE SURGERY  1985    left        Medications  Medication Sig     albuterol (VENTOLIN HFA) 108 (90 Base) MCG/ACT inhaler INHALE 2 PUFFS INTO THE LUNGS EVERY 6 HOURS AS NEEDED FOR SHORTNESS OF BREATH, DIFFICULTY BREATHING OR WHEEZING.     fluticasone-salmeterol (ADVAIR DISKUS) 250-50 MCG/DOSE inhaler Inhale 1 puff into the lungs every 12 hours ### DO NOT FILL NOW.  Please update patient's profile to reflect additional refills.  ####     hydrochlorothiazide (HYDRODIURIL) 12.5 MG tablet Take 1 tablet (12.5 mg) by mouth daily     losartan-hydrochlorothiazide (HYZAAR) 100-12.5 MG tablet Take 1 tablet by mouth daily       Allergies  Allergen Reactions     Cats      Itchy eyes, runny nose, wheezing     Dogs      Itchy eyes, runny nose, wheezing     Pollen Extract      Runny nose        Family History   Family history is unknown as he is adopted.     Social History   He reports that he has never smoked. He has never used smokeless tobacco. He reports that he does not drink alcohol and does not use drugs.    Review of Systems   Constitutional:  No fever, weight change or fatigue.    Eyes:     No dry eyes or vision changes.   Ears/Nose/Throat/Neck:  No oral ulcers, sore throat or voice change.    Cardiovascular:   No palpitations, syncope, angina or edema.   Respiratory:    No chest pain, excessive sleepiness, shortness of breath or hemoptysis.  "   Gastrointestinal:   No abdominal pain, nausea, vomiting, diarrhea or heartburn.    Genitourinary:   No dysuria, hematuria, urinary retention or urinary frequency.   Musculoskeletal:  No joint swelling or arthralgias.    Dermatologic:  No skin rash or other skin changes.   Neurologic:    No focal weakness or numbness. No neuropathy.   Psychiatric:    No depression, anxiety, suicidal ideation, or paranoid ideation.   Endocrine:   No cold or heat intolerance, polydipsia, hirsutism, change in libido, or flushing.   Hematology/Lymphatic:  No bleeding or lymphadenopathy.    Allergy/Immunology:  No rhinitis or hives.     Physical Exam   Vitals:  /81, HR 84, RR 16, height 1.803 m (5' 11\"), weight 118.4 kg (261 lb), SpO2 98 %.    General:  Alert and oriented to person, place and time   Airway: Normal oropharyngeal airway and neck mobility   Lungs:  Clear bilaterally   Heart:  Regular rate and rhythm   Abdomen: Soft, NT, ND, no masses   Extremities: Warm, good capillary refill    ASA Grade: II (mild systemic disease)    Impression: Cleared for use of conscious sedation for colorectal cancer screening    Plan: Proceed with colonoscopy     Claire Browning MD  [facility]  [phone]  "

## 2021-12-17 ENCOUNTER — PRE VISIT (OUTPATIENT)
Dept: UROLOGY | Facility: CLINIC | Age: 51
End: 2021-12-17
Payer: COMMERCIAL

## 2022-01-03 ENCOUNTER — TELEPHONE (OUTPATIENT)
Dept: INTERNAL MEDICINE | Facility: CLINIC | Age: 52
End: 2022-01-03
Payer: COMMERCIAL

## 2022-01-03 DIAGNOSIS — J20.9 ACUTE BRONCHITIS, UNSPECIFIED ORGANISM: ICD-10-CM

## 2022-01-03 DIAGNOSIS — J45.30 MILD PERSISTENT ASTHMA WITHOUT COMPLICATION: ICD-10-CM

## 2022-01-03 NOTE — TELEPHONE ENCOUNTER
Patient calling  Has a chest cold. He is being tested for COVID today. No fever. But coughing up green mucus.   Patient wants to know if he can have a antibiotic and steroids sent to [facility]. Ok to call and lm [phone]

## 2022-01-03 NOTE — TELEPHONE ENCOUNTER
Routed to provider to review if patient needs to schedule appointment to discuss.    Nadeen Monet RN  [facility]

## 2022-01-04 RX ORDER — AZITHROMYCIN 250 MG/1
TABLET, FILM COATED ORAL
Qty: 6 TABLET | Refills: 0 | Status: SHIPPED | OUTPATIENT
Start: 2022-01-04 | End: 2022-01-09

## 2022-01-04 RX ORDER — PREDNISONE 20 MG/1
20 TABLET ORAL DAILY
Qty: 5 TABLET | Refills: 0 | Status: SHIPPED | OUTPATIENT
Start: 2022-01-04 | End: 2022-01-21

## 2022-01-04 NOTE — TELEPHONE ENCOUNTER
Patient advised. He did receive positive COVID test today. Patient states main symptoms are chest congestion and cough, cough is productive with yellow-green mucous. No fever, increased wheezing or other symptoms present. He checked oxygen level today and it was 98%. He has asthma and asks if he should still take the antibiotic and steroid, he has needed them in the past for bad colds with similar symptoms.     Nadeen Monet RN  [facility]

## 2022-01-04 NOTE — TELEPHONE ENCOUNTER
Left voice message for patient that Sara said it would be okay to take Z-gus and steroid as asthma may be impacting his Covid. Asked patient to call back with any further questions.     Nadeen Monet RN  [facility]

## 2022-01-05 ENCOUNTER — VIRTUAL VISIT (OUTPATIENT)
Dept: UROLOGY | Facility: CLINIC | Age: 52
End: 2022-01-05
Payer: COMMERCIAL

## 2022-01-05 DIAGNOSIS — L90.0 LICHEN SCLEROSUS: Primary | ICD-10-CM

## 2022-01-05 PROCEDURE — 99213 OFFICE O/P EST LOW 20 MIN: CPT | Mod: GT | Performed by: UROLOGY

## 2022-01-05 RX ORDER — TRIAMCINOLONE ACETONIDE 1 MG/G
CREAM TOPICAL 2 TIMES DAILY
Qty: 45 G | Refills: 0 | Status: SHIPPED | OUTPATIENT
Start: 2022-01-05 | End: 2022-10-07

## 2022-01-05 NOTE — LETTER
1/5/2022       RE: Thai Mancia  [address]  [facility] [address]     Dear Colleague,    Thank you for referring your patient, Thai Mancia, to the [facility] CLIN[facility] [place] at [facility]. Please see a copy of my visit note below.    Jean-Claude is a 51 year old who is being evaluated via a billable video visit.      Video Visit Technology for this patient: [facility] Video Visit- Patient was left in waiting room    How would you like to obtain your AVS? MyChart  If the video visit is dropped, the invitation should be resent by: Send to e-mail at: [email or web address]  Will anyone else be joining your video visit? No      Video Start Time: 215p  Video-Visit Details    Type of service:  Video Visit    Video End Time:230p    Originating Location (pt. Location): Home    Distant Location (provider location):  [facility] UROLOGY [facility]     Platform used for Video Visit: Monica    51 year old with history of hypospadias. Meatus is subcoronal.  He had some phimosis so we did circumcision  LS on path specimen.  It did have some swelling and some phimosis recurrence but improving.  Exam:           Constitutional - No apparent distress. Patient of stated age.               Eyes - no redness or discharge.              Respiratory - no cough. no labored breathing              Musculoskeletal - full range of motion in all extremities              Skin - no visible skin discoloration or lesions              Neurological - no tremors              Psychiatric - no anxiety, alert & oriented                 The rest of a comprehensive physical examination is deferred due to PHE (public health emergency) video visit restrictions.  Difficult exam given video but I can tell there is some swelling and tightness and scarring.    A/p phimosis related to prior hypospadias.  Also had circumcision  Now there is some recurrence of swelling/LS    I  recommend steroid cream BID x 3 weeks then 1 week off.  He has followup already in a month, which he can keep scheduled.    Toy Feliz MD

## 2022-01-05 NOTE — PROGRESS NOTES
Jean-Claude is a 51 year old who is being evaluated via a billable video visit.      Video Visit Technology for this patient: [employer] Video Visit- Patient was left in waiting room    How would you like to obtain your AVS? MyChart  If the video visit is dropped, the invitation should be resent by: Send to e-mail at: [email or web address]  Will anyone else be joining your video visit? No      Video Start Time: 215p  Video-Visit Details    Type of service:  Video Visit    Video End Time:230p    Originating Location (pt. Location): Home    Distant Location (provider location):  [facility] CLINIC [place]     Platform used for Video Visit: Monica    51 year old with history of hypospadias. Meatus is subcoronal.  He had some phimosis so we did circumcision  LS on path specimen.  It did have some swelling and some phimosis recurrence but improving.  Exam:           Constitutional - No apparent distress. Patient of stated age.               Eyes - no redness or discharge.              Respiratory - no cough. no labored breathing              Musculoskeletal - full range of motion in all extremities              Skin - no visible skin discoloration or lesions              Neurological - no tremors              Psychiatric - no anxiety, alert & oriented                 The rest of a comprehensive physical examination is deferred due to PHE (public health emergency) video visit restrictions.  Difficult exam given video but I can tell there is some swelling and tightness and scarring.    A/p phimosis related to prior hypospadias.  Also had circumcision  Now there is some recurrence of swelling/LS    I recommend steroid cream BID x 3 weeks then 1 week off.  He has followup already in a month, which he can keep scheduled.    Toy Feliz MD

## 2022-01-10 ENCOUNTER — PRE VISIT (OUTPATIENT)
Dept: UROLOGY | Facility: CLINIC | Age: 52
End: 2022-01-10
Payer: COMMERCIAL

## 2022-01-10 NOTE — TELEPHONE ENCOUNTER
Reason for visit: Follow up     Relevant information: lichen sclerosis    Records/imaging/labs/orders: in EPIC    Pt called: no    At Rooming: normal

## 2022-01-21 ENCOUNTER — TELEPHONE (OUTPATIENT)
Dept: INTERNAL MEDICINE | Facility: CLINIC | Age: 52
End: 2022-01-21

## 2022-01-21 ENCOUNTER — OFFICE VISIT (OUTPATIENT)
Dept: INTERNAL MEDICINE | Facility: CLINIC | Age: 52
End: 2022-01-21
Payer: COMMERCIAL

## 2022-01-21 VITALS
OXYGEN SATURATION: 98 % | HEART RATE: 79 BPM | BODY MASS INDEX: 37.52 KG/M2 | SYSTOLIC BLOOD PRESSURE: 146 MMHG | RESPIRATION RATE: 20 BRPM | WEIGHT: 269 LBS | TEMPERATURE: 98.2 F | DIASTOLIC BLOOD PRESSURE: 87 MMHG

## 2022-01-21 DIAGNOSIS — J45.30 MILD PERSISTENT ASTHMA WITHOUT COMPLICATION: ICD-10-CM

## 2022-01-21 DIAGNOSIS — E66.01 MORBID OBESITY (H): Primary | ICD-10-CM

## 2022-01-21 PROCEDURE — 99214 OFFICE O/P EST MOD 30 MIN: CPT | Performed by: INTERNAL MEDICINE

## 2022-01-21 ASSESSMENT — ENCOUNTER SYMPTOMS
CONSTITUTIONAL NEGATIVE: 1
GASTROINTESTINAL NEGATIVE: 1
CARDIOVASCULAR NEGATIVE: 1
RESPIRATORY NEGATIVE: 1
NEUROLOGICAL NEGATIVE: 1
MUSCULOSKELETAL NEGATIVE: 1

## 2022-01-21 NOTE — PROGRESS NOTES
Assessment & Plan     Morbid obesity (H)  BMI is noted to be 37.5.  We briefly touched on dietary lifestyle modifications that can help keep his weight under better control.    Mild persistent asthma without complication  At this time, patient's asthma appears to be under good control with his daily use of Advair and as needed albuterol.  Unfortunately, his work conditions will cause him to have exacerbations of his asthma.  Patient will continue his Advair and albuterol as currently prescribed.  We will complete [facility] paperwork so that he can have time off from work to recover when his asthma does flare up.  His paperwork will be completed and submitted to his employer for further review.        30 minutes spent on the date of the encounter doing chart review, history and exam, documentation and further activities per the note       See Patient Instructions    Return in about 1 year (around 1/21/2023) for Routine preventive.    Joao Fowler MD  [facility] MARCELLE Bermeo is a 51 year old who presents for the following health issues: [facility] paperwork update.    Patient is a 51-year-old  male who presents to the clinic for follow-up of [facility] paperwork.  He does have a history of mild intermittent that he has been dealing with for several years.He does currently utilize Advair discus 250/50 mcg per dose with instruction to 1 puff twice per day as a controller therapy.  He does have an as needed albuterol inhaler for use as well.  Patient typically does not require the use of his albuterol inhaler.  He does work at [employer], and his position does require that he work and he can work for approximately 9 out of 10 hours each shift.  Patient does work 4 days/week.  His cold temperature does tend to exacerbate his asthma and makes it very difficult for him to function.  He has previously kept [facility] paperwork filed each year so that he could have time off from work to recover when  his asthma is flaring up.  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         Review of Systems   Constitutional: Negative.    HENT: Negative.    Respiratory: Negative.    Cardiovascular: Negative.    Gastrointestinal: Negative.    Genitourinary: Negative.    Musculoskeletal: Negative.    Neurological: Negative.             Objective    Blood pressure (!) 146/87, pulse 79, temperature 98.2  F (36.8  C), resp. rate 20, weight 122 kg (269 lb), SpO2 98 %.      Physical Exam  Vitals and nursing note reviewed.   Constitutional:       Appearance: Normal appearance. He is obese.   HENT:      Head: Normocephalic and atraumatic.      Right Ear: Tympanic membrane, ear canal and external ear normal.      Left Ear: Tympanic membrane, ear canal and external ear normal.      Mouth/Throat:      Mouth: Mucous membranes are moist.      Pharynx: Oropharynx is clear.   Eyes:      Extraocular Movements: Extraocular movements intact.      Conjunctiva/sclera: Conjunctivae normal.      Pupils: Pupils are equal, round, and reactive to light.   Cardiovascular:      Rate and Rhythm: Normal rate and regular rhythm.      Pulses: Normal pulses.      Heart sounds: Normal heart sounds.   Pulmonary:      Effort: Pulmonary effort is normal.      Breath sounds: Normal breath sounds.   Abdominal:      General: Bowel sounds are normal.      Palpations: Abdomen is soft.   Skin:     General: Skin is warm.      Capillary Refill: Capillary refill takes less than 2 seconds.   Neurological:      General: No focal deficit present.      Mental Status: He is alert. Mental status is at baseline.

## 2022-01-24 NOTE — TELEPHONE ENCOUNTER
[facility] paperwork is completed. When patient calls back, ask if he wants it to be faxed, or picked up. Also clarify if the Fitness for Duty portion was to be completed as well. Currently it is not.   Paperwork is at [facility]

## 2022-01-24 NOTE — TELEPHONE ENCOUNTER
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   Paperwork signed and faxed.

## 2022-02-07 NOTE — TELEPHONE ENCOUNTER
Patient calling  There were a few errors on his FMLA that need to be corrected  Dates of leave was noted for only 2 months  1/21/2022 to 3/21/2022  This should be 1/21/2022 to 1/21/2023    #5 on the form should be marked intermittent not consecutive    Please fax to [phone] attention Sarita Eden to call and  [phone]

## 2022-03-02 NOTE — TELEPHONE ENCOUNTER
Patient calling  All of the changed/edited section areas need to be initialed and dated and the bottom of the form must be resigned and dated as well. Please refax  Ok to call and pilar [phone]

## 2022-03-07 NOTE — TELEPHONE ENCOUNTER
Corrected forms refaxed and given to DAVI Loving, [facility] [place]  [phone]

## 2022-07-25 DIAGNOSIS — I10 ESSENTIAL HYPERTENSION: ICD-10-CM

## 2022-07-28 RX ORDER — LOSARTAN POTASSIUM AND HYDROCHLOROTHIAZIDE 12.5; 1 MG/1; MG/1
TABLET ORAL
Qty: 90 TABLET | Refills: 0 | Status: SHIPPED | OUTPATIENT
Start: 2022-07-28 | End: 2022-10-07

## 2022-07-28 NOTE — TELEPHONE ENCOUNTER
Losartan- hydrochlorothiazide   Routing refill request to provider for review/approval because:  Blood Pressure out of range for [facility] refill protocol.    BP Readings from Last 3 Encounters:   01/21/22 (!) 146/87   12/13/21 (!) 114/95   12/03/21 136/85       LOV 1/21/22

## 2022-08-02 ENCOUNTER — NURSE TRIAGE (OUTPATIENT)
Dept: INTERNAL MEDICINE | Facility: CLINIC | Age: 52
End: 2022-08-02

## 2022-08-02 DIAGNOSIS — K57.32 DIVERTICULITIS OF COLON: ICD-10-CM

## 2022-08-02 RX ORDER — METRONIDAZOLE 500 MG/1
500 TABLET ORAL 3 TIMES DAILY
Qty: 30 TABLET | Refills: 0 | Status: SHIPPED | OUTPATIENT
Start: 2022-08-02 | End: 2022-10-07

## 2022-08-02 RX ORDER — CIPROFLOXACIN 500 MG/1
500 TABLET, FILM COATED ORAL 2 TIMES DAILY
Qty: 20 TABLET | Refills: 0 | Status: SHIPPED | OUTPATIENT
Start: 2022-08-02 | End: 2022-10-07

## 2022-08-02 NOTE — TELEPHONE ENCOUNTER
Patient calling and thinks he has diverticulosis again.  He has has chills, pain in the same area where he had it last in the lower left abdominal area. Please advise next step. Ok to call and lm [phone]

## 2022-08-02 NOTE — TELEPHONE ENCOUNTER
Provider Recommendation Follow Up:   Reached patient/caregiver. Informed of provider's recommendations. Patient verbalized understanding and agrees with the plan.     Nadeen Monet RN  [facility]

## 2022-08-02 NOTE — TELEPHONE ENCOUNTER
Nurse Triage SBAR    Is this a 2nd Level Triage? YES, LICENSED PRACTITIONER REVIEW IS REQUIRED    Situation: Called patient to discuss symptoms.     Background: He has history of diverticulitis, had this 4-5 times before - last episode was in early Nov 2021. His pain is in the same area as previous episodes, he has 4 days of Cipro and 5 days of Metronidazole and asks if he should start taking this again based on symptoms.     Assessment: Symptoms started about 1 week ago had episode of chills over night, the next day, he had slight pain over LLQ. He did take Covid test as a precaution and it was negative. A few days went by and noted that the pain was increasing. Pain was worse yesterday, painful to stand up after lunch. He has not made any changes to his diet since symptoms started. With previous diverticulitis episodes, he would get constipated and not even able to pass gas. He has been able to have bowel movements at this point - had stool yesterday and today. He had eaten corn and Kalin lettuce a few days before symptoms started, these foods have triggered diverticulitis before. He called into work today. Pain constant, rates at 6/10 today, worse with movements.     Protocol Recommended Disposition:   Go To ED/UCC Now (Or To Office With PCP Approval)    Recommendation: Protocol recommends appointment, no appointments available in clinic, will forward to provider for 2nd level triage for recommendations.   Patient asks about diet, recommended low fiber diet with 6-8 cups of water a day.      Routed to provider    Does the patient meet one of the following criteria for ADS visit consideration? 16+ years old, with an [facility] PCP     TIP  Providers, please consider if this condition is appropriate for management at one of our Acute and Diagnostic Services sites.     If patient is a good candidate, please use dotphrase <dot>triageresponse and select Refer to ADS to document.    Ndaeen Monet RN  [facility]  [facility]      Reason for Disposition    Constant abdominal pain lasting > 2 hours    Additional Information    Negative: Passed out (i.e., fainted, collapsed and was not responding)    Negative: Shock suspected (e.g., cold/pale/clammy skin, too weak to stand, low BP, rapid pulse)    Negative: Sounds like a life-threatening emergency to the triager    Negative: Chest pain    Negative: Pain is mainly in upper abdomen (if needed ask: 'is it mainly above the belly button?')    Negative: SEVERE abdominal pain (e.g., excruciating)    Negative: Vomiting red blood or black (coffee ground) material    Negative: Bloody, black, or tarry bowel movements (Exception: chronic-unchanged black-grey bowel movements and is taking iron pills or Pepto-bismol)    Negative: Unable to urinate (or only a few drops) and bladder feels very full    Negative: Pain in scrotum persists > 1 hour    Protocols used: ABDOMINAL PAIN - MALE-A-OH

## 2022-08-04 NOTE — TELEPHONE ENCOUNTER
Patient calling back.  States his pain is still 6 out of 10 and worse if he moves/sneezes, etc.  After today, he will have taken Flagyl and Cipro x 3 days.      Also c/o loose stools today.  Thinks it could be from medications.    Advised patient if his pain is not better by tomorrow morning/early afternoon, go to the ED for eval.  Or if symptoms get worse, develops new symptoms (fever), go to the ED asap.  Patient in agreement.

## 2022-08-06 ENCOUNTER — APPOINTMENT (OUTPATIENT)
Dept: CT IMAGING | Facility: CLINIC | Age: 52
End: 2022-08-06
Attending: EMERGENCY MEDICINE
Payer: COMMERCIAL

## 2022-08-06 ENCOUNTER — HOSPITAL ENCOUNTER (EMERGENCY)
Facility: CLINIC | Age: 52
Discharge: HOME | End: 2022-08-06
Attending: EMERGENCY MEDICINE | Admitting: EMERGENCY MEDICINE
Payer: COMMERCIAL

## 2022-08-06 VITALS
RESPIRATION RATE: 14 BRPM | DIASTOLIC BLOOD PRESSURE: 92 MMHG | HEART RATE: 68 BPM | WEIGHT: 248 LBS | BODY MASS INDEX: 34.59 KG/M2 | TEMPERATURE: 97.7 F | SYSTOLIC BLOOD PRESSURE: 160 MMHG | OXYGEN SATURATION: 100 %

## 2022-08-06 DIAGNOSIS — K57.32 DIVERTICULITIS OF COLON: ICD-10-CM

## 2022-08-06 LAB
ALBUMIN SERPL BCG-MCNC: 3.6 G/DL (ref 3.5–5.2)
ALP SERPL-CCNC: 84 U/L (ref 40–129)
ALT SERPL W P-5'-P-CCNC: 36 U/L (ref 10–50)
ANION GAP SERPL CALCULATED.3IONS-SCNC: 7 MMOL/L (ref 7–15)
AST SERPL W P-5'-P-CCNC: 31 U/L (ref 10–50)
BASOPHILS # BLD AUTO: 0 10E3/UL (ref 0–0.2)
BASOPHILS NFR BLD AUTO: 0 %
BILIRUB SERPL-MCNC: 0.2 MG/DL
BUN SERPL-MCNC: 11.9 MG/DL (ref 6–20)
CALCIUM SERPL-MCNC: 8.5 MG/DL (ref 8.6–10)
CHLORIDE SERPL-SCNC: 106 MMOL/L (ref 98–107)
CREAT SERPL-MCNC: 0.84 MG/DL (ref 0.67–1.17)
DEPRECATED HCO3 PLAS-SCNC: 27 MMOL/L (ref 22–29)
EOSINOPHIL # BLD AUTO: 0.2 10E3/UL (ref 0–0.7)
EOSINOPHIL NFR BLD AUTO: 3 %
ERYTHROCYTE [DISTWIDTH] IN BLOOD BY AUTOMATED COUNT: 14.2 % (ref 10–15)
GFR SERPL CREATININE-BSD FRML MDRD: >90 ML/MIN/1.73M2
GLUCOSE SERPL-MCNC: 122 MG/DL (ref 70–99)
HCT VFR BLD AUTO: 40.6 % (ref 40–53)
HGB BLD-MCNC: 13.3 G/DL (ref 13.3–17.7)
IMM GRANULOCYTES # BLD: 0 10E3/UL
IMM GRANULOCYTES NFR BLD: 0 %
LIPASE SERPL-CCNC: 29 U/L (ref 13–60)
LYMPHOCYTES # BLD AUTO: 1.6 10E3/UL (ref 0.8–5.3)
LYMPHOCYTES NFR BLD AUTO: 22 %
MCH RBC QN AUTO: 29.5 PG (ref 26.5–33)
MCHC RBC AUTO-ENTMCNC: 32.8 G/DL (ref 31.5–36.5)
MCV RBC AUTO: 90 FL (ref 78–100)
MONOCYTES # BLD AUTO: 0.5 10E3/UL (ref 0–1.3)
MONOCYTES NFR BLD AUTO: 7 %
NEUTROPHILS # BLD AUTO: 4.7 10E3/UL (ref 1.6–8.3)
NEUTROPHILS NFR BLD AUTO: 68 %
NRBC # BLD AUTO: 0 10E3/UL
NRBC BLD AUTO-RTO: 0 /100
PLATELET # BLD AUTO: 307 10E3/UL (ref 150–450)
POTASSIUM SERPL-SCNC: 3.5 MMOL/L (ref 3.4–5.3)
PROT SERPL-MCNC: 6.6 G/DL (ref 6.4–8.3)
RBC # BLD AUTO: 4.51 10E6/UL (ref 4.4–5.9)
SODIUM SERPL-SCNC: 140 MMOL/L (ref 136–145)
WBC # BLD AUTO: 7 10E3/UL (ref 4–11)

## 2022-08-06 PROCEDURE — 85041 AUTOMATED RBC COUNT: CPT | Performed by: EMERGENCY MEDICINE

## 2022-08-06 PROCEDURE — 99285 EMERGENCY DEPT VISIT HI MDM: CPT | Mod: 25

## 2022-08-06 PROCEDURE — 74177 CT ABD & PELVIS W/CONTRAST: CPT

## 2022-08-06 PROCEDURE — 80053 COMPREHEN METABOLIC PANEL: CPT | Performed by: EMERGENCY MEDICINE

## 2022-08-06 PROCEDURE — 258N000003 HC RX IP 258 OP 636: Performed by: EMERGENCY MEDICINE

## 2022-08-06 PROCEDURE — 93005 ELECTROCARDIOGRAM TRACING: CPT

## 2022-08-06 PROCEDURE — 250N000011 HC RX IP 250 OP 636: Performed by: EMERGENCY MEDICINE

## 2022-08-06 PROCEDURE — 83690 ASSAY OF LIPASE: CPT | Performed by: EMERGENCY MEDICINE

## 2022-08-06 PROCEDURE — 250N000009 HC RX 250: Performed by: EMERGENCY MEDICINE

## 2022-08-06 PROCEDURE — 96361 HYDRATE IV INFUSION ADD-ON: CPT

## 2022-08-06 PROCEDURE — 36415 COLL VENOUS BLD VENIPUNCTURE: CPT | Performed by: EMERGENCY MEDICINE

## 2022-08-06 PROCEDURE — 96360 HYDRATION IV INFUSION INIT: CPT | Mod: 59

## 2022-08-06 PROCEDURE — 85014 HEMATOCRIT: CPT | Performed by: EMERGENCY MEDICINE

## 2022-08-06 PROCEDURE — 82040 ASSAY OF SERUM ALBUMIN: CPT | Performed by: EMERGENCY MEDICINE

## 2022-08-06 RX ORDER — SODIUM CHLORIDE 9 MG/ML
INJECTION, SOLUTION INTRAVENOUS CONTINUOUS
Status: DISCONTINUED | OUTPATIENT
Start: 2022-08-06 | End: 2022-08-06 | Stop reason: HOSPADM

## 2022-08-06 RX ORDER — IOPAMIDOL 755 MG/ML
500 INJECTION, SOLUTION INTRAVASCULAR ONCE
Status: COMPLETED | OUTPATIENT
Start: 2022-08-06 | End: 2022-08-06

## 2022-08-06 RX ADMIN — IOPAMIDOL 100 ML: 755 INJECTION, SOLUTION INTRAVENOUS at 12:50

## 2022-08-06 RX ADMIN — SODIUM CHLORIDE 1000 ML: 9 INJECTION, SOLUTION INTRAVENOUS at 12:17

## 2022-08-06 RX ADMIN — SODIUM CHLORIDE 65 ML: 9 INJECTION, SOLUTION INTRAVENOUS at 12:50

## 2022-08-06 ASSESSMENT — ENCOUNTER SYMPTOMS
HEMATURIA: 0
BLOOD IN STOOL: 0
ABDOMINAL PAIN: 1
CHILLS: 1
VOMITING: 0
DIARRHEA: 1
DYSURIA: 0
FEVER: 0

## 2022-08-06 NOTE — ED PROVIDER NOTES
History     Chief Complaint:  Abdominal Pain     HPI:  The history is provided by the patient.      Thai Mancia is a 52 year old male with a history of hypertension, diverticulitis, asthma who presents with lateral left upper abdominal pain which began 5 days ago. He describes his pain as left upper quadrant abdominal pain which wraps around to his back. The pain improves when he is on his feet and worsens when he is laying or sitting, noting that his pain woke him from sleep when he was laying on his left side 4 days ago. No increased pain with eating though he mentions that he has been deliberately eating less. He reports that one day prior to the onset of his pain, he did have some chills. He states that he has a history of several diverticulitis infections; however, his pain has historically been lower than the pain he presents with today. He reports that he has been taking Cipro and Flagyl for the past 5 days with no improvement of his pain. He notes that he has had some non-bloody diarrhea, but he is not sure if this is due to the antibiotics. Jean-Claude denies fever, vomiting, bloody stools, hematuria, dysuria. No known sick contacts, recent travel, trauma/injury, or dietary changes. He denies history of abdominal surgeries, kidney stones, or acid reflux. He denies alcohol use.    Review of Systems   Constitutional: Positive for chills. Negative for fever.   Gastrointestinal: Positive for abdominal pain and diarrhea. Negative for blood in stool and vomiting.   Genitourinary: Negative for dysuria and hematuria.   All other systems reviewed and are negative.    Allergies:  The patient has no known drug allergies.     Medications:  Aspirin inhaler  Advair Diskus inhaler  Hyzaar    Past Medical History:     Asthma  Diverticulitis  Hypertension  Phimosis  Obesity    Past Surgical History:    Circumcision  Colonoscopy  Knee surgery, left    Social History:  The patient presents to the ED alone.  The patient  presents to the ED via car.   Denies alcohol use.    Physical Exam     Patient Vitals for the past 24 hrs:   BP Temp Temp src Pulse Resp SpO2 Weight   08/06/22 1405 (!) 160/92 -- -- 68 -- 100 % --   08/06/22 1240 -- -- -- -- -- 99 % --   08/06/22 1215 138/86 -- -- -- -- -- --   08/06/22 1111 (!) 166/98 97.7  F (36.5  C) Temporal 85 14 98 % 112.5 kg (248 lb)     Physical Exam  Constitutional:       General: He is not in acute distress.     Appearance: He is not diaphoretic.   HENT:      Head: Atraumatic.   Eyes:      General: No scleral icterus.     Pupils: Pupils are equal, round, and reactive to light.   Cardiovascular:      Heart sounds: Normal heart sounds.   Pulmonary:      Effort: No respiratory distress.      Breath sounds: Normal breath sounds.   Abdominal:      General: Bowel sounds are normal.      Palpations: Abdomen is soft.      Tenderness: There is abdominal tenderness in the left upper quadrant. There is no guarding.   Musculoskeletal:         General: No tenderness.   Skin:     General: Skin is warm.      Findings: No rash.       Emergency Department Course     ECG  ECG taken at 1339, ECG read at 1345  Sinus rhythm with occasional premature ventricular complexes  Otherwise normal ECG  Rate 70 bpm. NM interval 156 ms. QRS duration 102 ms. QT/QTc 408/440 ms. P-R-T axes 63 43 41.     Imaging:    CT Abdomen Pelvis w Contrast  1.  Acute diverticulitis of the distal descending and mid sigmoid colon. No perforation or abscess.  Report per radiology    Laboratory:  Labs Ordered and Resulted from Time of ED Arrival to Time of ED Departure   COMPREHENSIVE METABOLIC PANEL - Abnormal       Result Value    Sodium 140      Potassium 3.5      Creatinine 0.84      Urea Nitrogen 11.9      Chloride 106      Carbon Dioxide (CO2) 27      Anion Gap 7      Glucose 122 (*)     Calcium 8.5 (*)     Protein Total 6.6      Albumin 3.6      Bilirubin Total 0.2      Alkaline Phosphatase 84      AST 31      ALT 36      GFR  Estimate >90     LIPASE - Normal    Lipase 29     CBC WITH PLATELETS AND DIFFERENTIAL    WBC Count 7.0      RBC Count 4.51      Hemoglobin 13.3      Hematocrit 40.6      MCV 90      MCH 29.5      MCHC 32.8      RDW 14.2      Platelet Count 307      % Neutrophils 68      % Lymphocytes 22      % Monocytes 7      % Eosinophils 3      % Basophils 0      % Immature Granulocytes 0      NRBCs per 100 WBC 0      Absolute Neutrophils 4.7      Absolute Lymphocytes 1.6      Absolute Monocytes 0.5      Absolute Eosinophils 0.2      Absolute Basophils 0.0      Absolute Immature Granulocytes 0.0      Absolute NRBCs 0.0     ROUTINE UA WITH MICROSCOPIC REFLEX TO CULTURE   ENTERIC BACTERIA AND VIRUS PANEL BY FIDELIA STOOL   CLOSTRIDIUM DIFFICILE TOXIN B      Emergency Department Course:       Reviewed:  I reviewed nursing notes, vitals and past medical history    Assessments:  1145 I obtained history and examined the patient as noted above.   1358 I rechecked the patient and explained findings.     Interventions:  1217 NS 1 L IV    Disposition:  The patient was discharged to home.     Impression & Plan     Medical Decision Making:  Thai Mancia is a 52 year old male who presents to the emergency department for evaluation of abdominal pain.  The patient has a history of diverticulitis.  He had a prior colonoscopy that did not show any other changes.  He has been on 5 days of Cipro and Flagyl but was referred into the ED given persistent pain.  Normal and he is afebrile.  No signs of systemic toxicity.  CT scan demonstrates uncomplicated diverticulitis without perforation or abscess.  The patient was offered admission for observation which he declined.  He feels comfortable going home and I believe this is an appropriate course of action.  He is only correction through his antibiotics so it is too early to declare this a treatment failure.  He will complete a course of antibiotics.  We did however discuss very specific return  precautions and he agrees to return for fever, worsening pain, vomiting, or any new concerns at all.  He will follow-up with GI as an outpatient.    Diagnosis:    ICD-10-CM    1. Diverticulitis of colon  K57.32      Scribe Disclosure:  I, Keke Quiroz, am serving as a scribe at 11:45 AM on 8/6/2022 to document services personally performed by Vinod Fernandez MD based on my observations and the provider's statements to me.      Vinod Fernandez MD  08/06/22 0842

## 2022-08-06 NOTE — ED TRIAGE NOTES
A&O x4, ABCs intact. Pt presents with LLQ abdominal pain that started one week ago. Pt states he has hx of diverticulitis. He has been on cipro and flagyl for the past 5 days but the pain is staying the same and not going away. Pt's PCP told him he should be seen because he probably needs imaging.         Triage Assessment     Row Name 08/06/22 1112       Triage Assessment (Adult)    Airway WDL WDL       Respiratory WDL    Respiratory WDL WDL       Cardiac WDL    Cardiac WDL WDL

## 2022-08-06 NOTE — DISCHARGE INSTRUCTIONS
Complete your course of antibiotics.    Return immediately to the ER for fever, worsening pain, vomiting, or any new concerns.

## 2022-08-08 LAB
ATRIAL RATE - MUSE: 70 BPM
DIASTOLIC BLOOD PRESSURE - MUSE: NORMAL MMHG
INTERPRETATION ECG - MUSE: NORMAL
P AXIS - MUSE: 63 DEGREES
PR INTERVAL - MUSE: 156 MS
QRS DURATION - MUSE: 102 MS
QT - MUSE: 408 MS
QTC - MUSE: 440 MS
R AXIS - MUSE: 43 DEGREES
SYSTOLIC BLOOD PRESSURE - MUSE: NORMAL MMHG
T AXIS - MUSE: 41 DEGREES
VENTRICULAR RATE- MUSE: 70 BPM

## 2022-10-07 ENCOUNTER — OFFICE VISIT (OUTPATIENT)
Dept: INTERNAL MEDICINE | Facility: CLINIC | Age: 52
End: 2022-10-07
Payer: COMMERCIAL

## 2022-10-07 VITALS
TEMPERATURE: 97.9 F | DIASTOLIC BLOOD PRESSURE: 84 MMHG | OXYGEN SATURATION: 99 % | HEART RATE: 76 BPM | BODY MASS INDEX: 34.86 KG/M2 | SYSTOLIC BLOOD PRESSURE: 144 MMHG | HEIGHT: 71 IN | WEIGHT: 249 LBS | RESPIRATION RATE: 22 BRPM

## 2022-10-07 DIAGNOSIS — Z87.19 HISTORY OF COLONIC DIVERTICULITIS: ICD-10-CM

## 2022-10-07 DIAGNOSIS — Z23 NEED FOR PROPHYLACTIC VACCINATION AND INOCULATION AGAINST INFLUENZA: ICD-10-CM

## 2022-10-07 DIAGNOSIS — Z00.00 ROUTINE GENERAL MEDICAL EXAMINATION AT A HEALTH CARE FACILITY: Primary | ICD-10-CM

## 2022-10-07 DIAGNOSIS — R73.03 PREDIABETES: ICD-10-CM

## 2022-10-07 DIAGNOSIS — M25.532 LEFT WRIST PAIN: ICD-10-CM

## 2022-10-07 DIAGNOSIS — Z12.5 SCREENING FOR PROSTATE CANCER: ICD-10-CM

## 2022-10-07 DIAGNOSIS — I10 ESSENTIAL HYPERTENSION: ICD-10-CM

## 2022-10-07 DIAGNOSIS — J45.30 MILD PERSISTENT ASTHMA WITHOUT COMPLICATION: ICD-10-CM

## 2022-10-07 LAB
BASOPHILS # BLD AUTO: 0 10E3/UL (ref 0–0.2)
BASOPHILS NFR BLD AUTO: 0 %
EOSINOPHIL # BLD AUTO: 0.2 10E3/UL (ref 0–0.7)
EOSINOPHIL NFR BLD AUTO: 3 %
ERYTHROCYTE [DISTWIDTH] IN BLOOD BY AUTOMATED COUNT: 14 % (ref 10–15)
HBA1C MFR BLD: 5.5 % (ref 0–5.6)
HCT VFR BLD AUTO: 46.3 % (ref 40–53)
HGB BLD-MCNC: 15.3 G/DL (ref 13.3–17.7)
IMM GRANULOCYTES # BLD: 0 10E3/UL
IMM GRANULOCYTES NFR BLD: 0 %
LYMPHOCYTES # BLD AUTO: 1.7 10E3/UL (ref 0.8–5.3)
LYMPHOCYTES NFR BLD AUTO: 24 %
MCH RBC QN AUTO: 29.2 PG (ref 26.5–33)
MCHC RBC AUTO-ENTMCNC: 33 G/DL (ref 31.5–36.5)
MCV RBC AUTO: 88 FL (ref 78–100)
MONOCYTES # BLD AUTO: 0.4 10E3/UL (ref 0–1.3)
MONOCYTES NFR BLD AUTO: 6 %
NEUTROPHILS # BLD AUTO: 4.7 10E3/UL (ref 1.6–8.3)
NEUTROPHILS NFR BLD AUTO: 67 %
PLATELET # BLD AUTO: 322 10E3/UL (ref 150–450)
RBC # BLD AUTO: 5.24 10E6/UL (ref 4.4–5.9)
WBC # BLD AUTO: 7.1 10E3/UL (ref 4–11)

## 2022-10-07 PROCEDURE — 90471 IMMUNIZATION ADMIN: CPT | Performed by: NURSE PRACTITIONER

## 2022-10-07 PROCEDURE — G0103 PSA SCREENING: HCPCS | Performed by: NURSE PRACTITIONER

## 2022-10-07 PROCEDURE — 99214 OFFICE O/P EST MOD 30 MIN: CPT | Mod: 25 | Performed by: NURSE PRACTITIONER

## 2022-10-07 PROCEDURE — 36415 COLL VENOUS BLD VENIPUNCTURE: CPT | Performed by: NURSE PRACTITIONER

## 2022-10-07 PROCEDURE — 0124A COVID-19,PF,PFIZER BOOSTER BIVALENT: CPT | Performed by: NURSE PRACTITIONER

## 2022-10-07 PROCEDURE — 80061 LIPID PANEL: CPT | Performed by: NURSE PRACTITIONER

## 2022-10-07 PROCEDURE — 83036 HEMOGLOBIN GLYCOSYLATED A1C: CPT | Performed by: NURSE PRACTITIONER

## 2022-10-07 PROCEDURE — 90682 RIV4 VACC RECOMBINANT DNA IM: CPT | Performed by: NURSE PRACTITIONER

## 2022-10-07 PROCEDURE — 91312 COVID-19,PF,PFIZER BOOSTER BIVALENT: CPT | Performed by: NURSE PRACTITIONER

## 2022-10-07 PROCEDURE — 80050 GENERAL HEALTH PANEL: CPT | Performed by: NURSE PRACTITIONER

## 2022-10-07 RX ORDER — LOSARTAN POTASSIUM AND HYDROCHLOROTHIAZIDE 25; 100 MG/1; MG/1
1 TABLET ORAL DAILY
Qty: 90 TABLET | Refills: 3 | Status: SHIPPED | OUTPATIENT
Start: 2022-10-07 | End: 2023-07-20

## 2022-10-07 RX ORDER — ALBUTEROL SULFATE 90 UG/1
AEROSOL, METERED RESPIRATORY (INHALATION)
Qty: 18 G | Refills: 11 | Status: SHIPPED | OUTPATIENT
Start: 2022-10-07 | End: 2023-07-20

## 2022-10-07 RX ORDER — FLUTICASONE PROPIONATE AND SALMETEROL 250; 50 UG/1; UG/1
1 POWDER RESPIRATORY (INHALATION) EVERY 12 HOURS
Qty: 60 EACH | Refills: 11 | Status: SHIPPED | OUTPATIENT
Start: 2022-10-07 | End: 2023-01-30

## 2022-10-07 RX ORDER — LOSARTAN POTASSIUM AND HYDROCHLOROTHIAZIDE 12.5; 1 MG/1; MG/1
1 TABLET ORAL DAILY
Qty: 90 TABLET | Refills: 3 | Status: SHIPPED | OUTPATIENT
Start: 2022-10-07 | End: 2022-10-07

## 2022-10-07 ASSESSMENT — ASTHMA QUESTIONNAIRES
QUESTION_1 LAST FOUR WEEKS HOW MUCH OF THE TIME DID YOUR ASTHMA KEEP YOU FROM GETTING AS MUCH DONE AT WORK, SCHOOL OR AT HOME: NONE OF THE TIME
QUESTION_3 LAST FOUR WEEKS HOW OFTEN DID YOUR ASTHMA SYMPTOMS (WHEEZING, COUGHING, SHORTNESS OF BREATH, CHEST TIGHTNESS OR PAIN) WAKE YOU UP AT NIGHT OR EARLIER THAN USUAL IN THE MORNING: NOT AT ALL
ACT_TOTALSCORE: 19
QUESTION_4 LAST FOUR WEEKS HOW OFTEN HAVE YOU USED YOUR RESCUE INHALER OR NEBULIZER MEDICATION (SUCH AS ALBUTEROL): ONE OR TWO TIMES PER DAY
QUESTION_2 LAST FOUR WEEKS HOW OFTEN HAVE YOU HAD SHORTNESS OF BREATH: ONCE OR TWICE A WEEK
QUESTION_5 LAST FOUR WEEKS HOW WOULD YOU RATE YOUR ASTHMA CONTROL: SOMEWHAT CONTROLLED
ACT_TOTALSCORE: 19

## 2022-10-07 ASSESSMENT — ANXIETY QUESTIONNAIRES
2. NOT BEING ABLE TO STOP OR CONTROL WORRYING: NOT AT ALL
GAD7 TOTAL SCORE: 0
8. IF YOU CHECKED OFF ANY PROBLEMS, HOW DIFFICULT HAVE THESE MADE IT FOR YOU TO DO YOUR WORK, TAKE CARE OF THINGS AT HOME, OR GET ALONG WITH OTHER PEOPLE?: NOT DIFFICULT AT ALL
6. BECOMING EASILY ANNOYED OR IRRITABLE: NOT AT ALL
7. FEELING AFRAID AS IF SOMETHING AWFUL MIGHT HAPPEN: NOT AT ALL
1. FEELING NERVOUS, ANXIOUS, OR ON EDGE: NOT AT ALL
GAD7 TOTAL SCORE: 0
IF YOU CHECKED OFF ANY PROBLEMS ON THIS QUESTIONNAIRE, HOW DIFFICULT HAVE THESE PROBLEMS MADE IT FOR YOU TO DO YOUR WORK, TAKE CARE OF THINGS AT HOME, OR GET ALONG WITH OTHER PEOPLE: NOT DIFFICULT AT ALL
3. WORRYING TOO MUCH ABOUT DIFFERENT THINGS: NOT AT ALL
7. FEELING AFRAID AS IF SOMETHING AWFUL MIGHT HAPPEN: NOT AT ALL
4. TROUBLE RELAXING: NOT AT ALL
5. BEING SO RESTLESS THAT IT IS HARD TO SIT STILL: NOT AT ALL
GAD7 TOTAL SCORE: 0

## 2022-10-07 ASSESSMENT — PATIENT HEALTH QUESTIONNAIRE - PHQ9
10. IF YOU CHECKED OFF ANY PROBLEMS, HOW DIFFICULT HAVE THESE PROBLEMS MADE IT FOR YOU TO DO YOUR WORK, TAKE CARE OF THINGS AT HOME, OR GET ALONG WITH OTHER PEOPLE: NOT DIFFICULT AT ALL
SUM OF ALL RESPONSES TO PHQ QUESTIONS 1-9: 0
SUM OF ALL RESPONSES TO PHQ QUESTIONS 1-9: 0

## 2022-10-07 NOTE — NURSING NOTE
"Chief Complaint   Patient presents with     RECHECK     Asthma and needs form filled out     initial BP (!) 142/88   Pulse 76   Temp 97.9  F (36.6  C) (Oral)   Resp 22   Ht 1.803 m (5' 11\")   Wt 112.9 kg (249 lb)   SpO2 99%   BMI 34.73 kg/m   Estimated body mass index is 34.73 kg/m  as calculated from the following:    Height as of this encounter: 1.803 m (5' 11\").    Weight as of this encounter: 112.9 kg (249 lb)..  bp completed using cuff size large  HILDA CHAVARRIA LPN  "

## 2022-10-07 NOTE — PATIENT INSTRUCTIONS
Lab in [address]    FMLA form completed     [facility]   Dr Lyndsay Guadalupe or Dr Tellez   [address]  [phone]

## 2022-10-07 NOTE — PROGRESS NOTES
Assessment & Plan     Routine general medical examination at a health care facility    - Lipid panel reflex to direct LDL Fasting; Future  - Comprehensive metabolic panel (BMP + Alb, Alk Phos, ALT, AST, Total. Bili, TP); Future  - TSH with free T4 reflex; Future  - CBC with platelets and differential; Future  - Hemoglobin A1c; Future  - PSA, screen; Future  - Lipid panel reflex to direct LDL Fasting  - Comprehensive metabolic panel (BMP + Alb, Alk Phos, ALT, AST, Total. 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 Prefer ventolin inhaler if possible  - fluticasone-salmeterol (ADVAIR DISKUS) 250-50 MCG/ACT inhaler; Inhale 1 puff into the lungs every 12 hours ### DO NOT FILL NOW.  Please update patient's profile to reflect additional refills.  ####  - CBC with platelets and differential; Future  - CBC with platelets and differential    Essential hypertension  Needs improvement   Will just increase hydrochlorothiazide portion of blood pressure pill and see how blood pressure is  - losartan-hydrochlorothiazide (HYZAAR) 100-25 MG tablet; Take 1 tablet by mouth daily Note change in dose  - Lipid panel reflex to direct LDL Fasting; Future  - Comprehensive metabolic panel (BMP + Alb, Alk Phos, ALT, AST, Total. Bili, TP); Future  - TSH with free T4 reflex; Future  - CBC with platelets and differential; Future  - Lipid panel reflex to direct LDL Fasting  -  "Comprehensive metabolic panel (BMP + Alb, Alk Phos, ALT, AST, Total. Bili, TP)  - TSH with free T4 reflex  - CBC with platelets and differential    Left wrist pain  Has had for a while in thumb area   He is left handed    - Orthopedic  Referral; Future    Screening for prostate cancer    - PSA, screen; Future  - PSA, screen    Prediabetes    - Comprehensive metabolic panel (BMP + Alb, Alk Phos, ALT, AST, Total. Bili, TP); Future  - Hemoglobin A1c; Future  - Comprehensive metabolic panel (BMP + Alb, Alk Phos, ALT, AST, Total. Bili, TP)  - Hemoglobin A1c    Need for prophylactic vaccination and inoculation against influenza    - INFLUENZA QUAD, RECOMBINANT, P-FREE (RIV4) (FLUBLOK)      34 minutes spent on the date of the encounter doing chart review, history and exam, documentation and further activities per the note       BMI:   Estimated body mass index is 34.73 kg/m  as calculated from the following:    Height as of this encounter: 1.803 m (5' 11\").    Weight as of this encounter: 112.9 kg (249 lb).       Patient Instructions   Lab in [address]    FMLA form completed     [facility]   Dr Lyndsay Guadalupe or Dr Tellez   [address]  [phone]      Return in about 6 months (around 4/7/2023).    LAYLA Adames [facility]    Kera Bermeo is a 52 year old, presenting for the following health issues:      History of Present Illness     Asthma:  He presents for follow up of asthma.  He has no cough, some wheezing, and no shortness of breath. He is using a relief medication daily. He typically misses taking his controller medication 1 time(s) per week.Patient is aware of the following triggers: cold air, dust mites, exercise or sports, pollens, smoke, strong odors and fumes and upper respiratory infections. The patient has had a visit to the Emergency Room, Urgent Care or Hospital due to asthma since the last clinic visit. He has been to the Emergency Room or " "Urgent Care 0 times.He has had a Hospitalization 0 times.    He eats 0-1 servings of fruits and vegetables daily.He consumes 1 sweetened beverage(s) daily.He exercises with enough effort to increase his heart rate 30 to 60 minutes per day.  He exercises with enough effort to increase his heart rate 4 days per week.   He is taking medications regularly.    Today's PHQ-9         PHQ-9 Total Score: 0    PHQ-9 Q9 Thoughts of better off dead/self-harm past 2 weeks :   Not at all    How difficult have these problems made it for you to do your work, take care of things at home, or get along with other people: Not difficult at all  Today's ROSA-7 Score: 0       Left hand pain in area below thumb     Review of Systems   Constitutional, HEENT, cardiovascular, pulmonary, GI, , musculoskeletal, neuro, skin, endocrine and psych systems are negative, except as otherwise noted.      Objective    BP (!) 144/84   Pulse 76   Temp 97.9  F (36.6  C) (Oral)   Resp 22   Ht 1.803 m (5' 11\")   Wt 112.9 kg (249 lb)   SpO2 99%   BMI 34.73 kg/m    Body mass index is 34.73 kg/m .  Physical Exam   GENERAL: alert and no distress  RESP: lungs clear to auscultation - no rales, rhonchi or wheezes  CV: regular rate and rhythm  MS: no gross musculoskeletal defects noted, no edema  NEURO: Normal strength and tone, mentation intact and speech normal  PSYCH: mentation appears normal, affect normal/bright    Lab                 "

## 2022-10-08 LAB
ALBUMIN SERPL-MCNC: 3.7 G/DL (ref 3.4–5)
ALP SERPL-CCNC: 99 U/L (ref 40–150)
ALT SERPL W P-5'-P-CCNC: 34 U/L (ref 0–70)
ANION GAP SERPL CALCULATED.3IONS-SCNC: 6 MMOL/L (ref 3–14)
AST SERPL W P-5'-P-CCNC: 26 U/L (ref 0–45)
BILIRUB SERPL-MCNC: 0.5 MG/DL (ref 0.2–1.3)
BUN SERPL-MCNC: 12 MG/DL (ref 7–30)
CALCIUM SERPL-MCNC: 9.1 MG/DL (ref 8.5–10.1)
CHLORIDE BLD-SCNC: 108 MMOL/L (ref 94–109)
CHOLEST SERPL-MCNC: 177 MG/DL
CO2 SERPL-SCNC: 26 MMOL/L (ref 20–32)
CREAT SERPL-MCNC: 0.83 MG/DL (ref 0.66–1.25)
FASTING STATUS PATIENT QL REPORTED: YES
GFR SERPL CREATININE-BSD FRML MDRD: >90 ML/MIN/1.73M2
GLUCOSE BLD-MCNC: 93 MG/DL (ref 70–99)
HDLC SERPL-MCNC: 59 MG/DL
LDLC SERPL CALC-MCNC: 106 MG/DL
NONHDLC SERPL-MCNC: 118 MG/DL
POTASSIUM BLD-SCNC: 3.9 MMOL/L (ref 3.4–5.3)
PROT SERPL-MCNC: 7.7 G/DL (ref 6.8–8.8)
PSA SERPL-MCNC: 0.4 UG/L (ref 0–4)
SODIUM SERPL-SCNC: 140 MMOL/L (ref 133–144)
TRIGL SERPL-MCNC: 60 MG/DL
TSH SERPL DL<=0.005 MIU/L-ACNC: 0.78 MU/L (ref 0.4–4)

## 2022-10-15 ENCOUNTER — HEALTH MAINTENANCE LETTER (OUTPATIENT)
Age: 52
End: 2022-10-15

## 2022-11-25 ENCOUNTER — TELEPHONE (OUTPATIENT)
Dept: INTERNAL MEDICINE | Facility: CLINIC | Age: 52
End: 2022-11-25

## 2022-11-25 NOTE — TELEPHONE ENCOUNTER
Patient calls c/o URI that he states is not uncommon for him. He stated he usually gets a zpac.     Is is asking for a script to get him thru the weekend.     Patient was offered VV next week and declined

## 2022-11-28 ENCOUNTER — OFFICE VISIT (OUTPATIENT)
Dept: URGENT CARE | Facility: URGENT CARE | Age: 52
End: 2022-11-28
Payer: COMMERCIAL

## 2022-11-28 ENCOUNTER — E-VISIT (OUTPATIENT)
Dept: URGENT CARE | Facility: CLINIC | Age: 52
End: 2022-11-28
Payer: COMMERCIAL

## 2022-11-28 VITALS
HEART RATE: 79 BPM | SYSTOLIC BLOOD PRESSURE: 197 MMHG | WEIGHT: 249 LBS | BODY MASS INDEX: 34.73 KG/M2 | RESPIRATION RATE: 18 BRPM | DIASTOLIC BLOOD PRESSURE: 90 MMHG | OXYGEN SATURATION: 98 % | TEMPERATURE: 97.9 F

## 2022-11-28 DIAGNOSIS — R03.0 ELEVATED BLOOD PRESSURE READING WITHOUT DIAGNOSIS OF HYPERTENSION: ICD-10-CM

## 2022-11-28 DIAGNOSIS — R05.9 COUGH, UNSPECIFIED TYPE: Primary | ICD-10-CM

## 2022-11-28 DIAGNOSIS — J20.8 ACUTE BACTERIAL BRONCHITIS: Primary | ICD-10-CM

## 2022-11-28 DIAGNOSIS — B96.89 ACUTE BACTERIAL BRONCHITIS: Primary | ICD-10-CM

## 2022-11-28 DIAGNOSIS — J45.901 MODERATE ASTHMA WITH EXACERBATION, UNSPECIFIED WHETHER PERSISTENT: ICD-10-CM

## 2022-11-28 PROCEDURE — 99214 OFFICE O/P EST MOD 30 MIN: CPT | Performed by: PHYSICIAN ASSISTANT

## 2022-11-28 PROCEDURE — 99207 PR NON-BILLABLE SERV PER CHARTING: CPT | Performed by: FAMILY MEDICINE

## 2022-11-28 RX ORDER — ALBUTEROL SULFATE 90 UG/1
2 AEROSOL, METERED RESPIRATORY (INHALATION) EVERY 6 HOURS
Qty: 8.5 G | Refills: 0
Start: 2022-11-28 | End: 2023-07-20

## 2022-11-28 RX ORDER — BENZONATATE 200 MG/1
200 CAPSULE ORAL 3 TIMES DAILY PRN
Qty: 21 CAPSULE | Refills: 0 | Status: SHIPPED | OUTPATIENT
Start: 2022-11-28 | End: 2022-12-05

## 2022-11-28 RX ORDER — AZITHROMYCIN 250 MG/1
TABLET, FILM COATED ORAL
Qty: 6 TABLET | Refills: 0 | Status: SHIPPED | OUTPATIENT
Start: 2022-11-28 | End: 2022-12-03

## 2022-11-28 RX ORDER — PREDNISONE 20 MG/1
20 TABLET ORAL 2 TIMES DAILY
Qty: 10 TABLET | Refills: 0 | Status: SHIPPED | OUTPATIENT
Start: 2022-11-28 | End: 2023-07-20

## 2022-11-28 NOTE — PROGRESS NOTES
"  Assessment & Plan     Acute bacterial bronchitis    Bronchitis is an infection of the air passages (bronchial tubes) in your lungs. It often occurs when you have a cold. This illness is contagious during the first few days and is spread through the air by coughing and sneezing, or by direct contact (touching the sick person and then touching your own eyes, nose, or mouth).  Symptoms of bronchitis include cough with mucus (phlegm) and low-grade fever. Bronchitis usually lasts 7 to 14 days. Mild cases can be treated with simple home remedies. More severe infection is treated with an antibiotic.    Patient also has walking pneumonia exposure    - azithromycin (ZITHROMAX) 250 MG tablet; Take 2 tablets (500 mg) by mouth daily for 1 day, THEN 1 tablet (250 mg) daily for 4 days.  - benzonatate (TESSALON) 200 MG capsule; Take 1 capsule (200 mg) by mouth 3 times daily as needed    Elevated blood pressure reading without diagnosis of hypertension    Patient has not taken BP medications today  He will take them tonight and monitor BP    Moderate asthma with exacerbation, unspecified whether persistent  Prednisone and albuterol for asthma flare up    - predniSONE (DELTASONE) 20 MG tablet; Take 1 tablet (20 mg) by mouth 2 times daily  - albuterol (PROVENTIL HFA) 108 (90 Base) MCG/ACT inhaler; Inhale 2 puffs into the lungs every 6 hours    Review of external notes as documented elsewhere in note       BMI:   Estimated body mass index is 34.73 kg/m  as calculated from the following:    Height as of 10/7/22: 1.803 m (5' 11\").    Weight as of this encounter: 112.9 kg (249 lb).     At today's visit with Thai Mancia , we discussed results, diagnosis, medications and formulated a plan.  We also discussed red flags for immediate return to clinic/ER, as well as indications for follow up with PCP if not improved in 3 days. Patient understood and agreed to plan. Thai Mancia was discharged with stable vitals and has no " further questions.       No follow-ups on file.    Nils Villareal, [facility], PA-C  [facility] ZUHAIR Bermeo is a 52 year old, presenting for the following health issues:  URI (Cough, chest congestion, chills X 1 week. Pt states it is now in chest )      HPI   Review of Systems   Constitutional, HEENT, cardiovascular, pulmonary, GI, , musculoskeletal, neuro, skin, endocrine and psych systems are negative, except as otherwise noted.      Objective    BP (!) 197/90   Pulse 79   Temp 97.9  F (36.6  C) (Tympanic)   Resp 18   Wt 112.9 kg (249 lb)   SpO2 98%   BMI 34.73 kg/m    Body mass index is 34.73 kg/m .  Physical Exam   GENERAL: healthy, alert and no distress  EYES: Eyes grossly normal to inspection, PERRL and conjunctivae and sclerae normal  HENT: ear canals and TM's normal, nose and mouth without ulcers or lesions  NECK: no adenopathy, no asymmetry, masses, or scars and thyroid normal to palpation  RESP: expiratory wheezes throughout  CV: regular rate and rhythm, normal S1 S2, no S3 or S4, no murmur, click or rub, no peripheral edema and peripheral pulses strong  MS: no gross musculoskeletal defects noted, no edema  SKIN: no suspicious lesions or rashes  NEURO: Normal strength and tone, mentation intact and speech normal  PSYCH: mentation appears normal, affect normal/bright

## 2022-11-28 NOTE — PATIENT INSTRUCTIONS
Dear Thai Mancia,    We are sorry you are not feeling well. Based on the responses you provided, it is recommended that you be seen in-person in urgent care so we can better evaluate your symptoms. Please click here to find the nearest urgent care location to you.   You will not be charged for this Visit. Thank you for trusting us with your care.    Jinny Alvarez MD

## 2022-11-28 NOTE — TELEPHONE ENCOUNTER
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 Patient has been ill for over a week and has been doing home remedies without improvement. Recommended OV /UC for assessment. Pt will call back to schedule.    Dae CARTER RN, BSN

## 2022-11-29 DIAGNOSIS — I10 ESSENTIAL HYPERTENSION: Primary | ICD-10-CM

## 2022-12-01 RX ORDER — LOSARTAN POTASSIUM AND HYDROCHLOROTHIAZIDE 12.5; 1 MG/1; MG/1
TABLET ORAL
Qty: 90 TABLET | Refills: 0 | Status: SHIPPED | OUTPATIENT
Start: 2022-12-01 | End: 2023-03-31

## 2022-12-06 ENCOUNTER — MYC MEDICAL ADVICE (OUTPATIENT)
Dept: INTERNAL MEDICINE | Facility: CLINIC | Age: 52
End: 2022-12-06

## 2022-12-06 DIAGNOSIS — J20.9 ACUTE BRONCHITIS TREATED WITH ANTIBIOTICS IN THE PAST 60 DAYS: Primary | ICD-10-CM

## 2022-12-07 RX ORDER — AZITHROMYCIN 250 MG/1
TABLET, FILM COATED ORAL
Qty: 6 TABLET | Refills: 0 | Status: SHIPPED | OUTPATIENT
Start: 2022-12-07 | End: 2022-12-12

## 2022-12-07 NOTE — TELEPHONE ENCOUNTER
"Please see patient's mychart message below.    Diagnosed with bacterial bronchitis via urgent care 11/28/22.    Getting better but still c/o prod cough and \"rattle\" in chest.    Need 2nd round of antibiotic?    Last office visit  10/7/22    Please advise, thanks.  "

## 2023-01-30 DIAGNOSIS — J45.30 MILD PERSISTENT ASTHMA WITHOUT COMPLICATION: ICD-10-CM

## 2023-01-30 RX ORDER — ALBUTEROL SULFATE 90 UG/1
AEROSOL, METERED RESPIRATORY (INHALATION)
Qty: 8.5 G | Refills: 11 | Status: SHIPPED | OUTPATIENT
Start: 2023-01-30 | End: 2023-09-15

## 2023-01-30 RX ORDER — FLUTICASONE PROPIONATE AND SALMETEROL 250; 50 UG/1; UG/1
POWDER RESPIRATORY (INHALATION)
Qty: 60 EACH | Refills: 11 | Status: SHIPPED | OUTPATIENT
Start: 2023-01-30 | End: 2023-09-15

## 2023-02-01 ENCOUNTER — TELEPHONE (OUTPATIENT)
Dept: INTERNAL MEDICINE | Facility: CLINIC | Age: 53
End: 2023-02-01
Payer: COMMERCIAL

## 2023-02-01 DIAGNOSIS — J45.30 MILD PERSISTENT ASTHMA WITHOUT COMPLICATION: ICD-10-CM

## 2023-02-01 RX ORDER — ALBUTEROL SULFATE 90 UG/1
AEROSOL, METERED RESPIRATORY (INHALATION)
Qty: 18 G | Refills: 11 | Status: CANCELLED | OUTPATIENT
Start: 2023-02-01

## 2023-02-01 NOTE — TELEPHONE ENCOUNTER
Prior Authorization Retail Medication Request    Medication/Dose: ventolin  ICD code (if different than what is on RX):    Previously Tried and Failed:    Rationale:      Insurance Name:[employer]    Insurance ID:  [id number]      Pharmacy Information (if different than what is on RX)  Name:  [facility]  Phone:  [phone]    Happy Wednesday!    PA is needed for this medication due to drug exclusion. Patient prefers brand name ventolin and requesting to go through the PA process. Please call and let [facility] know any denial/approval updates.     Thank you for all you do,    Isis Mendoza, [facility]  On Behalf of [facility]

## 2023-02-03 ENCOUNTER — NURSE TRIAGE (OUTPATIENT)
Dept: NURSING | Facility: CLINIC | Age: 53
End: 2023-02-03
Payer: COMMERCIAL

## 2023-02-04 NOTE — TELEPHONE ENCOUNTER
The patient is complaining pain lower left rib cage from daughter stepping on his back    No bruising noted but is complaining of  tenderness    Symptoms started on Sunday 01/30/23    The patient rates the pain constant, and improves when standing on his feet    The patient just has questions he is asking and does want full triage. Inquiring which place can do x-ray of his ribs.  Additional Information    Negative: Passed out (i.e., lost consciousness, collapsed and was not responding)    Negative: Shock suspected (e.g., cold/pale/clammy skin, too weak to stand, low BP, rapid pulse)    Negative: Sounds like a life-threatening emergency to the triager    Negative: Major injury to the back (e.g., MVA, fall > 10 feet or 3 meters, penetrating injury, etc.)    Negative: Followed a tailbone injury    Negative: [1] Pain in the upper back over the ribs (rib cage) AND [2] radiates (travels, goes) into chest    Protocols used: BACK PAIN-A-AH

## 2023-02-05 NOTE — TELEPHONE ENCOUNTER
Central Prior Authorization Team   Phone: [phone]    PA Initiation    Medication: ventolin  Insurance Company:    Pharmacy Filling the Rx: [place], [place] - [address] [place] [address]  Filling Pharmacy Phone: [phone]  Filling Pharmacy Fax: [phone]  Start Date: 2/5/2023

## 2023-02-06 NOTE — TELEPHONE ENCOUNTER
Called patient to ask additional questions from insurance:   1. He has not had allergic reaction to the generic albuterol  2. Ventolin works faster to treat his symptoms than generic albuterol. He has not tried any other generic formulations.   3. Provider is out of the office today, but no documentation in chart of MedWatch form being completed for patient.     Nadeen Monet RN  [facility]

## 2023-02-07 ENCOUNTER — APPOINTMENT (OUTPATIENT)
Dept: GENERAL RADIOLOGY | Facility: CLINIC | Age: 53
End: 2023-02-07
Attending: EMERGENCY MEDICINE
Payer: COMMERCIAL

## 2023-02-07 ENCOUNTER — HOSPITAL ENCOUNTER (EMERGENCY)
Facility: CLINIC | Age: 53
Discharge: HOME | End: 2023-02-07
Attending: EMERGENCY MEDICINE | Admitting: EMERGENCY MEDICINE
Payer: COMMERCIAL

## 2023-02-07 VITALS
RESPIRATION RATE: 18 BRPM | TEMPERATURE: 98.1 F | HEART RATE: 84 BPM | SYSTOLIC BLOOD PRESSURE: 194 MMHG | DIASTOLIC BLOOD PRESSURE: 106 MMHG | OXYGEN SATURATION: 98 %

## 2023-02-07 DIAGNOSIS — S20.212A RIB CONTUSION, LEFT, INITIAL ENCOUNTER: ICD-10-CM

## 2023-02-07 PROCEDURE — 99284 EMERGENCY DEPT VISIT MOD MDM: CPT

## 2023-02-07 PROCEDURE — 71101 X-RAY EXAM UNILAT RIBS/CHEST: CPT | Mod: LT

## 2023-02-07 RX ORDER — METHOCARBAMOL 750 MG/1
750 TABLET, FILM COATED ORAL 3 TIMES DAILY
Qty: 30 TABLET | Refills: 0 | Status: SHIPPED | OUTPATIENT
Start: 2023-02-07 | End: 2023-07-20

## 2023-02-07 RX ORDER — LIDOCAINE 50 MG/G
1 PATCH TOPICAL EVERY 24 HOURS
Qty: 10 PATCH | Refills: 0 | Status: SHIPPED | OUTPATIENT
Start: 2023-02-07 | End: 2023-07-20

## 2023-02-07 RX ORDER — OXYCODONE HYDROCHLORIDE 5 MG/1
5 TABLET ORAL EVERY 6 HOURS PRN
Qty: 12 TABLET | Refills: 0 | Status: SHIPPED | OUTPATIENT
Start: 2023-02-07 | End: 2023-02-10

## 2023-02-07 ASSESSMENT — ACTIVITIES OF DAILY LIVING (ADL): ADLS_ACUITY_SCORE: 33

## 2023-02-08 NOTE — ED PROVIDER NOTES
History     Chief Complaint:  Chest Wall Pain       HPI   Thai Mancia is a 52 year old male who presents with left chest wall pain. Patient reports that his daughter was walking on his back 9 days ago when he heard a pop on his left lower chest wall. He has had continuous pain since the incident. Pain worse with deep breathing. No other injuries. Denies SOB.    Independent Historian:   None - Patient Only      ROS:  Review of Systems   Musculoskeletal:        Left lower chest wall pain       Allergies:  Cats  Dogs  Pollen Extract     Medications:    lidocaine (LIDODERM) 5 % patch  methocarbamol (ROBAXIN) 750 MG tablet  oxyCODONE (ROXICODONE) 5 MG tablet  albuterol (PROAIR HFA/PROVENTIL HFA/VENTOLIN HFA) 108 (90 Base) MCG/ACT inhaler  albuterol (PROVENTIL HFA) 108 (90 Base) MCG/ACT inhaler  albuterol (VENTOLIN HFA) 108 (90 Base) MCG/ACT inhaler  fluticasone-salmeterol (ADVAIR) 250-50 MCG/ACT inhaler  losartan-hydrochlorothiazide (HYZAAR) 100-12.5 MG tablet  losartan-hydrochlorothiazide (HYZAAR) 100-25 MG tablet  predniSONE (DELTASONE) 20 MG tablet        Past Medical History:    Past Medical History:   Diagnosis Date     Asthma      Diverticulitis      Essential hypertension        Past Surgical History:    Past Surgical History:   Procedure Laterality Date     CIRCUMCISION N/A 11/18/2021    Procedure: CIRCUMCISION;  Surgeon: Toy Feliz MD;  Location: [facility] OR     COLONOSCOPY N/A 12/13/2021    Procedure: COLONOSCOPY;  Surgeon: Claire Browning MD;  Location:  GI     KNEE SURGERY  1985    left        Family History:    family history includes Unknown/Adopted in his father and mother.    Social History:   reports that he has never smoked. He has never used smokeless tobacco. He reports that he does not drink alcohol and does not use drugs.  PCP: Sara Elias     Physical Exam     Patient Vitals for the past 24 hrs:   BP Temp Pulse Resp SpO2   02/07/23 1835 (!) 194/106 98.1  F (36.7  C) 84 18  98 %        Physical Exam  General: Patient is awake, alert and interactive  Head: The scalp, face, and head appear normal  CV: Regular rate and rhythm. Peripheral pulses including radial pulses are symmetric.   Resp: Lungs are clear without wheezes or rales. No respiratory distress.   GI: Abdomen is soft, no rigidity, guarding, or rebound. No distension. No tenderness to palpation in any quadrant.     MS: left lower chest wall is tender to palpation. No asymmetric leg swelling, calf or thigh tenderness.    Skin: No rash or lesions noted. Normal capillary refill noted  Neuro: Speech is normal and fluent. Face is symmetric. Moving all extremities.   Psych:  Normal affect.  Appropriate interactions.      Emergency Department Course     Imaging:  Ribs XR, unilat 3 views + PA chest,  left   Final Result   IMPRESSION: There is no radiographic evidence of an acute displaced left lower rib fracture with attention to the skin marker.       No evidence of pneumonia or pulmonary edema. Cardiomediastinal silhouette and pulmonary vasculature are within normal limits. No pneumothorax or pleural effusion.          Report per radiology      Emergency Department Course & Assessments:    Independent Interpretation (X-rays, CTs, rhythm strip):  CXR shows no rib fracture     Disposition:  The patient was discharged to home.     Impression & Plan      Medical Decision Making:  Thai Mancia is a 52 year old male who presents for evaluation of chest pain after having his daughter walk on his back for pain relief.  [place] a pop and immediately had pain. Given trauma, I doubt another underlying cause of chest pain. No evidence of pneumothorax, splenic or liver injury, etc. Remote rib fracture vs rib contusion based on CXR and exam.  The patients head to toe trauma exam is otherwise normal at this time and no further trauma workup is needed as I believe there is no signs of serious head, neck, chest, spinal, extremity or abdominal  injuries. Patient was educated on natural course of this injury, provided pain medication, need for increasing gentle activity (walking around block, increase distance each day), complications of injury (pneumonia, DVT, etc). .  They need to follow up with primary only and only if further symptoms or progressive symptoms will need to seek care again in ED or with specialist.      Diagnosis:    ICD-10-CM    1. Rib contusion, left, initial encounter  S20.212A            Discharge Medications:  Discharge Medication List as of 2/7/2023  8:06 PM      START taking these medications    Details   lidocaine (LIDODERM) 5 % patch Place 1 patch onto the skin every 24 hours To prevent lidocaine toxicity, patient should be patch free for 12 hrs daily.Disp-10 patch, R-0Local Print      methocarbamol (ROBAXIN) 750 MG tablet Take 1 tablet (750 mg) by mouth 3 times daily, Disp-30 tablet, R-0, Local Print      oxyCODONE (ROXICODONE) 5 MG tablet Take 1 tablet (5 mg) by mouth every 6 hours as needed for pain, Disp-12 tablet, R-0, Local Print                MD Nell Calvin, Brannon Yeager MD  02/07/23 9278

## 2023-02-08 NOTE — ED TRIAGE NOTES
"Patient reports lower left chest pain, patient states his daughter was walking on his back to \"crack\" it and he felt a pop in his chest. Patient is concerned he broke a rib.      Triage Assessment     Row Name 02/07/23 3647       Triage Assessment (Adult)    Airway WDL WDL       Respiratory WDL    Respiratory WDL WDL       Skin Circulation/Temperature WDL    Skin Circulation/Temperature WDL WDL       Cardiac WDL    Cardiac WDL WDL       Peripheral/Neurovascular WDL    Peripheral Neurovascular WDL WDL       Cognitive/Neuro/Behavioral WDL    Cognitive/Neuro/Behavioral WDL WDL              "

## 2023-02-11 NOTE — TELEPHONE ENCOUNTER
PRIOR AUTHORIZATION DENIED    Medication: Ventolin - DENIED    Denial Date: 2/10/2023    Denial Rational: INSURANCE STATES PATIENT DID NOT MEET COVERAGE CRITERIA -                Appeal Information:  IF YOU WOULD LIKE TO APPEAL PLEASE SUPPLY PA TEAM WITH A LETTER OF MEDICAL NECESSITY WITH CLINICAL REASON.

## 2023-02-13 NOTE — TELEPHONE ENCOUNTER
Med Watch Form    Called patient, informed him of denial. He states his asthma symptoms have improved since restarting Advair. He will try the generic albuterol, if this does not work, he will pursue other avenues to get Ventolin instead. Informed patient that we can resent Ventolin prescription to another pharmacy if he finds a better price. Patient verbalizes understanding.     Nadeen Monet RN  [facility]

## 2023-02-13 NOTE — TELEPHONE ENCOUNTER
I HAVE NEVER FILLED A MEDICATION WATCH FORM OUT     I AM NOT SURE WHAT WE NEED TO DO    HE CAN BUY IT ON [employer] WITHOUT INSURANCE FOR $30-40    LET ME KNOW WHAT HE WANTS TO DO

## 2023-03-27 DIAGNOSIS — I10 ESSENTIAL HYPERTENSION: ICD-10-CM

## 2023-03-27 NOTE — TELEPHONE ENCOUNTER
Routing refill request to provider for review/approval because:  BP Readings from Last 3 Encounters:   02/07/23 (!) 194/106   11/28/22 (!) 197/90   10/07/22 (!) 144/84

## 2023-03-27 NOTE — LETTER
[place] [address]  [address]  [facility] [address]  Phone: [phone]        March 31, 2023      Thai Mancia                                                                                                                                [address] [place] [id number]            Dear Mr. Mancia,    We are concerned about your health care.  We recently provided you with a medication refill.  Many medications require a routine 6 month  follow-up.   At this time we ask that: You schedule a routine office visit     Your prescription: Has been refilled for 1 time  so you may have time for the above noted follow-up.      Thank you,      [facility]

## 2023-03-31 RX ORDER — LOSARTAN POTASSIUM AND HYDROCHLOROTHIAZIDE 12.5; 1 MG/1; MG/1
TABLET ORAL
Qty: 90 TABLET | Refills: 0 | Status: SHIPPED | OUTPATIENT
Start: 2023-03-31 | End: 2023-07-20 | Stop reason: DRUGHIGH

## 2023-03-31 NOTE — TELEPHONE ENCOUNTER
BP was not in range in October should schedule appointment to recheck as well as labs on BP medicine

## 2023-04-24 NOTE — TELEPHONE ENCOUNTER
Yes he should start meds but 5 days is not long enough. Rx for both Cipro and flagyl have been sent in for him for the right duration. If not getting better he should let us know.    Rhombic Flap Text: The defect edges were debeveled with a #15 scalpel blade.  Given the location of the defect and the proximity to free margins a rhombic flap was deemed most appropriate.  Using a sterile surgical marker, an appropriate rhombic flap was drawn incorporating the defect.    The area thus outlined was incised deep to adipose tissue with a #15 scalpel blade.  The skin margins were undermined to an appropriate distance in all directions utilizing iris scissors.

## 2023-07-20 ENCOUNTER — VIRTUAL VISIT (OUTPATIENT)
Dept: INTERNAL MEDICINE | Facility: CLINIC | Age: 53
End: 2023-07-20
Payer: COMMERCIAL

## 2023-07-20 DIAGNOSIS — J45.901 MODERATE ASTHMA WITH EXACERBATION, UNSPECIFIED WHETHER PERSISTENT: ICD-10-CM

## 2023-07-20 DIAGNOSIS — M25.562 PAIN IN BOTH KNEES, UNSPECIFIED CHRONICITY: ICD-10-CM

## 2023-07-20 DIAGNOSIS — M25.561 PAIN IN BOTH KNEES, UNSPECIFIED CHRONICITY: ICD-10-CM

## 2023-07-20 DIAGNOSIS — E66.01 MORBID OBESITY (H): ICD-10-CM

## 2023-07-20 DIAGNOSIS — I10 ESSENTIAL HYPERTENSION: Primary | ICD-10-CM

## 2023-07-20 PROCEDURE — 99214 OFFICE O/P EST MOD 30 MIN: CPT | Mod: VID | Performed by: NURSE PRACTITIONER

## 2023-07-20 RX ORDER — LOSARTAN POTASSIUM AND HYDROCHLOROTHIAZIDE 12.5; 1 MG/1; MG/1
1 TABLET ORAL DAILY
Qty: 90 TABLET | Refills: 0 | Status: CANCELLED | OUTPATIENT
Start: 2023-07-20

## 2023-07-20 RX ORDER — ALBUTEROL SULFATE 90 UG/1
2 AEROSOL, METERED RESPIRATORY (INHALATION) EVERY 6 HOURS
Qty: 8.5 G | Refills: 0 | Status: CANCELLED | OUTPATIENT
Start: 2023-07-20

## 2023-07-20 RX ORDER — LIDOCAINE 50 MG/G
1 PATCH TOPICAL EVERY 24 HOURS
Qty: 10 PATCH | Refills: 11 | Status: SHIPPED | OUTPATIENT
Start: 2023-07-20 | End: 2024-05-24

## 2023-07-20 RX ORDER — LOSARTAN POTASSIUM AND HYDROCHLOROTHIAZIDE 25; 100 MG/1; MG/1
1 TABLET ORAL DAILY
Qty: 90 TABLET | Refills: 3 | Status: SHIPPED | OUTPATIENT
Start: 2023-07-20 | End: 2024-05-10

## 2023-07-20 NOTE — PROGRESS NOTES
"Jean-Claude is a 53 year old who is being evaluated via a billable video visit.      How would you like to obtain your AVS? MyChart  If the video visit is dropped, the invitation should be resent by: Text to cell phone: [phone]  Will anyone else be joining your video visit? No        Assessment & Plan     Essential hypertension  Recheck at in person appointment   He will have blood pressure checked at home and bring to appointment   - losartan-hydrochlorothiazide (HYZAAR) 100-25 MG tablet; Take 1 tablet by mouth daily Note change in dose    Moderate asthma with exacerbation, unspecified whether persistent  Stable on medication     Morbid obesity (H)  Will discuss at appointment     Pain in both knees, unspecified chronicity  He uses lidocaine patches to knees and is considering injection   - lidocaine (LIDODERM) 5 % patch; Place 1 patch onto the skin every 24 hours To prevent lidocaine toxicity, patient should be patch free for 12 hrs daily.  - Orthopedic  Referral; Future      32 minutes spent by me on the date of the encounter doing chart review, history and exam, documentation and further activities per the note       BMI:   Estimated body mass index is 34.73 kg/m  as calculated from the following:    Height as of 10/7/22: 1.803 m (5' 11\").    Weight as of 11/28/22: 112.9 kg (249 lb).     Patient Instructions   Medication refilled     Blood pressure reading when you come     Ortho referral       LAYLA Adames CNP  [facility]    Subjective   Jean-Claude is a 53 year old, presenting for the following health issues:  RECHECK (Med refill)      7/20/2023    10:00 AM   Additional Questions   Roomed by Yahir   Accompanied by self         7/20/2023    10:00 AM   Patient Reported Additional Medications   Patient reports taking the following new medications no     HPI     HTN- not well controlled in past   He does not take blood pressure at home, though will have wife take blood " pressure prior to upcoming in person visit     Will refill current medication for now     Asthma well controlled    Has knee pain - would like to have lidocaine patches and to see them for possible injection - steroid did not work last time          Review of Systems   Constitutional, HEENT, cardiovascular, pulmonary, GI, , musculoskeletal, neuro, skin, endocrine and psych systems are negative, except as otherwise noted.      Objective           Vitals:  No vitals were obtained today due to virtual visit.    Physical Exam   GENERAL: alert and no distress  EYES: Eyes grossly normal to inspection.  No discharge or erythema, or obvious scleral/conjunctival abnormalities.  RESP: No audible wheeze, cough, or visible cyanosis.  No visible retractions or increased work of breathing.    SKIN: Visible skin clear. No significant rash, abnormal pigmentation or lesions.  NEURO: Cranial nerves grossly intact.  Mentation and speech appropriate for age.  PSYCH: Mentation appears normal, affect normal/bright, judgement and insight intact, normal speech and appearance well-groomed.                Video-Visit Details    Type of service:  Video Visit   2031  Originating Location (pt. Location): Home  Distant Location (provider location):  On-site  Platform used for Video Visit: Gagandeep

## 2023-09-12 NOTE — PROGRESS NOTES
CHIEF COMPLAINT:   Chief Complaint   Patient presents with    Right Knee - Pain     Hx of Osgood Schlatter. Had a procedure done. Hurt knee in 2016 while at work and was seen and told he needed a knee replacement. In January 2023 he fell on ice and he fell backwards and his knee bent way more than it should. Pain is over the entire knee. He notes some swelling. No tx, just ice, lidocaine patches, ibuprofen/acetaminophen.    Knee Pain     Bilateral knee pain. Onset:  Hx of Osgood Schloder Hurt knee in 2016 while at work and was seen and told he needed a knee replacement. In January he fell on ice and he fell backwards and his knee bent way more than it should.          Thai Mancia is seen today in the [facility] for evaluation of bilateral knee pain at the request of Sara Elias           HISTORY OF PRESENT ILLNESS    Thai Mancia is a 53 year old male seen for evaluation of ongoing bilateral knee pain for a number of years. Right more painful than the left so wants to focus on the right knee today. He reports hurt the knee 2016 and told he needed a knee replacement at that time but to hold off as long as he could, had and injection and some therapy. He then fell again 1/2023 on some ice backwards and bent the knee more than it should. More painful since then.    He locates pain throughout the knee with some swelling. Treatment with ice, lidocain patches, ibuprofen and acetaminophen. Had cortisone injection with Sports Medicine 2016.    Pain at rest, night. Worse with activities.    History of surgery ~1984 for Osgood Schlatter.    Some right hip pain.    He's having a cardiac stress test in a couple of weeks.    Present symptoms: moderate pain, severe pain, mild swelling.    Pain severity: 7/10  Frequency of symptoms: are constant  Exacerbating Factors: weight bearing, stairs, [id number], prolonged standing, knee bending  Relieving Factors: rest, sitting, knee  straight.  Night Pain: Yes  Pain while at rest: Yes   Numbness or tingling: No   Patient has tried:     NSAIDS: Yes      Physical Therapy: Yes, in the past, 2016.      Activity modification: Yes      Bracing: Yes , but kept slipping down and more bothersome.     Injections: Yes cortisone 2016.     Ice: Yes      Assistive device:  No     Other: lidocaine patches, acetaminophen.      Other PMH:  has a past medical history of Asthma, Diverticulitis, and Essential hypertension.  Patient Active Problem List   Diagnosis    CARDIOVASCULAR SCREENING; LDL GOAL LESS THAN 160    Mild persistent asthma without complication    Obesity due to excess calories, unspecified obesity severity    Essential hypertension    Morbid obesity (H)    Phimosis       Surgical Hx:  has a past surgical history that includes knee surgery (1985); Circumcision (N/A, 11/18/2021); and Colonoscopy (N/A, 12/13/2021).    Medications:   Current Outpatient Medications:     albuterol (PROAIR HFA/PROVENTIL HFA/VENTOLIN HFA) 108 (90 Base) MCG/ACT inhaler, INHALE 2 PUFFS INTO THE LUNGS EVERY 6 HOURS AS NEEDED FOR SHORTNESS OF BREATH, DIFFICULTY BREATHING OR WHEEZING., Disp: 8.5 g, Rfl: 11    fluticasone-salmeterol (ADVAIR) 250-50 MCG/ACT inhaler, INHALE ONE PUFF BY MOUTH EVERY 12 HOURS, Disp: 60 each, Rfl: 11    lidocaine (LIDODERM) 5 % patch, Place 1 patch onto the skin every 24 hours To prevent lidocaine toxicity, patient should be patch free for 12 hrs daily., Disp: 10 patch, Rfl: 11    losartan-hydrochlorothiazide (HYZAAR) 100-25 MG tablet, Take 1 tablet by mouth daily Note change in dose, Disp: 90 tablet, Rfl: 3    Allergies:   Allergies   Allergen Reactions    Cats      Itchy eyes, runny nose, wheezing    Dogs      Itchy eyes, runny nose, wheezing    Pollen Extract      Runny nose       Social Hx:  at [employer].   reports that he has never smoked. He has never used smokeless tobacco. He reports that he does not drink alcohol and does not use  "drugs.    Family Hx: family history includes Unknown/Adopted in his father and mother.    REVIEW OF SYSTEMS: 10 point ROS neg other than the symptoms noted above in the HPI and PMH. Notables include  CONSTITUTIONAL:NEGATIVE for fever, chills, change in weight  INTEGUMENTARY/SKIN: NEGATIVE for worrisome rashes, moles or lesions  MUSCULOSKELETAL:See HPI above  NEURO: NEGATIVE for weakness, dizziness or paresthesias    PHYSICAL EXAM:  BP (!) 145/84   Pulse 77   Ht 1.803 m (5' 11\")   Wt 118.6 kg (261 lb 6.4 oz)   BMI 36.46 kg/m     GENERAL APPEARANCE: healthy, alert, no distress  SKIN: no suspicious lesions or rashes  NEURO: Normal strength and tone, mentation intact and speech normal  PSYCH:  mentation appears normal and affect normal, not anxious  RESPIRATORY: No increased work of breathing.  HANDS: no clubbing, nail pitting  LYMPH: no palpable popliteal lymphadenopathy.    BILATERAL LOWER EXTREMITIES:  Gait: favors the right, slight varus thrust.  Alignment: varus right. Fairly neutral left.  No gross deformities or masses.  No Quad atrophy, strength normal.  Intact sensation deep peroneal nerve, superficial peroneal nerve, med/lat tibial nerve, sural nerve, saphenous nerve  Intact EHL, EDL, TA, FHL, GS, quadriceps hamstrings and hip flexors  Bilateral calf soft and nttp or squeeze.  DTRs: achilles 2+, patella 2+.  Edema: trace    LEFT KNEE EXAM:    Skin: intact, no ecchymosis or erythema  ROM: full extension to full flexion  Tight hamstrings on straight leg raise.  Effusion: none  Tender: NTTP med/lat joint line, anterior or posterior knee  McMurrays: negative    MCL: stable, and non-painful at both 0 and 30 degrees knee flexion  Varus stress: stable, and non-painful at both 0 and 30 degrees knee flexion  Lachmans: neg, firm endpoint  Posterior Drawer stable  Patellofemoral joint:                Apprehension: negative              Crepitations: mild    RIGHT KNEE EXAM:    Skin: intact, no ecchymosis or " "erythema  ROM: 5 extension to 105 flexion  Tight hamstrings on straight leg raise.  Effusion: moderate   Tender: medial joint line  Nontender to palpation lat joint line, anterior or posterior knee  Varus/valgus laxity with endpoint    Patellofemoral joint:                Apprehension: negative              Crepitations: mild   Grind: positive.    X-RAY:  3 views right knee from 9/22/2023 were reviewed in clinic today. On my review, no obvious fractures or dislocations. There is bone on bone loss of medial joint space with subchondral sclerosis, marginal osteophytes, articular flattening. Fairly preserved lateral joint space. Moderate patello-femoral degenerative changes with marginal osteophytes. Effusion.        MRI right knee 8/24/2016  1. Grade IV chondromalacia weightbearing medial compartment, especially anteriorly. Significant associated reactive subchondral bone marrow edema.  2. Up to grade IV chondromalacia at the far-medial trochlear groove. Minimal patellar chondromalacia.  3. Mild soft tissue edema surrounding the medial patellar retinaculum of uncertain etiology.  4. Possible 1 cm loose body anteriorly.     ASSESSMENT/PLAN: Thai Mancia is a 53 year old male with chronic right knee pain, advanced primary osteoarthritis.     * reviewed imaging studies with patient, showing arthritic changes, or wearing of the cartilage in the knee. This can be caused by normal \"wear and tear\" over the years or following prior injury to the knee.  Treatment typically starts nonsurgically. Surgical indication for total knee arthroplasty  when nonsurgical management is no longer effective.  Severe medial and moderate patello-femoral degenerative changes noted.    Non-surgical treatment for knee arthritis includes:    * rest, sitting  * Activity modification - avoid impact activities or activities that aggravate symptoms.  * NSAIDS (non-steroidal anti-inflammatory medications; e.g. Aleve, advil, motrin, ibuprofen) - " "regular use for inflammation ( twice daily or three times daily), with food, as long as no contra-indications Please discuss with primary care doctor if needed  * ice, 15-20 minutes at a time several times a day or as needed.  * Strengthening of quadriceps muscles  * Physical Therapy for strengthening, stretching and range of motion exercises of legs  * Tylenol as needed for pain, consider Tylenol arthritis or similar  * Weight loss: Weight loss:  Body mass index is 36.46 kg/m .. weight loss benefits, not only for the current pain symptoms, but also overall health. Recommend a good diet plan that works for the patient, with the assistance of a dietician or primary care doctor as needed. Also, a good, low-impact exercise program for at least 20 minutes per day, 3 times per week, such as exercise bike, elliptical , or pool.  * Exercise: low impact such as stationary bike, elliptical, pool.  * Injections: cortisone versus viscosupplementation (hyaluronic acid, \"rooster comb\", \"gel shots\"); risks and perceived benefits discussed today. Patient elects NOT to proceed.  * Bracing: bracing the knee may offer some relief of symptoms when worn and provide some stability.  * over the counter supplements such as glucosamine and chondroitin sulfate may help with joint pain.  * topical ointments may help as well    At this time, he'd like to discuss proceeding with knee replacement, however, he'd rather like to have surgery, if he should proceed, closer to home. He lives in [place], so either the  or [facility].    Referral placed to [facility] for surgical consultation.         Camilo Garcia M.D., M.S.  Dept. of Orthopaedic Surgery  [facility]       "

## 2023-09-15 ENCOUNTER — OFFICE VISIT (OUTPATIENT)
Dept: INTERNAL MEDICINE | Facility: CLINIC | Age: 53
End: 2023-09-15
Payer: COMMERCIAL

## 2023-09-15 VITALS
WEIGHT: 264 LBS | TEMPERATURE: 98.1 F | RESPIRATION RATE: 18 BRPM | HEART RATE: 78 BPM | DIASTOLIC BLOOD PRESSURE: 80 MMHG | SYSTOLIC BLOOD PRESSURE: 122 MMHG | OXYGEN SATURATION: 99 % | HEIGHT: 71 IN | BODY MASS INDEX: 36.96 KG/M2

## 2023-09-15 DIAGNOSIS — Z00.00 LABORATORY EXAMINATION ORDERED AS PART OF A ROUTINE GENERAL MEDICAL EXAMINATION: ICD-10-CM

## 2023-09-15 DIAGNOSIS — J45.30 MILD PERSISTENT ASTHMA WITHOUT COMPLICATION: ICD-10-CM

## 2023-09-15 DIAGNOSIS — E66.01 MORBID OBESITY (H): ICD-10-CM

## 2023-09-15 DIAGNOSIS — R07.9 CHEST PAIN, UNSPECIFIED TYPE: ICD-10-CM

## 2023-09-15 DIAGNOSIS — I10 ESSENTIAL HYPERTENSION: Primary | ICD-10-CM

## 2023-09-15 DIAGNOSIS — Z12.5 SCREENING FOR PROSTATE CANCER: ICD-10-CM

## 2023-09-15 PROCEDURE — 90682 RIV4 VACC RECOMBINANT DNA IM: CPT | Performed by: NURSE PRACTITIONER

## 2023-09-15 PROCEDURE — 90471 IMMUNIZATION ADMIN: CPT | Performed by: NURSE PRACTITIONER

## 2023-09-15 PROCEDURE — 93000 ELECTROCARDIOGRAM COMPLETE: CPT | Performed by: NURSE PRACTITIONER

## 2023-09-15 PROCEDURE — 99214 OFFICE O/P EST MOD 30 MIN: CPT | Mod: 25 | Performed by: NURSE PRACTITIONER

## 2023-09-15 RX ORDER — FLUTICASONE PROPIONATE AND SALMETEROL 250; 50 UG/1; UG/1
POWDER RESPIRATORY (INHALATION)
Qty: 60 EACH | Refills: 11 | Status: SHIPPED | OUTPATIENT
Start: 2023-09-15

## 2023-09-15 RX ORDER — ALBUTEROL SULFATE 90 UG/1
2 AEROSOL, METERED RESPIRATORY (INHALATION) EVERY 6 HOURS PRN
Qty: 8.5 G | Refills: 11 | Status: SHIPPED | OUTPATIENT
Start: 2023-09-15

## 2023-09-15 ASSESSMENT — ASTHMA QUESTIONNAIRES: ACT_TOTALSCORE: 19

## 2023-09-15 NOTE — PATIENT INSTRUCTIONS
Turmeric may be used for knee pain daily     [facility]   [phone]    Lab fasting     Stress test

## 2023-09-15 NOTE — PROGRESS NOTES
Assessment & Plan     Essential hypertension  In good range on current medicine  - EKG 12-lead complete w/read - Clinics  - Exercise Stress Test - Adult; Future  - Comprehensive metabolic panel (BMP + Alb, Alk Phos, ALT, AST, Total. Bili, TP); Future  - TSH with free T4 reflex; Future  - CBC with platelets and differential; Future  - Hemoglobin A1c; Future    Mild persistent asthma without complication  Stable on current medicine  - fluticasone-salmeterol (ADVAIR) 250-50 MCG/ACT inhaler; INHALE ONE PUFF BY MOUTH EVERY 12 HOURS  - albuterol (PROAIR HFA/PROVENTIL HFA/VENTOLIN HFA) 108 (90 Base) MCG/ACT inhaler; Inhale 2 puffs into the lungs every 6 hours as needed for shortness of breath or wheezing    Chest pain, unspecified type  He had 1 episode of some chest pressure that got a little bit worse 1 day in July.  The pain lasted about 5 minutes.  The prior day he had driven from [facility] and was drinking many energy drinks during that drive.  He has not had any recurrence of chest pain since that day.  He has no history of leg swelling or clotting issues  He is adopted so we do not have any family history  - EKG 12-lead complete w/read - Clinics  - Exercise Stress Test - Adult; Future  - Lipid panel reflex to direct LDL Fasting; Future  - Comprehensive metabolic panel (BMP + Alb, Alk Phos, ALT, AST, Total. Bili, TP); Future  - TSH with free T4 reflex; Future  - CBC with platelets and differential; Future    Laboratory examination ordered as part of a routine general medical examination    - Lipid panel reflex to direct LDL Fasting; Future  - Comprehensive metabolic panel (BMP + Alb, Alk Phos, ALT, AST, Total. Bili, TP); Future  - TSH with free T4 reflex; Future  - CBC with platelets and differential; Future    Screening for prostate cancer    - PSA, screen; Future    Morbid obesity (H)    - Comprehensive metabolic panel (BMP + Alb, Alk Phos, ALT, AST, Total. Bili, TP); Future  - TSH with free T4 reflex; Future  -  "CBC with platelets and differential; Future  - Hemoglobin A1c; Future      32 minutes spent by me on the date of the encounter doing chart review, history and exam, documentation and further activities per the note       BMI:   Estimated body mass index is 36.82 kg/m  as calculated from the following:    Height as of this encounter: 1.803 m (5' 11\").    Weight as of this encounter: 119.7 kg (264 lb).       Patient Instructions   Turmeric may be used for knee pain daily     [facility]   [phone]    Lab fasting     Stress test     LAYLA Adames CNP  [facility] MARCELLE Bermeo is a 53 year old, presenting for the following health issues:  Recheck Medication        9/15/2023    12:43 PM   Additional Questions   Roomed by Sanjuanita HERNANDEZ       History of Present Illness     Asthma:  He presents for follow up of asthma.  He has no cough, some wheezing, and no shortness of breath.  He is using a relief medication daily. He does not miss any doses of his controller medication throughout the week. Patient is aware of the following triggers: animal dander, dust mites, exercise or sports, pollens, strong odors and fumes and upper respiratory infections. The patient has had a visit to the Emergency Room, Urgent Care or Hospital due to asthma since the last clinic visit. He has been to the Emergency Room or Urgent Care 0 times.He has had a Hospitalization 0 times.    Hypertension: He presents for follow up of hypertension.  He does not check blood pressure  regularly outside of the clinic. Outside blood pressures have been over 140/90. He does not follow a low salt diet.     He eats 0-1 servings of fruits and vegetables daily.He consumes 1 sweetened beverage(s) daily.He exercises with enough effort to increase his heart rate 60 or more minutes per day.  He exercises with enough effort to increase his heart rate 4 days per week.   He is taking medications regularly.            Review " "of Systems   Constitutional, HEENT, cardiovascular, pulmonary, GI, , musculoskeletal, neuro, skin, endocrine and psych systems are negative, except as otherwise noted.    Seeing ortho for discussion about knee replacement   The lidocaine patches help   Right knee   Feel earlier in year and now pain is not tolerable   Tylenol and ibuprofen     Asthma well controlled with current medication       HTN well controlled on medication     July 27 th felt fluttering   Drive [facility] back   Caffeine and 5 hour energy day prior   Next day had ache in his chest- dull and then increased   Had to concentrate on breathing   5 minutes and resolved       Objective    /80   Pulse 78   Temp 98.1  F (36.7  C) (Oral)   Resp 18   Ht 1.803 m (5' 11\")   Wt 119.7 kg (264 lb)   SpO2 99%   BMI 36.82 kg/m    Body mass index is 36.82 kg/m .  Physical Exam   GENERAL:alert and no distress  RESP: lungs clear to auscultation - no rales, rhonchi or wheezes  CV: regular rate and rhythm, normal S1 S2, no S3 or S4, no murmur, click or rub, no peripheral edema and peripheral pulses strong  ABDOMEN: soft, nontender, no hepatosplenomegaly, no masses and bowel sounds normal  MS: no gross musculoskeletal defects noted, no edema  NEURO: Normal strength and tone, mentation intact and speech normal  PSYCH: mentation appears normal, affect normal/bright    Reviewed previous lab   EKG sinus rhythm                 "

## 2023-09-15 NOTE — NURSING NOTE
"Chief Complaint   Patient presents with    Recheck Medication     initial /80   Pulse 78   Temp 98.1  F (36.7  C) (Oral)   Resp 18   Ht 1.803 m (5' 11\")   Wt 119.7 kg (264 lb)   SpO2 99%   BMI 36.82 kg/m   Estimated body mass index is 36.82 kg/m  as calculated from the following:    Height as of this encounter: 1.803 m (5' 11\").    Weight as of this encounter: 119.7 kg (264 lb)..  bp completed using cuff size large  HILDA CHAVARRIA LPN  "

## 2023-09-20 ENCOUNTER — TELEPHONE (OUTPATIENT)
Dept: INTERNAL MEDICINE | Facility: CLINIC | Age: 53
End: 2023-09-20
Payer: COMMERCIAL

## 2023-09-20 NOTE — TELEPHONE ENCOUNTER
Called and spoke with patient to relay provider message. Patient verbalizes understanding of instructions and indicates no further questions at this time.    Thank you,  Tigre Sharma, Triage RN [facility]  1:30 PM 9/20/2023

## 2023-09-20 NOTE — TELEPHONE ENCOUNTER
It is a sinus rhythm   Left atrial enlargement happens with blood pressure/ HTN   Heart has to work harder to pump blood and artria can get a little enlarged - it is common- not concerning   He is on blood pressure medication

## 2023-09-20 NOTE — TELEPHONE ENCOUNTER
Patient would like further interpretation on his EKG results and help understanding what they mean please.     Sinus Rhythm   -Left atrial enlargement.    BORDERLINE      Patient has been contacted by pulmonology, but patient would like to know what his EKG mean by provider before he follows up with pulmonology.    Please advise.    Thank you,  Tigre Sharma, Triage RN [facility]  10:25 AM 9/20/2023

## 2023-09-22 ENCOUNTER — OFFICE VISIT (OUTPATIENT)
Dept: ORTHOPEDICS | Facility: CLINIC | Age: 53
End: 2023-09-22
Attending: NURSE PRACTITIONER
Payer: COMMERCIAL

## 2023-09-22 ENCOUNTER — ANCILLARY PROCEDURE (OUTPATIENT)
Dept: GENERAL RADIOLOGY | Facility: CLINIC | Age: 53
End: 2023-09-22
Attending: ORTHOPAEDIC SURGERY
Payer: COMMERCIAL

## 2023-09-22 VITALS
HEIGHT: 71 IN | BODY MASS INDEX: 36.6 KG/M2 | DIASTOLIC BLOOD PRESSURE: 84 MMHG | WEIGHT: 261.4 LBS | HEART RATE: 77 BPM | SYSTOLIC BLOOD PRESSURE: 145 MMHG

## 2023-09-22 DIAGNOSIS — M25.561 CHRONIC PAIN OF RIGHT KNEE: ICD-10-CM

## 2023-09-22 DIAGNOSIS — G89.29 CHRONIC PAIN OF RIGHT KNEE: ICD-10-CM

## 2023-09-22 DIAGNOSIS — M17.11 PRIMARY OSTEOARTHRITIS OF RIGHT KNEE: Primary | ICD-10-CM

## 2023-09-22 PROCEDURE — 73562 X-RAY EXAM OF KNEE 3: CPT | Mod: TC | Performed by: RADIOLOGY

## 2023-09-22 PROCEDURE — 99204 OFFICE O/P NEW MOD 45 MIN: CPT | Performed by: ORTHOPAEDIC SURGERY

## 2023-09-22 ASSESSMENT — PAIN SCALES - GENERAL: PAINLEVEL: SEVERE PAIN (7)

## 2023-09-22 NOTE — LETTER
9/22/2023         RE: Thai Mancia  [address]  [facility] [address]        Dear Colleague,    Thank you for referring your patient, Thai Mancia, to the [facility]. Please see a copy of my visit note below.    CHIEF COMPLAINT:   Chief Complaint   Patient presents with     Right Knee - Pain     Hx of Osgood Schlatter. Had a procedure done. Hurt knee in 2016 while at work and was seen and told he needed a knee replacement. In January 2023 he fell on ice and he fell backwards and his knee bent way more than it should. Pain is over the entire knee. He notes some swelling. No tx, just ice, lidocaine patches, ibuprofen/acetaminophen.     Knee Pain     Bilateral knee pain. Onset:  Hx of Osgood Schloder Hurt knee in 2016 while at work and was seen and told he needed a knee replacement. In January he fell on ice and he fell backwards and his knee bent way more than it should.          Thai Mancia is seen today in the [facility] for evaluation of bilateral knee pain at the request of Sara Elias           HISTORY OF PRESENT ILLNESS    Thai Mancia is a 53 year old male seen for evaluation of ongoing bilateral knee pain for a number of years. Right more painful than the left so wants to focus on the right knee today. He reports hurt the knee 2016 and told he needed a knee replacement at that time but to hold off as long as he could, had and injection and some therapy. He then fell again 1/2023 on some ice backwards and bent the knee more than it should. More painful since then.    He locates pain throughout the knee with some swelling. Treatment with ice, lidocain patches, ibuprofen and acetaminophen. Had cortisone injection with Sports Medicine 2016.    Pain at rest, night. Worse with activities.    History of surgery ~1984 for Osgood Schlatter.    Some right hip pain.    He's having a cardiac stress test in a couple of  weeks.    Present symptoms: moderate pain, severe pain, mild swelling.    Pain severity: 7/10  Frequency of symptoms: are constant  Exacerbating Factors: weight bearing, stairs, [id number], prolonged standing, knee bending  Relieving Factors: rest, sitting, knee straight.  Night Pain: Yes  Pain while at rest: Yes   Numbness or tingling: No   Patient has tried:     NSAIDS: Yes      Physical Therapy: Yes, in the past, 2016.      Activity modification: Yes      Bracing: Yes , but kept slipping down and more bothersome.     Injections: Yes cortisone 2016.     Ice: Yes      Assistive device:  No     Other: lidocaine patches, acetaminophen.      Other PMH:  has a past medical history of Asthma, Diverticulitis, and Essential hypertension.  Patient Active Problem List   Diagnosis     CARDIOVASCULAR SCREENING; LDL GOAL LESS THAN 160     Mild persistent asthma without complication     Obesity due to excess calories, unspecified obesity severity     Essential hypertension     Morbid obesity (H)     Phimosis       Surgical Hx:  has a past surgical history that includes knee surgery (1985); Circumcision (N/A, 11/18/2021); and Colonoscopy (N/A, 12/13/2021).    Medications:   Current Outpatient Medications:      albuterol (PROAIR HFA/PROVENTIL HFA/VENTOLIN HFA) 108 (90 Base) MCG/ACT inhaler, INHALE 2 PUFFS INTO THE LUNGS EVERY 6 HOURS AS NEEDED FOR SHORTNESS OF BREATH, DIFFICULTY BREATHING OR WHEEZING., Disp: 8.5 g, Rfl: 11     fluticasone-salmeterol (ADVAIR) 250-50 MCG/ACT inhaler, INHALE ONE PUFF BY MOUTH EVERY 12 HOURS, Disp: 60 each, Rfl: 11     lidocaine (LIDODERM) 5 % patch, Place 1 patch onto the skin every 24 hours To prevent lidocaine toxicity, patient should be patch free for 12 hrs daily., Disp: 10 patch, Rfl: 11     losartan-hydrochlorothiazide (HYZAAR) 100-25 MG tablet, Take 1 tablet by mouth daily Note change in dose, Disp: 90 tablet, Rfl: 3    Allergies:   Allergies   Allergen Reactions     Cats      Itchy eyes,  "runny nose, wheezing     Dogs      Itchy eyes, runny nose, wheezing     Pollen Extract      Runny nose       Social Hx:  [facility] [employer].   reports that he has never smoked. He has never used smokeless tobacco. He reports that he does not drink alcohol and does not use drugs.    Family Hx: family history includes Unknown/Adopted in his father and mother.    REVIEW OF SYSTEMS: 10 point ROS neg other than the symptoms noted above in the HPI and PMH. Notables include  CONSTITUTIONAL:NEGATIVE for fever, chills, change in weight  INTEGUMENTARY/SKIN: NEGATIVE for worrisome rashes, moles or lesions  MUSCULOSKELETAL:See HPI above  NEURO: NEGATIVE for weakness, dizziness or paresthesias    PHYSICAL EXAM:  BP (!) 145/84   Pulse 77   Ht 1.803 m (5' 11\")   Wt 118.6 kg (261 lb 6.4 oz)   BMI 36.46 kg/m     GENERAL APPEARANCE: healthy, alert, no distress  SKIN: no suspicious lesions or rashes  NEURO: Normal strength and tone, mentation intact and speech normal  PSYCH:  mentation appears normal and affect normal, not anxious  RESPIRATORY: No increased work of breathing.  HANDS: no clubbing, nail pitting  LYMPH: no palpable popliteal lymphadenopathy.    BILATERAL LOWER EXTREMITIES:  Gait: favors the right, slight varus thrust.  Alignment: varus right. Fairly neutral left.  No gross deformities or masses.  No Quad atrophy, strength normal.  Intact sensation deep peroneal nerve, superficial peroneal nerve, med/lat tibial nerve, sural nerve, saphenous nerve  Intact EHL, EDL, TA, FHL, GS, quadriceps hamstrings and hip flexors  Bilateral calf soft and nttp or squeeze.  DTRs: achilles 2+, patella 2+.  Edema: trace    LEFT KNEE EXAM:    Skin: intact, no ecchymosis or erythema  ROM: full extension to full flexion  Tight hamstrings on straight leg raise.  Effusion: none  Tender: NTTP med/lat joint line, anterior or posterior knee  McMurrays: negative    MCL: stable, and non-painful at both 0 and 30 degrees knee flexion  Varus " "stress: stable, and non-painful at both 0 and 30 degrees knee flexion  Lachmans: neg, firm endpoint  Posterior Drawer stable  Patellofemoral joint:                Apprehension: negative              Crepitations: mild    RIGHT KNEE EXAM:    Skin: intact, no ecchymosis or erythema  ROM: 5 extension to 105 flexion  Tight hamstrings on straight leg raise.  Effusion: moderate   Tender: medial joint line  Nontender to palpation lat joint line, anterior or posterior knee  Varus/valgus laxity with endpoint    Patellofemoral joint:                Apprehension: negative              Crepitations: mild   Grind: positive.    X-RAY:  3 views right knee from 9/22/2023 were reviewed in clinic today. On my review, no obvious fractures or dislocations. There is bone on bone loss of medial joint space with subchondral sclerosis, marginal osteophytes, articular flattening. Fairly preserved lateral joint space. Moderate patello-femoral degenerative changes with marginal osteophytes. Effusion.        MRI right knee 8/24/2016  1. Grade IV chondromalacia weightbearing medial compartment, especially anteriorly. Significant associated reactive subchondral bone marrow edema.  2. Up to grade IV chondromalacia at the far-medial trochlear groove. Minimal patellar chondromalacia.  3. Mild soft tissue edema surrounding the medial patellar retinaculum of uncertain etiology.  4. Possible 1 cm loose body anteriorly.     ASSESSMENT/PLAN: Thai Mancia is a 53 year old male with chronic right knee pain, advanced primary osteoarthritis.     * reviewed imaging studies with patient, showing arthritic changes, or wearing of the cartilage in the knee. This can be caused by normal \"wear and tear\" over the years or following prior injury to the knee.  Treatment typically starts nonsurgically. Surgical indication for total knee arthroplasty  when nonsurgical management is no longer effective.  Severe medial and moderate patello-femoral degenerative " "changes noted.    Non-surgical treatment for knee arthritis includes:    * rest, sitting  * Activity modification - avoid impact activities or activities that aggravate symptoms.  * NSAIDS (non-steroidal anti-inflammatory medications; e.g. Aleve, advil, motrin, ibuprofen) - regular use for inflammation ( twice daily or three times daily), with food, as long as no contra-indications Please discuss with primary care doctor if needed  * ice, 15-20 minutes at a time several times a day or as needed.  * Strengthening of quadriceps muscles  * Physical Therapy for strengthening, stretching and range of motion exercises of legs  * Tylenol as needed for pain, consider Tylenol arthritis or similar  * Weight loss: Weight loss:  Body mass index is 36.46 kg/m .. weight loss benefits, not only for the current pain symptoms, but also overall health. Recommend a good diet plan that works for the patient, with the assistance of a dietician or primary care doctor as needed. Also, a good, low-impact exercise program for at least 20 minutes per day, 3 times per week, such as exercise bike, elliptical , or pool.  * Exercise: low impact such as stationary bike, elliptical, pool.  * Injections: cortisone versus viscosupplementation (hyaluronic acid, \"rooster comb\", \"gel shots\"); risks and perceived benefits discussed today. Patient elects NOT to proceed.  * Bracing: bracing the knee may offer some relief of symptoms when worn and provide some stability.  * over the counter supplements such as glucosamine and chondroitin sulfate may help with joint pain.  * topical ointments may help as well    At this time, he'd like to discuss proceeding with knee replacement, however, he'd rather like to have surgery, if he should proceed, closer to home. He lives in [facility], so either the  or [facility].    Referral placed to [facility] for surgical consultation.         Camilo Garcia M.D., M.S.  Dept. of " Orthopaedic Surgery  [facility]           Again, thank you for allowing me to participate in the care of your patient.        Sincerely,        Camilo Garcia MD

## 2023-09-28 ENCOUNTER — MYC MEDICAL ADVICE (OUTPATIENT)
Dept: INTERNAL MEDICINE | Facility: CLINIC | Age: 53
End: 2023-09-28
Payer: COMMERCIAL

## 2023-09-28 NOTE — TELEPHONE ENCOUNTER
Sent [facility]acht message to patient to speak to triage nurse regarding symptoms.     Tigre Sharma, Triage RN Ed Bass  11:34 AM 9/28/2023

## 2023-09-29 ENCOUNTER — NURSE TRIAGE (OUTPATIENT)
Dept: INTERNAL MEDICINE | Facility: CLINIC | Age: 53
End: 2023-09-29
Payer: COMMERCIAL

## 2023-09-29 DIAGNOSIS — Z87.19 HISTORY OF COLONIC DIVERTICULITIS: Primary | ICD-10-CM

## 2023-09-29 RX ORDER — CIPROFLOXACIN 500 MG/1
500 TABLET, FILM COATED ORAL 2 TIMES DAILY
Qty: 20 TABLET | Refills: 0 | Status: SHIPPED | OUTPATIENT
Start: 2023-09-29 | End: 2023-11-14

## 2023-09-29 RX ORDER — METRONIDAZOLE 500 MG/1
500 TABLET ORAL 3 TIMES DAILY
Qty: 30 TABLET | Refills: 0 | Status: SHIPPED | OUTPATIENT
Start: 2023-09-29 | End: 2023-11-14

## 2023-09-29 NOTE — TELEPHONE ENCOUNTER
Pt states he has history of Diverticulitis about 5 times in the last 2 or 3 years.     Now feels like it has returned, since Wednesday night.     He has left over Cipro and Flagyl that he started taking yesterday.     Has Chills, low grade fever (he thinks), reports moderate, constant LLQ abdominal pain.     A little diarrhea, this AM. A little gas this AM.   No appetite.   No nausea or vomiting.   Taking Ibuprofen. No as painful as yesterday. Ate some mac and cheese this am.     Please advise if should go to [facility]? Or [facility] for medication refill?     Last Colonoscopy in 2021, with many diverticula.       Reason for Disposition   MILD TO MODERATE constant pain lasting > 2 hours    Additional Information   Negative: Passed out (i.e., fainted, collapsed and was not responding)   Negative: Shock suspected (e.g., cold/pale/clammy skin, too weak to stand, low BP, rapid pulse)   Negative: Sounds like a life-threatening emergency to the triager   Negative: Followed an abdomen (stomach) injury   Negative: Chest pain   Negative: Pain is mainly in upper abdomen (if needed ask: 'is it mainly above the belly button?')   Negative: Abdomen bloating or swelling are main symptoms   Negative: SEVERE abdominal pain (e.g., excruciating)   Negative: Vomiting red blood or black (coffee ground) material   Negative: Blood in bowel movements  (Exception: Blood on surface of BM with constipation.)   Negative: Black or tarry bowel movements  (Exception: Chronic-unchanged black-grey BMs AND is taking iron pills or Pepto-Bismol.)   Negative: Unable to urinate (or only a few drops) and bladder feels very full   Negative: Pain in scrotum persists > 1 hour    Protocols used: Abdominal Pain - Male-A-[place]

## 2023-09-29 NOTE — TELEPHONE ENCOUNTER
Called and spoke to patient to relay provider message. Patient verbalizes understanding of instructions and indicates no further questions at this time.    Thank you,  Tigre Sharma, Triage RN Ed Bass  12:49 PM 9/29/2023

## 2023-09-29 NOTE — TELEPHONE ENCOUNTER
Given lack of availability event that he is already started antibiotics, I will submit a prescription for full course of Cipro and metronidazole to his pharmacy.  Should his symptoms persist, then I would recommend that he be seen in person for further evaluation.

## 2023-10-06 ENCOUNTER — HOSPITAL ENCOUNTER (OUTPATIENT)
Dept: CARDIOLOGY | Facility: CLINIC | Age: 53
Discharge: HOME | End: 2023-10-06
Attending: NURSE PRACTITIONER | Admitting: NURSE PRACTITIONER
Payer: COMMERCIAL

## 2023-10-06 DIAGNOSIS — R07.9 CHEST PAIN, UNSPECIFIED TYPE: ICD-10-CM

## 2023-10-06 DIAGNOSIS — I10 ESSENTIAL HYPERTENSION: ICD-10-CM

## 2023-10-06 PROCEDURE — 93018 CV STRESS TEST I&R ONLY: CPT | Performed by: INTERNAL MEDICINE

## 2023-10-06 PROCEDURE — 93017 CV STRESS TEST TRACING ONLY: CPT

## 2023-10-06 PROCEDURE — 93016 CV STRESS TEST SUPVJ ONLY: CPT | Performed by: INTERNAL MEDICINE

## 2023-10-29 ENCOUNTER — HEALTH MAINTENANCE LETTER (OUTPATIENT)
Age: 53
End: 2023-10-29

## 2023-11-14 ENCOUNTER — VIRTUAL VISIT (OUTPATIENT)
Dept: INTERNAL MEDICINE | Facility: CLINIC | Age: 53
End: 2023-11-14
Payer: COMMERCIAL

## 2023-11-14 DIAGNOSIS — J45.30 MILD PERSISTENT ASTHMA WITHOUT COMPLICATION: ICD-10-CM

## 2023-11-14 DIAGNOSIS — M25.561 RIGHT KNEE PAIN, UNSPECIFIED CHRONICITY: Primary | ICD-10-CM

## 2023-11-14 PROCEDURE — 99443 PR PHYSICIAN TELEPHONE EVALUATION 21-30 MIN: CPT | Performed by: NURSE PRACTITIONER

## 2023-11-14 ASSESSMENT — ASTHMA QUESTIONNAIRES
QUESTION_1 LAST FOUR WEEKS HOW MUCH OF THE TIME DID YOUR ASTHMA KEEP YOU FROM GETTING AS MUCH DONE AT WORK, SCHOOL OR AT HOME: NONE OF THE TIME
ACT_TOTALSCORE: 20
QUESTION_4 LAST FOUR WEEKS HOW OFTEN HAVE YOU USED YOUR RESCUE INHALER OR NEBULIZER MEDICATION (SUCH AS ALBUTEROL): TWO OR THREE TIMES PER WEEK
QUESTION_2 LAST FOUR WEEKS HOW OFTEN HAVE YOU HAD SHORTNESS OF BREATH: ONCE OR TWICE A WEEK
ACT_TOTALSCORE: 20
QUESTION_3 LAST FOUR WEEKS HOW OFTEN DID YOUR ASTHMA SYMPTOMS (WHEEZING, COUGHING, SHORTNESS OF BREATH, CHEST TIGHTNESS OR PAIN) WAKE YOU UP AT NIGHT OR EARLIER THAN USUAL IN THE MORNING: NOT AT ALL
QUESTION_5 LAST FOUR WEEKS HOW WOULD YOU RATE YOUR ASTHMA CONTROL: SOMEWHAT CONTROLLED

## 2023-11-14 NOTE — NURSING NOTE
"Chief Complaint   Patient presents with    Forms     Pt is faxing over     initial There were no vitals taken for this visit. Estimated body mass index is 36.46 kg/m  as calculated from the following:    Height as of 9/22/23: 1.803 m (5' 11\").    Weight as of 9/22/23: 118.6 kg (261 lb 6.4 oz)..  bp completed using cuff size NA (Not Taken)  HILDA CHAVARRIA LPN  "

## 2023-11-14 NOTE — PROGRESS NOTES
"Jean-Claude is a 53 year old who is being evaluated via a billable video visit.      How would you like to obtain your AVS? MyChart  If the video visit is dropped, the invitation should be resent by: Text to cell phone: [phone]  Will anyone else be joining your video visit? No          Assessment & Plan     Right knee pain, unspecified chronicity  Needs new referral   - Orthopedic  Referral; Future    Mild persistent asthma without complication  Stable   FMLA form done       22 minutes spent by me on the date of the encounter doing chart review, history and exam, documentation and further activities per the note       BMI:   Estimated body mass index is 36.46 kg/m  as calculated from the following:    Height as of 9/22/23: 1.803 m (5' 11\").    Weight as of 9/22/23: 118.6 kg (261 lb 6.4 oz).       Patient Instructions   FMLA form completed     Ortho referral      LAYLA Adames CNP  [facility]    Subjective   Jean-Claude is a 53 year old, presenting for the following health issues:  Chief Complaint   Patient presents with    Forms     Pt is faxing over     Pt will  form tomorrow.      11/14/2023     2:52 PM   Additional Questions   Roomed by Sanjuanita HERNANDEZ       HPI     Asthma - needs FMLA form completed for asthma   Yearly FMLA form   4 time a month for 1 day at a time  Same as last year     AST   Date Value Ref Range Status   10/07/2022 26 0 - 45 U/L Final   08/21/2019 16 0 - 45 U/L Final     [place] and he lives down here so wants to see another provider closer to this area       Review of Systems   Constitutional, HEENT, cardiovascular, pulmonary, GI, , musculoskeletal, neuro, skin, endocrine and psych systems are negative, except as otherwise noted.      Objective           Vitals:  No vitals were obtained today due to virtual visit.    Physical Exam   GENERAL: alert and no distress    RESP: No audible " wheeze, cough    PSYCH: Mentation appears normal                Video-Visit Details    Type of service:  Video Visit - change to phone as connection not working for video      Originating Location (pt. Location): Home    Distant Location (provider location):  On-site  Platform used for Video Visit: Monica

## 2023-11-15 ENCOUNTER — TELEPHONE (OUTPATIENT)
Dept: INTERNAL MEDICINE | Facility: CLINIC | Age: 53
End: 2023-11-15
Payer: COMMERCIAL

## 2023-12-15 ENCOUNTER — TELEPHONE (OUTPATIENT)
Dept: ORTHOPEDICS | Facility: CLINIC | Age: 53
End: 2023-12-15

## 2023-12-15 ENCOUNTER — OFFICE VISIT (OUTPATIENT)
Dept: ORTHOPEDICS | Facility: CLINIC | Age: 53
End: 2023-12-15
Attending: NURSE PRACTITIONER
Payer: COMMERCIAL

## 2023-12-15 VITALS
HEIGHT: 71 IN | DIASTOLIC BLOOD PRESSURE: 82 MMHG | WEIGHT: 261 LBS | SYSTOLIC BLOOD PRESSURE: 125 MMHG | BODY MASS INDEX: 36.54 KG/M2

## 2023-12-15 DIAGNOSIS — M25.561 RIGHT KNEE PAIN, UNSPECIFIED CHRONICITY: ICD-10-CM

## 2023-12-15 DIAGNOSIS — M17.11 PRIMARY OSTEOARTHRITIS OF RIGHT KNEE: Primary | ICD-10-CM

## 2023-12-15 PROCEDURE — 20610 DRAIN/INJ JOINT/BURSA W/O US: CPT | Mod: RT | Performed by: STUDENT IN AN ORGANIZED HEALTH CARE EDUCATION/TRAINING PROGRAM

## 2023-12-15 PROCEDURE — 99204 OFFICE O/P NEW MOD 45 MIN: CPT | Mod: 25 | Performed by: STUDENT IN AN ORGANIZED HEALTH CARE EDUCATION/TRAINING PROGRAM

## 2023-12-15 RX ADMIN — LIDOCAINE HYDROCHLORIDE 5 ML: 10 INJECTION, SOLUTION INFILTRATION; PERINEURAL at 11:26

## 2023-12-15 RX ADMIN — TRIAMCINOLONE ACETONIDE 40 MG: 40 INJECTION, SUSPENSION INTRA-ARTICULAR; INTRAMUSCULAR at 11:26

## 2023-12-15 RX ADMIN — LIDOCAINE HYDROCHLORIDE 2 ML: 10 INJECTION, SOLUTION INFILTRATION; PERINEURAL at 11:26

## 2023-12-15 NOTE — PROGRESS NOTES
CC: Right Knee Pain    HPI: Patient is a 53-year-old male presents here today for treatment options of his right knee.  In short he has been dealing with this right knee for approximately 10 years.  He has had medial sided knee pain at baseline for 10 years.  He was evaluated in 2016 with x-ray and MRI.  He was told he has end-stage medial compartment arthritis and to continue living his life until his symptoms became such that he would like to undergo total knee arthroplasty.  He certainly has had significant activity modification.  He enjoys fishing and walking.  He understands that running, cutting and pivoting sports, and competitive sports are not a part of his future.  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  Prior to this year he was able to tolerate his symptoms well with Tylenol as needed for the pain.  However in January of this year he slipped on the ice and hyperflexed his knee.  Since that time he has had worsening symptoms.  He he has had significant effusions.  He localizes the pain over his patella and anterior knee.  He also gets symptoms of locking.  He states frequently when he tries to stand up his knee locks and he has to twist his knee around to unlock it.  He feels as if there is something loose in his knee.  He states his symptoms are different from the medial sided pain he had from his arthritis.      He has tried physical therapy in the past.  He has tried injections in the past.  Last in 2016.  He has been seen by an outside orthopedic surgeon who recommended total knee arthroplasty.  His job requires him to be on his feet 8 to 10 hours a day.  He would really like to get to alf before undergoing a total knee (8-10 years).  He understands that this is likely in his future.    Patient Active Problem List   Diagnosis    CARDIOVASCULAR SCREENING; LDL GOAL LESS THAN 160    Mild persistent asthma without complication    Obesity due to excess calories,  unspecified obesity severity    Essential hypertension    Morbid obesity (H)    Phimosis          Past Medical History:   Diagnosis Date    Asthma     Diverticulitis     Essential hypertension           Past Surgical History:   Procedure Laterality Date    CIRCUMCISION N/A 11/18/2021    Procedure: CIRCUMCISION;  Surgeon: Toy Feliz MD;  [place] [facility] [place]    COLONOSCOPY N/A 12/13/2021    Procedure: COLONOSCOPY;  Surgeon: Claire Browning MD;  Location:  GI    KNEE SURGERY  1985    left          Current Outpatient Medications   Medication Sig Dispense Refill    albuterol (PROAIR HFA/PROVENTIL HFA/VENTOLIN HFA) 108 (90 Base) MCG/ACT inhaler Inhale 2 puffs into the lungs every 6 hours as needed for shortness of breath or wheezing 8.5 g 11    fluticasone-salmeterol (ADVAIR) 250-50 MCG/ACT inhaler INHALE ONE PUFF BY MOUTH EVERY 12 HOURS 60 each 11    lidocaine (LIDODERM) 5 % patch Place 1 patch onto the skin every 24 hours To prevent lidocaine toxicity, patient should be patch free for 12 hrs daily. 10 patch 11    losartan-hydrochlorothiazide (HYZAAR) 100-25 MG tablet Take 1 tablet by mouth daily Note change in dose 90 tablet 3          Allergies   Allergen Reactions    Cats      Itchy eyes, runny nose, wheezing    Dogs      Itchy eyes, runny nose, wheezing    Pollen Extract      Runny nose          Family History   Problem Relation Age of Onset    Unknown/Adopted Mother         adopted    Unknown/Adopted Father     Diabetes No family hx of           Social History     Tobacco Use    Smoking status: Never    Smokeless tobacco: Never   Substance Use Topics    Alcohol use: No     Alcohol/week: 0.0 standard drinks of alcohol     Comment: quit 08/17            Objective:  Physical Exam:  RLE: No pain with axial load or logroll of the hip.  No open wounds, lacerations, or prior surgical incisions about the knee.  No significant edema or ecchymosis.  No erythema or drainage.  Grade 1 effusion of the knee joint.  No  pain to palpation over the quad tendon patella or patellar tendon.  Mild pain to palpation over the medial joint line.  No pain to palpation over the femoral origin or tibial insertion of the MCL.  No pain to palpation over the femoral origin or fibular insertion of the LCL.  Passive range of motion 5 to 130 degrees of knee flexion.  Active range of motion is equivalent to passive range of motion.  5/5 strength in flexion and extension.  Unable to reproduce locking through active and passive range of motion.  Stable to varus and valgus stress at 0 and 30 degrees of knee flexion with firm endpoint.  Negative Lachman.  Stable anterior posterior drawer.  Grossly neurovascular intact.    Imaging:  3 view x-ray of the right knee from September 2023 was reviewed.  This shows severe medial compartment arthritis with bone-on-bone arthritis.  Lateral joint space well-maintained.  Narrowing of the lateral patellofemoral joint space.  Small osteophyte formation off the medial patella and trochlea.    MRI without contrast of the right knee from 2016 was reviewed.  This shows complete loss of cartilage of the medial femoral condyle tibial plateau.  There is slight narrowing of the cartilage of the lateral patella and trochlea.  There is well-maintained cartilage in the lateral compartment and no meniscal pathology.  There is a 9 mm x 10 mm x 6 mm osseous loose body in the anterior compartment.    Procedure:   Written informed consent for Right knee intra-articular injection was obtained from the patient after discussing the risk and benefits of the procedure.  Risk and benefits including but not limited to bleeding, infection, failure to cure pain and allergic reaction were discussed.  The anterior inferolateral portal was identified by palpation of bony landmarks.  The skin was cleaned with iodine solution.  Under sterile technique the anterior inferolateral portal was utilized to inject the entirety of the steroid solution.   Soft dressings were applied.  Patient tolerated the procedure without difficulty.  I counseled the patient on signs and symptoms of allergic reaction and infection.    Large Joint Injection/Arthocentesis: R knee joint    Date/Time: 12/15/2023 11:26 AM    Performed by: Ronan Arenas MD  Authorized by: Ronan Arenas MD    Needle Size:  22 G  Guidance: landmark guided    Approach:  Anterolateral  Location:  Knee      Medications:  2 mL lidocaine 1 %; 5 mL lidocaine 1 %; 40 mg triamcinolone 40 MG/ML  Outcome:  Tolerated well, no immediate complications  Procedure discussed: discussed risks, benefits, and alternatives    Consent Given by:  Patient  Timeout: timeout called immediately prior to procedure    Prep: patient was prepped and draped in usual sterile fashion      Assessment and Plan: Patient is a 53-year-old male who presents here today for evaluation of his right knee.  He has end-stage medial compartment arthritis that has been progressing for years.  He also has mild patellofemoral arthritis.  Interestingly enough he has had more mechanical type symptoms since a fall in January with recurrent locking and effusions.  This is likely from aggravation of his arthritis.  However on MRI of his knee from 2016 he had a rather large osseous loose body.  It is possible that loose body the size could be contributing to his symptoms.    I reviewed his images with them today.  We reviewed the diagnosis, natural history, and prognosis.  We discussed both operative and nonoperative treatment options.  We discussed nonoperative management in the form of low impact aerobic exercise, weight loss, oral anti-inflammatory pain medication, bracing, physical therapy, and intra-articular corticosteroid and hyaluronic acid injections.  We also discussed operative intervention in the form of unicondylar vs total knee arthroplasty.  We reviewed the risks and benefits including but not limited to bleeding, infection requiring revision  surgery, loss of range of motion, loss of function, damage surrounding nerves and blood vessels, failure to cure pain, loss of limb and loss of life.  We also discussed the expected postoperative course after a total knee arthroplasty.  I had the opportunity to answer his questions at this time.     I did a very nice discussion with Mr. Mancia today.  He understands he has significant arthritis in his knee and that arthroplasty is likely in his future.  His main goal is to try to get to skilled nursing before undergoing a unicondylar versus total knee arthroplasty.  He understands that symptoms may drive him to surgery before skilled nursing.  I think this a very reasonable and realistic goal for him.  As he has not trialed injections in 7 years, I certainly think it would be very reasonable to revisit corticosteroid, hyaluronic acid, and PRP injections.  I also think he would be a very good candidate for a medial off  brace, particularly while he is at work.  I did discuss with him that he also may be a candidate for a unicondylar arthroplasty.  I would get a MRI to evaluate his patellofemoral and lateral compartment cartilage before making that determination.  Interestingly, he tolerated his medial side of symptoms very well until January.  This certainly could be from progression of his arthritis and increasing patellofemoral arthritis.  I would be interested to see where his large osseous loose body is in his knee, as a significant amount of the symptoms could be coming from a large loose body.  We discussed all this today.  We elected to move forward with an intra-articular corticosteroid injection.  I will order an MRI to evaluate for loose body as well as to evaluate the health of the cartilage in his patellofemoral and lateral compartments for possible unicondylar arthroplasty.  I would call him with the results when his MRI is completed.  I discussed with him that I would not perform arthroscopic surgery  within 6 weeks of an intra-articular injection, nor an arthroplasty within 3 months of an intra-articular injection.  He understands this.    Ronan Arenas MD    [facility]   Department of Orthopedic Surgery

## 2023-12-15 NOTE — TELEPHONE ENCOUNTER
LVM for patient stating that the MRI was ordered and apologize for the delay. Scheduling number given in case patient needs to call back.    Homero Scanlon ATC

## 2023-12-15 NOTE — PATIENT INSTRUCTIONS
[facility]   [address], [address]  [place] [address] [address] [place] [address]   Appointments: [phone] Appointments: [phone]   Fax: [phone] Fax: [phone]       1. Right knee pain, unspecified chronicity        INJECTION:  Steroid injection of the right knee was performed today in clinic.  - Do not soak in a hot tub, bath or swimming pool for 48 hours.  - Ok to shower.  - Ice today and only do your normal amounts of activity.  - The lidocaine (what is giving you pain relief right now) will likely stop working in 1-2 hours.  You may then have pain again, similar to before you received the injection. The corticosteroid will not start working until approximately 1-2 weeks from now.  - In a small percentage of people, cortisone can cause flushing/redness in the face. This usually lasts for 1-3 days and resolves. Cool compress and Ibuprofen/Tylenol can help if this happens.  - The steroid can raise your blood sugar for 3-5 days after injection. Diabetic patients are advised to monitor their blood glucose and adjust medication as needed during this time frame. Please call your PCP with further questions regarding medication adjustments.    For scheduling at [facility] call [phone] or [phone]     For scheduling in the [place] ([facility], [facility]) call  [phone] or  [phone]        For scheduling in the South ([facility], [facility]) call [phone]  or   [phone]          Dr Arenas will call with results of MRI.    Call my office with any questions or concerns, [phone].

## 2023-12-15 NOTE — LETTER
12/15/2023         RE: Thai Mancia  [address]  [place] [address]        Dear Colleague,    Thank you for referring your patient, Thai Mancia, to the [facility] [place]. Please see a copy of my visit note below.    CC: Right Knee Pain    HPI: Patient is a 53-year-old male presents here today for treatment options of his right knee.  In short he has been dealing with this right knee for approximately 10 years.  He has had medial sided knee pain at baseline for 10 years.  He was evaluated in 2016 with x-ray and MRI.  He was told he has end-stage medial compartment arthritis and to continue living his life until his symptoms became such that he would like to undergo total knee arthroplasty.  He certainly has had significant activity modification.  He enjoys fishing and walking.  He understands that running, cutting and pivoting sports, and competitive sports are not a part of his future.  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  Prior to this year he was able to tolerate his symptoms well with Tylenol as needed for the pain.  However in January of this year he slipped on the ice and hyperflexed his knee.  Since that time he has had worsening symptoms.  He he has had significant effusions.  He localizes the pain over his patella and anterior knee.  He also gets symptoms of locking.  He states frequently when he tries to stand up his knee locks and he has to twist his knee around to unlock it.  He feels as if there is something loose in his knee.  He states his symptoms are different from the medial sided pain he had from his arthritis.      He has tried physical therapy in the past.  He has tried injections in the past.  Last in 2016.  He has been seen by an outside orthopedic surgeon who recommended total knee arthroplasty.  His job requires him to be on his feet 8 to 10 hours a day.  He would really like to get to intermediate  before undergoing a total knee (8-10 years).  He understands that this is likely in his future.    Patient Active Problem List   Diagnosis     CARDIOVASCULAR SCREENING; LDL GOAL LESS THAN 160     Mild persistent asthma without complication     Obesity due to excess calories, unspecified obesity severity     Essential hypertension     Morbid obesity (H)     Phimosis          Past Medical History:   Diagnosis Date     Asthma      Diverticulitis      Essential hypertension           Past Surgical History:   Procedure Laterality Date     CIRCUMCISION N/A 11/18/2021    Procedure: CIRCUMCISION;  Surgeon: Toy Feliz MD;  [place] [facility] [place]     COLONOSCOPY N/A 12/13/2021    Procedure: COLONOSCOPY;  Surgeon: Claire Browning MD;  Location:  GI     KNEE SURGERY  1985    left          Current Outpatient Medications   Medication Sig Dispense Refill     albuterol (PROAIR HFA/PROVENTIL HFA/VENTOLIN HFA) 108 (90 Base) MCG/ACT inhaler Inhale 2 puffs into the lungs every 6 hours as needed for shortness of breath or wheezing 8.5 g 11     fluticasone-salmeterol (ADVAIR) 250-50 MCG/ACT inhaler INHALE ONE PUFF BY MOUTH EVERY 12 HOURS 60 each 11     lidocaine (LIDODERM) 5 % patch Place 1 patch onto the skin every 24 hours To prevent lidocaine toxicity, patient should be patch free for 12 hrs daily. 10 patch 11     losartan-hydrochlorothiazide (HYZAAR) 100-25 MG tablet Take 1 tablet by mouth daily Note change in dose 90 tablet 3          Allergies   Allergen Reactions     Cats      Itchy eyes, runny nose, wheezing     Dogs      Itchy eyes, runny nose, wheezing     Pollen Extract      Runny nose          Family History   Problem Relation Age of Onset     Unknown/Adopted Mother         adopted     Unknown/Adopted Father      Diabetes No family hx of           Social History     Tobacco Use     Smoking status: Never     Smokeless tobacco: Never   Substance Use Topics     Alcohol use: No     Alcohol/week: 0.0 standard drinks of  alcohol     Comment: quit 08/17            Objective:  Physical Exam:  RLE: No pain with axial load or logroll of the hip.  No open wounds, lacerations, or prior surgical incisions about the knee.  No significant edema or ecchymosis.  No erythema or drainage.  Grade 1 effusion of the knee joint.  No pain to palpation over the quad tendon patella or patellar tendon.  Mild pain to palpation over the medial joint line.  No pain to palpation over the femoral origin or tibial insertion of the MCL.  No pain to palpation over the femoral origin or fibular insertion of the LCL.  Passive range of motion 5 to 130 degrees of knee flexion.  Active range of motion is equivalent to passive range of motion.  5/5 strength in flexion and extension.  Unable to reproduce locking through active and passive range of motion.  Stable to varus and valgus stress at 0 and 30 degrees of knee flexion with firm endpoint.  Negative Lachman.  Stable anterior posterior drawer.  Grossly neurovascular intact.    Imaging:  3 view x-ray of the right knee from September 2023 was reviewed.  This shows severe medial compartment arthritis with bone-on-bone arthritis.  Lateral joint space well-maintained.  Narrowing of the lateral patellofemoral joint space.  Small osteophyte formation off the medial patella and trochlea.    MRI without contrast of the right knee from 2016 was reviewed.  This shows complete loss of cartilage of the medial femoral condyle tibial plateau.  There is slight narrowing of the cartilage of the lateral patella and trochlea.  There is well-maintained cartilage in the lateral compartment and no meniscal pathology.  There is a 9 mm x 10 mm x 6 mm osseous loose body in the anterior compartment.    Procedure:   Written informed consent for Right knee intra-articular injection was obtained from the patient after discussing the risk and benefits of the procedure.  Risk and benefits including but not limited to bleeding, infection,  failure to cure pain and allergic reaction were discussed.  The anterior inferolateral portal was identified by palpation of bony landmarks.  The skin was cleaned with iodine solution.  Under sterile technique the anterior inferolateral portal was utilized to inject the entirety of the steroid solution.  Soft dressings were applied.  Patient tolerated the procedure without difficulty.  I counseled the patient on signs and symptoms of allergic reaction and infection.    Large Joint Injection/Arthocentesis: R knee joint    Date/Time: 12/15/2023 11:26 AM    Performed by: Ronan Arenas MD  Authorized by: Ronan Arenas MD    Needle Size:  22 G  Guidance: landmark guided    Approach:  Anterolateral  Location:  Knee      Medications:  2 mL lidocaine 1 %; 5 mL lidocaine 1 %; 40 mg triamcinolone 40 MG/ML  Outcome:  Tolerated well, no immediate complications  Procedure discussed: discussed risks, benefits, and alternatives    Consent Given by:  Patient  Timeout: timeout called immediately prior to procedure    Prep: patient was prepped and draped in usual sterile fashion      Assessment and Plan: Patient is a 53-year-old male who presents here today for evaluation of his right knee.  He has end-stage medial compartment arthritis that has been progressing for years.  He also has mild patellofemoral arthritis.  Interestingly enough he has had more mechanical type symptoms since a fall in January with recurrent locking and effusions.  This is likely from aggravation of his arthritis.  However on MRI of his knee from 2016 he had a rather large osseous loose body.  It is possible that loose body the size could be contributing to his symptoms.    I reviewed his images with them today.  We reviewed the diagnosis, natural history, and prognosis.  We discussed both operative and nonoperative treatment options.  We discussed nonoperative management in the form of low impact aerobic exercise, weight loss, oral anti-inflammatory pain  medication, bracing, physical therapy, and intra-articular corticosteroid and hyaluronic acid injections.  We also discussed operative intervention in the form of unicondylar vs total knee arthroplasty.  We reviewed the risks and benefits including but not limited to bleeding, infection requiring revision surgery, loss of range of motion, loss of function, damage surrounding nerves and blood vessels, failure to cure pain, loss of limb and loss of life.  We also discussed the expected postoperative course after a total knee arthroplasty.  I had the opportunity to answer his questions at this time.     I did a very nice discussion with Mr. Mancia today.  He understands he has significant arthritis in his knee and that arthroplasty is likely in his future.  His main goal is to try to get to correction before undergoing a unicondylar versus total knee arthroplasty.  He understands that symptoms may drive him to surgery before correction.  I think this a very reasonable and realistic goal for him.  As he has not trialed injections in 7 years, I certainly think it would be very reasonable to revisit corticosteroid, hyaluronic acid, and PRP injections.  I also think he would be a very good candidate for a medial off  brace, particularly while he is at work.  I did discuss with him that he also may be a candidate for a unicondylar arthroplasty.  I would get a MRI to evaluate his patellofemoral and lateral compartment cartilage before making that determination.  Interestingly, he tolerated his medial side of symptoms very well until January.  This certainly could be from progression of his arthritis and increasing patellofemoral arthritis.  I would be interested to see where his large osseous loose body is in his knee, as a significant amount of the symptoms could be coming from a large loose body.  We discussed all this today.  We elected to move forward with an intra-articular corticosteroid injection.  I will order  an MRI to evaluate for loose body as well as to evaluate the health of the cartilage in his patellofemoral and lateral compartments for possible unicondylar arthroplasty.  I would call him with the results when his MRI is completed.  I discussed with him that I would not perform arthroscopic surgery within 6 weeks of an intra-articular injection, nor an arthroplasty within 3 months of an intra-articular injection.  He understands this.    Ronan Arenas MD    [facility]   Department of Orthopedic Surgery    Again, thank you for allowing me to participate in the care of your patient.        Sincerely,        Ronan Arenas MD

## 2023-12-15 NOTE — TELEPHONE ENCOUNTER
[facility] Call Center    Phone Message    May a detailed message be left on voicemail: yes     Reason for Call: Order(s): Other:   Reason for requested: MRI R knee   Date needed: asap   Provider name: Dr. Arenas     Action Taken: Other: Deepa     Travel Screening: Not Applicable

## 2023-12-16 RX ORDER — TRIAMCINOLONE ACETONIDE 40 MG/ML
40 INJECTION, SUSPENSION INTRA-ARTICULAR; INTRAMUSCULAR
Status: SHIPPED | OUTPATIENT
Start: 2023-12-15

## 2023-12-16 RX ORDER — LIDOCAINE HYDROCHLORIDE 10 MG/ML
5 INJECTION, SOLUTION INFILTRATION; PERINEURAL
Status: SHIPPED | OUTPATIENT
Start: 2023-12-15

## 2023-12-16 RX ORDER — LIDOCAINE HYDROCHLORIDE 10 MG/ML
2 INJECTION, SOLUTION INFILTRATION; PERINEURAL
Status: SHIPPED | OUTPATIENT
Start: 2023-12-15

## 2024-01-05 ENCOUNTER — HOSPITAL ENCOUNTER (OUTPATIENT)
Dept: MRI IMAGING | Facility: CLINIC | Age: 54
Discharge: HOME | End: 2024-01-05
Attending: STUDENT IN AN ORGANIZED HEALTH CARE EDUCATION/TRAINING PROGRAM | Admitting: STUDENT IN AN ORGANIZED HEALTH CARE EDUCATION/TRAINING PROGRAM
Payer: COMMERCIAL

## 2024-01-05 DIAGNOSIS — M25.561 RIGHT KNEE PAIN, UNSPECIFIED CHRONICITY: ICD-10-CM

## 2024-01-05 PROCEDURE — 73721 MRI JNT OF LWR EXTRE W/O DYE: CPT | Mod: RT

## 2024-01-19 ENCOUNTER — NURSE TRIAGE (OUTPATIENT)
Dept: NURSING | Facility: CLINIC | Age: 54
End: 2024-01-19
Payer: COMMERCIAL

## 2024-01-19 DIAGNOSIS — R20.2 NUMBNESS AND TINGLING IN RIGHT HAND: Primary | ICD-10-CM

## 2024-01-19 DIAGNOSIS — R29.898 HAND DYSFUNCTION: ICD-10-CM

## 2024-01-19 DIAGNOSIS — R20.0 NUMBNESS AND TINGLING IN RIGHT HAND: Primary | ICD-10-CM

## 2024-01-19 NOTE — TELEPHONE ENCOUNTER
"Nurse Triage SBAR    Is this a 2nd Level Triage? YES, LICENSED PRACTITIONER REVIEW IS REQUIRED    Situation: Jean-Claude reports symptoms of Numbness, Pain, Swelling to his Rt hand that started on Wed evening and are gradually subsiding.    - Wed, 1/17/24 - after coming home from work  - Rt hand couldn't open or make a fist - Hand was like a \"C\"  - Hand & fingers became swollen, stiff  - Numbness to 3rd & 4th fingers  - Very painful - Almost went ER - hurt so bad  - Took 1000 mg APAP & 800 mg IBU - help a little with pain  - Couldn't sleep Wed night  - Didn't go to work on Thu    Today, he is able to use the Rt hand; He is able to . He still has slight swelling and stiffness to the 4th finger of the Rt hand.    ER/UCC Now or Office w/ PCP Approval    Background:   Hx of HTN  Denies known recent injury to Rt arm/hand  Daily work involves repetitive lifting - greatest is ~18 lbs    Protocol Recommended Disposition:   Go to ED Now (Or PCP Triage)    Recommendation: Please contact pt with Provider recommendation  ** Pt requests call to Home line = [phone] **  Mobile line has call blocking - he doesn't know how to remove it    Routed to provider    Does the patient meet one of the following criteria for ADS visit consideration? 16+ years old, with an [facility] PCP     TIP  Providers, please consider if this condition is appropriate for management at one of our Acute and Diagnostic Services sites.     If patient is a good candidate, please use dotphrase <dot>triageresponse and select Refer to ADS to document.    5:27 pm - Spoke to Latoya delong  5:30 pm - Connected to on-call provider, Dr. Patti Lester  - ER advised  - Follow up with clinic next week  - Work letter, if needed, might be obtained from clinic; possibly from ER    5:36 pm - Notified pt of MD advice to be evaluated in ER.  - Pt states that he will discuss it with his wife    Estrellita Guallpa RN  [facility]      Reason for " Disposition   Neurologic deficit that was brief (now gone), ANY of the following: * Weakness of the face, arm, or leg on one side of the body * Numbness of the face, arm, or leg on one side of the body * Loss of speech or garbled speech    Additional Information   Negative: Difficult to awaken or acting confused (e.g., disoriented, slurred speech)   Negative: New neurologic deficit that is present NOW, sudden onset of ANY of the following: * Weakness of the face, arm, or leg on one side of the body* Numbness of the face, arm, or leg on one side of the body* Loss of speech or garbled speech   Negative: Sounds like a life-threatening emergency to the triager   Negative: SEVERE weakness (i.e., unable to walk or barely able to walk, requires support) and new-onset or worsening   Negative: Headache (with neurologic deficit)   Negative: Unable to urinate (or only a few drops) and bladder feels very full   Negative: Loss of bladder or bowel control (urine or bowel incontinence; wetting self, leaking stool) of new-onset   Negative: Back pain with numbness (loss of sensation) in groin or rectal area    Protocols used: Neurologic Deficit-A-OH

## 2024-01-22 NOTE — TELEPHONE ENCOUNTER
Call to patient to relay provider message. Patient would like neurology referral. Referral pended.    Thank you,  Tigre Sharma, Triage RN [place]  1:51 PM 1/22/2024

## 2024-01-22 NOTE — TELEPHONE ENCOUNTER
"Call to relay provider message and follow up with patient. Patient states that his hand is \"back to normal\". Patient denies pain, swelling, weakness, numbness or tingling. Patient has been using IBU and tylenol still. Patient has returned to work over the weekend.     Patient states that he has been using lidocaine patches on knee and concerned that the lidocaine patches may be causing his hand issue (due to a Google search) and would like primary care provider to advise if the lidocaine patches could cause his hand issue. Patient states he is waiting for orthopedist to contact him regarding his knee issues.     Please advise.     Thank you,  Tigre Sharma, Triage RN [facility]  7:46 AM 1/22/2024   "

## 2024-01-22 NOTE — TELEPHONE ENCOUNTER
Lidocaine could numb his hand but the functions should still work even numb   It does not sound like lidocaine would have caused inability to uncurl his hand   Does he want a referral to neurology?

## 2024-02-02 ENCOUNTER — TELEPHONE (OUTPATIENT)
Dept: SCHEDULING | Facility: CLINIC | Age: 54
End: 2024-02-02
Payer: COMMERCIAL

## 2024-02-02 DIAGNOSIS — M79.641 PAIN IN BOTH HANDS: Primary | ICD-10-CM

## 2024-02-02 DIAGNOSIS — M79.642 PAIN IN BOTH HANDS: Primary | ICD-10-CM

## 2024-02-02 NOTE — TELEPHONE ENCOUNTER
Order/Referral Request    Who is requesting: Patient    Orders being requested: Referral for specialist    Reason service is needed/diagnosis: Pain, both hands    When are orders needed by: asap    Has this been discussed with Provider: Yes    Does patient have a preference on a Group/Provider/Facility? N/A    Does patient have an appointment scheduled?: No    Where to send orders: Place orders within Epic    Could we send this information to you in [facility] or would you prefer to receive a phone call?:   Patient would prefer a phone call   Okay to leave a detailed message?: Yes at Home number on file [phone] (home)

## 2024-02-02 NOTE — TELEPHONE ENCOUNTER
Printed out referral and faxed to [facility] [place] - [phone].    Call to patient to notify him.    Thank you,  Tigre Sharma, Triage RN [facility]  12:42 PM 2/2/2024

## 2024-02-28 NOTE — TELEPHONE ENCOUNTER
Panel Management Review      Patient has the following on his problem list:     Asthma review     ACT Total Scores 7/31/2017   ACT TOTAL SCORE -   ASTHMA ER VISITS -   ASTHMA HOSPITALIZATIONS -   ACT TOTAL SCORE (Goal Greater than or Equal to 20) 21   In the past 12 months, how many times did you visit the emergency room for your asthma without being admitted to the hospital? 0   In the past 12 months, how many times were you hospitalized overnight because of your asthma? 0      1. Is Asthma diagnosis on the Problem List? Yes    2. Is Asthma listed on Health Maintenance? Yes    3. Patient is due for:  ACT and AAP      Composite cancer screening  Chart review shows that this patient is due/due soon for the following None  Summary:    Patient is due/failing the following:   AAP and ACT    Action needed:   Patient needs office visit for asthma f/u.    Type of outreach:    Sent Playspace message.    Questions for provider review:    None                                                                                                                                    Nina Gomez CMA       Chart routed to no one .           NULL

## 2024-03-01 ENCOUNTER — TELEPHONE (OUTPATIENT)
Dept: ORTHOPEDICS | Facility: CLINIC | Age: 54
End: 2024-03-01
Payer: COMMERCIAL

## 2024-03-01 NOTE — TELEPHONE ENCOUNTER
FINDINGS:     MEDIAL COMPARTMENT:   -Meniscus: Free edge and undersurface tearing of the body and posterior horn. Marked peripheral extrusion with a displaced flap in the inferior medial joint recess.  -Cartilage: Large areas of full-thickness cartilage loss along the medial femoral condyle and medial tibial plateau with subchondral marrow edema.     LATERAL COMPARTMENT:  -Meniscus: Normal.   -Cartilage: Normal.     PATELLOFEMORAL COMPARTMENT:   -Alignment: Patella midline. No subluxation or tilting.   -Cartilage: Full-thickness cartilage loss at the medial patellar facet and medial trochlea with subchondral marrow edema.     CRUCIATE LIGAMENTS:   -ACL: Normal.  -PCL: Normal.     COLLATERAL LIGAMENTS:   -Medial collateral ligament: Superficial and deep fibers are normal.  -Lateral collateral ligament: Normal.     POSTEROMEDIAL CORNER:  -Distal semimembranosus tendon is normal.   -Pes anserine tendons are normal. Posteromedial corner complex ligaments are intact.     POSTEROLATERAL CORNER:   -Popliteal tendon is intact. No tendinopathy.  -Biceps femoris tendon and posterolateral corner complex ligaments are intact.     EXTENSOR MECHANISM:   -Quadriceps tendon: Normal.  -Patellar tendon: Intact. Mild tendinosis distally.  -Patellofemoral ligaments and retinacula: Intact.     JOINT:   -Small to moderate joint effusion. No synovitis. There is a 1.3 cm intra-articular body anteriorly. There is a 1.6 cm intra-articular body posteriorly.     BONES:  -No fracture or concerning marrow replacing lesion.     SOFT TISSUES:   -No popliteal cyst. No acute muscular injury or soft tissue mass.                                                                       IMPRESSION:  1.  Medial meniscus tearing with marked peripheral extrusion and a displaced flap.  2.  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  3.  Small to moderate knee joint effusion with intra-articular bodies.  4.  Mild distal patellar  tendinosis.  5.  Intact cruciate ligaments, collateral ligaments, and lateral meniscus.    Please advise on MRI.    Homero Scanlon ATC

## 2024-03-05 NOTE — TELEPHONE ENCOUNTER
CC: Right knee MRI follow-up    Patient is a 53-year-old male known to my clinic with right knee medial compartment arthritis.  For full history and physical please see my note from December 2023.  In short he has severe, bone-on-bone arthritis in the medial compartment of his right knee.  He has been struggling with this for over 5 years.  He continues to work despite the medial sided knee pain.  Over the last year has had increasing anterior knee pain and swelling.  At that time he was having feelings of locking and mechanical symptoms.  I performed an intra-articular corticosteroid injection at that time.  He states that this gave him a few weeks of relief.  He has not yet tried hyaluronic acid or a medial  brace.  He takes Tylenol in the morning before work.  I reviewed his MRI with him.  This shows complete cartilage loss of the medial femoral condyle and tibial plateau.  There is complete cartilage loss off the medial facet of the patella and the medial trochlea.  There is a 1 cm x 1 cm x 1 cm osteochondral body in the anterior fat pad.  ACL PCL intact.  Lateral compartment shows mild thinning of the cartilage from the medial femoral condyle.  No cartilage loss from the tibia.  The lateral meniscus shows no signs of pathology.  I the opportunity to review these results with him today.  I the opportunity answer his questions about his MRI.  We discussed treatment options.  We discussed nonoperative options in the form of continued oral anti-inflammatory medication and activity modification.  We discussed the role of intra-articular corticosteroid and hyaluronic acid injections.  If he feels that these are giving him symptomatic relief, I would not limit the number of injections that he gets.  We also discussed the role of a medial  brace.  We discussed operative options.  At this point I would not recommend an arthroscopic procedure for loose body removal.  He does not appear to be having any  true symptoms of locking.  The loose bodies in the same position it was in 2016.  Given the amount of chondral damage to the medial facet of the patella and trochlea as well as his symptoms of anterior knee pain, I would not recommend a medial unicompartmental arthroplasty.  At this time, I would recommend a total knee arthroplasty as the operative treatment.  We discussed limitations as well as risks and benefits of total knee arthroplasty.  At this time he is goal remains to try to get to [facility] before having his knee replaced.  He would like to trial a hyaluronic acid injection and a medial  brace.  I am more than happy to put in a referral to physical therapy for a medial  brace.  We will request preauthorization for hyaluronic acid injection for his right knee.  I will follow-up with him in clinic in 2 weeks to perform the injection.      10 minutes were spent on the phone with the patient.    Ronan Arenas MD    [facility]   Department of Orthopedic Surgery

## 2024-03-06 ENCOUNTER — TELEPHONE (OUTPATIENT)
Dept: ORTHOPEDICS | Facility: CLINIC | Age: 54
End: 2024-03-06

## 2024-03-06 DIAGNOSIS — M17.11 PRIMARY OSTEOARTHRITIS OF RIGHT KNEE: Primary | ICD-10-CM

## 2024-03-06 NOTE — TELEPHONE ENCOUNTER
Called patient, LVM for patient to call back to schedule an injection for 2 weeks form 3/6/24. Scheduling number given.    Homero Scanlon ATC

## 2024-03-08 ENCOUNTER — NURSE TRIAGE (OUTPATIENT)
Dept: INTERNAL MEDICINE | Facility: CLINIC | Age: 54
End: 2024-03-08
Payer: COMMERCIAL

## 2024-03-08 NOTE — TELEPHONE ENCOUNTER
Patient scheduled an appointment for 3/18/24 with Dr. Arenas.   Message sent to our CAM team for DEBORAH Gould RN

## 2024-03-08 NOTE — TELEPHONE ENCOUNTER
Patient needs to have appointment scheduled for HA injection before authorization can be initiated.   There isn't a consent to communicate on file for patient.     Left 2nd message asking for a return call and [employer] message sent.     RICKY Gould RN

## 2024-03-08 NOTE — TELEPHONE ENCOUNTER
Patient reports potentially have blocked salivary gland on the L side, started a couple months ago and has been trying to express gland to no avail. Denies sore throat or any other cold symptoms. A but swollen and tender to touch. Patient denies difficulty breathing or issues managing saliva. Recommended patient go to - no available appointment within time frame that patient can make work. 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     Summer RN  Reason for Disposition   Very tender to the touch but no fever    Additional Information   Negative: Rapid increase in size of node over several hours   Negative: Tender node in the groin and has a sore, scratch, cut, or painful red area on that leg   Negative: Tender node in the armpit and has a sore, scratch, cut, or painful red area on that arm   Negative: Tender node in the neck and also has a sore throat with minimal/no runny nose or cough   Negative: Fever present > 3 days (72 hours)   Negative: Large nodes at multiple locations   Negative: Node is in the neck and causes difficulty breathing   Negative: Patient sounds very sick or weak to the triager   Negative: Sore throat is main symptom and has swollen node in the neck that is < 1 inch (2.5 cm) in size   Negative: Node is in the neck and can't swallow fluids   Negative: Fever > 103 F (39.4 C)   Negative: Lump or swelling in groin and pulsating (like heartbeat)   Negative: Single large node and size > 1 inch (2.5 cm)   Negative: Overlying skin is red   Negative: Sounds like a life-threatening emergency to the triager    Protocols used: Lymph Nodes - [id number]

## 2024-03-09 ENCOUNTER — OFFICE VISIT (OUTPATIENT)
Dept: URGENT CARE | Facility: URGENT CARE | Age: 54
End: 2024-03-09
Payer: COMMERCIAL

## 2024-03-09 VITALS
SYSTOLIC BLOOD PRESSURE: 172 MMHG | HEART RATE: 75 BPM | OXYGEN SATURATION: 98 % | TEMPERATURE: 97.8 F | RESPIRATION RATE: 16 BRPM | DIASTOLIC BLOOD PRESSURE: 93 MMHG

## 2024-03-09 DIAGNOSIS — R59.0 SUBMANDIBULAR LYMPHADENOPATHY: Primary | ICD-10-CM

## 2024-03-09 LAB
BASOPHILS # BLD AUTO: 0 10E3/UL (ref 0–0.2)
BASOPHILS NFR BLD AUTO: 1 %
DEPRECATED S PYO AG THROAT QL EIA: NEGATIVE
EOSINOPHIL # BLD AUTO: 0.3 10E3/UL (ref 0–0.7)
EOSINOPHIL NFR BLD AUTO: 4 %
ERYTHROCYTE [DISTWIDTH] IN BLOOD BY AUTOMATED COUNT: 14.4 % (ref 10–15)
HCT VFR BLD AUTO: 42.6 % (ref 40–53)
HGB BLD-MCNC: 14 G/DL (ref 13.3–17.7)
IMM GRANULOCYTES # BLD: 0 10E3/UL
IMM GRANULOCYTES NFR BLD: 0 %
LYMPHOCYTES # BLD AUTO: 2.3 10E3/UL (ref 0.8–5.3)
LYMPHOCYTES NFR BLD AUTO: 30 %
MCH RBC QN AUTO: 29.6 PG (ref 26.5–33)
MCHC RBC AUTO-ENTMCNC: 32.9 G/DL (ref 31.5–36.5)
MCV RBC AUTO: 90 FL (ref 78–100)
MONOCYTES # BLD AUTO: 0.5 10E3/UL (ref 0–1.3)
MONOCYTES NFR BLD AUTO: 7 %
MONOCYTES NFR BLD AUTO: NEGATIVE %
NEUTROPHILS # BLD AUTO: 4.5 10E3/UL (ref 1.6–8.3)
NEUTROPHILS NFR BLD AUTO: 59 %
PLATELET # BLD AUTO: 322 10E3/UL (ref 150–450)
RBC # BLD AUTO: 4.73 10E6/UL (ref 4.4–5.9)
WBC # BLD AUTO: 7.7 10E3/UL (ref 4–11)

## 2024-03-09 PROCEDURE — 36415 COLL VENOUS BLD VENIPUNCTURE: CPT | Performed by: PHYSICIAN ASSISTANT

## 2024-03-09 PROCEDURE — 87651 STREP A DNA AMP PROBE: CPT | Performed by: PHYSICIAN ASSISTANT

## 2024-03-09 PROCEDURE — 86308 HETEROPHILE ANTIBODY SCREEN: CPT | Performed by: PHYSICIAN ASSISTANT

## 2024-03-09 PROCEDURE — 99214 OFFICE O/P EST MOD 30 MIN: CPT | Performed by: PHYSICIAN ASSISTANT

## 2024-03-09 PROCEDURE — 85025 COMPLETE CBC W/AUTO DIFF WBC: CPT | Performed by: PHYSICIAN ASSISTANT

## 2024-03-09 NOTE — TELEPHONE ENCOUNTER
Per CAM team Synvisc One is not a preferred product with patient's insurance. Please advise if you'd like to switch to either Durolane (one time) or Eulfexxa (series of 3).     Eunice Kaur, ATC

## 2024-03-09 NOTE — Clinical Note
Roberto Panda,   I saw your very kind patient in urgent care today for evaluation of  Large, firm, tender, slightly irregular shaped, approximately 3 cm x 4.5 cm mass in left submandibular region x 2 months duration of uncertain etiology.   I have asked him to follow-up with you next week for further evaluation. My Urgent Care progress note is complete for your review.   Sincerely,   Cholo Sanders PA-C

## 2024-03-09 NOTE — PROGRESS NOTES
ASSESSMENT/PLAN:    (R59.0) Submandibular lymphadenopathy  (primary encounter diagnosis)    MDM: Large, firm, tender, slightly irregular shaped, approximately 3 cm x 4.5 cm mass in left submandibular region x 2 months duration of uncertain etiology and a 53-year-old male.  No increase in tenderness or size with chewing/eating (does not fit classic sialoadenitis picture--but that diagnosis remains in the differential).  No recent upper respiratory infections, dental infections, or ear nose and throat symptoms to suggest those etiologies.  Lymphoma and other more sinister etiologies remain in the differential.  Rapid strep is negative.  Strep PCR pending.  Mono was negative.  CBC with platelets and differential is within normal limits.  Patient is prescribed Augmentin 875 twice daily x 10 days.  If the  mass is infectious, I would expect it to decrease in size or resolve with antibiotic treatment.  Patient is advised to follow-up with primary care team this week (within a week) to reexamine the mass and to assess for response to treatment provided here today.  Primary care team will likely need to assist in additional evaluation (such as soft tissue neck ultrasound and to assist in coordinating a short interval follow-up with ENT) if he does not respond to antibiotic treatment provided here today.  All above was discussed with patient today in layperson terms.  Please also see the below discharge summary/plan (which I reviewed with patient today verbally and provided in printed form for home review).  I also CC'd patient's primary care provider on today's urgent care note to assist in continuity of care.    Plan: CBC with Platelets & Differential,         Streptococcus A Rapid Screen w/Reflex to PCR -         Clinic Collect, Mononucleosis screen, Group A         Streptococcus PCR Throat Swab,         amoxicillin-clavulanate (AUGMENTIN) 875-125 MG         tablet          After Visit Summary/Plan-       March 9,  2024 Urgent Care Plan:     As we discussed today, I do not know the cause of your large, firm, tender, slightly irregular shaped, approximately 3 cm x 4.5 cm mass in left submandibular region (under left angle of your jaw) for the past 2 months.     It is possible you could have an infection in his salivary gland.  However, as we reviewed today, your symptoms are not classic for that.    LABS:   -Your complete blood count with platelets and differential are within normal limits today here.      -Your mono testing is negative     -Your rapid strep test is negative    -A second test (called a strep PCR test) was done to look for hidden strep.  Please watch your MyChart for that result    -Please start the Augmentin antibiotic I prescribed today (1 tablet twice daily x 10 days)    -I have advised you follow-up with your primary care team next week to reevaluate your left sided neck mass.  If this fails to resolve with antibiotic treatment provided here today, your primary care provider team will need to assist you in obtaining imaging (such as soft tissue neck ultrasound), ENT doctor referral, and additional workup to look for more concerning causes (such as lymphoma).     -Follow-up sooner if you have any sudden worsening (increased swelling, increased pain, fever, chills or other new symptoms while you are waiting or follow-up with primary care next week)      This progress note has been dictated, with use of voice recognition software. Any grammatical, typographical, or context errors are unintentional and inherent to use of voice recognition software.  -----------------------------      Chief Complaint   Patient presents with    Mass     2 months, lump on left jaw-tender to touch, getting bigger, radiating to ear-has blocked salvia gland before     SUBJECTIVE:    Thai Mancia is a 53-year-old male who presents to urgent care today for evaluation of a large, firm, tender, lump under her left jaw area for  approximately 2 months duration.  Patient states lump was quite large when he first noticed it 2 months ago, but reports it may have gotten slightly larger over the past week or so.  Patient reports he did have a salivary duct stone (he cannot recall how that was treated/but reportedly did not require surgery) just left of mid chin area a number of years ago (not at same site as current swelling).    No sores inside of mouth.  No tenderness inside of mouth.  Lump does not get bigger or smaller with eating.  No increased pain with chewing or eating.  No tooth pain.  No known cracked teeth or abscessed teeth.  No injury or trauma to the area.  No recent history of pimples or skin infections in the area of swelling under her jaw.        ROS:   CONSTITUTIONAL: No acute onset fever,chills or fatigue.  No unexplained weight loss.  HEENT: No acute sore throat.  No acute ear pain or purulent drainage from ears.  CARDIAC: No acute onset chest pain or shortness of breath   RESP: No acute onset cough, chest pain or shortness of breath   GI: No acute onset abdominal pain. No acute onset nausea, vomiting or diarrhea   SKIN: No recent history of pimples or skin infections in the area of swelling under her jaw.  NEURO: No acute onset headaches, neck stiffness or lethargy.   HEME: No personal history of blood dyscrasias.  Patient is adopted (does not know anything about potential lymphoma or leukemia family history).        Past Medical History:   Diagnosis Date    Asthma     Diverticulitis     Essential hypertension      Patient Active Problem List   Diagnosis    CARDIOVASCULAR SCREENING; LDL GOAL LESS THAN 160    Mild persistent asthma without complication    Obesity due to excess calories, unspecified obesity severity    Essential hypertension    Morbid obesity (H)    Phimosis     Current Outpatient Medications   Medication    albuterol (PROAIR HFA/PROVENTIL HFA/VENTOLIN HFA) 108 (90 Base) MCG/ACT inhaler     fluticasone-salmeterol (ADVAIR) 250-50 MCG/ACT inhaler    lidocaine (LIDODERM) 5 % patch    losartan-hydrochlorothiazide (HYZAAR) 100-25 MG tablet     Current Facility-Administered Medications   Medication    lidocaine 1 % injection 2 mL    lidocaine 1 % injection 5 mL    triamcinolone (KENALOG-40) injection 40 mg     Allergies   Allergen Reactions    Cats      Itchy eyes, runny nose, wheezing    Dogs      Itchy eyes, runny nose, wheezing    Pollen Extract      Runny nose         OBJECTIVE:  BP (!) 172/93   Pulse 75   Temp 97.8  F (36.6  C)   Resp 16   SpO2 98%     General appearance: alert and no apparent distress  Skin color is uniform in color and without rash.  HEENT:   Conjunctiva not injected.  Sclera clear.  Left TM is normal: no effusions, no erythema, and normal landmarks.  Right TM is normal: no effusions, no erythema, and normal landmarks.  Nasal mucosa is normal.  Oropharyngeal exam is normal: no lesions, erythema, adenopathy or exudate.  Uvula is midline.  I palpated floor of mouth today (no lumps or swelling).  No obvious broken teeth.  No intraoral lesions.  NECK: Trachea is midline.  There is a large, firm, tender, slightly irregular shaped, approximately 3 cm x 4.5 cm mass in left submandibular region.  No other anterior or posterior cervical adenopathy.  CARDIAC:NORMAL - regular rate and rhythm without murmur.  RESP: Normal - CTA without rales, rhonchi, or wheezing.  ABDOMEN: Abdomen soft, non-tender. BS normal. No masses, organomegaly  NEURO: Alert and oriented.  Normal speech and mentation.  CN II/XII grossly intact.  Gait within normal limits.        Results for orders placed or performed in visit on 03/09/24   Mononucleosis screen     Status: Normal   Result Value Ref Range    Mononucleosis Screen Negative Negative   CBC with platelets and differential     Status: None   Result Value Ref Range    WBC Count 7.7 4.0 - 11.0 10e3/uL    RBC Count 4.73 4.40 - 5.90 10e6/uL    Hemoglobin 14.0 13.3  - 17.7 g/dL    Hematocrit 42.6 40.0 - 53.0 %    MCV 90 78 - 100 fL    MCH 29.6 26.5 - 33.0 pg    MCHC 32.9 31.5 - 36.5 g/dL    RDW 14.4 10.0 - 15.0 %    Platelet Count 322 150 - 450 10e3/uL    % Neutrophils 59 %    % Lymphocytes 30 %    % Monocytes 7 %    % Eosinophils 4 %    % Basophils 1 %    % Immature Granulocytes 0 %    Absolute Neutrophils 4.5 1.6 - 8.3 10e3/uL    Absolute Lymphocytes 2.3 0.8 - 5.3 10e3/uL    Absolute Monocytes 0.5 0.0 - 1.3 10e3/uL    Absolute Eosinophils 0.3 0.0 - 0.7 10e3/uL    Absolute Basophils 0.0 0.0 - 0.2 10e3/uL    Absolute Immature Granulocytes 0.0 <=0.4 10e3/uL   Streptococcus A Rapid Screen w/Reflex to PCR - Clinic Collect     Status: Normal    Specimen: Throat; Swab   Result Value Ref Range    Group A Strep antigen Negative Negative   CBC with Platelets & Differential     Status: None    Narrative    The following orders were created for panel order CBC with Platelets & Differential.  Procedure                               Abnormality         Status                     ---------                               -----------         ------                     CBC with platelets and d...[[id number]]                      Final result                 Please view results for these tests on the individual orders.

## 2024-03-10 LAB — GROUP A STREP BY PCR: NOT DETECTED

## 2024-03-10 NOTE — PATIENT INSTRUCTIONS
March 9, 2024 Urgent Care Plan:     As we discussed today, I do not know the cause of yourThere is a large, firm, tender, slightly irregular shaped, approximately 3 cm x 4.5 cm mass in left submandibular region (under left angle of your jaw) for the past 2 months.     It is possible you could have an infection in his salivary gland.  However, as we reviewed today, your symptoms are not classic for that.    LABS:   -Your complete blood count with platelets and differential are within normal limits today here.      -Your mono testing is negative     -Your rapid strep test is negative    -A second test (called a strep PCR test) was done to look for hidden strep.  Please watch your MyChart for that result    -Please start the Augmentin antibiotic I prescribed today (1 tablet twice daily x 10 days)    -I have advised you follow-up with your primary care team next week to reevaluate your left sided neck mass.  If this fails to resolve with antibiotic treatment provided here today, your primary care provider team will need to assist you in obtaining imaging (such as soft tissue neck ultrasound), ENT doctor referral, and additional workup to look for more concerning causes (such as lymphoma).     -Follow-up sooner if you have any sudden worsening (increased swelling, increased pain, fever, chills or other new symptoms while you are waiting or follow-up with primary care next week)

## 2024-03-12 ENCOUNTER — TELEPHONE (OUTPATIENT)
Dept: INTERNAL MEDICINE | Facility: CLINIC | Age: 54
End: 2024-03-12
Payer: COMMERCIAL

## 2024-03-12 DIAGNOSIS — R22.1 NECK MASS: Primary | ICD-10-CM

## 2024-03-12 NOTE — TELEPHONE ENCOUNTER
He can continue antibiotics, and see how it does    ENT referral ordered   I would make appointment and cancel if not needed   They can assess and do imaging if needed

## 2024-03-12 NOTE — TELEPHONE ENCOUNTER
Contacted patient. He started antibiotic on Sunday and the mass is a little smaller today, but has not resolved. He is okay with seeing ENT and having imaging or waiting longer to see how the mass responds to continued antibiotic treatment. Please call back with recommendations.    Ok to leave detailed voice message when calling back.     Nadeen Monet RN  [facility]

## 2024-03-12 NOTE — TELEPHONE ENCOUNTER
Per urgent care note     I have advised you follow-up with your primary care team next week to reevaluate your left sided neck mass. If this fails to resolve with antibiotic treatment provided here today, your primary care provider team will need to assist you in obtaining imaging (such as soft tissue neck ultrasound), ENT doctor referral, and additional workup to look for more concerning causes (such as lymphoma).     See how he is doing - if mass still present would have him see ENT. We might do imaging prior to that

## 2024-03-12 NOTE — TELEPHONE ENCOUNTER
Left detailed message with providers recommendation and gave number to schedule ENT if patient would like to reach out himself. Closing encounter.     Shanon MELÉNDEZ

## 2024-03-18 ENCOUNTER — TELEPHONE (OUTPATIENT)
Dept: OTOLARYNGOLOGY | Facility: CLINIC | Age: 54
End: 2024-03-18

## 2024-03-18 ENCOUNTER — OFFICE VISIT (OUTPATIENT)
Dept: ORTHOPEDICS | Facility: CLINIC | Age: 54
End: 2024-03-18
Payer: COMMERCIAL

## 2024-03-18 VITALS — HEIGHT: 71 IN | BODY MASS INDEX: 36.54 KG/M2 | WEIGHT: 261 LBS

## 2024-03-18 DIAGNOSIS — M17.11 PRIMARY OSTEOARTHRITIS OF RIGHT KNEE: Primary | ICD-10-CM

## 2024-03-18 PROCEDURE — 99213 OFFICE O/P EST LOW 20 MIN: CPT | Mod: 25 | Performed by: STUDENT IN AN ORGANIZED HEALTH CARE EDUCATION/TRAINING PROGRAM

## 2024-03-18 PROCEDURE — 20610 DRAIN/INJ JOINT/BURSA W/O US: CPT | Mod: RT | Performed by: STUDENT IN AN ORGANIZED HEALTH CARE EDUCATION/TRAINING PROGRAM

## 2024-03-18 ASSESSMENT — KOOS JR
RISING FROM SITTING: MODERATE
BENDING TO THE FLOOR TO PICK UP OBJECT: MODERATE
TWISING OR PIVOTING ON KNEE: MODERATE
KOOS JR SCORING: 54.84
STANDING UPRIGHT: MILD
STRAIGHTENING KNEE FULLY: MILD
HOW SEVERE IS YOUR KNEE STIFFNESS AFTER FIRST WAKING IN MORNING: MODERATE
GOING UP OR DOWN STAIRS: SEVERE

## 2024-03-18 NOTE — LETTER
3/18/2024         RE: Thai Mancia  [address]  [place] [address]        Dear Colleague,    Thank you for referring your patient, Thai Mancia, to the [facility] [place]. Please see a copy of my visit note below.    CC: Right knee arthritis    HPI: Patient is a 53-year-old man known to my practice with end-stage right knee medial compartment arthritis.  For full history and physical please see my notes from December and March.  In short, he underwent an intra-articular corticosteroid injection into his right knee in December.  This did provide some pain relief, but has worn off.  He has known end-stage arthritis in the medial compartment of his right knee.  MRI from December shows significant changes in the medial patellofemoral joint, as well as some early changes in the lateral compartment.  Therefore we discussed that his ultimate surgical intervention would be a total knee arthroplasty, not a unicondylar arthroplasty.  He is really trying to get to alf before undergoing a knee replacement.  We have submitted for prior Auth for hyaluronic acid injection to his right knee.  Additionally, he has been referred to physical therapy for trialing a medial  brace.  Although he has not yet had a chance to get that set up    Objective:   PE:  RLE: Grade 1 effusion of the right knee today.  Noted clinical varus deformity.  Passive range of motion 5 degrees shy of full extension to 120 degrees of flexion.  Active range of motion is equivalent to passive range of motion.  5/5 knee flexion extension    Procedure:   Written informed consent for Right knee intra-articular injection was obtained from the patient after discussing the risk and benefits of the procedure.  Risk and benefits including but not limited to bleeding, infection, failure to cure pain and allergic reaction were discussed.  The anterior inferolateral portal was identified by palpation of  bony landmarks.  The skin was cleaned with iodine solution.  Under sterile technique the anterior inferolateral portal was utilized to inject the entirety of the hyaluronic acid solution.  Soft dressings were applied.  Patient tolerated the procedure without difficulty.  I counseled the patient on signs and symptoms of allergic reaction and infection.    Large Joint Injection/Arthocentesis: R knee joint    Date/Time: 3/18/2024 4:20 PM    Performed by: Ronan Arenas MD  Authorized by: Ronan Aernas MD    Indications:  Pain  Needle Size:  22 G  Guidance: landmark guided    Approach:  Anterior  Location:  Knee      Medications:  60 mg sodium hyaluronate 60 MG/3ML  Outcome:  Tolerated well, no immediate complications  Procedure discussed: discussed risks, benefits, and alternatives    Consent Given by:  Patient  Timeout: timeout called immediately prior to procedure    Prep: patient was prepped and draped in usual sterile fashion      A/P:  Patient is a 53-year-old male known to my clinic with right knee tricompartmental osteoarthritis, most pronounced in the medial compartment.  Thus far he has trialed activity modification, oral anti-inflammatory medication, and intra-articular corticosteroid injection.  He is interested in trialing a hyaluronic acid injection today.  I think that is very reasonable.  I discussed with him the risks and benefits including but not limited to bleeding, infection, allergic reaction, and failure to cure pain.  Procedure was performed in office today as highlighted above.  I also resubmit his referral for a medial  brace, as he has been unable to set up that appointment yet.  He will follow-up with me as needed moving forward for his right knee arthritis.    Ronan Arenas MD    [facility]   Department of Orthopedic Surgery    Disclaimer: This note consists of symbols derived from keyboarding, dictation and/or voice recognition software. As a  result, there may be errors in the script that have gone undetected. Please consider this when interpreting information found in this chart.        Again, thank you for allowing me to participate in the care of your patient.        Sincerely,        Ronan Arenas MD

## 2024-03-18 NOTE — PROGRESS NOTES
CC: Right knee arthritis    HPI: Patient is a 53-year-old man known to my practice with end-stage right knee medial compartment arthritis.  For full history and physical please see my notes from December and March.  In short, he underwent an intra-articular corticosteroid injection into his right knee in December.  This did provide some pain relief, but has worn off.  He has known end-stage arthritis in the medial compartment of his right knee.  MRI from December shows significant changes in the medial patellofemoral joint, as well as some early changes in the lateral compartment.  Therefore we discussed that his ultimate surgical intervention would be a total knee arthroplasty, not a unicondylar arthroplasty.  He is really trying to get to [facility] before undergoing a knee replacement.  We have submitted for prior Auth for hyaluronic acid injection to his right knee.  Additionally, he has been referred to physical therapy for trialing a medial  brace.  Although he has not yet had a chance to get that set up    Objective:   PE:  RLE: Grade 1 effusion of the right knee today.  Noted clinical varus deformity.  Passive range of motion 5 degrees shy of full extension to 120 degrees of flexion.  Active range of motion is equivalent to passive range of motion.  5/5 knee flexion extension    Procedure:   Written informed consent for Right knee intra-articular injection was obtained from the patient after discussing the risk and benefits of the procedure.  Risk and benefits including but not limited to bleeding, infection, failure to cure pain and allergic reaction were discussed.  The anterior inferolateral portal was identified by palpation of bony landmarks.  The skin was cleaned with iodine solution.  Under sterile technique the anterior inferolateral portal was utilized to inject the entirety of the hyaluronic acid solution.  Soft dressings were applied.  Patient tolerated the procedure without difficulty.  I  counseled the patient on signs and symptoms of allergic reaction and infection.    Large Joint Injection/Arthocentesis: R knee joint    Date/Time: 3/18/2024 4:20 PM    Performed by: Ronan Arenas MD  Authorized by: Ronan Arenas MD    Indications:  Pain  Needle Size:  22 G  Guidance: landmark guided    Approach:  Anterior  Location:  Knee      Medications:  60 mg sodium hyaluronate 60 MG/3ML  Outcome:  Tolerated well, no immediate complications  Procedure discussed: discussed risks, benefits, and alternatives    Consent Given by:  Patient  Timeout: timeout called immediately prior to procedure    Prep: patient was prepped and draped in usual sterile fashion      A/P:  Patient is a 53-year-old male known to my clinic with right knee tricompartmental osteoarthritis, most pronounced in the medial compartment.  Thus far he has trialed activity modification, oral anti-inflammatory medication, and intra-articular corticosteroid injection.  He is interested in trialing a hyaluronic acid injection today.  I think that is very reasonable.  I discussed with him the risks and benefits including but not limited to bleeding, infection, allergic reaction, and failure to cure pain.  Procedure was performed in office today as highlighted above.  I also resubmit his referral for a medial  brace, as he has been unable to set up that appointment yet.  He will follow-up with me as needed moving forward for his right knee arthritis.    Ronan Arenas MD    [facility]   Department of Orthopedic Surgery    Disclaimer: This note consists of symbols derived from keyboarding, dictation and/or voice recognition software. As a result, there may be errors in the script that have gone undetected. Please consider this when interpreting information found in this chart.

## 2024-03-18 NOTE — TELEPHONE ENCOUNTER
[facility] Call Center    Phone Message    May a detailed message be left on voicemail: yes     Reason for Call: Other: Pt called in regards to referral for Neck Mass. Sending TE per protocol     Action Taken: Other: [facility] ENT    Travel Screening: Not Applicable

## 2024-03-20 ENCOUNTER — OFFICE VISIT (OUTPATIENT)
Dept: OTOLARYNGOLOGY | Facility: CLINIC | Age: 54
End: 2024-03-20
Payer: COMMERCIAL

## 2024-03-20 VITALS
SYSTOLIC BLOOD PRESSURE: 147 MMHG | BODY MASS INDEX: 35.84 KG/M2 | WEIGHT: 256 LBS | OXYGEN SATURATION: 96 % | HEIGHT: 71 IN | HEART RATE: 85 BPM | DIASTOLIC BLOOD PRESSURE: 90 MMHG | TEMPERATURE: 98.7 F

## 2024-03-20 DIAGNOSIS — R22.1 NECK MASS: Primary | ICD-10-CM

## 2024-03-20 PROCEDURE — 99203 OFFICE O/P NEW LOW 30 MIN: CPT | Performed by: REGISTERED NURSE

## 2024-03-20 ASSESSMENT — PAIN SCALES - GENERAL: PAINLEVEL: SEVERE PAIN (6)

## 2024-03-20 NOTE — NURSING NOTE
"Chief Complaint   Patient presents with    Consult     Neck mass      Blood pressure (!) 147/90, pulse 85, temperature 98.7  F (37.1  C), height 1.803 m (5' 11\"), weight 116.1 kg (256 lb), SpO2 96%.  Bienvenido Silva LPN    "

## 2024-03-20 NOTE — LETTER
3/20/2024       RE: Thai Mancia  [address]  [place] [address]     Dear Colleague,    Thank you for referring your patient, Thai Mancia, to the [facility] [place] [facility]. Please see a copy of my visit note below.    [facility] Ear, Nose and Throat [facility]   Head and Neck Surgery  March 20, 2024     HPI: Thai Mancia is a 53 year old male who presents today for evaluation of left neck mass.  Patient reports a approximate 4-month history of a left neck mass.  Patient cannot recall when they first noticed mass but feels that this mass may be slowly enlarging over the past few months.  Patient denies any upper respiratory infections, fluctuation of mass, ear pain, or dental infections.  Patient was seen in primary care on 3/9/2024.  Strep and mono were negative.  CBC within normal limits.  Patient was prescribed Augmentin which did not change neck mass.    Patient denies any dysphagia, odynophagia, new sore throat, mouth pain, ear pain, bleeding from the mouth, hemoptysis, voice changes or fever/chills. Mass does not fluctuate in size or change when eating.    Past Medical History:  Past Medical History:   Diagnosis Date    Asthma     Diverticulitis     Essential hypertension        Past Surgical History:  Past Surgical History:   Procedure Laterality Date    CIRCUMCISION N/A 11/18/2021    Procedure: CIRCUMCISION;  Surgeon: Toy Feliz MD;  [place] [facility] [place]    COLONOSCOPY N/A 12/13/2021    Procedure: COLONOSCOPY;  Surgeon: Claire Browning MD;  Location:  GI    KNEE SURGERY  1985    left       Medications:  Current Outpatient Medications   Medication Sig Dispense Refill    albuterol (PROAIR HFA/PROVENTIL HFA/VENTOLIN HFA) 108 (90 Base) MCG/ACT inhaler Inhale 2 puffs into the lungs every 6 hours as needed for shortness of breath or wheezing 8.5 g 11    fluticasone-salmeterol (ADVAIR)  "250-50 MCG/ACT inhaler INHALE ONE PUFF BY MOUTH EVERY 12 HOURS 60 each 11    lidocaine (LIDODERM) 5 % patch Place 1 patch onto the skin every 24 hours To prevent lidocaine toxicity, patient should be patch free for 12 hrs daily. 10 patch 11    losartan-hydrochlorothiazide (HYZAAR) 100-25 MG tablet Take 1 tablet by mouth daily Note change in dose 90 tablet 3       Allergies:  Allergies   Allergen Reactions    Cats      Itchy eyes, runny nose, wheezing    Dogs      Itchy eyes, runny nose, wheezing    Pollen Extract      Runny nose        Social History:  Social History     Tobacco Use    Smoking status: Never    Smokeless tobacco: Never   Vaping Use    Vaping Use: Never used   Substance Use Topics    Alcohol use: No     Alcohol/week: 0.0 standard drinks of alcohol     Comment: quit 08/17    Drug use: No     ROS: 10 point ROS neg other than the symptoms noted above in the HPI.    Physical Exam:    BP (!) 147/90   Pulse 85   Temp 98.7  F (37.1  C)   Ht 1.803 m (5' 11\")   Wt 116.1 kg (256 lb)   SpO2 96%   BMI 35.70 kg/m       Constitutional:  The patient was unaccompanied, well-groomed, and in no acute distress.     Skin: Normal:  warm and pink without rash    Neurologic: Alert and oriented x 3.  CN's III-XII within normal limits.  Voice normal.    Psychiatric: The patient's affect was calm, cooperative, and appropriate.     Communication:  Normal; communicates verbally, normal voice quality.    Respiratory: Breathing comfortably without stridor or exertion of accessory muscles.    Head/Face:  Normocephalic and atraumatic.  No lesions or scars.    Eyes: Pupils were equal and reactive.  Extraocular movement intact.    Ears: Pinnae and tragus non-tender.  EAC's and TM's were clear.     Nose: Sinuses were non-tender.  Anterior rhinoscopy revealed midline septum and absence of purulence or polyps.     Oral Cavity: Normal tongue, floor of mouth, buccal mucosa, and palate.  No lesions or masses on inspection or " palpation.     Oropharynx: Normal mucosa, palate symmetric with normal elevation. No abnormal lymph tissue in the oropharynx.   Pterygoid region non-tender.    Neck: Approx. 3 cm, semi-firm mass left level IIa neck. No tenderness with palpation.     Assessment/Plan:  1. Neck mass  Patient with 4-month history of a left neck mass.  Physical exam today does reveal a semifirm approximate 3 cm left neck mass in level 2A.  This may be an inflamed submandibular gland, however, patient does not have any other symptoms of sialoadenitis.  Will obtain a CT scan to further evaluate.  Mass is palpable on exam and would be amenable for a clinical FNA if indicated based on imaging.    Will order CT scan and contact patient with results and follow-up plan of care once imaging has been completed.     Estefani Conroy DNP, APRN, CNP  Otolaryngology  Head & Neck Surgery  [phone]    30 minutes spent on the date of the encounter doing chart review, history and exam, documentation and further activities per the note.

## 2024-03-24 NOTE — PROGRESS NOTES
[facility]   Head and Neck Surgery  March 20, 2024     HPI: Thai Mancia is a 53 year old male who presents today for evaluation of left neck mass.  Patient reports a approximate 4-month history of a left neck mass.  Patient cannot recall when they first noticed mass but feels that this mass may be slowly enlarging over the past few months.  Patient denies any upper respiratory infections, fluctuation of mass, ear pain, or dental infections.  Patient was seen in primary care on 3/9/2024.  Strep and mono were negative.  CBC within normal limits.  Patient was prescribed Augmentin which did not change neck mass.    Patient denies any dysphagia, odynophagia, new sore throat, mouth pain, ear pain, bleeding from the mouth, hemoptysis, voice changes or fever/chills. Mass does not fluctuate in size or change when eating.    Past Medical History:  Past Medical History:   Diagnosis Date     Asthma      Diverticulitis      Essential hypertension        Past Surgical History:  Past Surgical History:   Procedure Laterality Date     CIRCUMCISION N/A 11/18/2021    Procedure: CIRCUMCISION;  Surgeon: Toy Feliz MD;  [place] [facility] [place]     COLONOSCOPY N/A 12/13/2021    Procedure: COLONOSCOPY;  Surgeon: Claire Browning MD;  Location:  GI     KNEE SURGERY  1985    left       Medications:  Current Outpatient Medications   Medication Sig Dispense Refill     albuterol (PROAIR HFA/PROVENTIL HFA/VENTOLIN HFA) 108 (90 Base) MCG/ACT inhaler Inhale 2 puffs into the lungs every 6 hours as needed for shortness of breath or wheezing 8.5 g 11     fluticasone-salmeterol (ADVAIR) 250-50 MCG/ACT inhaler INHALE ONE PUFF BY MOUTH EVERY 12 HOURS 60 each 11     lidocaine (LIDODERM) 5 % patch Place 1 patch onto the skin every 24 hours To prevent lidocaine toxicity, patient should be patch free for 12 hrs daily. 10 patch 11     losartan-hydrochlorothiazide (HYZAAR) 100-25 MG tablet Take 1 tablet by mouth daily  "Note change in dose 90 tablet 3       Allergies:  Allergies   Allergen Reactions     Cats      Itchy eyes, runny nose, wheezing     Dogs      Itchy eyes, runny nose, wheezing     Pollen Extract      Runny nose        Social History:  Social History     Tobacco Use     Smoking status: Never     Smokeless tobacco: Never   Vaping Use     Vaping Use: Never used   Substance Use Topics     Alcohol use: No     Alcohol/week: 0.0 standard drinks of alcohol     Comment: quit 08/17     Drug use: No     ROS: 10 point ROS neg other than the symptoms noted above in the HPI.    Physical Exam:    BP (!) 147/90   Pulse 85   Temp 98.7  F (37.1  C)   Ht 1.803 m (5' 11\")   Wt 116.1 kg (256 lb)   SpO2 96%   BMI 35.70 kg/m       Constitutional:  The patient was unaccompanied, well-groomed, and in no acute distress.     Skin: Normal:  warm and pink without rash    Neurologic: Alert and oriented x 3.  CN's III-XII within normal limits.  Voice normal.    Psychiatric: The patient's affect was calm, cooperative, and appropriate.     Communication:  Normal; communicates verbally, normal voice quality.    Respiratory: Breathing comfortably without stridor or exertion of accessory muscles.    Head/Face:  Normocephalic and atraumatic.  No lesions or scars.    Eyes: Pupils were equal and reactive.  Extraocular movement intact.    Ears: Pinnae and tragus non-tender.  EAC's and TM's were clear.     Nose: Sinuses were non-tender.  Anterior rhinoscopy revealed midline septum and absence of purulence or polyps.     Oral Cavity: Normal tongue, floor of mouth, buccal mucosa, and palate.  No lesions or masses on inspection or palpation.     Oropharynx: Normal mucosa, palate symmetric with normal elevation. No abnormal lymph tissue in the oropharynx.   Pterygoid region non-tender.    Neck: Approx. 3 cm, semi-firm mass left level IIa neck. No tenderness with palpation.     Assessment/Plan:  1. Neck mass  Patient with 4-month history of a left neck " mass.  Physical exam today does reveal a semifirm approximate 3 cm left neck mass in level 2A.  This may be an inflamed submandibular gland, however, patient does not have any other symptoms of sialoadenitis.  Will obtain a CT scan to further evaluate.  Mass is palpable on exam and would be amenable for a clinical FNA if indicated based on imaging.    Will order CT scan and contact patient with results and follow-up plan of care once imaging has been completed.     Estefani Conroy DNP, APRN, CNP  Otolaryngology  Head & Neck Surgery  [phone]    30 minutes spent on the date of the encounter doing chart review, history and exam, documentation and further activities per the note.

## 2024-03-27 ENCOUNTER — ANCILLARY PROCEDURE (OUTPATIENT)
Dept: CT IMAGING | Facility: CLINIC | Age: 54
End: 2024-03-27
Attending: REGISTERED NURSE
Payer: COMMERCIAL

## 2024-03-27 DIAGNOSIS — R22.1 NECK MASS: ICD-10-CM

## 2024-03-27 PROCEDURE — 70491 CT SOFT TISSUE NECK W/DYE: CPT | Mod: GC | Performed by: RADIOLOGY

## 2024-03-27 RX ORDER — IOPAMIDOL 755 MG/ML
90 INJECTION, SOLUTION INTRAVASCULAR ONCE
Status: COMPLETED | OUTPATIENT
Start: 2024-03-27 | End: 2024-03-27

## 2024-03-27 RX ADMIN — IOPAMIDOL 90 ML: 755 INJECTION, SOLUTION INTRAVASCULAR at 17:42

## 2024-03-27 NOTE — DISCHARGE INSTRUCTIONS

## 2024-03-28 ENCOUNTER — TELEPHONE (OUTPATIENT)
Dept: OTOLARYNGOLOGY | Facility: CLINIC | Age: 54
End: 2024-03-28
Payer: COMMERCIAL

## 2024-03-28 LAB — RADIOLOGIST FLAGS: NORMAL

## 2024-03-29 NOTE — TELEPHONE ENCOUNTER
"FUTURE VISIT INFORMATION      FUTURE VISIT INFORMATION:  Date: 6/24/24  Time: 8:20am  Location: [facility]  REFERRAL INFORMATION:  Referring provider:    Referring providers clinic:    Reason for visit/diagnosis  per referral neck mass scheduled per referral notes. confirmedC     RECORDS REQUESTED FROM:       Clinic name Comments Records Status Imaging Status   [facility] ENT 3/20/24- OV Mary Conroy NP  Epic     Imaging  3/27/24- CT Neck  Epic  PACS    [facility] 3/9/24- Ov Cholo Sanders PA-C  Epic             \"Please notify/message CSS if patient completed outside imaging prior to scheduled appointment and/or any outside records that might have been missed at pre visit -Thank you\"  "

## 2024-04-01 ENCOUNTER — TELEPHONE (OUTPATIENT)
Dept: OTOLARYNGOLOGY | Facility: CLINIC | Age: 54
End: 2024-04-01
Payer: COMMERCIAL

## 2024-04-03 ENCOUNTER — TELEPHONE (OUTPATIENT)
Dept: OTOLARYNGOLOGY | Facility: CLINIC | Age: 54
End: 2024-04-03
Payer: COMMERCIAL

## 2024-04-03 NOTE — TELEPHONE ENCOUNTER
"FUTURE VISIT INFORMATION      FUTURE VISIT INFORMATION:  Date: 4/23/24  Time: 7:15AM  Location: [facility] [place]  REFERRAL INFORMATION:  Referring provider:    Referring providers clinic:    Reason for visit/diagnosis  per referral neck mass scheduled per referral notes. confirmedCSC///Per notes in chart     RECORDS REQUESTED FROM:       Clinic name Comments Records Status Imaging Status   [facility] ENT 3/20/24- OV Mary Conroy NP  Epic      Imaging  3/27/24- CT Neck  Epic  PACS    [facility] 3/9/24- Ov Cholo Sanders PA-C  Epic                   \"Please notify/message CSS if patient completed outside imaging prior to scheduled appointment and/or any outside records that might have been missed at pre visit -Thank you\"    "

## 2024-04-23 ENCOUNTER — PRE VISIT (OUTPATIENT)
Dept: OTOLARYNGOLOGY | Facility: CLINIC | Age: 54
End: 2024-04-23

## 2024-04-23 ENCOUNTER — OFFICE VISIT (OUTPATIENT)
Dept: OTOLARYNGOLOGY | Facility: CLINIC | Age: 54
End: 2024-04-23
Attending: NURSE PRACTITIONER
Payer: COMMERCIAL

## 2024-04-23 ENCOUNTER — PREP FOR PROCEDURE (OUTPATIENT)
Dept: OTOLARYNGOLOGY | Facility: CLINIC | Age: 54
End: 2024-04-23

## 2024-04-23 VITALS
BODY MASS INDEX: 35.28 KG/M2 | HEIGHT: 71 IN | SYSTOLIC BLOOD PRESSURE: 165 MMHG | OXYGEN SATURATION: 97 % | WEIGHT: 252 LBS | HEART RATE: 69 BPM | DIASTOLIC BLOOD PRESSURE: 92 MMHG

## 2024-04-23 DIAGNOSIS — R22.1 NECK MASS: ICD-10-CM

## 2024-04-23 DIAGNOSIS — K11.5 SALIVARY GLAND STONE: Primary | ICD-10-CM

## 2024-04-23 PROCEDURE — 99204 OFFICE O/P NEW MOD 45 MIN: CPT | Performed by: OTOLARYNGOLOGY

## 2024-04-23 RX ORDER — CEFAZOLIN SODIUM 2 G/50ML
2 SOLUTION INTRAVENOUS
Status: CANCELLED | OUTPATIENT
Start: 2024-04-23

## 2024-04-23 RX ORDER — CEFAZOLIN SODIUM 2 G/50ML
2 SOLUTION INTRAVENOUS SEE ADMIN INSTRUCTIONS
Status: CANCELLED | OUTPATIENT
Start: 2024-04-23

## 2024-04-23 ASSESSMENT — PAIN SCALES - GENERAL: PAINLEVEL: SEVERE PAIN (7)

## 2024-04-23 NOTE — PROGRESS NOTES
Otolaryngology Clinic      Name: Thai Mancia  MRN: [id number]  Age: 53 year old  : 1970  Referring provider: Sara Elias  2024      Chief Complaint:  Consultation    History of Present Illness:   Thai Mancia is a 53 year old male with a history of Asthma and Diverticulitis who presents for consultation regarding left neck mass. Patient describes the mass as a little bothersome. He first starting noticing problems around Thanksgiving of last year. He reports the mass has been gradually growing over the past several months. Has been placed on antibiotics in the past. Has an upcoming knee replacement and is worried about his knee getting infected.      Active Medications:     Current Outpatient Medications:     albuterol (PROAIR HFA/PROVENTIL HFA/VENTOLIN HFA) 108 (90 Base) MCG/ACT inhaler, Inhale 2 puffs into the lungs every 6 hours as needed for shortness of breath or wheezing, Disp: 8.5 g, Rfl: 11    fluticasone-salmeterol (ADVAIR) 250-50 MCG/ACT inhaler, INHALE ONE PUFF BY MOUTH EVERY 12 HOURS, Disp: 60 each, Rfl: 11    lidocaine (LIDODERM) 5 % patch, Place 1 patch onto the skin every 24 hours To prevent lidocaine toxicity, patient should be patch free for 12 hrs daily., Disp: 10 patch, Rfl: 11    losartan-hydrochlorothiazide (HYZAAR) 100-25 MG tablet, Take 1 tablet by mouth daily Note change in dose, Disp: 90 tablet, Rfl: 3    Current Facility-Administered Medications:     lidocaine 1 % injection 2 mL, 2 mL, , , Ronan Arenas MD, 2 mL at 12/15/23 1126    lidocaine 1 % injection 5 mL, 5 mL, , , Ronan Arenas MD, 5 mL at 12/15/23 1126    sodium hyaluronate (DUROLANE) injection 60 mg, 60 mg, , , Ronan Arenas MD, 60 mg at 24 1620    triamcinolone (KENALOG-40) injection 40 mg, 40 mg, , , Ronan Arenas MD, 40 mg at 12/15/23 1126      Allergies:   Cats, Dogs, and Pollen extract      Past Medical History:  Past Medical History:   Diagnosis Date    Asthma     Diverticulitis   "   Essential hypertension      Patient Active Problem List   Diagnosis    CARDIOVASCULAR SCREENING; LDL GOAL LESS THAN 160    Mild persistent asthma without complication    Obesity due to excess calories, unspecified obesity severity    Essential hypertension    Morbid obesity (H)    Phimosis        Past Surgical History:  Past Surgical History:   Procedure Laterality Date    CIRCUMCISION N/A 11/18/2021    Procedure: CIRCUMCISION;  Surgeon: Toy Feliz MD;  Location: [facility] OR    COLONOSCOPY N/A 12/13/2021    Procedure: COLONOSCOPY;  Surgeon: Claire Browning MD;  Location:  GI    KNEE SURGERY  1985    left       Family History:   Family History   Problem Relation Age of Onset    Unknown/Adopted Mother         adopted    Unknown/Adopted Father     Diabetes No family hx of          Social History:   Social History     Tobacco Use    Smoking status: Never    Smokeless tobacco: Never   Vaping Use    Vaping status: Never Used   Substance Use Topics    Alcohol use: No     Alcohol/week: 0.0 standard drinks of alcohol     Comment: quit 08/17    Drug use: No       Review of Systems:   Pertinent items are noted in HPI or as in patient entered ROS below, remainder of complete ROS is negative.       4/23/2024     6:57 AM   [facility] ROS   Constitutional Appetite change    Problems with sleep   Eyes Double vision   Cardiopulmonary Wheezing   Gastrointestinal/Genitourinary Heartburn/indigestion    Unexplained nausea/vomiting   Musculoskeletal Sore or stiff joints    Swollen joints    Neck pain    Swollen legs/feet   Hematologic Lymph node swelling   Endocrine Thirst    Frequent urination   Skin Change in moles         Physical Exam:   BP (!) 165/92   Pulse 69   Ht 1.803 m (5' 11\")   Wt 114.3 kg (252 lb)   SpO2 97%   BMI 35.15 kg/m       Constitutional:  The patient was unaccompanied, well-groomed, and in no acute distress.    Skin:  Warm and pink.    Neurologic:  Alert and oriented x 3.  CN's III-XII within normal " limits.  Voice normal.   Psychiatric:  The patient's affect was calm, cooperative, and appropriate.    Respiratory:  Breathing comfortably without stridor or exertion of accessory muscles.    Eyes: Extraocular movement intact.    Head:  Normocephalic and atraumatic.  No lesions or scars.    Ears:  Pinnae and tragus non-tender.  EAC's and TM's were clear.   Nose:  Sinuses were non-tender.  Anterior rhinoscopy revealed midline septum and absence of purulence or polyps.    OC/OP:  Normal tongue, floor of mouth, buccal mucosa, and palate.  No lesions or masses on inspection or palpation.  No abnormal lymph tissue in the oropharynx.  The pterygoid region is non-tender.    Neck:  Supple with normal laryngeal and tracheal landmarks.  The parotid beds were without masses.  No palpable thyroid.left neck submax area bimanually palpavbler some tenderness stone palpable too, in neck ofgland.   Lymphatic:  There is no palpable lymphadenopathy in the neck.     Imaging:  CT SOFT TISSUE NECK W CONTRAST 3/27/2024   Impression:  Nonobstructive sialolith of the left submandibular gland. The gland  itself displays a mild degree of enhancement, which is favored to  represent mild inflammatory changes.      Assessment and Plan:    ICD-10-CM    1. Neck mass  R22.1 Adult ENT  Referral         53 year old male with a history of left neck mass.     Follow-up: No follow-ups on file.     Will [id number] left nek gland removal  as it could bacterially seed his joint replacement. Will plan for this       Scribe Disclosure:   I, Denisse Alvarez, am serving as a scribe; to document services personally performed by Bassam Mendez MD -based on data collection and the provider's statements to me.     Provider Disclosure:  I agree with above History, Review of Systems, Physical exam and Plan.  I have reviewed the content of the documentation and have edited it as needed. I have personally performed the services documented here and the  documentation accurately represents those services and the decisions I have made.      Electronically signed by:

## 2024-04-23 NOTE — PROGRESS NOTES
Teaching Flowsheet - ENT   Relevant Diagnosis: Salivary Gland Stones  Teaching Topic:Person(s) involved in teaching: patient       Motivation Level:  Asks Questions:   Yes  Eager to Learn:   Yes  Cooperative:   Yes  Receptive (willing/able to accept information):   Yes  Comments: Reviewed pre-op H and P,  NPO prior to  surgery,  pre-op scrub (given Hibiclens)  Reviewed post-op  cares , activity and pain.     Patient demonstrates understanding of the following:  Reason for the appointment, diagnosis and treatment plan:   Yes  Knowledge of proper use of medications and conditions for which they are ordered (with special attention to potential side effects or drug interactions):  stop aspirin products 1 week before surgery Yes  Which situations necessitate calling provider and whom to contact:   Yes  Nutritional needs and diet plan:   Yes  Pain management techniques:   Yes  Patient instructed on hand hygiene:  Yes  How and/when to access community resources:   Yes     Infection Prevention:  Patient   demonstrates understanding of the following:  Surgical procedure site care taught Yes  Signs and symptoms of infection taught Yes  Wound care taught Yes  Instructional Materials Used/Given: verbal instruction.

## 2024-04-23 NOTE — LETTER
2024       RE: Thai Mancia  [address]  [facility] [address]     Dear Colleague,    Thank you for referring your patient, Thai Mancia, to the [facility] [place] at [facility]. Please see a copy of my visit note below.      Otolaryngology Clinic      Name: Thai Mancia  MRN: [id number]  Age: 53 year old  : 1970  Referring provider: Sara Elias  2024      Chief Complaint:  Consultation    History of Present Illness:   Thai Mancia is a 53 year old male with a history of Asthma and Diverticulitis who presents for consultation regarding left neck mass. Patient describes the mass as a little bothersome. He first starting noticing problems around Thanksgiving of last year. He reports the mass has been gradually growing over the past several months. Has been placed on antibiotics in the past. Has an upcoming knee replacement and is worried about his knee getting infected.      Active Medications:     Current Outpatient Medications:      albuterol (PROAIR HFA/PROVENTIL HFA/VENTOLIN HFA) 108 (90 Base) MCG/ACT inhaler, Inhale 2 puffs into the lungs every 6 hours as needed for shortness of breath or wheezing, Disp: 8.5 g, Rfl: 11     fluticasone-salmeterol (ADVAIR) 250-50 MCG/ACT inhaler, INHALE ONE PUFF BY MOUTH EVERY 12 HOURS, Disp: 60 each, Rfl: 11     lidocaine (LIDODERM) 5 % patch, Place 1 patch onto the skin every 24 hours To prevent lidocaine toxicity, patient should be patch free for 12 hrs daily., Disp: 10 patch, Rfl: 11     losartan-hydrochlorothiazide (HYZAAR) 100-25 MG tablet, Take 1 tablet by mouth daily Note change in dose, Disp: 90 tablet, Rfl: 3    Current Facility-Administered Medications:      lidocaine 1 % injection 2 mL, 2 mL, , , Ronan Arenas MD, 2 mL at 12/15/23 1126     lidocaine 1 % injection 5 mL, 5 mL, , , Ronan Arenas MD, 5 mL at 12/15/23 1126      sodium hyaluronate (DUROLANE) injection 60 mg, 60 mg, , , Ronan Arenas MD, 60 mg at 03/18/24 1620     triamcinolone (KENALOG-40) injection 40 mg, 40 mg, , , Ronan Arenas MD, 40 mg at 12/15/23 1126      Allergies:   Cats, Dogs, and Pollen extract      Past Medical History:  Past Medical History:   Diagnosis Date     Asthma      Diverticulitis      Essential hypertension      Patient Active Problem List   Diagnosis     CARDIOVASCULAR SCREENING; LDL GOAL LESS THAN 160     Mild persistent asthma without complication     Obesity due to excess calories, unspecified obesity severity     Essential hypertension     Morbid obesity (H)     Phimosis        Past Surgical History:  Past Surgical History:   Procedure Laterality Date     CIRCUMCISION N/A 11/18/2021    Procedure: CIRCUMCISION;  Surgeon: Toy Feliz MD;  Location: [facility] OR     COLONOSCOPY N/A 12/13/2021    Procedure: COLONOSCOPY;  Surgeon: Claire Browning MD;  Location:  GI     KNEE SURGERY  1985    left       Family History:   Family History   Problem Relation Age of Onset     Unknown/Adopted Mother         adopted     Unknown/Adopted Father      Diabetes No family hx of          Social History:   Social History     Tobacco Use     Smoking status: Never     Smokeless tobacco: Never   Vaping Use     Vaping status: Never Used   Substance Use Topics     Alcohol use: No     Alcohol/week: 0.0 standard drinks of alcohol     Comment: quit 08/17     Drug use: No       Review of Systems:   Pertinent items are noted in HPI or as in patient entered ROS below, remainder of complete ROS is negative.       4/23/2024     6:57 AM   [facility] ROS   Constitutional Appetite change    Problems with sleep   Eyes Double vision   Cardiopulmonary Wheezing   Gastrointestinal/Genitourinary Heartburn/indigestion    Unexplained nausea/vomiting   Musculoskeletal Sore or stiff joints    Swollen joints    Neck pain    Swollen legs/feet   Hematologic Lymph node swelling   Endocrine  "Thirst    Frequent urination   Skin Change in moles         Physical Exam:   BP (!) 165/92   Pulse 69   Ht 1.803 m (5' 11\")   Wt 114.3 kg (252 lb)   SpO2 97%   BMI 35.15 kg/m       Constitutional:  The patient was unaccompanied, well-groomed, and in no acute distress.    Skin:  Warm and pink.    Neurologic:  Alert and oriented x 3.  CN's III-XII within normal limits.  Voice normal.   Psychiatric:  The patient's affect was calm, cooperative, and appropriate.    Respiratory:  Breathing comfortably without stridor or exertion of accessory muscles.    Eyes: Extraocular movement intact.    Head:  Normocephalic and atraumatic.  No lesions or scars.    Ears:  Pinnae and tragus non-tender.  EAC's and TM's were clear.   Nose:  Sinuses were non-tender.  Anterior rhinoscopy revealed midline septum and absence of purulence or polyps.    OC/OP:  Normal tongue, floor of mouth, buccal mucosa, and palate.  No lesions or masses on inspection or palpation.  No abnormal lymph tissue in the oropharynx.  The pterygoid region is non-tender.    Neck:  Supple with normal laryngeal and tracheal landmarks.  The parotid beds were without masses.  No palpable thyroid.left neck submax area bimanually palpavbler some tenderness stone palpable too, in neck ofgland.   Lymphatic:  There is no palpable lymphadenopathy in the neck.     Imaging:  CT SOFT TISSUE NECK W CONTRAST 3/27/2024   Impression:  Nonobstructive sialolith of the left submandibular gland. The gland  itself displays a mild degree of enhancement, which is favored to  represent mild inflammatory changes.      Assessment and Plan:    ICD-10-CM    1. Neck mass  R22.1 Adult ENT  Referral         53 year old male with a history of left neck mass.     Follow-up: No follow-ups on file.     Will [id number] left nek gland removal  as it could bacterially seed his joint replacement. Will plan for this       Scribe Disclosure:   Denisse COLE, am serving as a scribe; to document " services personally performed by Bassam Mendez MD -based on data collection and the provider's statements to me.     Provider Disclosure:  I agree with above History, Review of Systems, Physical exam and Plan.  I have reviewed the content of the documentation and have edited it as needed. I have personally performed the services documented here and the documentation accurately represents those services and the decisions I have made.      Electronically signed by:          Teaching Flowsheet - ENT   Relevant Diagnosis: Salivary Gland Stones  Teaching Topic:Person(s) involved in teaching: patient       Motivation Level:  Asks Questions:   Yes  Eager to Learn:   Yes  Cooperative:   Yes  Receptive (willing/able to accept information):   Yes  Comments: Reviewed pre-op H and P,  NPO prior to  surgery,  pre-op scrub (given Hibiclens)  Reviewed post-op  cares , activity and pain.     Patient demonstrates understanding of the following:  Reason for the appointment, diagnosis and treatment plan:   Yes  Knowledge of proper use of medications and conditions for which they are ordered (with special attention to potential side effects or drug interactions):  stop aspirin products 1 week before surgery Yes  Which situations necessitate calling provider and whom to contact:   Yes  Nutritional needs and diet plan:   Yes  Pain management techniques:   Yes  Patient instructed on hand hygiene:  Yes  How and/when to access community resources:   Yes     Infection Prevention:  Patient   demonstrates understanding of the following:  Surgical procedure site care taught Yes  Signs and symptoms of infection taught Yes  Wound care taught Yes  Instructional Materials Used/Given: verbal instruction.      Again, thank you for allowing me to participate in the care of your patient.      Sincerely,    Bassam Mendez MD

## 2024-04-25 ENCOUNTER — TELEPHONE (OUTPATIENT)
Dept: OTOLARYNGOLOGY | Facility: CLINIC | Age: 54
End: 2024-04-25
Payer: COMMERCIAL

## 2024-04-25 PROBLEM — K11.5 SALIVARY GLAND STONE: Status: ACTIVE | Noted: 2024-04-23

## 2024-04-25 NOTE — TELEPHONE ENCOUNTER
Patient is scheduled for surgery with Dr. Mendez.     Spoke with: Patient     Date of Surgery: 6/03/2024    Location: [facility] OR     Pre op with Provider: SUMANTH     H&P: Patient will schedule pre op with Sara Gibson. Informed patient pre op will need to be completed within 30 days of surgery date.     Additional imaging/appointments: Patient is scheduled for a 1 week post op with Dr. Mendez on 6/11/2024 at 3:00 PM.     Surgery packet: Will mail packet, confirmed with patient address in chart works best. Patient made aware arrival time for surgery will not be listed within packet.      Additional comments: Informed patient a pre op nurse will call 2-5 days prior to surgery to go over further details/give arrival time.         Daisha Cruz on 4/25/2024 at 3:30 PM     Symptoms: TIA, Hallucination, cough     Onset: yesterday     Location / description: Wife states pt having hallucinations since yesterday.  States he has had them with TIA or infection in the past.  Wife states she was able to calm him down to go to bed but pt was up in the middle of the night flailing a lot.  Pt was sleeping but woke up hallucinating.   This morning kept saying he wants to go home but he is at home.  Pt turned on lights to let him know he was and tried to reorient but was not able to.  This morning was difficult to put in chair, unclear if due to weakness or pt could not use the lift herself.  States normally oriented to her and place.  .      Precipitating Factors: history of TIA's, symptoms since yesterday.     Pain Scale (1-10), 10 highest: 0/10     Associated Symptoms: as above     What  improves / worsens symptoms: none     Symptom specific medications: see MAR     Recent Care: 1/15/21 PCP     Did the patient have a positive coronavirus screening?: No     PLAN:  Call 911     Patient/Caller wants to hear from PCP before   following recommendations.  States she understands of risks of not being seen emergently.          Reason for Disposition  • [1] Difficult to awaken or acting confused (disoriented, slurred speech) AND [2] present now AND [3] new onset    Additional Information  • Negative: [1] Difficult to awaken or acting confused (disoriented, slurred speech) AND [2] present now AND [3] diabetic         Protocols used: CONFUSION - DELIRIUM-A-

## 2024-04-30 ENCOUNTER — PATIENT OUTREACH (OUTPATIENT)
Dept: INTERNAL MEDICINE | Facility: CLINIC | Age: 54
End: 2024-04-30
Payer: COMMERCIAL

## 2024-04-30 NOTE — TELEPHONE ENCOUNTER
Patient Quality Outreach    Patient is due for the following:   Hypertension -  BP check  Physical Preventive Adult Physical    Next Steps:   Schedule a Adult Preventative    Type of outreach:    Sent [employer] message.      Questions for provider review:    None           Sanjuanita Gonzalez LPN  Chart routed to none.

## 2024-05-10 ENCOUNTER — MYC REFILL (OUTPATIENT)
Dept: INTERNAL MEDICINE | Facility: CLINIC | Age: 54
End: 2024-05-10
Payer: COMMERCIAL

## 2024-05-10 DIAGNOSIS — I10 ESSENTIAL HYPERTENSION: ICD-10-CM

## 2024-05-10 RX ORDER — LOSARTAN POTASSIUM AND HYDROCHLOROTHIAZIDE 25; 100 MG/1; MG/1
1 TABLET ORAL DAILY
Qty: 90 TABLET | Refills: 3 | Status: CANCELLED | OUTPATIENT
Start: 2024-05-10

## 2024-05-10 RX ORDER — LOSARTAN POTASSIUM AND HYDROCHLOROTHIAZIDE 25; 100 MG/1; MG/1
1 TABLET ORAL DAILY
Qty: 90 TABLET | Refills: 0 | Status: SHIPPED | OUTPATIENT
Start: 2024-05-10 | End: 2024-08-13

## 2024-05-24 ENCOUNTER — OFFICE VISIT (OUTPATIENT)
Dept: INTERNAL MEDICINE | Facility: CLINIC | Age: 54
End: 2024-05-24
Payer: COMMERCIAL

## 2024-05-24 VITALS
WEIGHT: 248.3 LBS | OXYGEN SATURATION: 100 % | BODY MASS INDEX: 34.76 KG/M2 | HEIGHT: 71 IN | DIASTOLIC BLOOD PRESSURE: 78 MMHG | HEART RATE: 94 BPM | TEMPERATURE: 97.7 F | SYSTOLIC BLOOD PRESSURE: 142 MMHG | RESPIRATION RATE: 14 BRPM

## 2024-05-24 DIAGNOSIS — R06.83 SNORING: ICD-10-CM

## 2024-05-24 DIAGNOSIS — K11.8 FULLNESS OF SUBMANDIBULAR GLAND: ICD-10-CM

## 2024-05-24 DIAGNOSIS — Z01.818 PREOP GENERAL PHYSICAL EXAM: Primary | ICD-10-CM

## 2024-05-24 DIAGNOSIS — Z00.00 LABORATORY EXAMINATION ORDERED AS PART OF A ROUTINE GENERAL MEDICAL EXAMINATION: ICD-10-CM

## 2024-05-24 DIAGNOSIS — Z12.5 SCREENING FOR PROSTATE CANCER: ICD-10-CM

## 2024-05-24 DIAGNOSIS — I10 ESSENTIAL HYPERTENSION: ICD-10-CM

## 2024-05-24 LAB
BASOPHILS # BLD AUTO: 0 10E3/UL (ref 0–0.2)
BASOPHILS NFR BLD AUTO: 1 %
EOSINOPHIL # BLD AUTO: 0.4 10E3/UL (ref 0–0.7)
EOSINOPHIL NFR BLD AUTO: 6 %
ERYTHROCYTE [DISTWIDTH] IN BLOOD BY AUTOMATED COUNT: 13.6 % (ref 10–15)
HBA1C MFR BLD: 5.3 % (ref 0–5.6)
HCT VFR BLD AUTO: 44.2 % (ref 40–53)
HGB BLD-MCNC: 14.7 G/DL (ref 13.3–17.7)
IMM GRANULOCYTES # BLD: 0 10E3/UL
IMM GRANULOCYTES NFR BLD: 0 %
LYMPHOCYTES # BLD AUTO: 1.5 10E3/UL (ref 0.8–5.3)
LYMPHOCYTES NFR BLD AUTO: 25 %
MCH RBC QN AUTO: 29.8 PG (ref 26.5–33)
MCHC RBC AUTO-ENTMCNC: 33.3 G/DL (ref 31.5–36.5)
MCV RBC AUTO: 90 FL (ref 78–100)
MONOCYTES # BLD AUTO: 0.4 10E3/UL (ref 0–1.3)
MONOCYTES NFR BLD AUTO: 7 %
NEUTROPHILS # BLD AUTO: 3.8 10E3/UL (ref 1.6–8.3)
NEUTROPHILS NFR BLD AUTO: 62 %
PLATELET # BLD AUTO: 319 10E3/UL (ref 150–450)
RBC # BLD AUTO: 4.93 10E6/UL (ref 4.4–5.9)
WBC # BLD AUTO: 6.2 10E3/UL (ref 4–11)

## 2024-05-24 PROCEDURE — 93000 ELECTROCARDIOGRAM COMPLETE: CPT | Performed by: NURSE PRACTITIONER

## 2024-05-24 PROCEDURE — 84443 ASSAY THYROID STIM HORMONE: CPT | Performed by: NURSE PRACTITIONER

## 2024-05-24 PROCEDURE — G0103 PSA SCREENING: HCPCS | Performed by: NURSE PRACTITIONER

## 2024-05-24 PROCEDURE — 80053 COMPREHEN METABOLIC PANEL: CPT | Performed by: NURSE PRACTITIONER

## 2024-05-24 PROCEDURE — 80061 LIPID PANEL: CPT | Performed by: NURSE PRACTITIONER

## 2024-05-24 PROCEDURE — 83036 HEMOGLOBIN GLYCOSYLATED A1C: CPT | Performed by: NURSE PRACTITIONER

## 2024-05-24 PROCEDURE — 85025 COMPLETE CBC W/AUTO DIFF WBC: CPT | Performed by: NURSE PRACTITIONER

## 2024-05-24 PROCEDURE — 36415 COLL VENOUS BLD VENIPUNCTURE: CPT | Performed by: NURSE PRACTITIONER

## 2024-05-24 PROCEDURE — 99214 OFFICE O/P EST MOD 30 MIN: CPT | Performed by: NURSE PRACTITIONER

## 2024-05-24 RX ORDER — AMLODIPINE BESYLATE 2.5 MG/1
2.5 TABLET ORAL DAILY
Qty: 90 TABLET | Refills: 3 | Status: SHIPPED | OUTPATIENT
Start: 2024-05-24

## 2024-05-24 ASSESSMENT — ASTHMA QUESTIONNAIRES
QUESTION_4 LAST FOUR WEEKS HOW OFTEN HAVE YOU USED YOUR RESCUE INHALER OR NEBULIZER MEDICATION (SUCH AS ALBUTEROL): TWO OR THREE TIMES PER WEEK
QUESTION_3 LAST FOUR WEEKS HOW OFTEN DID YOUR ASTHMA SYMPTOMS (WHEEZING, COUGHING, SHORTNESS OF BREATH, CHEST TIGHTNESS OR PAIN) WAKE YOU UP AT NIGHT OR EARLIER THAN USUAL IN THE MORNING: ONCE OR TWICE
ACT_TOTALSCORE: 20
QUESTION_2 LAST FOUR WEEKS HOW OFTEN HAVE YOU HAD SHORTNESS OF BREATH: ONCE OR TWICE A WEEK
ACT_TOTALSCORE: 20
QUESTION_1 LAST FOUR WEEKS HOW MUCH OF THE TIME DID YOUR ASTHMA KEEP YOU FROM GETTING AS MUCH DONE AT WORK, SCHOOL OR AT HOME: NONE OF THE TIME
QUESTION_5 LAST FOUR WEEKS HOW WOULD YOU RATE YOUR ASTHMA CONTROL: WELL CONTROLLED

## 2024-05-24 NOTE — PATIENT INSTRUCTIONS
Add amlodipine once daily for blood pressure     Lab in [address]    Hold morning of surgery   Losartan hydrochlorothiazide    Morning of surgery DO TAKE   Advair   Albuterol   Amlodipine     Sleep referral

## 2024-05-24 NOTE — PROGRESS NOTES
Preoperative Evaluation  [place] [address]  [address]  [facility] [address]  Phone: [phone]  Primary Provider: LAYLA Adames CNP  Pre-op Performing Provider: LAYLA Adames CNP  May 24, 2024             5/24/2024   Surgical Information   What procedure is being done? excision of submandobular gland   Facility or Hospital where procedure/surgery will be performed: [facility]   Who is doing the procedure / surgery? [facility]   Date of surgery / procedure: 6/3/24   Time of surgery / procedure: morning   Where do you plan to recover after surgery? at home with family     Fax number for surgical facility: Note does not need to be faxed, will be available electronically in Epic.    Assessment & Plan     The proposed surgical procedure is considered INTERMEDIATE risk.    Preop general physical exam    - EKG 12-lead complete w/read - Clinics    Fullness of submandibular gland  Needs removal as has a stone that is not removable     Essential hypertension  Needs improvement   - Hemoglobin A1c  - CBC with platelets and differential  - TSH with free T4 reflex  - Comprehensive metabolic panel (BMP + Alb, Alk Phos, ALT, AST, Total. Bili, TP)  - amLODIPine (NORVASC) 2.5 MG tablet; Take 1 tablet (2.5 mg) by mouth daily    Screening for prostate cancer    - PSA, screen      Laboratory examination ordered as part of a routine general medical examination    - CBC with platelets and differential  - TSH with free T4 reflex  - Comprehensive metabolic panel (BMP + Alb, Alk Phos, ALT, AST, Total. Bili, TP)  - Lipid panel reflex to direct LDL Fasting    BMI 34.0-34.9,adult      Snoring  Will do after this surgery   - Adult Sleep Eval & Management  Referral; Future         - No identified additional risk factors other than previously addressed    Antiplatelet or Anticoagulation Medication Instructions   - Patient is on no antiplatelet or anticoagulation  medications.    Additional Medication Instructions    Hold morning of surgery   Losartan hydrochlorothiazide    Morning of surgery DO TAKE   Advair   Albuterol   Amlodipine     Recommendation  Approval given to proceed with proposed procedure, without further diagnostic evaluation.        Kera Bermeo is a 54 year old, presenting for the following:  Pre-Op Exam    Salivary gland - stone - need to remove gland   Dryer mouth- cough up sputum in am            5/24/2024     7:50 AM   Additional Questions   Roomed by Ania Maria MA   Accompanied by Himself     HPI related to upcoming procedure:         5/24/2024   Pre-Op Questionnaire   Have you ever had a heart attack or stroke? No   Have you ever had surgery on your heart or blood vessels, such as a stent placement, a coronary artery bypass, or surgery on an artery in your head, neck, heart, or legs? No   Do you have chest pain with activity? No   Do you have a history of heart failure? No   Do you currently have a cold, bronchitis or symptoms of other infection? No   Do you have a cough, shortness of breath, or wheezing? No   Do you or anyone in your family have previous history of blood clots? No   Do you or does anyone in your family have a serious bleeding problem such as prolonged bleeding following surgeries or cuts? No   Have you ever had problems with anemia or been told to take iron pills? No   Have you had any abnormal blood loss such as black, tarry or bloody stools? No   Have you ever had a blood transfusion? No   Are you willing to have a blood transfusion if it is medically needed before, during, or after your surgery? Yes   Have you or any of your relatives ever had problems with anesthesia? No   Do you have sleep apnea, excessive snoring or daytime drowsiness? Snoring    Do you have a CPAP machine? No    Do you have any artifical heart valves or other implanted medical devices like a pacemaker, defibrillator, or continuous glucose monitor? No   Do  you have artificial joints? No   Are you allergic to latex? No     Health Care Directive  Patient does not have a Health Care Directive or Living Will: Discussed advance care planning with patient; however, patient declined at this time.    Preoperative Review of    reviewed - no record of controlled substances prescribed.      Status of Chronic Conditions:  See problem list for active medical problems.  Problems all longstanding and stable, except as noted/documented.  See ROS for pertinent symptoms related to these conditions.    Patient Active Problem List    Diagnosis Date Noted    Salivary gland stone 04/23/2024     Priority: Medium    Phimosis 10/27/2021     Priority: Medium     Added automatically from request for surgery [id number]      Morbid obesity (H) 06/30/2021     Priority: Medium    Essential hypertension 04/10/2019     Priority: Medium    Mild persistent asthma without complication 08/08/2016     Priority: Medium    Obesity due to excess calories, unspecified obesity severity 08/08/2016     Priority: Medium    CARDIOVASCULAR SCREENING; LDL GOAL LESS THAN 160 08/05/2011     Priority: Medium      Past Medical History:   Diagnosis Date    Asthma     Diverticulitis     Essential hypertension      Past Surgical History:   Procedure Laterality Date    CIRCUMCISION N/A 11/18/2021    Procedure: CIRCUMCISION;  Surgeon: Toy Feliz MD;  [place] [facility] [place]    COLONOSCOPY N/A 12/13/2021    Procedure: COLONOSCOPY;  Surgeon: Claire Borwning MD;  Location:  GI    KNEE SURGERY  1985    left     Current Outpatient Medications   Medication Sig Dispense Refill    albuterol (PROAIR HFA/PROVENTIL HFA/VENTOLIN HFA) 108 (90 Base) MCG/ACT inhaler Inhale 2 puffs into the lungs every 6 hours as needed for shortness of breath or wheezing 8.5 g 11    amLODIPine (NORVASC) 2.5 MG tablet Take 1 tablet (2.5 mg) by mouth daily 90 tablet 3    fluticasone-salmeterol (ADVAIR) 250-50 MCG/ACT inhaler INHALE ONE PUFF BY  "MOUTH EVERY 12 HOURS 60 each 11    losartan-hydrochlorothiazide (HYZAAR) 100-25 MG tablet Take 1 tablet by mouth daily Note change in dose 90 tablet 0       Allergies   Allergen Reactions    Cats      Itchy eyes, runny nose, wheezing    Dogs      Itchy eyes, runny nose, wheezing    Pollen Extract      Runny nose        Social History     Tobacco Use    Smoking status: Never    Smokeless tobacco: Never   Substance Use Topics    Alcohol use: No     Alcohol/week: 0.0 standard drinks of alcohol     Comment: quit 08/17     Family History   Problem Relation Age of Onset    Unknown/Adopted Mother         adopted    Unknown/Adopted Father     Diabetes No family hx of      History   Drug Use No             Review of Systems  Constitutional, neuro, ENT, endocrine, pulmonary, cardiac, gastrointestinal, genitourinary, musculoskeletal, integument and psychiatric systems are negative, except as otherwise noted.    Objective    BP (!) 142/78   Pulse 94   Temp 97.7  F (36.5  C) (Oral)   Resp 14   Ht 1.803 m (5' 11\")   Wt 112.6 kg (248 lb 4.8 oz)   SpO2 100%   BMI 34.63 kg/m     Estimated body mass index is 34.63 kg/m  as calculated from the following:    Height as of this encounter: 1.803 m (5' 11\").    Weight as of this encounter: 112.6 kg (248 lb 4.8 oz).  "

## 2024-05-25 LAB
ALBUMIN SERPL BCG-MCNC: 4 G/DL (ref 3.5–5.2)
ALP SERPL-CCNC: 78 U/L (ref 40–150)
ALT SERPL W P-5'-P-CCNC: 34 U/L (ref 0–70)
ANION GAP SERPL CALCULATED.3IONS-SCNC: 9 MMOL/L (ref 7–15)
AST SERPL W P-5'-P-CCNC: 29 U/L (ref 0–45)
BILIRUB SERPL-MCNC: 0.5 MG/DL
BUN SERPL-MCNC: 16.3 MG/DL (ref 6–20)
CALCIUM SERPL-MCNC: 9 MG/DL (ref 8.6–10)
CHLORIDE SERPL-SCNC: 106 MMOL/L (ref 98–107)
CHOLEST SERPL-MCNC: 182 MG/DL
CREAT SERPL-MCNC: 0.95 MG/DL (ref 0.67–1.17)
DEPRECATED HCO3 PLAS-SCNC: 26 MMOL/L (ref 22–29)
EGFRCR SERPLBLD CKD-EPI 2021: >90 ML/MIN/1.73M2
FASTING STATUS PATIENT QL REPORTED: YES
FASTING STATUS PATIENT QL REPORTED: YES
GLUCOSE SERPL-MCNC: 100 MG/DL (ref 70–99)
HDLC SERPL-MCNC: 52 MG/DL
LDLC SERPL CALC-MCNC: 116 MG/DL
NONHDLC SERPL-MCNC: 130 MG/DL
POTASSIUM SERPL-SCNC: 4.1 MMOL/L (ref 3.4–5.3)
PROT SERPL-MCNC: 7.1 G/DL (ref 6.4–8.3)
PSA SERPL DL<=0.01 NG/ML-MCNC: 0.45 NG/ML (ref 0–3.5)
SODIUM SERPL-SCNC: 141 MMOL/L (ref 135–145)
TRIGL SERPL-MCNC: 72 MG/DL
TSH SERPL DL<=0.005 MIU/L-ACNC: 1.03 UIU/ML (ref 0.3–4.2)

## 2024-05-31 ENCOUNTER — ANESTHESIA EVENT (OUTPATIENT)
Dept: SURGERY | Facility: AMBULATORY SURGERY CENTER | Age: 54
End: 2024-05-31
Payer: COMMERCIAL

## 2024-06-03 ENCOUNTER — HOSPITAL ENCOUNTER (OUTPATIENT)
Facility: AMBULATORY SURGERY CENTER | Age: 54
Discharge: HOME | End: 2024-06-03
Attending: OTOLARYNGOLOGY
Payer: COMMERCIAL

## 2024-06-03 ENCOUNTER — ANESTHESIA (OUTPATIENT)
Dept: SURGERY | Facility: AMBULATORY SURGERY CENTER | Age: 54
End: 2024-06-03
Payer: COMMERCIAL

## 2024-06-03 VITALS
DIASTOLIC BLOOD PRESSURE: 81 MMHG | TEMPERATURE: 96.8 F | SYSTOLIC BLOOD PRESSURE: 162 MMHG | BODY MASS INDEX: 34.72 KG/M2 | HEIGHT: 71 IN | OXYGEN SATURATION: 96 % | HEART RATE: 75 BPM | RESPIRATION RATE: 18 BRPM | WEIGHT: 248 LBS

## 2024-06-03 DIAGNOSIS — K11.5 SALIVARY GLAND STONE: Primary | ICD-10-CM

## 2024-06-03 PROCEDURE — 42440 EXCISE SUBMAXILLARY GLAND: CPT | Mod: LT

## 2024-06-03 PROCEDURE — 42440 EXCISE SUBMAXILLARY GLAND: CPT | Performed by: NURSE ANESTHETIST, CERTIFIED REGISTERED

## 2024-06-03 PROCEDURE — 88307 TISSUE EXAM BY PATHOLOGIST: CPT | Mod: TC | Performed by: OTOLARYNGOLOGY

## 2024-06-03 PROCEDURE — 88307 TISSUE EXAM BY PATHOLOGIST: CPT | Mod: 26 | Performed by: STUDENT IN AN ORGANIZED HEALTH CARE EDUCATION/TRAINING PROGRAM

## 2024-06-03 PROCEDURE — 42440 EXCISE SUBMAXILLARY GLAND: CPT | Performed by: ANESTHESIOLOGY

## 2024-06-03 RX ORDER — FENTANYL CITRATE 50 UG/ML
25 INJECTION, SOLUTION INTRAMUSCULAR; INTRAVENOUS EVERY 5 MIN PRN
Status: DISCONTINUED | OUTPATIENT
Start: 2024-06-03 | End: 2024-06-03 | Stop reason: HOSPADM

## 2024-06-03 RX ORDER — ONDANSETRON 2 MG/ML
INJECTION INTRAMUSCULAR; INTRAVENOUS PRN
Status: DISCONTINUED | OUTPATIENT
Start: 2024-06-03 | End: 2024-06-03

## 2024-06-03 RX ORDER — OXYCODONE HYDROCHLORIDE 5 MG/1
5 TABLET ORAL
Status: DISCONTINUED | OUTPATIENT
Start: 2024-06-03 | End: 2024-06-04 | Stop reason: HOSPADM

## 2024-06-03 RX ORDER — LIDOCAINE HYDROCHLORIDE AND EPINEPHRINE 10; 10 MG/ML; UG/ML
INJECTION, SOLUTION INFILTRATION; PERINEURAL PRN
Status: DISCONTINUED | OUTPATIENT
Start: 2024-06-03 | End: 2024-06-03 | Stop reason: HOSPADM

## 2024-06-03 RX ORDER — SODIUM CHLORIDE, SODIUM LACTATE, POTASSIUM CHLORIDE, CALCIUM CHLORIDE 600; 310; 30; 20 MG/100ML; MG/100ML; MG/100ML; MG/100ML
INJECTION, SOLUTION INTRAVENOUS CONTINUOUS
Status: DISCONTINUED | OUTPATIENT
Start: 2024-06-03 | End: 2024-06-04 | Stop reason: HOSPADM

## 2024-06-03 RX ORDER — CEFAZOLIN SODIUM 2 G/50ML
2 SOLUTION INTRAVENOUS
Status: COMPLETED | OUTPATIENT
Start: 2024-06-03 | End: 2024-06-03

## 2024-06-03 RX ORDER — ONDANSETRON 4 MG/1
4 TABLET, ORALLY DISINTEGRATING ORAL EVERY 30 MIN PRN
Status: DISCONTINUED | OUTPATIENT
Start: 2024-06-03 | End: 2024-06-04 | Stop reason: HOSPADM

## 2024-06-03 RX ORDER — CHLORHEXIDINE GLUCONATE ORAL RINSE 1.2 MG/ML
SOLUTION DENTAL PRN
Status: DISCONTINUED | OUTPATIENT
Start: 2024-06-03 | End: 2024-06-03 | Stop reason: HOSPADM

## 2024-06-03 RX ORDER — OXYCODONE HYDROCHLORIDE 5 MG/1
5-10 TABLET ORAL EVERY 4 HOURS PRN
Qty: 10 TABLET | Refills: 0 | Status: SHIPPED | OUTPATIENT
Start: 2024-06-03

## 2024-06-03 RX ORDER — NALOXONE HYDROCHLORIDE 0.4 MG/ML
0.1 INJECTION, SOLUTION INTRAMUSCULAR; INTRAVENOUS; SUBCUTANEOUS
Status: DISCONTINUED | OUTPATIENT
Start: 2024-06-03 | End: 2024-06-03 | Stop reason: HOSPADM

## 2024-06-03 RX ORDER — FENTANYL CITRATE 50 UG/ML
INJECTION, SOLUTION INTRAMUSCULAR; INTRAVENOUS PRN
Status: DISCONTINUED | OUTPATIENT
Start: 2024-06-03 | End: 2024-06-03

## 2024-06-03 RX ORDER — SODIUM CHLORIDE, SODIUM LACTATE, POTASSIUM CHLORIDE, CALCIUM CHLORIDE 600; 310; 30; 20 MG/100ML; MG/100ML; MG/100ML; MG/100ML
INJECTION, SOLUTION INTRAVENOUS CONTINUOUS
Status: DISCONTINUED | OUTPATIENT
Start: 2024-06-03 | End: 2024-06-03 | Stop reason: HOSPADM

## 2024-06-03 RX ORDER — ACETAMINOPHEN 325 MG/1
975 TABLET ORAL ONCE
Status: COMPLETED | OUTPATIENT
Start: 2024-06-03 | End: 2024-06-03

## 2024-06-03 RX ORDER — DEXAMETHASONE SODIUM PHOSPHATE 4 MG/ML
INJECTION, SOLUTION INTRA-ARTICULAR; INTRALESIONAL; INTRAMUSCULAR; INTRAVENOUS; SOFT TISSUE PRN
Status: DISCONTINUED | OUTPATIENT
Start: 2024-06-03 | End: 2024-06-03

## 2024-06-03 RX ORDER — NALOXONE HYDROCHLORIDE 0.4 MG/ML
0.1 INJECTION, SOLUTION INTRAMUSCULAR; INTRAVENOUS; SUBCUTANEOUS
Status: DISCONTINUED | OUTPATIENT
Start: 2024-06-03 | End: 2024-06-04 | Stop reason: HOSPADM

## 2024-06-03 RX ORDER — HYDROMORPHONE HYDROCHLORIDE 1 MG/ML
0.4 INJECTION, SOLUTION INTRAMUSCULAR; INTRAVENOUS; SUBCUTANEOUS EVERY 5 MIN PRN
Status: DISCONTINUED | OUTPATIENT
Start: 2024-06-03 | End: 2024-06-03 | Stop reason: HOSPADM

## 2024-06-03 RX ORDER — MAGNESIUM HYDROXIDE 1200 MG/15ML
LIQUID ORAL PRN
Status: DISCONTINUED | OUTPATIENT
Start: 2024-06-03 | End: 2024-06-03 | Stop reason: HOSPADM

## 2024-06-03 RX ORDER — LIDOCAINE HYDROCHLORIDE 20 MG/ML
INJECTION, SOLUTION INFILTRATION; PERINEURAL PRN
Status: DISCONTINUED | OUTPATIENT
Start: 2024-06-03 | End: 2024-06-03

## 2024-06-03 RX ORDER — ONDANSETRON 4 MG/1
4 TABLET, ORALLY DISINTEGRATING ORAL EVERY 30 MIN PRN
Status: DISCONTINUED | OUTPATIENT
Start: 2024-06-03 | End: 2024-06-03 | Stop reason: HOSPADM

## 2024-06-03 RX ORDER — HYDROMORPHONE HYDROCHLORIDE 1 MG/ML
0.2 INJECTION, SOLUTION INTRAMUSCULAR; INTRAVENOUS; SUBCUTANEOUS EVERY 5 MIN PRN
Status: DISCONTINUED | OUTPATIENT
Start: 2024-06-03 | End: 2024-06-03 | Stop reason: HOSPADM

## 2024-06-03 RX ORDER — OXYCODONE HYDROCHLORIDE 5 MG/1
5 TABLET ORAL
Status: COMPLETED | OUTPATIENT
Start: 2024-06-03 | End: 2024-06-03

## 2024-06-03 RX ORDER — DEXAMETHASONE SODIUM PHOSPHATE 10 MG/ML
4 INJECTION, SOLUTION INTRAMUSCULAR; INTRAVENOUS
Status: DISCONTINUED | OUTPATIENT
Start: 2024-06-03 | End: 2024-06-04 | Stop reason: HOSPADM

## 2024-06-03 RX ORDER — ACETAMINOPHEN 325 MG/1
650 TABLET ORAL EVERY 4 HOURS PRN
Qty: 50 TABLET | Refills: 0 | Status: SHIPPED | OUTPATIENT
Start: 2024-06-03

## 2024-06-03 RX ORDER — GABAPENTIN 300 MG/1
300 CAPSULE ORAL
Status: COMPLETED | OUTPATIENT
Start: 2024-06-03 | End: 2024-06-03

## 2024-06-03 RX ORDER — FENTANYL CITRATE 50 UG/ML
25 INJECTION, SOLUTION INTRAMUSCULAR; INTRAVENOUS
Status: DISCONTINUED | OUTPATIENT
Start: 2024-06-03 | End: 2024-06-04 | Stop reason: HOSPADM

## 2024-06-03 RX ORDER — PROPOFOL 10 MG/ML
INJECTION, EMULSION INTRAVENOUS PRN
Status: DISCONTINUED | OUTPATIENT
Start: 2024-06-03 | End: 2024-06-03

## 2024-06-03 RX ORDER — LIDOCAINE 40 MG/G
CREAM TOPICAL
Status: DISCONTINUED | OUTPATIENT
Start: 2024-06-03 | End: 2024-06-04 | Stop reason: HOSPADM

## 2024-06-03 RX ORDER — CEFAZOLIN SODIUM 2 G/50ML
2 SOLUTION INTRAVENOUS SEE ADMIN INSTRUCTIONS
Status: DISCONTINUED | OUTPATIENT
Start: 2024-06-03 | End: 2024-06-04 | Stop reason: HOSPADM

## 2024-06-03 RX ORDER — PROPOFOL 10 MG/ML
INJECTION, EMULSION INTRAVENOUS CONTINUOUS PRN
Status: DISCONTINUED | OUTPATIENT
Start: 2024-06-03 | End: 2024-06-03

## 2024-06-03 RX ORDER — OXYCODONE HYDROCHLORIDE 5 MG/1
10 TABLET ORAL
Status: DISCONTINUED | OUTPATIENT
Start: 2024-06-03 | End: 2024-06-04 | Stop reason: HOSPADM

## 2024-06-03 RX ORDER — FENTANYL CITRATE 50 UG/ML
50 INJECTION, SOLUTION INTRAMUSCULAR; INTRAVENOUS EVERY 5 MIN PRN
Status: DISCONTINUED | OUTPATIENT
Start: 2024-06-03 | End: 2024-06-03 | Stop reason: HOSPADM

## 2024-06-03 RX ORDER — AMOXICILLIN 250 MG
1-2 CAPSULE ORAL 2 TIMES DAILY
Qty: 30 TABLET | Refills: 0 | Status: SHIPPED | OUTPATIENT
Start: 2024-06-03

## 2024-06-03 RX ORDER — DEXAMETHASONE SODIUM PHOSPHATE 10 MG/ML
4 INJECTION, SOLUTION INTRAMUSCULAR; INTRAVENOUS
Status: DISCONTINUED | OUTPATIENT
Start: 2024-06-03 | End: 2024-06-03 | Stop reason: HOSPADM

## 2024-06-03 RX ORDER — ONDANSETRON 2 MG/ML
4 INJECTION INTRAMUSCULAR; INTRAVENOUS EVERY 30 MIN PRN
Status: DISCONTINUED | OUTPATIENT
Start: 2024-06-03 | End: 2024-06-04 | Stop reason: HOSPADM

## 2024-06-03 RX ORDER — ACETAMINOPHEN 325 MG/1
650 TABLET ORAL
Status: DISCONTINUED | OUTPATIENT
Start: 2024-06-03 | End: 2024-06-04 | Stop reason: HOSPADM

## 2024-06-03 RX ORDER — ONDANSETRON 2 MG/ML
4 INJECTION INTRAMUSCULAR; INTRAVENOUS EVERY 30 MIN PRN
Status: DISCONTINUED | OUTPATIENT
Start: 2024-06-03 | End: 2024-06-03 | Stop reason: HOSPADM

## 2024-06-03 RX ORDER — DEXMEDETOMIDINE HYDROCHLORIDE 4 UG/ML
INJECTION, SOLUTION INTRAVENOUS PRN
Status: DISCONTINUED | OUTPATIENT
Start: 2024-06-03 | End: 2024-06-03

## 2024-06-03 RX ORDER — MINERAL OIL/HYDROPHIL PETROLAT
OINTMENT (GRAM) TOPICAL 2 TIMES DAILY
Qty: 50 G | Refills: 0 | Status: SHIPPED | OUTPATIENT
Start: 2024-06-03

## 2024-06-03 RX ADMIN — PROPOFOL 50 MG: 10 INJECTION, EMULSION INTRAVENOUS at 09:20

## 2024-06-03 RX ADMIN — OXYCODONE HYDROCHLORIDE 5 MG: 5 TABLET ORAL at 12:10

## 2024-06-03 RX ADMIN — DEXMEDETOMIDINE HYDROCHLORIDE 12 MCG: 4 INJECTION, SOLUTION INTRAVENOUS at 10:36

## 2024-06-03 RX ADMIN — ONDANSETRON 4 MG: 2 INJECTION INTRAMUSCULAR; INTRAVENOUS at 08:53

## 2024-06-03 RX ADMIN — ACETAMINOPHEN 975 MG: 325 TABLET ORAL at 07:45

## 2024-06-03 RX ADMIN — FENTANYL CITRATE 50 MCG: 50 INJECTION, SOLUTION INTRAMUSCULAR; INTRAVENOUS at 08:56

## 2024-06-03 RX ADMIN — FENTANYL CITRATE 50 MCG: 50 INJECTION, SOLUTION INTRAMUSCULAR; INTRAVENOUS at 09:00

## 2024-06-03 RX ADMIN — FENTANYL CITRATE 100 MCG: 50 INJECTION, SOLUTION INTRAMUSCULAR; INTRAVENOUS at 10:48

## 2024-06-03 RX ADMIN — PROPOFOL 150 MCG/KG/MIN: 10 INJECTION, EMULSION INTRAVENOUS at 09:30

## 2024-06-03 RX ADMIN — DEXMEDETOMIDINE HYDROCHLORIDE 8 MCG: 4 INJECTION, SOLUTION INTRAVENOUS at 10:40

## 2024-06-03 RX ADMIN — CEFAZOLIN SODIUM 2 G: 2 SOLUTION INTRAVENOUS at 08:53

## 2024-06-03 RX ADMIN — GABAPENTIN 300 MG: 300 CAPSULE ORAL at 07:45

## 2024-06-03 RX ADMIN — DEXMEDETOMIDINE HYDROCHLORIDE 8 MCG: 4 INJECTION, SOLUTION INTRAVENOUS at 09:14

## 2024-06-03 RX ADMIN — LIDOCAINE HYDROCHLORIDE 100 MG: 20 INJECTION, SOLUTION INFILTRATION; PERINEURAL at 09:01

## 2024-06-03 RX ADMIN — FENTANYL CITRATE 25 MCG: 50 INJECTION, SOLUTION INTRAMUSCULAR; INTRAVENOUS at 12:15

## 2024-06-03 RX ADMIN — DEXMEDETOMIDINE HYDROCHLORIDE 8 MCG: 4 INJECTION, SOLUTION INTRAVENOUS at 09:11

## 2024-06-03 RX ADMIN — Medication 0.5 MG: at 10:24

## 2024-06-03 RX ADMIN — DEXAMETHASONE SODIUM PHOSPHATE 4 MG: 4 INJECTION, SOLUTION INTRA-ARTICULAR; INTRALESIONAL; INTRAMUSCULAR; INTRAVENOUS; SOFT TISSUE at 09:01

## 2024-06-03 RX ADMIN — PROPOFOL 200 MG: 10 INJECTION, EMULSION INTRAVENOUS at 09:01

## 2024-06-03 RX ADMIN — PROPOFOL 50 MG: 10 INJECTION, EMULSION INTRAVENOUS at 10:22

## 2024-06-03 RX ADMIN — PROPOFOL 150 MCG/KG/MIN: 10 INJECTION, EMULSION INTRAVENOUS at 09:01

## 2024-06-03 RX ADMIN — DEXMEDETOMIDINE HYDROCHLORIDE 4 MCG: 4 INJECTION, SOLUTION INTRAVENOUS at 09:18

## 2024-06-03 RX ADMIN — SODIUM CHLORIDE, SODIUM LACTATE, POTASSIUM CHLORIDE, CALCIUM CHLORIDE: 600; 310; 30; 20 INJECTION, SOLUTION INTRAVENOUS at 07:44

## 2024-06-03 RX ADMIN — PROPOFOL 50 MG: 10 INJECTION, EMULSION INTRAVENOUS at 10:40

## 2024-06-03 NOTE — DISCHARGE INSTRUCTIONS
[facility]  Home Care Following Anesthesia  For 24 hours after surgery:  Get plenty of rest.  A responsible adult must stay with you for at least 24 hours after you leave the surgery center.  Do not drive or use heavy equipment.  If you have weakness or tingling, don't drive or use heavy equipment until this feeling goes away.   Do not drink alcohol.   Avoid strenuous or risky activities.  Ask for help when climbing stairs.  You may feel lightheaded.  IF so, sit for a few minutes before standing.  Have someone help you get up.   If you have nausea (feel sick to your stomach): Drink only clear liquids such as apple juice, ginger ale, broth or 7-Up.  Rest may also help.  Be sure to drink enough fluids.  Move to a regular diet as you feel able.   You may have a slight fever.  Call the doctor if your fever is over 100 F (37.7 C) (taken under the tongue) or lasts longer than 24 hours.  You may have a dry mouth, a sore throat, muscle aches or trouble sleeping. These should go away after 24 hours.  Do not make important or legal decisions.   It is recommended to avoid smoking.               Tips for taking pain medications  To get the best pain relief possible, remember these points:  Take pain medications as directed, before pain becomes severe.  Pain medication can upset your stomach: taking it with food may help.  Constipation is a common side effect of pain medication. Drink plenty of  fluids.  Eat foods high in fiber. Take a stool softener if recommended by your doctor or pharmacist.  Do not drink alcohol, drive or operate machinery while taking pain medications.  Ask about other ways to control pain, such as with heat, ice or relaxation.    Tylenol/Acetaminophen Consumption    If you feel your pain relief is insufficient, you may take Tylenol/Acetaminophen in addition to your narcotic pain medication.   Be careful not to exceed 4,000 mg of Tylenol/Acetaminophen in a 24 hour  period from all sources.  If you are taking extra strength Tylenol/acetaminophen (500 mg), the maximum dose is 8 tablets in 24 hours.  If you are taking regular strength acetaminophen (325 mg), the maximum dose is 12 tablets in 24 hours.    Call a doctor for any of the following:  Signs of infection (fever, growing tenderness at the surgery site, a large amount of drainage or bleeding, severe pain, foul-smelling drainage, redness, swelling).  It has been over 8 to 10 hours since surgery and you are still not able to urinate (pass water).  Headache for over 24 hours.  Numbness, tingling or weakness the day after surgery (if you had spinal anesthesia).  Signs of Covid-19 infection (temperature over 100 degrees, shortness of breath, cough, loss of taste/smell, generalized body aches, persistent headache, chills, sore throat, nausea/vomiting/diarrhea)  Your doctor is:       Dr. Bassam Mendez, ENT Otolaryngology: [phone]               Or dial [phone] and ask for the resident on call for:  ENT Otolaryngology  For emergency care, call the:  [facility]:  [phone] (TTY for hearing impaired: [phone])

## 2024-06-03 NOTE — ANESTHESIA CARE TRANSFER NOTE
Patient: Thai Mancia    Procedure: Procedure(s):  excision of left submandibular gland       Diagnosis: Salivary gland stone [K11.5]  Diagnosis Additional Information: No value filed.    Anesthesia Type:   General     Note:    Oropharynx: oropharynx clear of all foreign objects and spontaneously breathing  Level of Consciousness: drowsy  Oxygen Supplementation: face mask  Level of Supplemental Oxygen (L/min / FiO2): 6  Independent Airway: airway patency satisfactory and stable  Dentition: dentition unchanged  Vital Signs Stable: post-procedure vital signs reviewed and stable  Report to RN Given: handoff report given  Patient transferred to: PACU  Comments: Uneventful transport   Report to MEDHAT Grajeda  Exchanging well; color natl  Pt comfortable  Pt responds appropriately to command  IV patent  Lips/teeth/dentition as preop status  Questions answered      Handoff Report: Identifed the Patient, Identified the Reponsible Provider, Reviewed the pertinent medical history, Discussed the surgical course, Reviewed Intra-OP anesthesia mangement and issues during anesthesia, Set expectations for post-procedure period and Allowed opportunity for questions and acknowledgement of understanding      Vitals:  Vitals Value Taken Time   /91 06/03/24 1159   Temp 97.4 TAT 1201   Pulse 87 06/03/24 1201   Resp 16 06/03/24 1201   SpO2 98 % 06/03/24 1201   Vitals shown include unfiled device data.    Electronically Signed By: LAYLA DOWNEY CRNA  Oralia 3, 2024  12:02 PM

## 2024-06-03 NOTE — BRIEF OP NOTE
[facility]    Brief Operative Note    Pre-operative diagnosis: Salivary gland stone [K11.5]  Post-operative diagnosis Same as pre-operative diagnosis    Procedure: excision of left submandibular gland, Left - Jaw    Surgeon: Surgeons and Role:     * Bassam Mendez MD - Primary     * Evie Shetty MD - Resident - Assisting  Anesthesia: General   Estimated Blood Loss: None    Drains: Cristobal-Young  Specimens:   ID Type Source Tests Collected by Time Destination   1 : left submandibular gland with stone Tissue Salivary Gland, Submandibular, Left SURGICAL PATHOLOGY EXAM Bassam Mendez MD 6/3/2024 11:15 AM      Findings:   Left submandibular gland with sialolith excised.  Complications: None.  Implants: * No implants in log *        Evie Shetty MD  Otolaryngology-Head & Neck Surgery - PGY-1  To contact ENT please dial [phone] and enter job code [phone].

## 2024-06-03 NOTE — ANESTHESIA PROCEDURE NOTES
Airway       Patient location during procedure: PACU       Procedure Start/Stop Times: 6/3/2024 9:05 AM  Staff -        CRNA: Margot Adams APRN CRNA       Performed By: CRNA  Consent for Airway        Urgency: emergent  Indications and Patient Condition       Indications for airway management: radha-procedural       Mallampati: I     Induction type:intravenous       Mask difficulty assessment: 1 - vent by mask    Final Airway Details       Final airway type: endotracheal airway       Successful airway: ETT - single and Oral  Endotracheal Airway Details        ETT size (mm): 8.0       Cuffed: yes       Successful intubation technique: video laryngoscopy       VL Blade Size: MAC 4       Grade View of Cords: 1       Adjucts: stylet       Position: Right       Measured from: lips       Secured at (cm): 23       Bite block used: None    Post intubation assessment        ETT secured       Placement verified by: capnometry, equal breath sounds and chest rise        Number of attempts at approach: 1       Number of other approaches attempted: 0       Secured with: tape       Ease of procedure: easy       Dentition: Intact and Unchanged    Medication(s) Administered   Medication Administration Time: 6/3/2024 9:05 AM

## 2024-06-03 NOTE — ANESTHESIA PREPROCEDURE EVALUATION
Anesthesia Pre-Procedure Evaluation    Patient: Thai Mancia   MRN: [id number] : 1970        Procedure : Procedure(s):  excision of left submandibular gland          Past Medical History:   Diagnosis Date    Asthma     Diverticulitis     Essential hypertension       Past Surgical History:   Procedure Laterality Date    CIRCUMCISION N/A 2021    Procedure: CIRCUMCISION;  Surgeon: Toy Feliz MD;  Location: [facility] OR    COLONOSCOPY N/A 2021    Procedure: COLONOSCOPY;  Surgeon: Claire Browning MD;  Location:  GI    KNEE SURGERY      left      Allergies   Allergen Reactions    Cats      Itchy eyes, runny nose, wheezing    Dogs      Itchy eyes, runny nose, wheezing    Pollen Extract      Runny nose      Social History     Tobacco Use    Smoking status: Never    Smokeless tobacco: Never   Substance Use Topics    Alcohol use: No     Alcohol/week: 0.0 standard drinks of alcohol     Comment: quit       Wt Readings from Last 1 Encounters:   24 112.5 kg (248 lb)        Anesthesia Evaluation   Pt has had prior anesthetic.     No history of anesthetic complications       ROS/MED HX  ENT/Pulmonary:     (+)     ESTHER risk factors, snores loudly, hypertension,               asthma                  Neurologic:  - neg neurologic ROS     Cardiovascular:     (+)  hypertension- -   -  - -                                      METS/Exercise Tolerance:     Hematologic:  - neg hematologic  ROS     Musculoskeletal:  - neg musculoskeletal ROS     GI/Hepatic:  - neg GI/hepatic ROS  (-) GERD   Renal/Genitourinary:  - neg Renal ROS     Endo:  - neg endo ROS   (+)               Obesity,       Psychiatric/Substance Use:  - neg psychiatric ROS     Infectious Disease:  - neg infectious disease ROS     Malignancy:       Other:            Physical Exam    Airway        Mallampati: III   TM distance: > 3 FB   Neck ROM: full   Mouth opening: > 3 cm    Respiratory Devices and Support         Dental      "  (+) Modest Abnormalities - crowns, retainers, 1 or 2 missing teeth    B=Bridge, C=Chipped, L=Loose, M=Missing    Cardiovascular             Pulmonary                   OUTSIDE LABS:  CBC:   Lab Results   Component Value Date    WBC 6.2 05/24/2024    WBC 7.7 03/09/2024    HGB 14.7 05/24/2024    HGB 14.0 03/09/2024    HCT 44.2 05/24/2024    HCT 42.6 03/09/2024     05/24/2024     03/09/2024     BMP:   Lab Results   Component Value Date     05/24/2024     10/07/2022    POTASSIUM 4.1 05/24/2024    POTASSIUM 3.9 10/07/2022    CHLORIDE 106 05/24/2024    CHLORIDE 108 10/07/2022    CO2 26 05/24/2024    CO2 26 10/07/2022    BUN 16.3 05/24/2024    BUN 12 10/07/2022    CR 0.95 05/24/2024    CR 0.83 10/07/2022     (H) 05/24/2024    GLC 93 10/07/2022     COAGS: No results found for: \"PTT\", \"INR\", \"FIBR\"  POC: No results found for: \"BGM\", \"HCG\", \"HCGS\"  HEPATIC:   Lab Results   Component Value Date    ALBUMIN 4.0 05/24/2024    PROTTOTAL 7.1 05/24/2024    ALT 34 05/24/2024    AST 29 05/24/2024    ALKPHOS 78 05/24/2024    BILITOTAL 0.5 05/24/2024     OTHER:   Lab Results   Component Value Date    A1C 5.3 05/24/2024    FRANCK 9.0 05/24/2024    LIPASE 29 08/06/2022    TSH 1.03 05/24/2024       Anesthesia Plan    ASA Status:  2    NPO Status:  NPO Appropriate    Anesthesia Type: General.     - Airway: ETT   Induction: Intravenous, Propofol.   Maintenance: Balanced.        Consents    Anesthesia Plan(s) and associated risks, benefits, and realistic alternatives discussed. Questions answered and patient/representative(s) expressed understanding.     - Discussed:     - Discussed with:  Patient      - Extended Intubation/Ventilatory Support Discussed: No.      - Patient is DNR/DNI Status: No          Postoperative Care    Pain management: IV analgesics, Oral pain medications, Multi-modal analgesia.   PONV prophylaxis: Dexamethasone or Solumedrol, Ondansetron (or other 5HT-3), Background Propofol Infusion " "    Comments:               Terry Simpson MD    I have reviewed the pertinent notes and labs in the chart from the past 30 days and (re)examined the patient.  Any updates or changes from those notes are reflected in this note.              # Obesity: Estimated body mass index is 34.59 kg/m  as calculated from the following:    Height as of this encounter: 1.803 m (5' 11\").    Weight as of this encounter: 112.5 kg (248 lb).      "

## 2024-06-03 NOTE — ANESTHESIA POSTPROCEDURE EVALUATION
Patient: Thai Mancia    Procedure: Procedure(s):  excision of left submandibular gland       Anesthesia Type:  General    Note:  Disposition: Outpatient   Postop Pain Control: Uneventful            Sign Out: Well controlled pain   PONV: No   Neuro/Psych: Uneventful            Sign Out: Acceptable/Baseline neuro status   Airway/Respiratory: Uneventful            Sign Out: Acceptable/Baseline resp. status   CV/Hemodynamics: Uneventful            Sign Out: Acceptable CV status; No obvious hypovolemia; No obvious fluid overload   Other NRE: NONE   DID A NON-ROUTINE EVENT OCCUR? No           Last vitals:  Vitals Value Taken Time   /91 06/03/24 1215   Temp 36.3  C (97.4  F) 06/03/24 1200   Pulse 74 06/03/24 1222   Resp 0 06/03/24 1222   SpO2 91 % 06/03/24 1225   Vitals shown include unfiled device data.    Electronically Signed By: Terry Simpson MD  Oralia 3, 2024  1:09 PM

## 2024-06-04 ENCOUNTER — TELEPHONE (OUTPATIENT)
Dept: OTOLARYNGOLOGY | Facility: CLINIC | Age: 54
End: 2024-06-04
Payer: COMMERCIAL

## 2024-06-04 NOTE — TELEPHONE ENCOUNTER
Called patient and reviewed that patient would need to come into clinic for KIANA drain removal. Patient scheduled for nurse visit for tomorrow for drain removal. Patient verbalized understanding and will follow up as scheduled.    Radhika ESCAMILLAN, RN

## 2024-06-04 NOTE — TELEPHONE ENCOUNTER
[facility] Call Center    Phone Message    May a detailed message be left on voicemail: yes     Reason for Call: Other: Pt called in regards to his drains from surgery yesterday. He's wondering if he needs to come back to the clinic to have them removed, or could he go to any clinic or urgent care. He would like a call back to clarify for next steps.     Action Taken: Other: [facility]    Travel Screening: Not Applicable     Date of Service:

## 2024-06-06 NOTE — TELEPHONE ENCOUNTER
Hello,    I'm with ortho con.  Pt has been waiting on his knee MRI results which were ordered by Dr. Arenas a bit back.  Could he please get a call so he knows the results and next steps?  Pt can be reached at .  Ok to leave a detailed VM.     Radha 10, 2024     Sheridan Aldridge PA-C  [address] [facility], [facility] [address]  [facility] [address]    Patient: mathieu Quispe   YOB: 1946   Date of Visit: 6/6/2024       Dear Dr. Sheridan Aldridge PA-C:    Thank you for referring mathieu Quispe to me for evaluation. Below are my notes for this consultation.  If you have questions, please do not hesitate to call me. I look forward to following your patient along with you.       Sincerely,     Abhilash Rodriguez MD      CC: No Recipients  ______________________________________________________________________________________    HISTORY OF PRESENT ILLNESS:  Mathieu Quispe is a 77 y.o. female who presents today for a cystoscopy. She has been noticing hematuria. She reports that she does have incontinence and would like not treatment for it because she is worried about treatment. She states she has a history of breast cancer. She was referred to GI for her epigastric pain and has an upcoming appointment.          Past Medical History  She has a past medical history of Abdominal pain, Anxiety, Cat bite (03/11/2023), Cataract, Depression, Disease of thyroid gland, GERD (gastroesophageal reflux disease), GERD (gastroesophageal reflux disease), Hypothyroidism, Irritable bowel syndrome, Panic attacks, Personal history of malignant neoplasm of breast (06/30/2021), and Personal history of other complications of pregnancy, childbirth and the puerperium.    Surgical History  She has a past surgical history that includes Other surgical history (06/30/2021); Breast surgery; Gastric bypass; Colon surgery; Hysterectomy; Cataract extraction; Mastectomy, partial (Left); and CT angio abdomen w and or wo IV IV contrast (12/28/2023).     Social History  She reports that she has never smoked. She has never been exposed to tobacco smoke. She has never used smokeless tobacco. She reports that she does not drink alcohol and does not use drugs.    Family History  Family History    Problem Relation Name Age of Onset   • Osteoporosis Mother     • Alcohol abuse Father     • Cancer Sister          breast   • Osteoporosis Sister     • Cancer Brother          colon. prostate   • Hypothyroidism Other          Allergies  Nsaids (non-steroidal anti-inflammatory drug)      A comprehensive 10+ review of systems was negative except for: see hpi                          PHYSICAL EXAMINATION:  BP Readings from Last 3 Encounters:   05/14/24 116/63   05/10/24 130/61   04/09/24 148/70      Wt Readings from Last 3 Encounters:   05/14/24 (!) 37.2 kg (82 lb)   05/10/24 (!) 38.8 kg (85 lb 8.6 oz)   04/09/24 (!) 38.8 kg (85 lb 8 oz)      BMI: Estimated body mass index is 16.01 kg/m² as calculated from the following:    Height as of 5/14/24: 1.524 m (5').    Weight as of 5/14/24: 37.2 kg (82 lb).  BSA: Estimated body surface area is 1.25 meters squared as calculated from the following:    Height as of 5/14/24: 1.524 m (5').    Weight as of 5/14/24: 37.2 kg (82 lb).  HEENT: Normocephalic, atraumatic, PER EOMI, nonicteric, trachea normal, thyroid normal, oropharynx normal.  CARDIAC: regular rate & rhythm, S1 & S2 normal.  No heaves, thrills, gallops or murmurs.  LUNGS: Clear to auscultation, no spinal or CV tenderness.  EXTREMITIES: No evidence of cyanosis, clubbing or edema.    Stage 0 POP     Indication  Lower urinary tract symptoms, hematuria     POST-OP DIAGNOSIS:   Same.  ANESTHESIA:    2% Lidocaine gel.  PROCEDURE:    Cystoscopy.  SURGEON:     gilma    FINDINGS    Bladder:  normal  Trigone & Ureteral Orifices: normal.  Vesical Neck:  normal.  Urethra: normal.    PROCEDURE PHYSICIAN NOTE  I was present in the exam room  for the entire procedure as above. We reviewed procedure with patient along with the indications, options, alternatives,  risks of having and not having procedure,  benefits, and probability of success.  We answered the patients questions. We explained the expected post procedure symptoms  including possible dysuria and infection.  Pt consented to have procedure.  The patient will follow up for discussion of her results.              Assessment:  Latha Quispe is a 77 y.o.  who presents for microhematuria and incontinence     Microhematuria:   -Cystoscopy done 24  CTU shows small nonbstructing L kidney stone   Repeat UA in 1 year   Urine culture ordered       Incontinence:   3 months of gemtesa samples provided  Follow up in 6 weeks virtually       All questions and concerns were answered and addressed.  The patient expressed understanding and agrees with the plan.     Abhilash Rodriguez MD    Scribe Attestation  By signing my name below, IShayla, Andra   attest that this documentation has been prepared under the direction and in the presence of Abhilash Rodriguez MD.

## 2024-06-10 LAB
PATH REPORT.COMMENTS IMP SPEC: NORMAL
PATH REPORT.COMMENTS IMP SPEC: NORMAL
PATH REPORT.FINAL DX SPEC: NORMAL
PATH REPORT.GROSS SPEC: NORMAL
PATH REPORT.MICROSCOPIC SPEC OTHER STN: NORMAL
PATH REPORT.RELEVANT HX SPEC: NORMAL
PHOTO IMAGE: NORMAL

## 2024-06-11 ENCOUNTER — OFFICE VISIT (OUTPATIENT)
Dept: OTOLARYNGOLOGY | Facility: CLINIC | Age: 54
End: 2024-06-11
Payer: COMMERCIAL

## 2024-06-11 ENCOUNTER — TELEPHONE (OUTPATIENT)
Dept: OTOLARYNGOLOGY | Facility: CLINIC | Age: 54
End: 2024-06-11

## 2024-06-11 VITALS
HEART RATE: 79 BPM | OXYGEN SATURATION: 96 % | DIASTOLIC BLOOD PRESSURE: 73 MMHG | BODY MASS INDEX: 34.28 KG/M2 | SYSTOLIC BLOOD PRESSURE: 131 MMHG | HEIGHT: 71 IN | WEIGHT: 244.9 LBS

## 2024-06-11 DIAGNOSIS — K11.5 SALIVARY GLAND STONE: Primary | ICD-10-CM

## 2024-06-11 PROCEDURE — 99024 POSTOP FOLLOW-UP VISIT: CPT | Performed by: OTOLARYNGOLOGY

## 2024-06-11 ASSESSMENT — PAIN SCALES - GENERAL: PAINLEVEL: MODERATE PAIN (4)

## 2024-06-11 NOTE — LETTER
"2024       RE: Thai Mancia  [address]  [facility] [address]     Dear Colleague,    Thank you for referring your patient, Thai Mancia, to the [facility] [place] [facility]. Please see a copy of my visit note below.      Otolaryngology Clinic    Name: Thai Mancia  MRN: [id number]  Age: 54 year old  : 2024      Chief Complaint:   Follow up     History of Present Illness:   Thai Mancia is a 54 year old male with a history of chronic left-sided sialoadenitis with sialolithiasis s/p left submandibular gland (24) who presents for follow up. He reports feeling fine postoperatively.      Review of Systems:   Pertinent items are noted in HPI or as in patient entered ROS below, remainder of complete ROS is negative.       2024     6:57 AM    ENT ROS   Constitutional Appetite change    Problems with sleep   Eyes Double vision   Cardiopulmonary Wheezing   Gastrointestinal/Genitourinary Heartburn/indigestion    Unexplained nausea/vomiting   Musculoskeletal Sore or stiff joints    Swollen joints    Neck pain    Swollen legs/feet   Hematologic Lymph node swelling   Endocrine Thirst    Frequent urination   Skin Change in moles        Physical Exam:   /73   Pulse 79   Ht 1.803 m (5' 11\")   Wt 111.1 kg (244 lb 14.4 oz)   SpO2 96%   BMI 34.16 kg/m       PHYSICAL EXAMINATION:    Constitutional:  The patient was unaccompanied, well-groomed, and in no acute distress.    Skin:  Warm and pink.    Neurologic:  Alert and oriented x 3.  CN's III-XII within normal limits.  Voice normal.   Psychiatric:  The patient's affect was calm, cooperative, and appropriate.    Respiratory:  Breathing comfortably without stridor or exertion of accessory muscles.    Eyes: Extraocular movement intact.    Head:  Normocephalic and atraumatic.  No lesions or scars.    Ears:  Pinnae and " tragus non-tender.  EAC's and TM's were clear.    Nose:  Sinuses were non-tender.  Anterior rhinoscopy revealed midline septum and absence of purulence or polyps.    OC/OP:  Normal tongue, floor of mouth, buccal mucosa, and palate.  No lesions or masses on inspection or palpation.  No abnormal lymph tissue in the oropharynx.  The pterygoid region is non-tender.    Neck:  Supple with normal laryngeal and tracheal landmarks.  The parotid beds were without masses.  No palpable thyroid.  Lymphatic:  There is no palpable lymphadenopathy in the neck.     Neck incision clean and healing      Assessment and Plan:  No diagnosis found.  54 year old male with a history of chronic left-sided sialoadenitis with sialolithiasis s/p left submandibular gland (6/03/24) who presents for follow up. Mouth is healing well. Makes no major complaints at this time. Follow up in 6 months.     Follow-up: No follow-ups on file.      Scribe Disclosure:   I, Denisse Alvarez, am serving as a scribe; to document services personally performed by Bassam Mendez MD -based on data collection and the provider's statements to me.     Provider Disclosure:  I agree with above History, Review of Systems, Physical exam and Plan.  I have reviewed the content of the documentation and have edited it as needed. I have personally performed the services documented here and the documentation accurately represents those services and the decisions I have made.      Electronically signed by:  Bassam Mendez MD            Again, thank you for allowing me to participate in the care of your patient.      Sincerely,    Bassam Mendez MD

## 2024-06-11 NOTE — PROGRESS NOTES
"  Otolaryngology Clinic    Name: Thai Mancia  MRN: [id number]  Age: 54 year old  : 2024      Chief Complaint:   Follow up     History of Present Illness:   Thai Mancia is a 54 year old male with a history of chronic left-sided sialoadenitis with sialolithiasis s/p left submandibular gland (24) who presents for follow up. He reports feeling fine postoperatively.      Review of Systems:   Pertinent items are noted in HPI or as in patient entered ROS below, remainder of complete ROS is negative.       2024     6:57 AM    ENT ROS   Constitutional Appetite change    Problems with sleep   Eyes Double vision   Cardiopulmonary Wheezing   Gastrointestinal/Genitourinary Heartburn/indigestion    Unexplained nausea/vomiting   Musculoskeletal Sore or stiff joints    Swollen joints    Neck pain    Swollen legs/feet   Hematologic Lymph node swelling   Endocrine Thirst    Frequent urination   Skin Change in moles        Physical Exam:   /73   Pulse 79   Ht 1.803 m (5' 11\")   Wt 111.1 kg (244 lb 14.4 oz)   SpO2 96%   BMI 34.16 kg/m       PHYSICAL EXAMINATION:    Constitutional:  The patient was unaccompanied, well-groomed, and in no acute distress.    Skin:  Warm and pink.    Neurologic:  Alert and oriented x 3.  CN's III-XII within normal limits.  Voice normal.   Psychiatric:  The patient's affect was calm, cooperative, and appropriate.    Respiratory:  Breathing comfortably without stridor or exertion of accessory muscles.    Eyes: Extraocular movement intact.    Head:  Normocephalic and atraumatic.  No lesions or scars.    Ears:  Pinnae and tragus non-tender.  EAC's and TM's were clear.    Nose:  Sinuses were non-tender.  Anterior rhinoscopy revealed midline septum and absence of purulence or polyps.    OC/OP:  Normal tongue, floor of mouth, buccal mucosa, and palate.  No lesions or masses on inspection or palpation.  No abnormal lymph tissue in the oropharynx.  The " pterygoid region is non-tender.    Neck:  Supple with normal laryngeal and tracheal landmarks.  The parotid beds were without masses.  No palpable thyroid.  Lymphatic:  There is no palpable lymphadenopathy in the neck.     Neck incision clean and healing      Assessment and Plan:  No diagnosis found.  54 year old male with a history of chronic left-sided sialoadenitis with sialolithiasis s/p left submandibular gland (6/03/24) who presents for follow up. Mouth is healing well. Makes no major complaints at this time. Follow up in 6 months.     Follow-up: No follow-ups on file.      Scribe Disclosure:   I, Denisse Alvarez, am serving as a scribe; to document services personally performed by Bassam Mendez MD -based on data collection and the provider's statements to me.     Provider Disclosure:  I agree with above History, Review of Systems, Physical exam and Plan.  I have reviewed the content of the documentation and have edited it as needed. I have personally performed the services documented here and the documentation accurately represents those services and the decisions I have made.      Electronically signed by:  Bassam Mendez MD

## 2024-06-11 NOTE — TELEPHONE ENCOUNTER
Patient confirmed scheduled appointment:  Date: 12/17  Time: 3:00pm  Visit type: Return ENT   Provider: Dr. Mendez  Location: [facility]  Testing/imaging:   Additional notes:

## 2024-06-11 NOTE — PATIENT INSTRUCTIONS
You were seen in the ENT Clinic today by Dr. Mendez. If you have any questions or concerns after your appointment, please contact us (see below)      2.   Plan to return to the ENT clinic 6 months            How to Contact Us:  Send a [employer] message to your provider. Our team will respond to you via [employer]. Occasionally, we will need to call you to get further information.  For urgent matters (Monday-Friday), call the ENT Clinic: [phone] and speak with a call center team member - they will route your call appropriately.   If you'd like to speak directly with a nurse, please find our contact information below. We do our best to check voicemail frequently throughout the day, and will work to call you back within 1-2 days. For urgent matters, please use the general clinic phone numbers listed above.     MEDH[facility] Garrido  Direct: [phone]  Cailin WATERS LPN  Direct: [phone]         [facility]  Department of Otolaryngology

## 2024-06-24 ENCOUNTER — PRE VISIT (OUTPATIENT)
Dept: OTOLARYNGOLOGY | Facility: CLINIC | Age: 54
End: 2024-06-24

## 2024-07-12 ENCOUNTER — TELEPHONE (OUTPATIENT)
Dept: ORTHOPEDICS | Facility: CLINIC | Age: 54
End: 2024-07-12
Payer: COMMERCIAL

## 2024-07-12 NOTE — TELEPHONE ENCOUNTER
Informed patient that provider will call him next Monday.    Scheduling number given in the mean time if he has any further questions.      Homero Scanlon ATC

## 2024-07-12 NOTE — TELEPHONE ENCOUNTER
Other: Patient would like to schedule R knee replacement surgery with Dr. Arenas      Could we send this information to you in [employer] or would you prefer to receive a phone call?:   Patient would prefer a phone call   Okay to leave a detailed message?: Yes at Cell number on file:    Telephone Information:   Mobile [phone]

## 2024-07-12 NOTE — TELEPHONE ENCOUNTER
Patient last seen on 3/18/24 at which time patient had HA injection. Order for  brace was placed. No mention of surgery.  Please advise if patient needs to be seen prior before case request is placed.       Homero Scanlon ATC

## 2024-07-16 ENCOUNTER — TELEPHONE (OUTPATIENT)
Dept: ORTHOPEDICS | Facility: CLINIC | Age: 54
End: 2024-07-16
Payer: COMMERCIAL

## 2024-07-16 DIAGNOSIS — M17.11 LOCALIZED OSTEOARTHRITIS OF RIGHT KNEE: Primary | ICD-10-CM

## 2024-07-16 NOTE — TELEPHONE ENCOUNTER
CC: Right Knee Osteoarthritis    Patient is a 54-year-old male known to my practice with end-stage right knee osteoarthritis.  Trialed operative management of the past in the form of  brace, corticosteroid injection, hyaluronic acid injection, activity modification, physical therapy, and oral antifungal medications.  Last injection was in March appointment 40 at this time he feels that his knee pain continues to worsen is affecting his ability perform his job his actives of daily living.  He is interested in discussing operative management.  In the past we have discussed operative management form of right total knee arthroplasty.  I again discussed with him today the surgical technique of right knee arthroplasty.  Discussed the postoperative protocol.  We also discussed risk benefits of operative intervention including but not limited to bleeding, infection, failure to pain, damage to nerves or blood vessels, stiffness, postoperative DVT/PE, loss of limb and loss of life.  I demonstrated answered questions.  He would like to move forward with planning a total knee arthroplasty for later this fall or winter.  I think that is a very reasonable choice for him as he is certainly trial of years of nonoperative management.  Will plan for right Eden persona total knee arthroplasty with MC poly and CR femur.  I will use aspirin for DVT prophylaxis postoperatively.      15 minutes were spent on the phone with the patient.    Ronan Arenas MD    [facility]   Department of Orthopedic Surgery

## 2024-08-13 ENCOUNTER — MYC REFILL (OUTPATIENT)
Dept: INTERNAL MEDICINE | Facility: CLINIC | Age: 54
End: 2024-08-13
Payer: COMMERCIAL

## 2024-08-13 DIAGNOSIS — I10 ESSENTIAL HYPERTENSION: ICD-10-CM

## 2024-08-13 RX ORDER — AMLODIPINE BESYLATE 2.5 MG/1
2.5 TABLET ORAL DAILY
Qty: 90 TABLET | Refills: 3 | OUTPATIENT
Start: 2024-08-13

## 2024-08-13 RX ORDER — LOSARTAN POTASSIUM AND HYDROCHLOROTHIAZIDE 25; 100 MG/1; MG/1
1 TABLET ORAL DAILY
Qty: 90 TABLET | Refills: 0 | OUTPATIENT
Start: 2024-08-13

## 2024-08-13 RX ORDER — LOSARTAN POTASSIUM AND HYDROCHLOROTHIAZIDE 25; 100 MG/1; MG/1
1 TABLET ORAL DAILY
Qty: 90 TABLET | Refills: 2 | Status: SHIPPED | OUTPATIENT
Start: 2024-08-13

## 2024-09-03 ENCOUNTER — MYC MEDICAL ADVICE (OUTPATIENT)
Dept: INTERNAL MEDICINE | Facility: CLINIC | Age: 54
End: 2024-09-03
Payer: COMMERCIAL

## 2024-09-03 DIAGNOSIS — M25.562 PAIN IN BOTH KNEES, UNSPECIFIED CHRONICITY: ICD-10-CM

## 2024-09-03 DIAGNOSIS — M25.561 PAIN IN BOTH KNEES, UNSPECIFIED CHRONICITY: ICD-10-CM

## 2024-09-04 RX ORDER — LIDOCAINE 50 MG/G
1 PATCH TOPICAL EVERY 24 HOURS
Qty: 10 PATCH | Refills: 11 | Status: SHIPPED | OUTPATIENT
Start: 2024-09-04

## 2024-09-04 NOTE — TELEPHONE ENCOUNTER
Sent to [facility] pharmacy   That is where other medication have been sent     It may not be covered but can be bought OTC 4% patches without prescription

## 2024-09-04 NOTE — TELEPHONE ENCOUNTER
Please review myc message    Lidocaine patch was discontinued on 5/24/24.    Medication pended    Myc message sent to patient asking which pharmacy he would like to use.  Awaiting response.

## 2024-10-29 ENCOUNTER — OFFICE VISIT (OUTPATIENT)
Dept: SLEEP MEDICINE | Facility: CLINIC | Age: 54
End: 2024-10-29
Attending: NURSE PRACTITIONER
Payer: COMMERCIAL

## 2024-10-29 VITALS
HEART RATE: 68 BPM | DIASTOLIC BLOOD PRESSURE: 74 MMHG | BODY MASS INDEX: 33.96 KG/M2 | HEIGHT: 71 IN | SYSTOLIC BLOOD PRESSURE: 130 MMHG | OXYGEN SATURATION: 97 % | WEIGHT: 242.6 LBS

## 2024-10-29 DIAGNOSIS — R06.83 SNORING: ICD-10-CM

## 2024-10-29 DIAGNOSIS — R40.0 DAYTIME SLEEPINESS: Primary | ICD-10-CM

## 2024-10-29 PROCEDURE — 99205 OFFICE O/P NEW HI 60 MIN: CPT | Performed by: INTERNAL MEDICINE

## 2024-10-29 ASSESSMENT — SLEEP AND FATIGUE QUESTIONNAIRES
HOW LIKELY ARE YOU TO NOD OFF OR FALL ASLEEP WHEN YOU ARE A PASSENGER IN A CAR FOR AN HOUR WITHOUT A BREAK: SLIGHT CHANCE OF DOZING
HOW LIKELY ARE YOU TO NOD OFF OR FALL ASLEEP WHILE SITTING AND READING: SLIGHT CHANCE OF DOZING
HOW LIKELY ARE YOU TO NOD OFF OR FALL ASLEEP WHILE LYING DOWN TO REST IN THE AFTERNOON WHEN CIRCUMSTANCES PERMIT: MODERATE CHANCE OF DOZING
HOW LIKELY ARE YOU TO NOD OFF OR FALL ASLEEP IN A CAR, WHILE STOPPED FOR A FEW MINUTES IN TRAFFIC: SLIGHT CHANCE OF DOZING
HOW LIKELY ARE YOU TO NOD OFF OR FALL ASLEEP WHILE WATCHING TV: MODERATE CHANCE OF DOZING
HOW LIKELY ARE YOU TO NOD OFF OR FALL ASLEEP WHILE SITTING QUIETLY AFTER LUNCH WITHOUT ALCOHOL: WOULD NEVER DOZE
HOW LIKELY ARE YOU TO NOD OFF OR FALL ASLEEP WHILE SITTING AND TALKING TO SOMEONE: WOULD NEVER DOZE
HOW LIKELY ARE YOU TO NOD OFF OR FALL ASLEEP WHILE SITTING INACTIVE IN A PUBLIC PLACE: SLIGHT CHANCE OF DOZING

## 2024-10-29 NOTE — PROGRESS NOTES
[facility]   Outpatient Sleep Medicine Consultation  October 29, 2024      Name: Thai Mancia MRN# [id number]   Age: 54 year old YOB: 1970     Date of Consultation: October 29, 2024  Consultation is requested by: LAYLA Adames CNP  [address] [place] [address] Sara Elias  Primary care provider: Sara Elias         Reason for Sleep Consult:     Thai Mancia is a 54 year old male for complaints of loud snoring for several years         Assessment and Plan:     Summary Sleep Diagnoses:    Suspected ESTHER  High stop bang score of 4 and very typical symptoms. We discussed the pathophysiology of ESTHER including the cardio-neurologic complications of significant untreated sleep apnea as well as the treatment options including CPAP, oral appliances and hypoglossal nerve stimulator.  He will be a good candidate for home sleep study due to the high pre-test probability.          Summary Recommendations:      Orders Placed This Encounter   Procedures    HST-Home Sleep Apnea Test - Noxturnal Returnable       Summary Counseling:  See instructions    Counseling included a comprehensive review of diagnostic and therapeutic strategies as well as risks of inadequate therapy.  Educational materials provided in instructions.             History of Present Illness:   I had the pleasure of seeing Thai Mancia is a 54 year old male who presents for evaluation of loud snoring.  Jean-Cladue presents for complaints of socially disruptive snoring that is very bothersome to his spouse. Otherwise he is not sleepy nor tired during the day. He has no issues with his sleep and wakes up feeling refreshed. No nocturnal choking.  He has no symptoms suggestive of RLS nor central hypersomnia.  Weight has remained stable throughout the years.       SLEEP-WAKE SCHEDULE:     Thai Mancia     -Describes themself as neither a morning or night person;     -Naps -  None    Thai Mancia    -ON WEEKDAYS, goes to sleep at 9:00 PM during the week; awakens  3  AM with an alarm; falls asleep in 5 minutes; denies difficulty falling asleep.     -ON WEEKENDS, goes to sleep at 10:00 PM.  He wakes up at 7:00 AM without an alarm; falls asleep in 5 minutes.       -Awakens 2-4 times a night for 10 minutes before falling back to sleep; awakens to go to the bathroom and anxiety      SCALES       SLEEP APNEA: Stopbang score -4       INSOMNIA:  Insomnia severity score: 14       SLEEPINESS: Edmonson sleepiness scale: 8 [normal < 11]   Drowsy driving/near accidents None  Consequences: None       PHQ9:     SLEEP COMPLAINTS:  Cardio-respiratory    Snoring- 7/week  Dyspnea- denies  Morning headaches or confusion-denies  Coexisting Lung disease: denies    Coexisting Heart disease: denies    Does patient have a bed partner: Yes  Has bed partner been sleeping separately because of snoring:  denies            RLS Screen: When you try to relax in the evening or sleep at  night, do you ever have unpleasant, restless feelings in your  legs that can be relieved by walking or movement? denies    Periodic limb movement: denies    Narcolepsy:  denies sudden urges of sleep attacks  Cataplexy:  denies   Sleep paralysis:  denies    Hallucinations:   denies       Sleep Behaviors:  Leg symptoms/movements: denies  Motor restlessness:denies  Night terrors: denies  Bruxism: denies  Automatic behaviors: none    Other subjective complaints:  Anxiety or rumination denies  Pain and discomfort at  night: denies  Waking up with heart pounding or racing: denies  GERD or aspiration:denies         Parasomnia:   NREM - denies recurrent persistent confusional arousal, night eating, sleep walking or sleep terrors   REM  - denies dream enactment; injuries   Safety: No issues             Medications:     Current Outpatient Medications   Medication Sig Dispense Refill    acetaminophen (TYLENOL) 325 MG tablet Take 2 tablets (650  mg) by mouth every 4 hours as needed for mild pain 50 tablet 0    albuterol (PROAIR HFA/PROVENTIL HFA/VENTOLIN HFA) 108 (90 Base) MCG/ACT inhaler Inhale 2 puffs into the lungs every 6 hours as needed for shortness of breath or wheezing 8.5 g 11    amLODIPine (NORVASC) 2.5 MG tablet Take 1 tablet (2.5 mg) by mouth daily 90 tablet 3    fluticasone-salmeterol (ADVAIR) 250-50 MCG/ACT inhaler INHALE ONE PUFF BY MOUTH EVERY 12 HOURS 60 each 11    lidocaine (LIDODERM) 5 % patch Place 1 patch onto the skin every 24 hours. To prevent lidocaine toxicity, patient should be patch free for 12 hrs daily. 10 patch 11    losartan-hydrochlorothiazide (HYZAAR) 100-25 MG tablet Take 1 tablet by mouth daily Note change in dose 90 tablet 2    mineral oil-hydrophilic petrolatum (AQUAPHOR) external ointment Apply topically 2 times daily 50 g 0    oxyCODONE (ROXICODONE) 5 MG tablet Take 1-2 tablets (5-10 mg) by mouth every 4 hours as needed for moderate to severe pain (Patient not taking: Reported on 10/29/2024) 10 tablet 0    senna-docusate (SENOKOT-S/PERICOLACE) 8.6-50 MG tablet Take 1-2 tablets by mouth 2 times daily (Patient not taking: Reported on 10/29/2024) 30 tablet 0     Current Facility-Administered Medications   Medication Dose Route Frequency Provider Last Rate Last Admin    lidocaine 1 % injection 2 mL  2 mL   Ronan Arenas MD   2 mL at 12/15/23 1126    lidocaine 1 % injection 5 mL  5 mL   Ronan Arenas MD   5 mL at 12/15/23 1126    sodium hyaluronate (DUROLANE) injection 60 mg  60 mg   Ronan Arenas MD   60 mg at 03/18/24 1620    triamcinolone (KENALOG-40) injection 40 mg  40 mg   Ronan Arenas MD   40 mg at 12/15/23 1126        Allergies   Allergen Reactions    Cats      Itchy eyes, runny nose, wheezing    Dogs      Itchy eyes, runny nose, wheezing    Pollen Extract      Runny nose            Past Medical History:     Does not need 02 supplement at night   Past Medical History:   Diagnosis Date    Asthma      "Diverticulitis     Essential hypertension              Past Surgical History:    Previous upper airway surgery - None   Past Surgical History:   Procedure Laterality Date    CIRCUMCISION N/A 11/18/2021    Procedure: CIRCUMCISION;  Surgeon: Toy Feliz MD;  Location: [facility] OR    COLONOSCOPY N/A 12/13/2021    Procedure: COLONOSCOPY;  Surgeon: Claire Browning MD;  Location:  GI    EXCISE GLAND SUBMANDIBULAR Left 6/3/2024    Procedure: excision of left submandibular gland;  Surgeon: Bassam Mendez MD;  Location: [facility] OR    KNEE SURGERY  1985    left            Social History:     Social History     Tobacco Use    Smoking status: Never    Smokeless tobacco: Never   Substance Use Topics    Alcohol use: No     Alcohol/week: 0.0 standard drinks of alcohol     Comment: quit 08/17         Chemical History:     Tobacco: Never     Uses 1 cups/day of coffee. Last caffeine intake is usually before 6 am    Supplements for wakefulness: None    EtOH: None None of the patient's responses to the CAGE screening were positive / Negative CAGE score  Recreational Drugs: None     Psych Hx:       10/7/2022     9:02 AM   PHQ   PHQ-9 Total Score 0   Q9: Thoughts of better off dead/self-harm past 2 weeks Not at all        Patient-reported      Current dangers to self or others:none           Family History:     Family History   Problem Relation Age of Onset    Unknown/Adopted Mother         adopted    Unknown/Adopted Father     Diabetes No family hx of           Review of Systems:     A complete 10 point review of systems was negative other than HPI or as commented below:   Thai Mancia has gained 5-10 pounds in 10 years.               Physical Examination:     /74   Pulse 68   Ht 1.803 m (5' 11\")   Wt 110 kg (242 lb 9.6 oz)   SpO2 97%   BMI 33.84 kg/m    Exam:  Constitutional: healthy, alert, and no distress  Head: Normocephalic. No masses, lesions, tenderness or abnormalities  ENT: ENT exam normal, no neck " "nodes or sinus tenderness  Cardiovascular: negative, PMI normal. No lifts, heaves, or thrills. RRR. No murmurs, clicks gallops or rub  Respiratory: negative, Percussion normal. Good diaphragmatic excursion. Lungs clear  Gastrointestinal: Abdomen soft, non-tender. BS normal. No masses, organomegaly  : Deferred  Musculoskeletal: extremities normal- no gross deformities noted, gait normal, and normal muscle tone  Skin: no suspicious lesions or rashes  Neurologic: Gait normal. Reflexes normal and symmetric. Sensation grossly WNL.  Psychiatric: mentation appears normal and affect normal/bright            Data: All pertinent previous laboratory data reviewed     No results found for: \"PH\", \"PHARTERIAL\", \"PO2\", \"[id number]\", \"SAT\", \"PCO2\", \"HCO3\", \"BASEEXCESS\", \"MARSHAL\", \"BEB\"  Lab Results   Component Value Date    TSH 1.03 05/24/2024    TSH 0.78 10/07/2022     Lab Results   Component Value Date     (H) 05/24/2024    GLC 93 10/07/2022     Lab Results   Component Value Date    HGB 14.7 05/24/2024    HGB 14.0 03/09/2024     Lab Results   Component Value Date    BUN 16.3 05/24/2024    BUN 12 10/07/2022    CR 0.95 05/24/2024    CR 0.83 10/07/2022     Lab Results   Component Value Date    AST 29 05/24/2024    AST 26 10/07/2022    ALT 34 05/24/2024    ALT 34 10/07/2022    ALKPHOS 78 05/24/2024    ALKPHOS 99 10/07/2022    BILITOTAL 0.5 05/24/2024    BILITOTAL 0.5 10/07/2022     No results found for: \"UAMP\", \"UBARB\", \"BENZODIAZEUR\", \"UCANN\", \"UCOC\", \"OPIT\", \"UPCP\"        Copy to: Sara Elias MD 10/29/2024       I spent 60 minutes on the date of the encounter doing chart review, history and exam, documentation and further coordination as noted above exclusive of time interpreting sleep study         "

## 2024-11-15 ENCOUNTER — OFFICE VISIT (OUTPATIENT)
Dept: INTERNAL MEDICINE | Facility: CLINIC | Age: 54
End: 2024-11-15
Payer: COMMERCIAL

## 2024-11-15 VITALS
SYSTOLIC BLOOD PRESSURE: 138 MMHG | BODY MASS INDEX: 33.67 KG/M2 | HEART RATE: 71 BPM | OXYGEN SATURATION: 98 % | DIASTOLIC BLOOD PRESSURE: 76 MMHG | TEMPERATURE: 98.4 F | RESPIRATION RATE: 18 BRPM | WEIGHT: 241.4 LBS

## 2024-11-15 DIAGNOSIS — Z01.818 PREOP GENERAL PHYSICAL EXAM: Primary | ICD-10-CM

## 2024-11-15 DIAGNOSIS — R94.31 ABNORMAL ELECTROCARDIOGRAM: ICD-10-CM

## 2024-11-15 DIAGNOSIS — J45.30 MILD PERSISTENT ASTHMA WITHOUT COMPLICATION: ICD-10-CM

## 2024-11-15 DIAGNOSIS — I10 ESSENTIAL HYPERTENSION: ICD-10-CM

## 2024-11-15 DIAGNOSIS — M25.561 RIGHT KNEE PAIN, UNSPECIFIED CHRONICITY: ICD-10-CM

## 2024-11-15 PROBLEM — E66.01 MORBID OBESITY (H): Status: RESOLVED | Noted: 2021-06-30 | Resolved: 2024-11-15

## 2024-11-15 LAB
ANION GAP SERPL CALCULATED.3IONS-SCNC: 10 MMOL/L (ref 7–15)
BUN SERPL-MCNC: 15.3 MG/DL (ref 6–20)
CALCIUM SERPL-MCNC: 9.1 MG/DL (ref 8.8–10.4)
CHLORIDE SERPL-SCNC: 104 MMOL/L (ref 98–107)
CREAT SERPL-MCNC: 1 MG/DL (ref 0.67–1.17)
EGFRCR SERPLBLD CKD-EPI 2021: 89 ML/MIN/1.73M2
GLUCOSE SERPL-MCNC: 98 MG/DL (ref 70–99)
HCO3 SERPL-SCNC: 27 MMOL/L (ref 22–29)
HGB BLD-MCNC: 14.7 G/DL (ref 13.3–17.7)
POTASSIUM SERPL-SCNC: 4.1 MMOL/L (ref 3.4–5.3)
SODIUM SERPL-SCNC: 141 MMOL/L (ref 135–145)

## 2024-11-15 PROCEDURE — 85018 HEMOGLOBIN: CPT | Performed by: NURSE PRACTITIONER

## 2024-11-15 PROCEDURE — 80048 BASIC METABOLIC PNL TOTAL CA: CPT | Performed by: NURSE PRACTITIONER

## 2024-11-15 PROCEDURE — 36415 COLL VENOUS BLD VENIPUNCTURE: CPT | Performed by: NURSE PRACTITIONER

## 2024-11-15 RX ORDER — FLUTICASONE PROPIONATE AND SALMETEROL 250; 50 UG/1; UG/1
POWDER RESPIRATORY (INHALATION)
Qty: 60 EACH | Refills: 11 | Status: SHIPPED | OUTPATIENT
Start: 2024-11-15

## 2024-11-15 RX ORDER — ALBUTEROL SULFATE 90 UG/1
2 INHALANT RESPIRATORY (INHALATION) EVERY 6 HOURS PRN
Qty: 18 G | Refills: 11 | Status: SHIPPED | OUTPATIENT
Start: 2024-11-15

## 2024-11-15 ASSESSMENT — ASTHMA QUESTIONNAIRES
QUESTION_5 LAST FOUR WEEKS HOW WOULD YOU RATE YOUR ASTHMA CONTROL: SOMEWHAT CONTROLLED
QUESTION_1 LAST FOUR WEEKS HOW MUCH OF THE TIME DID YOUR ASTHMA KEEP YOU FROM GETTING AS MUCH DONE AT WORK, SCHOOL OR AT HOME: A LITTLE OF THE TIME
QUESTION_3 LAST FOUR WEEKS HOW OFTEN DID YOUR ASTHMA SYMPTOMS (WHEEZING, COUGHING, SHORTNESS OF BREATH, CHEST TIGHTNESS OR PAIN) WAKE YOU UP AT NIGHT OR EARLIER THAN USUAL IN THE MORNING: NOT AT ALL
ACT_TOTALSCORE: 19
QUESTION_2 LAST FOUR WEEKS HOW OFTEN HAVE YOU HAD SHORTNESS OF BREATH: ONCE OR TWICE A WEEK
ACT_TOTALSCORE: 19
QUESTION_4 LAST FOUR WEEKS HOW OFTEN HAVE YOU USED YOUR RESCUE INHALER OR NEBULIZER MEDICATION (SUCH AS ALBUTEROL): TWO OR THREE TIMES PER WEEK

## 2024-11-15 ASSESSMENT — PAIN SCALES - GENERAL: PAINLEVEL_OUTOF10: SEVERE PAIN (6)

## 2024-11-15 NOTE — PATIENT INSTRUCTIONS
Lab in [address]    Do Advair twice daily   Rinse mouth after using     Albuterol as needed     Morning of surgery DO NOT TAKE  Losartan hydrochlorothiazide   May take night prior to surgery     Morning of surgery MAY TAKE  Advair   Albuterol  Amlodipine

## 2024-11-15 NOTE — PROGRESS NOTES
Preoperative Evaluation  [place] [address]  [address]  [facility] [address]  Phone: [phone]  Primary Provider: LAYLA Adames CNP  Pre-op Performing Provider: LAYLA Adames CNP  Nov 15, 2024             11/14/2024   Surgical Information   What procedure is being done? Physical and FMLA Paperwork    Facility or Hospital where procedure/surgery will be performed: [facility]    Who is doing the procedure / surgery? Dr. Ronan Arenas    Date of surgery / procedure: 12/4/2024    Time of surgery / procedure: Unkown    Where do you plan to recover after surgery? at home with family        Patient-reported     Fax number for surgical facility: Note does not need to be faxed, will be available electronically in Epic.    Assessment & Plan     The proposed surgical procedure is considered INTERMEDIATE risk.    Preop general physical exam      Right knee pain, unspecified chronicity  Need repair     Essential hypertension  In good range     Mild persistent asthma without complication  Stable   Discussed using advair more consistently as he had been using prn              Risks and Recommendations  The patient has the following additional risks and recommendations for perioperative complications:   - No identified additional risk factors other than previously addressed    Antiplatelet or Anticoagulation Medication Instructions   - Patient is on no antiplatelet or anticoagulation medications.    Additional Medication Instructions    Morning of surgery DO NOT TAKE  Losartan hydrochlorothiazide   May take night prior to surgery     Morning of surgery MAY TAKE  Advair   Albuterol  Amlodipine      Recommendation  Approval given to proceed with proposed procedure, without further diagnostic evaluation.    Kera Bermeo is a 54 year old, presenting for the following:  Pre-Op Exam (Total right knee surgery 12/4.) and paperwork (FLMA for after surgery  recovery.)          11/15/2024     8:05 AM   Additional Questions   Roomed by Lisa Jerez MA   Accompanied by Self         11/15/2024     8:05 AM   Patient Reported Additional Medications   Patient reports taking the following new medications no     HPI related to upcoming procedure:   Need right knee replaced     Asthma in good control     Blood pressure controlled on medication             11/14/2024   Pre-Op Questionnaire   Have you ever had a heart attack or stroke? No    Have you ever had surgery on your heart or blood vessels, such as a stent placement, a coronary artery bypass, or surgery on an artery in your head, neck, heart, or legs? No    Do you have chest pain with activity? No    Do you have a history of heart failure? No    Do you currently have a cold, bronchitis or symptoms of other infection? No    Do you have a cough, shortness of breath, or wheezing? No    Do you or anyone in your family have previous history of blood clots? Adopted    Do you or does anyone in your family have a serious bleeding problem such as prolonged bleeding following surgeries or cuts? (Adopted    Have you ever had problems with anemia or been told to take iron pills? No    Have you had any abnormal blood loss such as black, tarry or bloody stools? No    Have you ever had a blood transfusion? No    Are you willing to have a blood transfusion if it is medically needed before, during, or after your surgery? Yes    Have you or any of your relatives ever had problems with anesthesia? No    Do you have sleep apnea, excessive snoring or daytime drowsiness? (!) YES  Snoring   Started working with sleep management    Do you have a CPAP machine? (!) NO     Do you have any artifical heart valves or other implanted medical devices like a pacemaker, defibrillator, or continuous glucose monitor? No    Do you have artificial joints? No    Are you allergic to latex? No        Patient-reported     Health Care Directive  Patient does not  have a Health Care Directive: Discussed advance care planning with patient; however, patient declined at this time.    Preoperative Review of    reviewed - controlled substances reflected in medication list.      Status of Chronic Conditions:  See problem list for active medical problems.  Problems all longstanding and stable, except as noted/documented.  See ROS for pertinent symptoms related to these conditions.    Patient Active Problem List    Diagnosis Date Noted    Salivary gland stone 04/23/2024     Priority: Medium    Phimosis 10/27/2021     Priority: Medium     Added automatically from request for surgery [id number]      Essential hypertension 04/10/2019     Priority: Medium    Mild persistent asthma without complication 08/08/2016     Priority: Medium    Obesity due to excess calories, unspecified obesity severity 08/08/2016     Priority: Medium    CARDIOVASCULAR SCREENING; LDL GOAL LESS THAN 160 08/05/2011     Priority: Medium      Past Medical History:   Diagnosis Date    Asthma     Diverticulitis     Essential hypertension      Past Surgical History:   Procedure Laterality Date    CIRCUMCISION N/A 11/18/2021    Procedure: CIRCUMCISION;  Surgeon: Toy Feliz MD;  Location: [facility] OR    COLONOSCOPY N/A 12/13/2021    Procedure: COLONOSCOPY;  Surgeon: Claire Browning MD;  Location:  GI    EXCISE GLAND SUBMANDIBULAR Left 6/3/2024    Procedure: excision of left submandibular gland;  Surgeon: Bassam Mendez MD;  Location: [facility] OR    KNEE SURGERY  1985    left     Current Outpatient Medications   Medication Sig Dispense Refill    albuterol (PROAIR HFA/PROVENTIL HFA/VENTOLIN HFA) 108 (90 Base) MCG/ACT inhaler Inhale 2 puffs into the lungs every 6 hours as needed for shortness of breath or wheezing. 18 g 11    amLODIPine (NORVASC) 2.5 MG tablet Take 1 tablet (2.5 mg) by mouth daily 90 tablet 3    fluticasone-salmeterol (ADVAIR) 250-50 MCG/ACT inhaler INHALE ONE PUFF BY MOUTH EVERY 12 HOURS 60  "each 11    lidocaine (LIDODERM) 5 % patch Place 1 patch onto the skin every 24 hours. To prevent lidocaine toxicity, patient should be patch free for 12 hrs daily. 10 patch 11    losartan-hydrochlorothiazide (HYZAAR) 100-25 MG tablet Take 1 tablet by mouth daily Note change in dose 90 tablet 2    oxyCODONE (ROXICODONE) 5 MG tablet Take 1-2 tablets (5-10 mg) by mouth every 4 hours as needed for moderate to severe pain 10 tablet 0       Allergies   Allergen Reactions    Cats      Itchy eyes, runny nose, wheezing    Dogs      Itchy eyes, runny nose, wheezing    Pollen Extract      Runny nose        Social History     Tobacco Use    Smoking status: Never    Smokeless tobacco: Never   Substance Use Topics    Alcohol use: No     Alcohol/week: 0.0 standard drinks of alcohol     Comment: quit 08/17     Family History   Problem Relation Age of Onset    Unknown/Adopted Mother         adopted    Unknown/Adopted Father     Diabetes No family hx of      History   Drug Use No             Review of Systems  Constitutional, neuro, ENT, endocrine, pulmonary, cardiac, gastrointestinal, genitourinary, musculoskeletal, integument and psychiatric systems are negative, except as otherwise noted.    Objective    /76   Pulse 71   Temp 98.4  F (36.9  C) (Oral)   Resp 18   Wt 109.5 kg (241 lb 6.4 oz)   SpO2 98%   BMI 33.67 kg/m     Estimated body mass index is 33.67 kg/m  as calculated from the following:    Height as of 10/29/24: 1.803 m (5' 11\").    Weight as of this encounter: 109.5 kg (241 lb 6.4 oz).  Physical Exam  GENERAL: alert and no distress  EYES: Eyes grossly normal to inspection,  and conjunctivae and sclerae normal  HENT: ear canals and TM's normal, nose and mouth without ulcers or lesions  NECK: no adenopathy, no asymmetry, masses, or scars  RESP: lungs clear to auscultation - no rales, rhonchi or wheezes  CV: regular rate and rhythm, normal S1 S2, no S3 or S4, no murmur, click or rub, no peripheral edema  ABDOMEN: " soft, nontender, no hepatosplenomegaly, no masses and bowel sounds normal  MS: no gross musculoskeletal defects noted, no edema- right knee pain   SKIN: no suspicious lesions or rashes  NEURO: Normal strength and tone, mentation intact and speech normal  PSYCH: mentation appears normal, affect normal/bright    Recent Labs   Lab Test 05/24/24  0830 03/09/24  1748   HGB 14.7 14.0    322     --    POTASSIUM 4.1  --    CR 0.95  --    A1C 5.3  --         Diagnostics  Recent Results (from the past 24 hours)   Hemoglobin    Collection Time: 11/15/24  9:27 AM   Result Value Ref Range    Hemoglobin 14.7 13.3 - 17.7 g/dL   Basic metabolic panel  (Ca, Cl, CO2, Creat, Gluc, K, Na, BUN)    Collection Time: 11/15/24  9:27 AM   Result Value Ref Range    Sodium 141 135 - 145 mmol/L    Potassium 4.1 3.4 - 5.3 mmol/L    Chloride 104 98 - 107 mmol/L    Carbon Dioxide (CO2) 27 22 - 29 mmol/L    Anion Gap 10 7 - 15 mmol/L    Urea Nitrogen 15.3 6.0 - 20.0 mg/dL    Creatinine 1.00 0.67 - 1.17 mg/dL    GFR Estimate 89 >60 mL/min/1.73m2    Calcium 9.1 8.8 - 10.4 mg/dL    Glucose 98 70 - 99 mg/dL      EKG: appears normal, NSR    Revised Cardiac Risk Index (RCRI)  The patient has the following serious cardiovascular risks for perioperative complications:   - No serious cardiac risks = 0 points     RCRI Interpretation: 0 points: Class I (very low risk - 0.4% complication rate)         Signed Electronically by: LAYLA Adames CNP  A copy of this evaluation report is provided to the requesting physician.

## 2024-11-20 ENCOUNTER — VIRTUAL VISIT (OUTPATIENT)
Dept: ORTHOPEDICS | Facility: CLINIC | Age: 54
End: 2024-11-20
Payer: COMMERCIAL

## 2024-11-20 DIAGNOSIS — M17.11 PRIMARY OSTEOARTHRITIS OF RIGHT KNEE: Primary | ICD-10-CM

## 2024-11-20 DIAGNOSIS — Z96.651 S/P TOTAL KNEE ARTHROPLASTY, RIGHT: ICD-10-CM

## 2024-11-20 NOTE — PROVIDER NOTIFICATION
11/20/24 1542   Discharge Planning   Patient/Family Anticipates Transition to home with family   Concerns to be Addressed all concerns addressed in this encounter   Living Arrangements   People in Home spouse;child(ramses), dependent   Type of Residence Private Residence   Is your private residence a single family home or apartment? Single family home   Number of Stairs, Within Home, Primary three   Stair Railings, Within Home, Primary railings safe and in good condition   Once home, are you able to live on one level? Yes   Which level? Main Level   Bathroom Shower/Tub Tub/Shower unit   Equipment Currently Used at Home none   Support System   Support Systems Spouse/Significant Other;Children   Do you have someone available to stay with you one or two nights once you are home? Yes   Medical Clearance   It is recommended that you call and check with any specialty providers before surgery to see if you need surgical clearance.  Do you see any specialty providers outside of your primary care provider? No   Blood   Known Bleeding Disorder or Coagulopathy Yes   Does the patient have any Confucianist/cultural preferences related to blood products? No   Education   Has the patient scheduled or completed pre-op total joint education, either in class or online, in the last 12 months? Yes  (will watch video)

## 2024-11-20 NOTE — PROGRESS NOTES
This is a non-billable nursing telephone visit    Teaching Flowsheet   Relevant Diagnosis: right knee osteoarthritis  Teaching Topic: right total knee arthroplasty    Patient's  is his wife.  Pre-op H&P obtained on 11/15.  Advised bringing inhalers on DOS to the hospital.  Patient had left submandibular excision in June, he reports he is recovering well.  Pre-op MRI obtained on 1/5/24.  Reviewed Dr. Arenas's expectations for physical therapy post surgery and that this should begin 7 days post procedure.  Provided patient with PT scheduling line, referral entered.  [facility] referral entered and discussed with patient.  Discussed total joint online class.  Patient will call if they require help for signing up for the total joint zoom class. Patient understands they will need a preop exam within 30 days of date of surgery. Patient understands the expected length of stay and the expectation of outpatient physical therapy. Patient understands the need for an at home  after surgery and need for transportation home.  Discussed dental/non-sterile procedure prophylaxis. Discussed the [facility] Care Companion service.        Person(s) involved in teaching:   Patient     Motivation Level:  Asks Questions: Yes  Eager to Learn: Yes  Cooperative: Yes  Receptive (willing/able to accept information): Yes  Any cultural factors/Spiritism beliefs that may influence understanding or compliance? No  Comments: none     Patient demonstrates understanding of the following:  Reason for the appointment, diagnosis and treatment plan: Yes  Knowledge of proper use of medications and conditions for which they are ordered (with special attention to potential side effects or drug interactions): Yes  Which situations necessitate calling provider and whom to contact: Yes       Teaching Concerns Addressed:   Comments: none     Proper use and care of (medical equip, care aids, etc.): Yes  Nutritional needs and diet plan: Yes  Pain management  techniques: Yes  Wound Care: Yes  How and/when to access community resources: Yes     Instructional Materials Used/Given: Preoperative teaching packet, surgical soap x2, dental card, total joint booklet, & welcome letter       Time spent with patient: 30 minutes.    FRANCINE EspinalN, RN  [facility]   Dr. Nelson Bolden

## 2024-11-22 ENCOUNTER — MEDICAL CORRESPONDENCE (OUTPATIENT)
Dept: HEALTH INFORMATION MANAGEMENT | Facility: CLINIC | Age: 54
End: 2024-11-22
Payer: COMMERCIAL

## 2024-12-02 NOTE — PHARMACY-ADMISSION MEDICATION HISTORY
Med rec completed by pre-admitting RNSUHAS.    Current Facility-Administered Medications for the 12/4/24 encounter (Hospital Encounter)   Medication    sodium hyaluronate (DUROLANE) injection 60 mg     PTA Med List   Medication Sig Note Last Dose/Taking    albuterol (PROAIR HFA/PROVENTIL HFA/VENTOLIN HFA) 108 (90 Base) MCG/ACT inhaler Inhale 2 puffs into the lungs every 6 hours as needed for shortness of breath or wheezing. 11/25/2024: Bring to hospital on DOS  Taking As Needed    amLODIPine (NORVASC) 2.5 MG tablet Take 1 tablet (2.5 mg) by mouth daily  Taking    fluticasone-salmeterol (ADVAIR) 250-50 MCG/ACT inhaler INHALE ONE PUFF BY MOUTH EVERY 12 HOURS  Taking    losartan-hydrochlorothiazide (HYZAAR) 100-25 MG tablet Take 1 tablet by mouth daily Note change in dose 11/25/2024: Hold at least 11 hrs prior to surgery  Taking

## 2024-12-02 NOTE — PROGRESS NOTES
Ortho Navigator Note      Pre-op Date 11/15/24     Medical Clearance  Cleared     Labs Stable      COVID Test Date No longer indicated      Skin  Intact      Activity: Ambulates independently without assistive device      Equipment Need Patient HAS a walker for discharge. Defer to PT/OT for recs.       Meds to Hold Held all supplements 14 days prior to surgery  Losartan hydrochlorothiazide-Hold am of surgery   * Medication recommendations are not intended to be exhaustive; they are limited to common medications that are potentially dangerous if incorrectly managed   NPO Instructions  Instructed to stop solids 8 hr prior to arrival and clears 2 hr prior to arrival.       Pre-op Joint Education Complete? Complete    Discharge Plan Patient has plan to discharge home on morning of POD 1.   Spouse Elizabeth will arrive at hospital at 0800 to participate in therapy and discharge education. They will then transport patient home    /Transportation Elizabeth will be  and transportation.  is physically able to care for patient.      Barriers to Discharge No barriers to discharge.      Additional Info/   Special Needs : Patient had no unanswered questions or concerns.           11/20/24 0290   Discharge Planning   Patient/Family Anticipates Transition to home with family   Concerns to be Addressed all concerns addressed in this encounter   Living Arrangements   People in Home spouse;child(ramses), dependent   Type of Residence Private Residence   Is your private residence a single family home or apartment? Single family home   Number of Stairs, Within Home, Primary three   Stair Railings, Within Home, Primary railings safe and in good condition   Once home, are you able to live on one level? Yes   Which level? Main Level   Bathroom Shower/Tub Tub/Shower unit   Equipment Currently Used at Home none   Support System   Support Systems Spouse/Significant Other;Children   Do you have someone available to stay with you one or  two nights once you are home? Yes   Medical Clearance   Date of Physical 11/15/24   Clinic Name [facility]   It is recommended that you call and check with any specialty providers before surgery to see if you need surgical clearance.  Do you see any specialty providers outside of your primary care provider? No   Blood   Known Bleeding Disorder or Coagulopathy No   Does the patient have any Rastafari/cultural preferences related to blood products? No   Education   Has the patient scheduled or completed pre-op total joint education, either in class or online, in the last 12 months? Yes  (will watch video)   Patient attended total joint pre-op class/received pre-op teaching  online   Relationship/Environment   Name(s) of People in Home Elizabeth (spouse)

## 2024-12-03 ENCOUNTER — MEDICAL CORRESPONDENCE (OUTPATIENT)
Dept: HEALTH INFORMATION MANAGEMENT | Facility: CLINIC | Age: 54
End: 2024-12-03
Payer: COMMERCIAL

## 2024-12-04 ENCOUNTER — ANESTHESIA EVENT (OUTPATIENT)
Dept: SURGERY | Facility: CLINIC | Age: 54
End: 2024-12-04
Payer: COMMERCIAL

## 2024-12-04 ENCOUNTER — ANESTHESIA (OUTPATIENT)
Dept: SURGERY | Facility: CLINIC | Age: 54
End: 2024-12-04
Payer: COMMERCIAL

## 2024-12-04 ENCOUNTER — APPOINTMENT (OUTPATIENT)
Dept: GENERAL RADIOLOGY | Facility: CLINIC | Age: 54
End: 2024-12-04
Attending: PHYSICIAN ASSISTANT
Payer: COMMERCIAL

## 2024-12-04 ENCOUNTER — HOSPITAL ENCOUNTER (OUTPATIENT)
Facility: CLINIC | Age: 54
Discharge: HOME | End: 2024-12-05
Attending: STUDENT IN AN ORGANIZED HEALTH CARE EDUCATION/TRAINING PROGRAM | Admitting: STUDENT IN AN ORGANIZED HEALTH CARE EDUCATION/TRAINING PROGRAM
Payer: COMMERCIAL

## 2024-12-04 DIAGNOSIS — Z78.9 DEEP VEIN THROMBOSIS (DVT) PROPHYLAXIS PRESCRIBED AT DISCHARGE: ICD-10-CM

## 2024-12-04 DIAGNOSIS — Z96.651 S/P TKR (TOTAL KNEE REPLACEMENT) USING CEMENT, RIGHT: Primary | ICD-10-CM

## 2024-12-04 DIAGNOSIS — K59.03 DRUG-INDUCED CONSTIPATION: ICD-10-CM

## 2024-12-04 DIAGNOSIS — G89.18 ACUTE POST-OPERATIVE PAIN: ICD-10-CM

## 2024-12-04 PROCEDURE — 250N000011 HC RX IP 250 OP 636: Performed by: ANESTHESIOLOGY

## 2024-12-04 PROCEDURE — 250N000013 HC RX MED GY IP 250 OP 250 PS 637: Performed by: STUDENT IN AN ORGANIZED HEALTH CARE EDUCATION/TRAINING PROGRAM

## 2024-12-04 PROCEDURE — 250N000011 HC RX IP 250 OP 636: Mod: JZ | Performed by: PHYSICIAN ASSISTANT

## 2024-12-04 PROCEDURE — 258N000003 HC RX IP 258 OP 636: Performed by: PHYSICIAN ASSISTANT

## 2024-12-04 PROCEDURE — 258N000001 HC RX 258: Performed by: STUDENT IN AN ORGANIZED HEALTH CARE EDUCATION/TRAINING PROGRAM

## 2024-12-04 PROCEDURE — 250N000009 HC RX 250: Performed by: STUDENT IN AN ORGANIZED HEALTH CARE EDUCATION/TRAINING PROGRAM

## 2024-12-04 PROCEDURE — 370N000017 HC ANESTHESIA TECHNICAL FEE, PER MIN: Performed by: STUDENT IN AN ORGANIZED HEALTH CARE EDUCATION/TRAINING PROGRAM

## 2024-12-04 PROCEDURE — C1776 JOINT DEVICE (IMPLANTABLE): HCPCS | Performed by: STUDENT IN AN ORGANIZED HEALTH CARE EDUCATION/TRAINING PROGRAM

## 2024-12-04 PROCEDURE — 258N000003 HC RX IP 258 OP 636: Performed by: STUDENT IN AN ORGANIZED HEALTH CARE EDUCATION/TRAINING PROGRAM

## 2024-12-04 PROCEDURE — C1713 ANCHOR/SCREW BN/BN,TIS/BN: HCPCS | Performed by: STUDENT IN AN ORGANIZED HEALTH CARE EDUCATION/TRAINING PROGRAM

## 2024-12-04 PROCEDURE — 250N000009 HC RX 250: Performed by: ANESTHESIOLOGY

## 2024-12-04 PROCEDURE — 250N000013 HC RX MED GY IP 250 OP 250 PS 637: Performed by: PHYSICIAN ASSISTANT

## 2024-12-04 PROCEDURE — 250N000025 HC SEVOFLURANE, PER MIN: Performed by: STUDENT IN AN ORGANIZED HEALTH CARE EDUCATION/TRAINING PROGRAM

## 2024-12-04 PROCEDURE — 250N000011 HC RX IP 250 OP 636: Performed by: STUDENT IN AN ORGANIZED HEALTH CARE EDUCATION/TRAINING PROGRAM

## 2024-12-04 PROCEDURE — 250N000013 HC RX MED GY IP 250 OP 250 PS 637: Performed by: ANESTHESIOLOGY

## 2024-12-04 PROCEDURE — 999N000141 HC STATISTIC PRE-PROCEDURE NURSING ASSESSMENT: Performed by: STUDENT IN AN ORGANIZED HEALTH CARE EDUCATION/TRAINING PROGRAM

## 2024-12-04 PROCEDURE — 258N000003 HC RX IP 258 OP 636: Performed by: NURSE ANESTHETIST, CERTIFIED REGISTERED

## 2024-12-04 PROCEDURE — 250N000009 HC RX 250: Performed by: NURSE ANESTHETIST, CERTIFIED REGISTERED

## 2024-12-04 PROCEDURE — 272N000001 HC OR GENERAL SUPPLY STERILE: Performed by: STUDENT IN AN ORGANIZED HEALTH CARE EDUCATION/TRAINING PROGRAM

## 2024-12-04 PROCEDURE — 360N000077 HC SURGERY LEVEL 4, PER MIN: Performed by: STUDENT IN AN ORGANIZED HEALTH CARE EDUCATION/TRAINING PROGRAM

## 2024-12-04 PROCEDURE — 250N000011 HC RX IP 250 OP 636: Performed by: NURSE ANESTHETIST, CERTIFIED REGISTERED

## 2024-12-04 PROCEDURE — 710N000009 HC RECOVERY PHASE 1, LEVEL 1, PER MIN: Performed by: STUDENT IN AN ORGANIZED HEALTH CARE EDUCATION/TRAINING PROGRAM

## 2024-12-04 PROCEDURE — 999N000065 XR KNEE PORT RIGHT 1/2 VIEWS: Mod: RT

## 2024-12-04 DEVICE — IMPLANTABLE DEVICE
Type: IMPLANTABLE DEVICE | Site: KNEE | Status: NON-FUNCTIONAL
Brand: PERSONA® VIVACIT-E®
Removed: 2024-12-11

## 2024-12-04 DEVICE — SIMPLEX® HV IS A FAST-SETTING ACRYLIC RESIN FOR USE IN BONE SURGERY. MIXING THE TWO SEPARATE STERILE COMPONENTS PRODUCES A DUCTILE BONE CEMENT WHICH, AFTER HARDENING, FIXES THE IMPLANT AND TRANSFERS STRESSES PRODUCED DURING MOVEMENT EVENLY TO THE BONE. SIMPLEX® HV CEMENT POWDER ALSO CONTAINS INSOLUBLE ZIRCONIUM DIOXIDE AS AN X-RAY CONTRAST MEDIUM. SIMPLEX® HV DOES NOT EMIT A SIGNAL AND DOES NOT POSE A SAFETY RISK IN A MAGNETIC RESONANCE ENVIRONMENT.
Type: IMPLANTABLE DEVICE | Site: KNEE | Status: FUNCTIONAL
Brand: SIMPLEX HV

## 2024-12-04 DEVICE — IMPLANTABLE DEVICE
Type: IMPLANTABLE DEVICE | Site: KNEE | Status: FUNCTIONAL
Brand: PERSONA®

## 2024-12-04 DEVICE — IMPLANTABLE DEVICE
Type: IMPLANTABLE DEVICE | Site: KNEE | Status: FUNCTIONAL
Brand: PERSONA® NATURAL TIBIA®

## 2024-12-04 RX ORDER — METHYLPREDNISOLONE ACETATE 40 MG/ML
INJECTION, SUSPENSION INTRA-ARTICULAR; INTRALESIONAL; INTRAMUSCULAR; SOFT TISSUE PRN
Status: DISCONTINUED | OUTPATIENT
Start: 2024-12-04 | End: 2024-12-04

## 2024-12-04 RX ORDER — HYDRALAZINE HYDROCHLORIDE 20 MG/ML
2.5-5 INJECTION INTRAMUSCULAR; INTRAVENOUS EVERY 10 MIN PRN
Status: DISCONTINUED | OUTPATIENT
Start: 2024-12-04 | End: 2024-12-04 | Stop reason: HOSPADM

## 2024-12-04 RX ORDER — PROCHLORPERAZINE MALEATE 5 MG/1
10 TABLET ORAL EVERY 6 HOURS PRN
Status: DISCONTINUED | OUTPATIENT
Start: 2024-12-04 | End: 2024-12-05 | Stop reason: HOSPADM

## 2024-12-04 RX ORDER — ALBUTEROL SULFATE 0.83 MG/ML
2.5 SOLUTION RESPIRATORY (INHALATION) EVERY 4 HOURS PRN
Status: DISCONTINUED | OUTPATIENT
Start: 2024-12-04 | End: 2024-12-04 | Stop reason: HOSPADM

## 2024-12-04 RX ORDER — ONDANSETRON 2 MG/ML
4 INJECTION INTRAMUSCULAR; INTRAVENOUS EVERY 30 MIN PRN
Status: DISCONTINUED | OUTPATIENT
Start: 2024-12-04 | End: 2024-12-04 | Stop reason: HOSPADM

## 2024-12-04 RX ORDER — HYDROMORPHONE HCL IN WATER/PF 6 MG/30 ML
0.2 PATIENT CONTROLLED ANALGESIA SYRINGE INTRAVENOUS EVERY 5 MIN PRN
Status: DISCONTINUED | OUTPATIENT
Start: 2024-12-04 | End: 2024-12-04 | Stop reason: HOSPADM

## 2024-12-04 RX ORDER — ASPIRIN 81 MG/1
81 TABLET ORAL 2 TIMES DAILY
Status: DISCONTINUED | OUTPATIENT
Start: 2024-12-04 | End: 2024-12-05 | Stop reason: HOSPADM

## 2024-12-04 RX ORDER — CEFAZOLIN SODIUM/WATER 2 G/20 ML
2 SYRINGE (ML) INTRAVENOUS SEE ADMIN INSTRUCTIONS
Status: DISCONTINUED | OUTPATIENT
Start: 2024-12-04 | End: 2024-12-04 | Stop reason: HOSPADM

## 2024-12-04 RX ORDER — GABAPENTIN 100 MG/1
100 CAPSULE ORAL AT BEDTIME
Qty: 14 CAPSULE | Refills: 0 | Status: ON HOLD | OUTPATIENT
Start: 2024-12-04 | End: 2024-12-12

## 2024-12-04 RX ORDER — NALOXONE HYDROCHLORIDE 0.4 MG/ML
0.4 INJECTION, SOLUTION INTRAMUSCULAR; INTRAVENOUS; SUBCUTANEOUS
Status: DISCONTINUED | OUTPATIENT
Start: 2024-12-04 | End: 2024-12-05 | Stop reason: HOSPADM

## 2024-12-04 RX ORDER — ONDANSETRON 4 MG/1
4 TABLET, ORALLY DISINTEGRATING ORAL EVERY 30 MIN PRN
Status: DISCONTINUED | OUTPATIENT
Start: 2024-12-04 | End: 2024-12-04 | Stop reason: HOSPADM

## 2024-12-04 RX ORDER — SODIUM CHLORIDE, SODIUM LACTATE, POTASSIUM CHLORIDE, CALCIUM CHLORIDE 600; 310; 30; 20 MG/100ML; MG/100ML; MG/100ML; MG/100ML
INJECTION, SOLUTION INTRAVENOUS CONTINUOUS
Status: DISCONTINUED | OUTPATIENT
Start: 2024-12-04 | End: 2024-12-04 | Stop reason: HOSPADM

## 2024-12-04 RX ORDER — VANCOMYCIN HYDROCHLORIDE 1 G/20ML
INJECTION, POWDER, LYOPHILIZED, FOR SOLUTION INTRAVENOUS PRN
Status: DISCONTINUED | OUTPATIENT
Start: 2024-12-04 | End: 2024-12-04 | Stop reason: HOSPADM

## 2024-12-04 RX ORDER — LIDOCAINE 40 MG/G
CREAM TOPICAL
Status: DISCONTINUED | OUTPATIENT
Start: 2024-12-04 | End: 2024-12-04 | Stop reason: HOSPADM

## 2024-12-04 RX ORDER — HYDRALAZINE HYDROCHLORIDE 20 MG/ML
INJECTION INTRAMUSCULAR; INTRAVENOUS PRN
Status: DISCONTINUED | OUTPATIENT
Start: 2024-12-04 | End: 2024-12-04

## 2024-12-04 RX ORDER — CEFAZOLIN SODIUM/WATER 2 G/20 ML
2 SYRINGE (ML) INTRAVENOUS
Status: DISCONTINUED | OUTPATIENT
Start: 2024-12-04 | End: 2024-12-04 | Stop reason: HOSPADM

## 2024-12-04 RX ORDER — HYDROXYZINE HYDROCHLORIDE 25 MG/1
25 TABLET, FILM COATED ORAL EVERY 6 HOURS PRN
Status: DISCONTINUED | OUTPATIENT
Start: 2024-12-04 | End: 2024-12-05 | Stop reason: HOSPADM

## 2024-12-04 RX ORDER — FLUTICASONE FUROATE AND VILANTEROL 100; 25 UG/1; UG/1
1 POWDER RESPIRATORY (INHALATION) EVERY EVENING
Status: DISCONTINUED | OUTPATIENT
Start: 2024-12-04 | End: 2024-12-05 | Stop reason: HOSPADM

## 2024-12-04 RX ORDER — POLYETHYLENE GLYCOL 3350 17 G/17G
17 POWDER, FOR SOLUTION ORAL DAILY
Status: DISCONTINUED | OUTPATIENT
Start: 2024-12-05 | End: 2024-12-05 | Stop reason: HOSPADM

## 2024-12-04 RX ORDER — DEXAMETHASONE SODIUM PHOSPHATE 4 MG/ML
INJECTION, SOLUTION INTRA-ARTICULAR; INTRALESIONAL; INTRAMUSCULAR; INTRAVENOUS; SOFT TISSUE PRN
Status: DISCONTINUED | OUTPATIENT
Start: 2024-12-04 | End: 2024-12-04

## 2024-12-04 RX ORDER — CYCLOBENZAPRINE HCL 10 MG
10 TABLET ORAL 3 TIMES DAILY PRN
Status: DISCONTINUED | OUTPATIENT
Start: 2024-12-04 | End: 2024-12-05 | Stop reason: HOSPADM

## 2024-12-04 RX ORDER — CEFAZOLIN SODIUM 2 G/100ML
2 INJECTION, SOLUTION INTRAVENOUS EVERY 8 HOURS
Status: COMPLETED | OUTPATIENT
Start: 2024-12-04 | End: 2024-12-05

## 2024-12-04 RX ORDER — NALOXONE HYDROCHLORIDE 0.4 MG/ML
0.2 INJECTION, SOLUTION INTRAMUSCULAR; INTRAVENOUS; SUBCUTANEOUS
Status: DISCONTINUED | OUTPATIENT
Start: 2024-12-04 | End: 2024-12-05 | Stop reason: HOSPADM

## 2024-12-04 RX ORDER — ACETAMINOPHEN 325 MG/1
975 TABLET ORAL ONCE
Status: COMPLETED | OUTPATIENT
Start: 2024-12-04 | End: 2024-12-04

## 2024-12-04 RX ORDER — ALBUTEROL SULFATE 90 UG/1
2 INHALANT RESPIRATORY (INHALATION) EVERY 6 HOURS PRN
Status: DISCONTINUED | OUTPATIENT
Start: 2024-12-04 | End: 2024-12-05 | Stop reason: HOSPADM

## 2024-12-04 RX ORDER — PROPOFOL 10 MG/ML
INJECTION, EMULSION INTRAVENOUS CONTINUOUS PRN
Status: DISCONTINUED | OUTPATIENT
Start: 2024-12-04 | End: 2024-12-04

## 2024-12-04 RX ORDER — BISACODYL 10 MG
10 SUPPOSITORY, RECTAL RECTAL DAILY PRN
Status: DISCONTINUED | OUTPATIENT
Start: 2024-12-07 | End: 2024-12-05 | Stop reason: HOSPADM

## 2024-12-04 RX ORDER — FENTANYL CITRATE 0.05 MG/ML
INJECTION, SOLUTION INTRAMUSCULAR; INTRAVENOUS PRN
Status: DISCONTINUED | OUTPATIENT
Start: 2024-12-04 | End: 2024-12-04

## 2024-12-04 RX ORDER — DEXAMETHASONE SODIUM PHOSPHATE 4 MG/ML
4 INJECTION, SOLUTION INTRA-ARTICULAR; INTRALESIONAL; INTRAMUSCULAR; INTRAVENOUS; SOFT TISSUE
Status: DISCONTINUED | OUTPATIENT
Start: 2024-12-04 | End: 2024-12-04 | Stop reason: HOSPADM

## 2024-12-04 RX ORDER — ONDANSETRON 2 MG/ML
4 INJECTION INTRAMUSCULAR; INTRAVENOUS EVERY 6 HOURS PRN
Status: DISCONTINUED | OUTPATIENT
Start: 2024-12-04 | End: 2024-12-05 | Stop reason: HOSPADM

## 2024-12-04 RX ORDER — AMOXICILLIN 250 MG
1 CAPSULE ORAL 2 TIMES DAILY
Status: DISCONTINUED | OUTPATIENT
Start: 2024-12-04 | End: 2024-12-05 | Stop reason: HOSPADM

## 2024-12-04 RX ORDER — ACETAMINOPHEN 325 MG/1
650 TABLET ORAL EVERY 4 HOURS PRN
Qty: 100 TABLET | Refills: 0 | Status: SHIPPED | OUTPATIENT
Start: 2024-12-04 | End: 2024-12-07

## 2024-12-04 RX ORDER — ACETAMINOPHEN 325 MG/1
650 TABLET ORAL EVERY 4 HOURS PRN
Status: DISCONTINUED | OUTPATIENT
Start: 2024-12-07 | End: 2024-12-05 | Stop reason: HOSPADM

## 2024-12-04 RX ORDER — ONDANSETRON 4 MG/1
4 TABLET, ORALLY DISINTEGRATING ORAL EVERY 6 HOURS PRN
Status: DISCONTINUED | OUTPATIENT
Start: 2024-12-04 | End: 2024-12-05 | Stop reason: HOSPADM

## 2024-12-04 RX ORDER — FENTANYL CITRATE 50 UG/ML
25 INJECTION, SOLUTION INTRAMUSCULAR; INTRAVENOUS EVERY 5 MIN PRN
Status: DISCONTINUED | OUTPATIENT
Start: 2024-12-04 | End: 2024-12-04 | Stop reason: HOSPADM

## 2024-12-04 RX ORDER — HYDROMORPHONE HCL IN WATER/PF 6 MG/30 ML
0.4 PATIENT CONTROLLED ANALGESIA SYRINGE INTRAVENOUS
Status: DISCONTINUED | OUTPATIENT
Start: 2024-12-04 | End: 2024-12-05 | Stop reason: HOSPADM

## 2024-12-04 RX ORDER — LABETALOL HYDROCHLORIDE 5 MG/ML
10 INJECTION, SOLUTION INTRAVENOUS
Status: DISCONTINUED | OUTPATIENT
Start: 2024-12-04 | End: 2024-12-04 | Stop reason: HOSPADM

## 2024-12-04 RX ORDER — PROPOFOL 10 MG/ML
INJECTION, EMULSION INTRAVENOUS PRN
Status: DISCONTINUED | OUTPATIENT
Start: 2024-12-04 | End: 2024-12-04

## 2024-12-04 RX ORDER — DIAZEPAM 10 MG/2ML
2.5 INJECTION, SOLUTION INTRAMUSCULAR; INTRAVENOUS
Status: DISCONTINUED | OUTPATIENT
Start: 2024-12-04 | End: 2024-12-04 | Stop reason: HOSPADM

## 2024-12-04 RX ORDER — OXYCODONE HYDROCHLORIDE 10 MG/1
10 TABLET ORAL EVERY 4 HOURS PRN
Status: DISCONTINUED | OUTPATIENT
Start: 2024-12-04 | End: 2024-12-05 | Stop reason: HOSPADM

## 2024-12-04 RX ORDER — BUPIVACAINE HYDROCHLORIDE AND EPINEPHRINE 5; 5 MG/ML; UG/ML
INJECTION, SOLUTION PERINEURAL PRN
Status: DISCONTINUED | OUTPATIENT
Start: 2024-12-04 | End: 2024-12-04

## 2024-12-04 RX ORDER — TRANEXAMIC ACID 650 MG/1
1950 TABLET ORAL ONCE
Status: COMPLETED | OUTPATIENT
Start: 2024-12-04 | End: 2024-12-04

## 2024-12-04 RX ORDER — ASPIRIN 81 MG/1
81 TABLET ORAL 2 TIMES DAILY
Qty: 60 TABLET | Refills: 0 | Status: ON HOLD | OUTPATIENT
Start: 2024-12-04 | End: 2024-12-12

## 2024-12-04 RX ORDER — DIPHENHYDRAMINE HCL 25 MG
25 CAPSULE ORAL EVERY 6 HOURS PRN
Status: DISCONTINUED | OUTPATIENT
Start: 2024-12-04 | End: 2024-12-04 | Stop reason: HOSPADM

## 2024-12-04 RX ORDER — DIPHENHYDRAMINE HYDROCHLORIDE 50 MG/ML
25 INJECTION INTRAMUSCULAR; INTRAVENOUS EVERY 6 HOURS PRN
Status: DISCONTINUED | OUTPATIENT
Start: 2024-12-04 | End: 2024-12-04 | Stop reason: HOSPADM

## 2024-12-04 RX ORDER — LIDOCAINE 40 MG/G
CREAM TOPICAL
Status: DISCONTINUED | OUTPATIENT
Start: 2024-12-04 | End: 2024-12-05 | Stop reason: HOSPADM

## 2024-12-04 RX ORDER — HYDROMORPHONE HCL IN WATER/PF 6 MG/30 ML
0.4 PATIENT CONTROLLED ANALGESIA SYRINGE INTRAVENOUS EVERY 5 MIN PRN
Status: DISCONTINUED | OUTPATIENT
Start: 2024-12-04 | End: 2024-12-04 | Stop reason: HOSPADM

## 2024-12-04 RX ORDER — HYDROMORPHONE HCL IN WATER/PF 6 MG/30 ML
0.2 PATIENT CONTROLLED ANALGESIA SYRINGE INTRAVENOUS
Status: DISCONTINUED | OUTPATIENT
Start: 2024-12-04 | End: 2024-12-05 | Stop reason: HOSPADM

## 2024-12-04 RX ORDER — ONDANSETRON 2 MG/ML
INJECTION INTRAMUSCULAR; INTRAVENOUS PRN
Status: DISCONTINUED | OUTPATIENT
Start: 2024-12-04 | End: 2024-12-04

## 2024-12-04 RX ORDER — OXYCODONE HYDROCHLORIDE 5 MG/1
5-10 TABLET ORAL EVERY 4 HOURS PRN
Qty: 26 TABLET | Refills: 0 | Status: ON HOLD | OUTPATIENT
Start: 2024-12-04 | End: 2024-12-12

## 2024-12-04 RX ORDER — SODIUM CHLORIDE, SODIUM LACTATE, POTASSIUM CHLORIDE, CALCIUM CHLORIDE 600; 310; 30; 20 MG/100ML; MG/100ML; MG/100ML; MG/100ML
INJECTION, SOLUTION INTRAVENOUS CONTINUOUS PRN
Status: DISCONTINUED | OUTPATIENT
Start: 2024-12-04 | End: 2024-12-04

## 2024-12-04 RX ORDER — GABAPENTIN 100 MG/1
100 CAPSULE ORAL 3 TIMES DAILY
Status: DISCONTINUED | OUTPATIENT
Start: 2024-12-04 | End: 2024-12-05 | Stop reason: HOSPADM

## 2024-12-04 RX ORDER — LABETALOL 20 MG/4 ML (5 MG/ML) INTRAVENOUS SYRINGE
PRN
Status: DISCONTINUED | OUTPATIENT
Start: 2024-12-04 | End: 2024-12-04

## 2024-12-04 RX ORDER — ACETAMINOPHEN 325 MG/1
975 TABLET ORAL EVERY 8 HOURS
Status: DISCONTINUED | OUTPATIENT
Start: 2024-12-05 | End: 2024-12-05 | Stop reason: HOSPADM

## 2024-12-04 RX ORDER — NALOXONE HYDROCHLORIDE 0.4 MG/ML
0.1 INJECTION, SOLUTION INTRAMUSCULAR; INTRAVENOUS; SUBCUTANEOUS
Status: DISCONTINUED | OUTPATIENT
Start: 2024-12-04 | End: 2024-12-04 | Stop reason: HOSPADM

## 2024-12-04 RX ORDER — AMOXICILLIN 250 MG
1-2 CAPSULE ORAL 2 TIMES DAILY
Qty: 30 TABLET | Refills: 0 | Status: ON HOLD | OUTPATIENT
Start: 2024-12-04 | End: 2024-12-12

## 2024-12-04 RX ORDER — OXYCODONE HYDROCHLORIDE 5 MG/1
5 TABLET ORAL EVERY 4 HOURS PRN
Status: DISCONTINUED | OUTPATIENT
Start: 2024-12-04 | End: 2024-12-05 | Stop reason: HOSPADM

## 2024-12-04 RX ORDER — LIDOCAINE HYDROCHLORIDE 20 MG/ML
INJECTION, SOLUTION INFILTRATION; PERINEURAL PRN
Status: DISCONTINUED | OUTPATIENT
Start: 2024-12-04 | End: 2024-12-04

## 2024-12-04 RX ORDER — FENTANYL CITRATE 50 UG/ML
50 INJECTION, SOLUTION INTRAMUSCULAR; INTRAVENOUS EVERY 5 MIN PRN
Status: DISCONTINUED | OUTPATIENT
Start: 2024-12-04 | End: 2024-12-04 | Stop reason: HOSPADM

## 2024-12-04 RX ORDER — SODIUM CHLORIDE, SODIUM LACTATE, POTASSIUM CHLORIDE, CALCIUM CHLORIDE 600; 310; 30; 20 MG/100ML; MG/100ML; MG/100ML; MG/100ML
INJECTION, SOLUTION INTRAVENOUS CONTINUOUS
Status: DISCONTINUED | OUTPATIENT
Start: 2024-12-04 | End: 2024-12-05 | Stop reason: HOSPADM

## 2024-12-04 RX ORDER — GLYCOPYRROLATE 0.2 MG/ML
INJECTION, SOLUTION INTRAMUSCULAR; INTRAVENOUS PRN
Status: DISCONTINUED | OUTPATIENT
Start: 2024-12-04 | End: 2024-12-04

## 2024-12-04 RX ADMIN — PROPOFOL 200 MG: 10 INJECTION, EMULSION INTRAVENOUS at 13:00

## 2024-12-04 RX ADMIN — SENNOSIDES AND DOCUSATE SODIUM 1 TABLET: 50; 8.6 TABLET ORAL at 19:53

## 2024-12-04 RX ADMIN — FLUTICASONE FUROATE AND VILANTEROL TRIFENATATE 1 PUFF: 100; 25 POWDER RESPIRATORY (INHALATION) at 21:11

## 2024-12-04 RX ADMIN — LIDOCAINE HYDROCHLORIDE 50 MG: 20 INJECTION, SOLUTION INFILTRATION; PERINEURAL at 13:00

## 2024-12-04 RX ADMIN — HYDRALAZINE HYDROCHLORIDE 10 MG: 20 INJECTION INTRAMUSCULAR; INTRAVENOUS at 13:41

## 2024-12-04 RX ADMIN — GLYCOPYRROLATE 0.2 MG: 0.2 INJECTION, SOLUTION INTRAMUSCULAR; INTRAVENOUS at 13:41

## 2024-12-04 RX ADMIN — Medication 2 G: at 12:51

## 2024-12-04 RX ADMIN — MIDAZOLAM 2 MG: 1 INJECTION INTRAMUSCULAR; INTRAVENOUS at 12:51

## 2024-12-04 RX ADMIN — CYCLOBENZAPRINE 10 MG: 10 TABLET, FILM COATED ORAL at 22:42

## 2024-12-04 RX ADMIN — DEXAMETHASONE SODIUM PHOSPHATE 8 MG: 4 INJECTION, SOLUTION INTRA-ARTICULAR; INTRALESIONAL; INTRAMUSCULAR; INTRAVENOUS; SOFT TISSUE at 13:00

## 2024-12-04 RX ADMIN — MIDAZOLAM 2 MG: 1 INJECTION INTRAMUSCULAR; INTRAVENOUS at 09:25

## 2024-12-04 RX ADMIN — PROPOFOL 50 MCG/KG/MIN: 10 INJECTION, EMULSION INTRAVENOUS at 13:06

## 2024-12-04 RX ADMIN — SODIUM CHLORIDE, POTASSIUM CHLORIDE, SODIUM LACTATE AND CALCIUM CHLORIDE: 600; 310; 30; 20 INJECTION, SOLUTION INTRAVENOUS at 18:01

## 2024-12-04 RX ADMIN — HYDROXYZINE HYDROCHLORIDE 25 MG: 25 TABLET, FILM COATED ORAL at 16:51

## 2024-12-04 RX ADMIN — HYDROMORPHONE HYDROCHLORIDE 0.4 MG: 0.2 INJECTION, SOLUTION INTRAMUSCULAR; INTRAVENOUS; SUBCUTANEOUS at 19:45

## 2024-12-04 RX ADMIN — OXYCODONE HYDROCHLORIDE 5 MG: 5 TABLET ORAL at 20:59

## 2024-12-04 RX ADMIN — FENTANYL CITRATE 50 MCG: 50 INJECTION, SOLUTION INTRAMUSCULAR; INTRAVENOUS at 16:29

## 2024-12-04 RX ADMIN — BUPIVACAINE HYDROCHLORIDE AND EPINEPHRINE BITARTRATE 10 ML: 5; .005 INJECTION, SOLUTION PERINEURAL at 09:32

## 2024-12-04 RX ADMIN — FENTANYL CITRATE 25 MCG: 50 INJECTION, SOLUTION INTRAMUSCULAR; INTRAVENOUS at 17:08

## 2024-12-04 RX ADMIN — ONDANSETRON 4 MG: 2 INJECTION INTRAMUSCULAR; INTRAVENOUS at 15:46

## 2024-12-04 RX ADMIN — CEFAZOLIN SODIUM 2 G: 2 INJECTION, SOLUTION INTRAVENOUS at 22:43

## 2024-12-04 RX ADMIN — TRANEXAMIC ACID 1950 MG: 650 TABLET ORAL at 08:13

## 2024-12-04 RX ADMIN — HYDRALAZINE HYDROCHLORIDE 10 MG: 20 INJECTION INTRAMUSCULAR; INTRAVENOUS at 13:57

## 2024-12-04 RX ADMIN — FENTANYL CITRATE 25 MCG: 50 INJECTION, SOLUTION INTRAMUSCULAR; INTRAVENOUS at 16:54

## 2024-12-04 RX ADMIN — ASPIRIN 81 MG: 81 TABLET, COATED ORAL at 19:53

## 2024-12-04 RX ADMIN — FENTANYL CITRATE 100 MCG: 0.05 INJECTION, SOLUTION INTRAMUSCULAR; INTRAVENOUS at 13:00

## 2024-12-04 RX ADMIN — METHYLPREDNISOLONE ACETATE 40 MG: 40 INJECTION, SUSPENSION INTRA-ARTICULAR; INTRALESIONAL; INTRAMUSCULAR; SOFT TISSUE at 09:30

## 2024-12-04 RX ADMIN — HYDROMORPHONE HYDROCHLORIDE 1 MG: 1 INJECTION, SOLUTION INTRAMUSCULAR; INTRAVENOUS; SUBCUTANEOUS at 13:06

## 2024-12-04 RX ADMIN — SODIUM CHLORIDE, POTASSIUM CHLORIDE, SODIUM LACTATE AND CALCIUM CHLORIDE: 600; 310; 30; 20 INJECTION, SOLUTION INTRAVENOUS at 14:41

## 2024-12-04 RX ADMIN — GABAPENTIN 100 MG: 100 CAPSULE ORAL at 19:53

## 2024-12-04 RX ADMIN — OXYCODONE HYDROCHLORIDE 10 MG: 10 TABLET ORAL at 16:52

## 2024-12-04 RX ADMIN — ACETAMINOPHEN 975 MG: 325 TABLET, FILM COATED ORAL at 16:51

## 2024-12-04 RX ADMIN — SODIUM CHLORIDE, POTASSIUM CHLORIDE, SODIUM LACTATE AND CALCIUM CHLORIDE: 600; 310; 30; 20 INJECTION, SOLUTION INTRAVENOUS at 12:51

## 2024-12-04 RX ADMIN — LABETALOL 20 MG/4 ML (5 MG/ML) INTRAVENOUS SYRINGE 10 MG: at 14:05

## 2024-12-04 RX ADMIN — BUPIVACAINE HYDROCHLORIDE AND EPINEPHRINE BITARTRATE 20 ML: 5; .005 INJECTION, SOLUTION PERINEURAL at 09:30

## 2024-12-04 RX ADMIN — FENTANYL CITRATE 100 MCG: 0.05 INJECTION, SOLUTION INTRAMUSCULAR; INTRAVENOUS at 09:25

## 2024-12-04 ASSESSMENT — ACTIVITIES OF DAILY LIVING (ADL)
ADLS_ACUITY_SCORE: 15
ADLS_ACUITY_SCORE: 20
ADLS_ACUITY_SCORE: 15
ADLS_ACUITY_SCORE: 15
ADLS_ACUITY_SCORE: 20
ADLS_ACUITY_SCORE: 15
ADLS_ACUITY_SCORE: 20

## 2024-12-04 NOTE — ANESTHESIA CARE TRANSFER NOTE
Patient: Thai Mancia    Procedure: Procedure(s):  Right total knee arthroplasty       Diagnosis: Localized osteoarthritis of right knee [M17.11]  Diagnosis Additional Information: No value filed.    Anesthesia Type:   General     Note:    Oropharynx: oral airway in place and spontaneously breathing  Level of Consciousness: drowsy  Oxygen Supplementation: face mask  Level of Supplemental Oxygen (L/min / FiO2): 6  Independent Airway: airway patency satisfactory and stable  Dentition: dentition unchanged  Vital Signs Stable: post-procedure vital signs reviewed and stable  Report to RN Given: handoff report given  Patient transferred to: PACU    Handoff Report: Identifed the Patient, Identified the Reponsible Provider, Reviewed the pertinent medical history, Discussed the surgical course, Reviewed Intra-OP anesthesia mangement and issues during anesthesia, Set expectations for post-procedure period and Allowed opportunity for questions and acknowledgement of understanding      Vitals:  Vitals Value Taken Time   /64 12/04/24 1600   Temp 95.9  F (35.5  C) 12/04/24 1602   Pulse 75 12/04/24 1602   Resp 15 12/04/24 1602   SpO2 92 % 12/04/24 1602   Vitals shown include unfiled device data.    Electronically Signed By: LAYLA Pryor CRNA  December 4, 2024  4:03 PM

## 2024-12-04 NOTE — ANESTHESIA PROCEDURE NOTES
Adductor canal Procedure Note    Pre-Procedure   Staff -        Anesthesiologist:  Jairon Lawson MD       Performed By: anesthesiologist       Referred By: Deepa       Location: pre-op       Pre-Anesthestic Checklist: patient identified, IV checked, site marked, risks and benefits discussed, informed consent, monitors and equipment checked, pre-op evaluation, at physician/surgeon's request and post-op pain management  Timeout:       Correct Patient: Yes        Correct Procedure: Yes        Correct Site: Yes        Correct Position: Yes        Correct Laterality: Yes        Site Marked: Yes  Procedure Documentation  Procedure: Adductor canal       Laterality: right       Patient Position: supine       Patient Prep/Sterile Barriers: sterile gloves       Skin prep: Betadine       Local skin infiltrated with 0.5 mL of 1% lidocaine.        Needle Type: insulated and short bevel       Needle Gauge: 21.        Needle Length (Inches): 4        Ultrasound guided       1. Ultrasound was used to identify targeted nerve, plexus, vascular marker, or fascial plane and place a needle adjacent to it in real-time.       2. Ultrasound was used to visualize the spread of anesthetic in close proximity to the above referenced structure.       4. The visualized anatomic structures appeared normal.       5. There were no apparent abnormal pathologic findings.    Assessment/Narrative         The placement was negative for: blood aspirated, painful injection and site bleeding       Paresthesias: No.       Bolus given via needle..        Secured via.        Insertion/Infusion Method: Single Shot       Complications: none       Injection made incrementally with aspirations every 5 mL.     Comments:  The surgeon has given a written order transferring care of this patient to me for the performance of a regional analgesia block for post-op pain control. It is requested of me because I am uniquely trained and qualified to perform  "this block and the surgeon is neither trained nor qualified to perform this procedure.        FOR [facility]) ONLY:   Pain Team Contact information: please page the Pain Team Via [employer]. Search \"Pain\". During daytime hours, please page the attending first. At night please page the resident first.      "

## 2024-12-04 NOTE — BRIEF OP NOTE
[facility]    Brief Operative Note    Pre-operative diagnosis: Localized osteoarthritis of right knee [M17.11]  Post-operative diagnosis Same as pre-operative diagnosis    Procedure: Right total knee arthroplasty, Right - Knee    Surgeon: Surgeons and Role:     * Ronan Arenas MD - Primary     * Brannon Martinez PA-C - Assisting  Anesthesia: General with Block   Estimated Blood Loss: 100 mL from 12/4/2024 12:55 PM to 12/4/2024  3:59 PM      Drains: None  Specimens: * No specimens in log *  Findings:   None.  Complications: None.  Implants:   Implant Name Type Inv. Item Serial No.  Lot No. LRB No. Used Action   BONE CEMENT RADIOPAQUE SIMPLEX HV FULL DOSE 6194-1-001 - [id number] Cement, Bone BONE CEMENT RADIOPAQUE SIMPLEX HV FULL DOSE 6194-1-001  [employer] [id number] Right 2 Implanted   IMP PATELLA ZIM KNEE ALL PAMELA 35MM -622-35 - [id number] Total Joint Component/Insert IMP PATELLA ZIM KNEE ALL PAMELA 35MM -812-35  [employer] [id number] Right 1 Implanted   KNEE FEMUR CR CEMENT CCR STD SZ 11 R - [id number] Total Joint Component/Insert KNEE FEMUR CR CEMENT CCR STD SZ 11 R  [employer] [id number] Right 1 Implanted   KNEE TIBIA STEM 5DEG SZ F R [id number] - [id number] Total Joint Component/Insert KNEE TIBIA STEM 5DEG SZ F R [id number]  [employer] [id number] Right 1 Implanted   INSERT TIBIAL CR 10MM VE R 8-11 EF - [id number] Total Joint Component/Insert INSERT TIBIAL CR 10MM VE R 8-11 EF  [employer] [id number] Right 1 Implanted

## 2024-12-04 NOTE — ANESTHESIA PREPROCEDURE EVALUATION
Anesthesia Pre-Procedure Evaluation    Patient: Thai Mancia   MRN: [id number] : 1970        Procedure : Procedure(s):  Right total knee arthroplasty          Past Medical History:   Diagnosis Date    Asthma     Complication of anesthesia     slow to wake up    Diverticulitis     Essential hypertension       Past Surgical History:   Procedure Laterality Date    CIRCUMCISION N/A 2021    Procedure: CIRCUMCISION;  Surgeon: Toy Feliz MD;  Location: [facility] OR    COLONOSCOPY N/A 2021    Procedure: COLONOSCOPY;  Surgeon: Claire Browning MD;  Location:  GI    EXCISE GLAND SUBMANDIBULAR Left 6/3/2024    Procedure: excision of left submandibular gland;  Surgeon: Bassam Mendez MD;  Location: [facility] OR    KNEE SURGERY      left      Allergies   Allergen Reactions    Cats      Itchy eyes, runny nose, wheezing    Dogs      Itchy eyes, runny nose, wheezing    Pollen Extract      Runny nose      Social History     Tobacco Use    Smoking status: Never    Smokeless tobacco: Never   Substance Use Topics    Alcohol use: No     Alcohol/week: 0.0 standard drinks of alcohol     Comment: quit       Wt Readings from Last 1 Encounters:   24 109.4 kg (241 lb 1.6 oz)        Anesthesia Evaluation   Pt has had prior anesthetic. Type: General.    No history of anesthetic complications       ROS/MED HX  ENT/Pulmonary:     (+)                     Mild Persistent, asthma  Treatment: Inhaler prn,                 Neurologic:  - neg neurologic ROS     Cardiovascular:     (+)  hypertension- -   -  - -                                   (-) wheezes   METS/Exercise Tolerance:     Hematologic:  - neg hematologic  ROS     Musculoskeletal:   (+)  arthritis,             GI/Hepatic:  - neg GI/hepatic ROS     Renal/Genitourinary:  - neg Renal ROS     Endo: Comment: Class 1 obesity    (+)               Obesity,       Psychiatric/Substance Use:  - neg psychiatric ROS     Infectious Disease:  - neg  infectious disease ROS     Malignancy:  - neg malignancy ROS     Other:            Physical Exam    Airway        Mallampati: II   TM distance: > 3 FB   Neck ROM: full   Mouth opening: > 3 cm    Respiratory Devices and Support         Dental           Cardiovascular   cardiovascular exam normal       Rhythm and rate: regular and normal     Pulmonary   pulmonary exam normal        breath sounds clear to auscultation   (-) no wheezes    Other findings: Lab Test        11/15/24     05/24/24     03/09/24     10/07/22                       0927          0830          1748          1023          WBC           --          6.2          7.7          7.1           HGB          14.7         14.7         14.0         15.3          MCV           --          90           90           88            PLT           --          319          322          322            Lab Test        11/15/24     05/24/24     10/07/22                       0927          0830          1023          NA           141          141          140           POTASSIUM    4.1          4.1          3.9           CHLORIDE     104          106          108           CO2          27           26           26            BUN          15.3         16.3         12            CR           1.00         0.95         0.83          ANIONGAP     10           9            6             FRANCK          9.1          9.0          9.1           GLC          98           100*         93                   EKG Interpretation:     Sinus Rhythm  BORDERLINE LVH    OUTSIDE LABS:  CBC:   Lab Results   Component Value Date    WBC 6.2 05/24/2024    WBC 7.7 03/09/2024    HGB 14.7 11/15/2024    HGB 14.7 05/24/2024    HCT 44.2 05/24/2024    HCT 42.6 03/09/2024     05/24/2024     03/09/2024     BMP:   Lab Results   Component Value Date     11/15/2024     05/24/2024    POTASSIUM 4.1 11/15/2024    POTASSIUM 4.1 05/24/2024    CHLORIDE 104 11/15/2024    CHLORIDE 106  "05/24/2024    CO2 27 11/15/2024    CO2 26 05/24/2024    BUN 15.3 11/15/2024    BUN 16.3 05/24/2024    CR 1.00 11/15/2024    CR 0.95 05/24/2024    GLC 98 11/15/2024     (H) 05/24/2024     COAGS: No results found for: \"PTT\", \"INR\", \"FIBR\"  POC: No results found for: \"BGM\", \"HCG\", \"HCGS\"  HEPATIC:   Lab Results   Component Value Date    ALBUMIN 4.0 05/24/2024    PROTTOTAL 7.1 05/24/2024    ALT 34 05/24/2024    AST 29 05/24/2024    ALKPHOS 78 05/24/2024    BILITOTAL 0.5 05/24/2024     OTHER:   Lab Results   Component Value Date    A1C 5.3 05/24/2024    FRANCK 9.1 11/15/2024    LIPASE 29 08/06/2022    TSH 1.03 05/24/2024       Anesthesia Plan    ASA Status:  3    NPO Status:  NPO Appropriate    Anesthesia Type: General.     - Airway: LMA   Induction: Intravenous.   Maintenance: Balanced.        Consents    Anesthesia Plan(s) and associated risks, benefits, and realistic alternatives discussed. Questions answered and patient/representative(s) expressed understanding.     - Discussed: Risks, Benefits and Alternatives for BOTH SEDATION and the PROCEDURE were discussed     - Discussed with:  Patient      - Extended Intubation/Ventilatory Support Discussed: No.      - Patient is DNR/DNI Status: No     Use of blood products discussed: No .     Postoperative Care    Pain management: IV analgesics, Oral pain medications, Peripheral nerve block (Single Shot), Multi-modal analgesia.   PONV prophylaxis: Ondansetron (or other 5HT-3), Dexamethasone or Solumedrol, Background Propofol Infusion     Comments:               Jairon Lawson MD    I have reviewed the pertinent notes and labs in the chart from the past 30 days and (re)examined the patient.  Any updates or changes from those notes are reflected in this note.               # Hypertension: Noted on problem list               # Asthma: noted on problem list       "

## 2024-12-04 NOTE — OP NOTE
Date of Surgery: December 4, 2024    Surgeon: Ronan Arenas MD     Assistants:   1. Dany Martinez PA-C  2. ILENE Alfaro-5    A skilled surgical assistant was required to assist with patient positioning, draping, and retraction during open portions of the case.    Pre-operative Diagnosis:   1) Right Knee Osteoarthritis    Post-operative Diagnosis:   1) Right Knee Osteoarthritis    Procedure:   1) Right Total Knee Arthroplasty     Implants:    Implant Name Type Inv. Item Serial No.  Lot No. LRB No. Used Action   BONE CEMENT RADIOPAQUE SIMPLEX HV FULL DOSE 6194-1-001 - [id number] Cement, Bone BONE CEMENT RADIOPAQUE SIMPLEX HV FULL DOSE 6194-1-001  [employer] [id number] Right 2 Implanted   IMP PATELLA ZIM KNEE ALL PAMELA 35MM -109-35 - [id number] Total Joint Component/Insert IMP PATELLA ZIM KNEE ALL PAMELA 35MM -112-35  [employer] [id number] Right 1 Implanted   KNEE FEMUR CR CEMENT CCR STD SZ 11 R - [id number] Total Joint Component/Insert KNEE FEMUR CR CEMENT CCR STD SZ 11 R  [employer] [id number] Right 1 Implanted   KNEE TIBIA STEM 5DEG SZ F R [id number] - [id number] Total Joint Component/Insert KNEE TIBIA STEM 5DEG SZ F R [id number]  [employer] [id number] Right 1 Implanted   INSERT TIBIAL CR 10MM VE R 8-11 EF - [id number] Total Joint Component/Insert INSERT TIBIAL CR 10MM VE R 8-11 EF  [employer] [id number] Right 1 Implanted        Anesthesia: General with Adductor Canal Block    Estimated Blood Loss: 100 ml       Complications: None       Specimen: None    Tourniquette Time: 120 min    Condition: Stable to PACU    Procedure Details   Indications for Procedure:  Patient is a 53yo known to my practice with right knee osteoarthritis.  The patient is still having pain affecting her activities of daily living despite nonoperative management which may have included, but not limited to low impact aerobic exercises, weight loss, oral anti-inflammatory medication,  intra-articular injections, and bracing.  Operative and non-operative treatment options have been discussed.  Informed consent was obtained after discussing the risks and benefits of the procedure which include but were not limited to failure to cure pain, stiffness, instability, hardware failure, infection that may require multiple surgeries, post-operative DVT/PE, damage to nearby nerves, arteries, and veins that may lead to loss of function of the limb, and death.  The patient understood the risks and wished to proceed with surgery.     Procedure:   On the day of surgery, the patient was identified in the preoperative holding area and the operative site was confirmed and marked with a permanent skin marker. Informed consent was again reviewed confirming the patient, procedure, site, laterality, and surgeon. We again discussed the risks and benefits of the procedure. All questions were answered at this time.  He wished to move forward with operative intervention.  A regional anesthetic block was performed by my anesthesia colleagues.    The patient was transported to the operative suite, transferred to the operative table without incident and secured using a belt.  Anesthesia was induced by our anesthesia colleagues. The patient was placed in the supine position. All bony prominences were padded. The operative leg was sterilely prepped and draped in the usual fashion. Final timeout was conducted. All in the room were in agreement with the correct patient identification, procedure, site, laterality, and surgeon, as well as preoperative antibiotics having been instilled.       The leg was examined and found to have a 7-100 degrees of passive motion and stable in varus and valgus. The leg was exsanguinated with an Esmarch bandage and the tourniquette was inflated to 250mmHg. An  medial parapatellar approach to the knee was used. A vertical incision was made, centered over the patella. Large skin flaps were raised. A  medial parapatellar capsulotomy was performed to enter the joint. Limited medial soft tissue release was performed on the proximal tibia. Portions of the anterior fate pad were removed.    The patella was reflected laterally and the knee was examined. There was found to be severe tri-compartmental arthritic changes. The ACL was removed with use of the bovie. A partial medial and lateral menisectomy was performed with the bovie. An intramedullary claudia was placed and 5 degree valgus intramedullary femoral guide system was used to make the distal femoral cut. A posterior reference system was used to reference rotation and femoral size. 3 degrees of external rotation was selected. A size 11 femoral component was measured. The 4-in-1 cutting guide was used to complete anterior, posterior, and chamfer cuts. The cutting guide was removed with all bone fragment.    We then turned our attention to the tibia. The tibia was subluxed anteriorly. An extramedullary tibial cutting guide was used to make the proximal tibia cut. The proximal tibial cut was made with 7 degrees of posterior slope. The bone fragment was removed. The remained of the medial and lateral menisci were removed. At this time any large posterior femoral osteophytes or loose bodies were removed from the knee. 10cc of local field block anesthetic was injected into the medial and lateral posterior joint capsule. A 10mm trial block was used to assess the soft tissue gap and alignment in flexion, extension, and mid-flexion. These were found to be satisfactory.     We then placed the trial femoral component and trial tibial component with a 10mm poly trial. The knee was tested for stability, range of motion, ligamentous balance, and patellar tracking in flexion, extension, and midflexion. These were found to be satisfactory. Range of motion was 0-130 degrees of knee flexion.     We then turned ourt attention to the patella. The patellar width was measured. A free  hand patella cut was performed. The bone fragment was removed and the patellar button size was measure. A 35mm patellar component was selected. The appropriate size drill guise was placed on the medial aspect and the patellar lug holes were drilled. The trial tibial button was placed. With all trial components intact, the knee was tested for stability, range of motion, ligamentous balance, and patellar tracking in flexion, extension, and midflexion. These were found to be satisfactory. The femur lug holes were drilled. The rotation of the tibial component was marked with a bovie.    The trials were then removed. The tibia was again subluxed anteriorly.  The tibia was sized.  A size F tibial trial component was selected.  This was placed on the surface of the tibia and appropriate rotation.  No overlying off the surface of the tibia was noted.  The trial component was pinned in place.  The appropriately sized reamer was utilized to enter the tibial canal.  The appropriate sized keel punch was used through the tibial guide.  The tibial trial guide was removed.    All trial components were removed. The femur, tibia, and patella were copiously irrigated and dried. The high viscosity Simplex cement was vacuum mixed with 500mg Vancomycin. The femoral, tibial, and patellar components were cemented into place. A10mm polyethylene trial was inserted. Excess cement was removed. Thee knee was held in extension while the cement dried. During this time the knee was thoroughly irrigated with a diluted Iodine solution and normal saline irrigation. The remainder of the radha-articular anesthetic field block was injected.. Once the cement has dried, the knee was again ranged with the 10, 11, and 12mm polyethylene trial component in place. A size 10mm MC polyethylene was selected. After clearing the tibial tray of any remaining debris or soft tissue, the final polyethylene component was snapped into place on the tibial tray.    With  all final components in place, the knee was tested for stability, range of motion, ligamentous balance, and patellar tracking in flexion, extension, and midflexion. These were found to be satisfactory. Range of motion was 0-130 degrees of knee flexion.    The wound was irrigated copiously with diluted ancef wash and normal saline. The tourniquet was deflated. Any pulsatile bleeding was coagulated with the bovie. The capsule was closed with #1 StrataFix. The subdermal tissues were closed in layered fashion using 0 Vicryl, 2-0 Vicryl, and 3-0 Monocryl for the skin. Steristrips were applied. An Aquacell Dressing was placed and an Ace wrap. The drapes were taken down and the patient was awoken from anesthesia. The patient was successfully transferred back to the hospital bed from the operative table without incident. The patient returned to PACU in stable condition.     All sponge needle instrument counts were correct at the end of the case.     Post Op Plan:    The patient will be admitted to the orthopedic floor for routine post-operative care.    Weight-bearing as tolerated on the operative leg with no range of motion restrictions.    Aspirin for chemical DVT prophylaxis   PO and IV pain control   24-hours of postop antibiotics   PT and OT   Discharge planning    Patient will be discharged when meeting criteria. Follow-up with Dr Arenas in 10 - 14 days.    Ronan Arenas MD    [facility]   Department of Orthopedic Surgery

## 2024-12-04 NOTE — ANESTHESIA PROCEDURE NOTES
Airway       Patient location during procedure: OR       Procedure Start/Stop Times: 12/4/2024 1:02 PM  Staff -        CRNA: Kevin Arshad APRN CRNA       Performed By: CRNA  Consent for Airway        Urgency: elective  Indications and Patient Condition       Indications for airway management: radha-procedural       Induction type:intravenous       Mask difficulty assessment: 1 - vent by mask    Final Airway Details       Final airway type: supraglottic airway    Supraglottic Airway Details        Type: LMA       Brand: I-Gel       LMA size: 5    Post intubation assessment        Placement verified by: capnometry, equal breath sounds and chest rise        Number of attempts at approach: 1       Number of other approaches attempted: 0       Secured with: commercial tube stock       Ease of procedure: easy       Dentition: Intact and Unchanged    Medication(s) Administered   Medication Administration Time: 12/4/2024 1:02 PM

## 2024-12-04 NOTE — ANESTHESIA POSTPROCEDURE EVALUATION
Patient: Thai Mancia    Procedure: Procedure(s):  Right total knee arthroplasty       Anesthesia Type:  General    Note:  Disposition: Inpatient; Admission   Postop Pain Control: Uneventful            Sign Out: Well controlled pain   PONV: No   Neuro/Psych: Uneventful            Sign Out: Acceptable/Baseline neuro status   Airway/Respiratory: Uneventful            Sign Out: Acceptable/Baseline resp. status   CV/Hemodynamics: Uneventful            Sign Out: Acceptable CV status; No obvious hypovolemia; No obvious fluid overload   Other NRE:    DID A NON-ROUTINE EVENT OCCUR? No           Last vitals:  Vitals Value Taken Time   /88 12/04/24 1605   Temp 96.08  F (35.6  C) 12/04/24 1608   Pulse 98 12/04/24 1609   Resp 9 12/04/24 1609   SpO2 85 % 12/04/24 1609   Vitals shown include unfiled device data.    Electronically Signed By: Barbara Romero MD  December 4, 2024  4:10 PM

## 2024-12-05 ENCOUNTER — TELEPHONE (OUTPATIENT)
Dept: ORTHOPEDICS | Facility: CLINIC | Age: 54
End: 2024-12-05

## 2024-12-05 ENCOUNTER — APPOINTMENT (OUTPATIENT)
Dept: PHYSICAL THERAPY | Facility: CLINIC | Age: 54
End: 2024-12-05
Attending: STUDENT IN AN ORGANIZED HEALTH CARE EDUCATION/TRAINING PROGRAM
Payer: COMMERCIAL

## 2024-12-05 ENCOUNTER — APPOINTMENT (OUTPATIENT)
Dept: OCCUPATIONAL THERAPY | Facility: CLINIC | Age: 54
End: 2024-12-05
Attending: STUDENT IN AN ORGANIZED HEALTH CARE EDUCATION/TRAINING PROGRAM
Payer: COMMERCIAL

## 2024-12-05 VITALS
HEIGHT: 71 IN | DIASTOLIC BLOOD PRESSURE: 56 MMHG | TEMPERATURE: 98.1 F | WEIGHT: 247.14 LBS | RESPIRATION RATE: 20 BRPM | OXYGEN SATURATION: 96 % | BODY MASS INDEX: 34.6 KG/M2 | HEART RATE: 66 BPM | SYSTOLIC BLOOD PRESSURE: 119 MMHG

## 2024-12-05 LAB
ANION GAP SERPL CALCULATED.3IONS-SCNC: 11 MMOL/L (ref 7–15)
BUN SERPL-MCNC: 12.1 MG/DL (ref 6–20)
CALCIUM SERPL-MCNC: 8.2 MG/DL (ref 8.8–10.4)
CHLORIDE SERPL-SCNC: 105 MMOL/L (ref 98–107)
CREAT SERPL-MCNC: 0.97 MG/DL (ref 0.67–1.17)
EGFRCR SERPLBLD CKD-EPI 2021: >90 ML/MIN/1.73M2
GLUCOSE SERPL-MCNC: 124 MG/DL (ref 70–99)
HCO3 SERPL-SCNC: 24 MMOL/L (ref 22–29)
HGB BLD-MCNC: 12.5 G/DL (ref 13.3–17.7)
POTASSIUM SERPL-SCNC: 4.3 MMOL/L (ref 3.4–5.3)
SODIUM SERPL-SCNC: 140 MMOL/L (ref 135–145)

## 2024-12-05 PROCEDURE — 250N000013 HC RX MED GY IP 250 OP 250 PS 637: Performed by: STUDENT IN AN ORGANIZED HEALTH CARE EDUCATION/TRAINING PROGRAM

## 2024-12-05 PROCEDURE — 85018 HEMOGLOBIN: CPT | Performed by: PHYSICIAN ASSISTANT

## 2024-12-05 PROCEDURE — 97165 OT EVAL LOW COMPLEX 30 MIN: CPT | Mod: GO | Performed by: REHABILITATION PRACTITIONER

## 2024-12-05 PROCEDURE — 250N000011 HC RX IP 250 OP 636: Mod: JZ | Performed by: PHYSICIAN ASSISTANT

## 2024-12-05 PROCEDURE — 82310 ASSAY OF CALCIUM: CPT | Performed by: PHYSICIAN ASSISTANT

## 2024-12-05 PROCEDURE — 36415 COLL VENOUS BLD VENIPUNCTURE: CPT | Performed by: PHYSICIAN ASSISTANT

## 2024-12-05 PROCEDURE — 97116 GAIT TRAINING THERAPY: CPT | Mod: GP

## 2024-12-05 PROCEDURE — 97535 SELF CARE MNGMENT TRAINING: CPT | Mod: GO | Performed by: REHABILITATION PRACTITIONER

## 2024-12-05 PROCEDURE — 97161 PT EVAL LOW COMPLEX 20 MIN: CPT | Mod: GP

## 2024-12-05 PROCEDURE — 80048 BASIC METABOLIC PNL TOTAL CA: CPT | Performed by: PHYSICIAN ASSISTANT

## 2024-12-05 PROCEDURE — 97110 THERAPEUTIC EXERCISES: CPT | Mod: GP

## 2024-12-05 PROCEDURE — 250N000013 HC RX MED GY IP 250 OP 250 PS 637: Performed by: PHYSICIAN ASSISTANT

## 2024-12-05 RX ADMIN — OXYCODONE HYDROCHLORIDE 5 MG: 5 TABLET ORAL at 01:08

## 2024-12-05 RX ADMIN — CYCLOBENZAPRINE 10 MG: 10 TABLET, FILM COATED ORAL at 06:19

## 2024-12-05 RX ADMIN — HYDROXYZINE HYDROCHLORIDE 25 MG: 25 TABLET, FILM COATED ORAL at 01:08

## 2024-12-05 RX ADMIN — GABAPENTIN 100 MG: 100 CAPSULE ORAL at 08:49

## 2024-12-05 RX ADMIN — ACETAMINOPHEN 975 MG: 325 TABLET, FILM COATED ORAL at 08:49

## 2024-12-05 RX ADMIN — OXYCODONE HYDROCHLORIDE 10 MG: 10 TABLET ORAL at 08:49

## 2024-12-05 RX ADMIN — ASPIRIN 81 MG: 81 TABLET, COATED ORAL at 08:49

## 2024-12-05 RX ADMIN — CEFAZOLIN SODIUM 2 G: 2 INJECTION, SOLUTION INTRAVENOUS at 06:11

## 2024-12-05 RX ADMIN — ACETAMINOPHEN 975 MG: 325 TABLET, FILM COATED ORAL at 00:11

## 2024-12-05 ASSESSMENT — ACTIVITIES OF DAILY LIVING (ADL)
ADLS_ACUITY_SCORE: 22
IADL_COMMENTS: SPOUSE TO COMPLETE
ADLS_ACUITY_SCORE: 22
ADLS_ACUITY_SCORE: 20
ADLS_ACUITY_SCORE: 22

## 2024-12-05 NOTE — PLAN OF CARE
Patient vital signs are at baseline: Yes  Patient able to ambulate as they were prior to admission or with assist devices provided by therapies during their stay:  Yes - Ax2 GB/walker  Patient MUST void prior to discharge:  Yes  Patient able to tolerate oral intake:  Yes  Pain has adequate pain control using Oral analgesics:  Yes  Does patient have an identified :  Yes  Has goal D/C date and time been discussed with patient:  No, Pt consult pending    A&O. Dressing with large/copious amount of drainage around 0600. Dressing Reinforced with abd.  Pt reporting RLE numbness to be  improving. Denied nausea and SOB.

## 2024-12-05 NOTE — TELEPHONE ENCOUNTER
Reason for Call:  Form, our goal is to have forms completed with 7 days, however, some forms may require a visit or additional information.    Type of letter, form or note:  FMLA     Who is the form from?: Patient    Where did the form come from: Patient or family brought in       Phone number of person requesting form: [phone]  Can we leave a detailed message on this number:  Not Applicable    Desired completion date of form: ASAP      How will form be returned?:  picked up in clinic by Jean-Claude Mancia    Has the patient signed a consent form for release of information (may be included with form)? Not Applicable    Additional comments: None    Form was started and place in Provider Basket for provider review/ completion at [facility].

## 2024-12-05 NOTE — PLAN OF CARE
"Goal Outcome Evaluation:      Plan of Care Reviewed With: patient, spouse    Overall Patient Progress: improvingOverall Patient Progress: improving    Outcome Evaluation: pt arrive about 1745 from PACU to floor.   Pain rated 7 out of 10. Pt has received Oxycodone and Atarax at 1650 about.   Patient vital signs are at baseline: Yes 2L NC CAPNO 28 Etco2  Patient able to ambulate as they were prior to admission or with assist devices provided by therapies during their stay:  No,  Reason:  NO OOB yet  Patient MUST void prior to discharge:  No,  Reason:  DTV   Patient able to tolerate oral intake:  Yes  Pain has adequate pain control using Oral analgesics:  No,  Reason: pt just had Atarax, Oxycodone and fentanyl in PACU.  Does patient have an identified :  Yes Wife at bedside  Has goal D/C date and time been discussed with patient:  Yes Tomorrow with wife.      Problem: Adult Inpatient Plan of Care  Goal: Plan of Care Review  Description: The Plan of Care Review/Shift note should be completed every shift.  The Outcome Evaluation is a brief statement about your assessment that the patient is improving, declining, or no change.  This information will be displayed automatically on your shift  note.  Outcome: Progressing  Flowsheets (Taken 12/4/2024 1955)  Outcome Evaluation: pt arrive about 1745 from PACU to floor.  Plan of Care Reviewed With:   patient   spouse  Overall Patient Progress: improving  Goal: Patient-Specific Goal (Individualized)  Description: You can add care plan individualizations to a care plan. Examples of Individualization might be:  \"Parent requests to be called daily at 9am for status\", \"I have a hard time hearing out of my right ear\", or \"Do not touch me to wake me up as it startles  me\".  Outcome: Progressing  Goal: Absence of Hospital-Acquired Illness or Injury  Outcome: Progressing  Goal: Optimal Comfort and Wellbeing  Outcome: Progressing  Intervention: Monitor Pain and Promote " Comfort  Recent Flowsheet Documentation  Taken 12/4/2024 1753 by Juliana Chaudhary, RN  Pain Management Interventions: medication (see MAR)  Goal: Readiness for Transition of Care  Outcome: Progressing

## 2024-12-05 NOTE — PROGRESS NOTES
12/05/24 1203   Appointment Info   Signing Clinician's Name / Credentials (OT) Char Velásquez, OTR/L   Rehab Comments (OT) Due to small amount of continued incisional draining on distal end. Draining resolve shortly with pressure, ortho PA wants patient placed in knee immobilizer for 24-48hrs to help the incision heal. May remove after 48hrs without ROM restriction.   Living Environment   People in Home spouse;child(ramses), dependent  (ages 15 and 8)   Current Living Arrangements house   Home Accessibility stairs to enter home;stairs within home   Number of Stairs, Main Entrance 4   Number of Stairs, Within Home, Primary greater than 10 stairs   Transportation Anticipated family or friend will provide   Living Environment Comments 4 HORTENSIA then all needs met. Tub shower, no bars or seat.   Self-Care   Usual Activity Tolerance good   Current Activity Tolerance good   Regular Exercise No   Equipment Currently Used at Home none  (has reacher, spouse has ordered grab bars for tub, also purchased a RTS with armrests)   Fall history within last six months no   Activity/Exercise/Self-Care Comment Pt was wearing ACE brace prior to surgery, otherwise IND. Works at [employer], very active job. Wife is RN here. Pt owns FWW and crutches. Spouse works as RN at mom[employer] at [employer], the next week off of work.   Instrumental Activities of Daily Living (IADL)   IADL Comments spouse to complete   General Information   Onset of Illness/Injury or Date of Surgery 12/04/24   Referring Physician Brannon Martinez, PAMeriC   Patient/Family Therapy Goal Statement (OT) to return home today   Additional Occupational Profile Info/Pertinent History of Current Problem Patient is pOD #1 for R TKA   Existing Precautions/Restrictions fall  (KI donned for next 24-48 hours)   Right Lower Extremity (Weight-bearing Status) weight-bearing as tolerated (WBAT)  (with KI for next 24-48 hours, no ROM until removed)   General Observations and Info patient was in bed,  agreed to OT session, spouses present throughout session   Cognitive Status Examination   Orientation Status orientation to person, place and time   Pain Assessment   Patient Currently in Pain Yes, see Vital Sign flowsheet  (rating not provided)   Posture   Posture not impaired   Range of Motion Comprehensive   General Range of Motion bilateral upper extremity ROM WNL   Strength Comprehensive (MMT)   Comment, General Manual Muscle Testing (MMT) Assessment decreased strength in R LE to be expected   Muscle Tone Assessment   Muscle Tone Quick Adds No deficits were identified   Coordination   Upper Extremity Coordination No deficits were identified   Bed Mobility   Comment (Bed Mobility) SBA for bed mobility   Transfers   Transfers sit-stand transfer;toilet transfer;shower transfer   Sit-Stand Transfer   Sit-Stand [place] (Transfers) supervision;verbal cues   Assistive Device (Sit-Stand Transfers) walker, front-wheeled   Shower Transfer   Shower Transfer Comments min A, fww tub transfer   Toilet Transfer   Type (Toilet Transfer) stand-sit   [place] Level (Toilet Transfer) contact guard;verbal cues   Balance   Balance Comments LOB was not noted, general unsteadiness to be expected   Activities of Daily Living   BADL Assessment/Intervention lower body dressing   Lower Body Dressing Assessment/Training   [place] Level (Lower Body Dressing) moderate assist (50% patient effort);verbal cues;lower body dressing skills   Clinical Impression   Criteria for Skilled Therapeutic Interventions Met (OT) Yes, treatment indicated   OT Diagnosis decreased ADL/IADLs   OT Problem List-Impairments impacting ADL activity tolerance impaired;balance;range of motion (ROM);strength;post-surgical precautions   Assessment of Occupational Performance 5 or more Performance Deficits   Identified Performance Deficits Dsg, toileting, bathing, functional/community mobility, household chores, driving, errands   Planned Therapy  Interventions (OT) ADL retraining;progressive activity/exercise;transfer training   Clinical Decision Making Complexity (OT) problem focused assessment/low complexity   Risk & Benefits of therapy have been explained evaluation/treatment results reviewed;care plan/treatment goals reviewed;risks/benefits reviewed;current/potential barriers reviewed;participants included;patient;spouse/significant other   OT Total Evaluation Time   OT Eval, Low Complexity Minutes (96817) 8   OT Goals   Therapy Frequency (OT) One time eval and treatment   OT Predicted Duration/Target Date for Goal Attainment 12/05/24   OT Goals Lower Body Dressing;Toilet Transfer/Toileting;Transfers;OT Goal 1;OT Goal 2   OT: Lower Body Dressing Minimal assist;including orthotic;within precautions;Goal Met   OT: Transfer Supervision/stand-by assist;with assistive device;within precautions;Goal Met  (tub transfer)   OT: Toilet Transfer/Toileting Supervision/stand-by assist;toilet transfer;using adaptive equipment;within precautions;Goal Met   OT: Goal 1 Patient will verbalize at least 2-3 adaptations to ADLs routines at home to accommodate energy level and safety needs.- goal met   OT: Goal 2 Patient will demonstrate safe use of walker during ADLs.- goal met   OT Discharge Planning   OT Plan dc   OT Rationale for DC Rec defer to ortho team for dc plan- patient has met needed goals for safe discharge home with family to A as needed with IADL's; household chores, driving, errands, cooking and bathing. Recommended AE; shower chair and LHS, patient has all other needed AE. Will discharge from OT services.   OT Brief overview of current status SBA, fww functional mobility and transfers, will have A with KI and LE until able to increase ROOM in knee   Total Session Time   Total Session Time (sum of timed and untimed services) 8

## 2024-12-05 NOTE — PLAN OF CARE
"A/o x4. IV removed. Discharge instructions gone over and understanding verbalized. All belongings gathered and sent with patient. Meds (tylenol, senna, gabapentin, aspirin, oxy) given to patient to take home. Wife to transport home.         Problem: Adult Inpatient Plan of Care  Goal: Plan of Care Review  Description: The Plan of Care Review/Shift note should be completed every shift.  The Outcome Evaluation is a brief statement about your assessment that the patient is improving, declining, or no change.  This information will be displayed automatically on your shift  note.  Outcome: Met  Flowsheets (Taken 12/5/2024 1136)  Outcome Evaluation: discharging home  Plan of Care Reviewed With: patient  Overall Patient Progress: improving  Goal: Patient-Specific Goal (Individualized)  Description: You can add care plan individualizations to a care plan. Examples of Individualization might be:  \"Parent requests to be called daily at 9am for status\", \"I have a hard time hearing out of my right ear\", or \"Do not touch me to wake me up as it startles  me\".  Outcome: Met  Goal: Absence of Hospital-Acquired Illness or Injury  Outcome: Met  Intervention: Identify and Manage Fall Risk  Recent Flowsheet Documentation  Taken 12/5/2024 0849 by Sophie Chun RN  Safety Promotion/Fall Prevention:   activity supervised   assistive device/personal items within reach   clutter free environment maintained   nonskid shoes/slippers when out of bed   safety round/check completed  Intervention: Prevent Skin Injury  Recent Flowsheet Documentation  Taken 12/5/2024 0849 by Sophie Chun RN  Body Position: position changed independently  Intervention: Prevent and Manage VTE (Venous Thromboembolism) Risk  Recent Flowsheet Documentation  Taken 12/5/2024 0849 by Sophie Chun RN  VTE Prevention/Management: SCDs off (sequential compression devices)  Intervention: Prevent Infection  Recent Flowsheet Documentation  Taken 12/5/2024 0849 by Lay, " Sophie DAI RN  Infection Prevention:   hand hygiene promoted   environmental surveillance performed  Goal: Optimal Comfort and Wellbeing  Outcome: Met  Intervention: Monitor Pain and Promote Comfort  Recent Flowsheet Documentation  Taken 12/5/2024 0849 by Sophie Chun RN  Pain Management Interventions:   medication (see MAR)   cold applied  Goal: Readiness for Transition of Care  Outcome: Met     Problem: Knee Arthroplasty  Goal: Optimal Coping  Outcome: Met  Goal: Absence of Bleeding  Outcome: Met  Goal: Effective Bowel Elimination  Outcome: Met  Goal: Fluid and Electrolyte Balance  Outcome: Met  Goal: Optimal Functional Ability  Outcome: Met  Intervention: Promote Optimal Functional Status  Recent Flowsheet Documentation  Taken 12/5/2024 0849 by Sophie Chun RN  Assistive Device Utilized:   gait belt   walker  Activity Management: activity adjusted per tolerance  Goal: Absence of Infection Signs and Symptoms  Outcome: Met  Goal: Intact Neurovascular Status  Outcome: Met  Goal: Anesthesia/Sedation Recovery  Outcome: Met  Intervention: Optimize Anesthesia Recovery  Recent Flowsheet Documentation  Taken 12/5/2024 0849 by Sophie Chun RN  Safety Promotion/Fall Prevention:   activity supervised   assistive device/personal items within reach   clutter free environment maintained   nonskid shoes/slippers when out of bed   safety round/check completed  Goal: Optimal Pain Control and Function  Outcome: Met  Intervention: Prevent or Manage Pain  Recent Flowsheet Documentation  Taken 12/5/2024 0849 by Sophie Chun RN  Pain Management Interventions:   medication (see MAR)   cold applied  Goal: Nausea and Vomiting Relief  Outcome: Met  Goal: Effective Urinary Elimination  Outcome: Met  Goal: Effective Oxygenation and Ventilation  Outcome: Met  Intervention: Optimize Oxygenation and Ventilation  Recent Flowsheet Documentation  Taken 12/5/2024 0849 by Sophie Chun RN  Activity Management: activity adjusted per  tolerance  Head of Bed (HOB) Positioning: HOB at 20-30 degrees         Goal Outcome Evaluation:      Plan of Care Reviewed With: patient    Overall Patient Progress: improvingOverall Patient Progress: improving    Outcome Evaluation: discharging home

## 2024-12-05 NOTE — DISCHARGE SUMMARY
[facility]  Discharge Summary            Interval History:     54 year old male admitted postoperatively for elective Right TKA  with Dr. Arenas due to DJD unresponsive to non operative treatment.  There were no notable intraoperative complications.  Patient being discharged post op Day #1.  Thai Mancia received skilled nursing, PT, OT, SW inquiry.  There was no Hospitalist care during the stay.     Thai Mancia has progressed satisfactorily with ambulation and pain management and without medical complication.  Patient is confident in their discharge and has met goals with PT and OT. Pt lives at home with family and will be discharged to home based on home living conditions and level of support.  Thai Mancia leaves the hospital in stable condition with good chance for recovery.  Today: Jean-Claude reports he is continued to improve since surgery. He notes his operative dressing drained through last night and the nurse placed ABDs over bandage. The ABDs have absorbed a small amount of blood since this morning.     Pain: mild  Nausea: none  Light headedness : none  Chest pain: none  Shortness of breath: none              Assessment and Plan:     S/P Right TKA Day #1.  Final Diagnosis: same  VSS, Hemodynamically asymptomatic, pain management adequate.    PLAN:  Post-operative dressing was removed, and cleaned. Small amount of continued incisional draining on distal end. Draining resolve shortly with pressure. Steri-strips and new aquacel dressing was applied. This was all completed with sterile gloves/dressings. Patient placed in knee immobilizer for 24-48hrs to help the incision heal. May remove after 48hrs without ROM restriction. Will call if incision dressing begins to become saturated again. Wife is a nurse and will routinely check dressing. Wife and father were present. All questions and concerns were answered.    DVT prophylaxis with asa, as patient has no history of VTE.  Pain  management with Tylenol, Oxycodone  Bowel Regimen.  RICE  Assistive devices as determined by Physical therapy.  OP PT.  Patient instructions attached to discharge.      Discharge to home  Follow up in clinic as previously scheduled, approx 10-14 days post op.    [facility]  [address] [place]  [address]  [phone]  [phone] fax  [phone] for scheduling                      Physical Exam:   Patient Vitals for the past 12 hrs:   BP Temp Temp src Pulse Resp SpO2   12/05/24 0746 119/56 98.1  F (36.7  C) Oral 66 20 96 %   12/05/24 0620 -- -- -- -- 22 96 %   12/05/24 0353 132/71 99.1  F (37.3  C) Temporal 82 21 95 %   12/05/24 0108 -- -- -- -- 23 94 %   12/05/24 0011 (!) 147/68 -- -- -- 22 95 %     Hemoglobin   Date Value Ref Range Status   12/05/2024 12.5 (L) 13.3 - 17.7 g/dL Final   11/15/2024 14.7 13.3 - 17.7 g/dL Final   08/21/2019 13.8 13.3 - 17.7 g/dL Final   03/20/2019 14.7 13.3 - 17.7 g/dL Final       Patient is alert and oriented.  Vitals: stable  Neurovascular status: Grossly intact to light sensation  Dressing: soaked through - new dressing placed after PT has remained clean and dry.  Calves: soft and non tender          Connor Macdonald PA-C  [facility] - Surgery    12/5/2024

## 2024-12-05 NOTE — PROGRESS NOTES
Occupational Therapy Discharge Summary    Reason for therapy discharge:    All goals and outcomes met, no further needs identified.    Progress towards therapy goal(s). See goals on Care Plan in [facility] electronic health record for goal details.  Goals met    Therapy recommendation(s):    No further therapy is recommended.

## 2024-12-05 NOTE — PROGRESS NOTES
12/05/24 0944   Appointment Info   Signing Clinician's Name / Credentials (PT) Rebecca Burger DPT   Rehab Comments (PT) ortho MALIK requesting KI for 48 hours after dressing change, verbally ok'd session without KI   Living Environment   People in Home spouse   Current Living Arrangements house   Home Accessibility stairs to enter home;stairs within home   Number of Stairs, Main Entrance 4   Stair Railings, Main Entrance none   Transportation Anticipated family or friend will provide   Living Environment Comments 4 HORTENSIA then all needs met. Tub shower, no bars or seat.   Self-Care   Usual Activity Tolerance good   Current Activity Tolerance moderate   Regular Exercise No   Equipment Currently Used at Home none   Fall history within last six months no   Activity/Exercise/Self-Care Comment Pt was wearing ACE brace prior to surgery, otherwise IND. Works at [employer], very active job. Wife is RN here. Pt owns FWW and crutches.   General Information   Onset of Illness/Injury or Date of Surgery 12/04/24   Referring Physician Brannon Martinez PA-C   Patient/Family Therapy Goals Statement (PT) return home   Pertinent History of Current Problem (include personal factors and/or comorbidities that impact the POC) 54 year old male admitted postoperatively for elective Right TKA  with Dr. Arenas due to DJD unresponsive to non operative treatment.  There were no notable intraoperative complications.   Existing Precautions/Restrictions fall;weight bearing   Weight-Bearing Status - RLE weight-bearing as tolerated   General Observations KI after dressing change for d/c   Cognition   Affect/Mental Status (Cognition) WNL   Follows Commands (Cognition) WNL   Pain Assessment   Patient Currently in Pain Yes, see Vital Sign flowsheet   Integumentary/Edema   Integumentary/Edema Comments RLE ACE bandage intact and reinforced d/t drainage   Posture    Posture Forward head position;Protracted shoulders   Range of Motion (ROM)   ROM Comment    Patient:   PANCHO MCCALL            MRN: [id number]            FIN: [id number]              Age:   50 years     Sex:  FEMALE     :  70   Associated Diagnoses:   Dependent edema   Author:   AMERICA TANG     Basic Information   Time seen: Immediately upon arrival.   Patient information:.   History of Present Illness   The patient presents with bilateral leg pain/swelling.   50-year-old female with a history of asthma here today for eval of bilateral lower extremity pain/swelling.  swelling has become much more painful over the past 24 hours, limiting her ambulation.  Denies any chest pain, shortness of breath.  Denies any fever.  Denies any trauma or falls.  .     Review of Systems   Constitutional symptoms:  No fever, no chills, no sweats.    Skin symptoms:  No jaundice, no rash.    Eye symptoms:  No recent vision problems, no pain, no discharge.   ENMT symptoms:  No ear pain, no sore throat, no nasal congestion.   Respiratory symptoms:  No shortness of breath, no cough, no hemoptysis.   Cardiovascular symptoms:  No chest pain, no palpitations.    Gastrointestinal symptoms:  No abdominal pain, no nausea, no vomiting, no diarrhea.   Genitourinary symptoms:  No dysuria, no hematuria.    Musculoskeletal symptoms:  Bilat LE pain/swelling.   Neurologic symptoms:  No headache, no altered level of consciousness, no tingling.   Psychiatric symptoms:  Negative except as documented in HPI.   Endocrine symptoms:  Negative except as documented in HPI.   Hematologic/Lymphatic symptoms:  Negative except as documented in HPI.  Allergy/immunologic symptoms:  Negative except as documented in HPI.     Health Status   Allergies:    Allergic Reactions (All)  Severity Not Documented  Codeine- No reactions were documented.  EGGS- No reactions were documented.  Penicillins- No reactions were documented.  Canceled/Inactive Reactions (All)  Severity Not Documented  Allergic to Eggs- No reactions were documented..    Medications: Per nurse's notes.   Immunizations: Per nurse's notes.     Past Medical/ Family/ Social History   Medical history:    All Problems  Asthma / [id number] / Confirmed.   Surgical history:    Tubal ligation (473023141).  Removal of gallstones in bile duct (3184780941)..   Family history: Include Family History   Entire family history is negative..   Social history:    Social & Psychosocial Habits  Alcohol  06/27/2016  Use: Current    Frequency: 1-2 times per week    Use: Current    Frequency: 1-2 times per week  03/26/2020  Use: Current, Current  Substance Abuse  03/26/2020  Use: Past    Type: Marijuana    Use: Past    Type: Marijuana  Tobacco  06/27/2016  Smoking Tobacco Use: Never smoker    Smoked/Smokeless Tobacco in Last 30 Days: No    Smoking Tobacco Use: Never smoker    Smoked/Smokeless Tobacco in Last 30 Days: No    Smoking Tobacco Use: Never smoker  03/26/2020  Smoking Tobacco Use: Light tobacco smoker    Smoked/Smokeless Tobacco in Last 30 Days: Yes    Type: Cigarettes    Smoking Tobacco Use: Light tobacco smoker    Smoked/Smokeless Tobacco in Last 30 Days: Yes    Type: Cigarettes    Smoking Tobacco Use: Light tobacco smoker    If Cigarettes Used, How Many: 4 Cigarettes or Less Per    Smokeless tobacco use: Never    If Cigarettes Used, How Many: 4 Cigarettes or Less Per    Type: Cigarettes    If Cigarettes Used, How Many: 4 Cigarettes or Less Per  .     Physical Examination              Vital Signs   Vital Sign   07/31/20 00:56 CDT Temperature - VS 36.5 deg_C  Normal    Temperature Source - VS Oral    Heart/Pulse Rate 100  HI    Pulse Source Monitor    Respiration Rate 18 breaths/min  HI    SpO2 100 %  Normal    NIBP Systolic 166  HI    NIBP Diastolic 101  HI    NIBP Source Right Arm    NIBP   HI   .   General:  Alert, no acute distress.    Skin:  Warm, dry.    Head:  Normocephalic, atraumatic.    Neck:  Supple, trachea midline, no JVD.    Eye:  Pupils are equal, round and reactive to  no knee ROM at this time d/t drainage per PA-C   Strength (Manual Muscle Testing)   Strength (Manual Muscle Testing) Able to perform R SLR;Able to perform L SLR;Deficits observed during functional mobility   Bed Mobility   Comment, (Bed Mobility) supine<>sit SBA   Transfers   Comment, (Transfers) sit<>stand with FWW and SBA   Gait/Stairs (Locomotion)   Comment, (Gait/Stairs) amb with FWW and SBA, step-to keeping R knee in extension   Balance   Balance Comments impaired; needing FWW and SBA   Sensory Examination   Sensory Perception patient reports no sensory changes   Clinical Impression   Criteria for Skilled Therapeutic Intervention Yes, treatment indicated   PT Diagnosis (PT) impaired functional mobility   Influenced by the following impairments weakness, pain, post op status   Functional limitations due to impairments difficulty with bed mobility, transfers, ambulation, stairs   Clinical Presentation (PT Evaluation Complexity) stable   Clinical Presentation Rationale clinical judgement   Clinical Decision Making (Complexity) low complexity   Planned Therapy Interventions (PT) balance training;bed mobility training;gait training;home exercise program;neuromuscular re-education;patient/family education;ROM (range of motion);stair training;strengthening;transfer training;progressive activity/exercise;risk factor education;home program guidelines   Risk & Benefits of therapy have been explained evaluation/treatment results reviewed;care plan/treatment goals reviewed;risks/benefits reviewed;current/potential barriers reviewed;participants voiced agreement with care plan;participants included;patient;spouse/significant other   PT Total Evaluation Time   PT Eval, Low Complexity Minutes (38016) 10   Physical Therapy Goals   PT Frequency One time eval and treatment only   PT Predicted Duration/Target Date for Goal Attainment 12/05/24   PT Goals Bed Mobility;Gait;Transfers;Stairs   PT: Bed Mobility Independent;Supine  light, extraocular movements are intact, normal conjunctiva.   Ears, nose, mouth and throat:  Oral mucosa moist, no pharyngeal erythema or exudate.   Cardiovascular:  Regular rate and rhythm, No murmur.    Respiratory:  Lungs are clear to auscultation, respirations are non-labored, breath sounds are equal.   Chest wall:  No tenderness, No deformity.    Back:  Nontender, Normal range of motion, no CVA tenderness   .    Musculoskeletal:  Normal ROM, normal strength, no deformity, Lower extremity: Bilateral, calf, swelling, Has left greater than right LE edema, Mild calf tenderness.   Gastrointestinal:  Soft, Nontender, Non distended, Normal bowel sounds.   Neurological:  Alert and oriented to person, place, time, and situation, No focal neurological deficit observed.   Psychiatric:  Cooperative.     Medical Decision Making   Electrocardiogram:   Results review:  Lab results : LABORATORY   07/31/20 02:25 CDT Sodium 139 mmol/L    Potassium 3.4 mmol/L    Chloride 101 mmol/L    Carbon Dioxide (CO2) 27 mmol/L    Anion Gap 14 mmol/L    BUN 7 mg/dL    Creatinine 0.64 mg/dL    BUN/Creatinine Ratio 11    GFR ESTIMATE  >90    GFR ESTIMATE NON  >90    Calcium 9.2 mg/dL    Glucose Level 108 mg/dL  HI    GOT/ unit/L  HI    GPT/ALT 70 unit/L  HI    Alk Phosphatase 75 unit/L    Bilirubin Total 0.5 mg/dL    Bilirubin, Direct 0.1 mg/dL    Protein, Total 7.3 gm/dL    Albumin 3.5 gm/dL  LOW    NT proBNP 46 pg/mL    WBC 6.4 THOUSAND/mcL    RBC 3.88 MILLION/mcL  LOW    Hemoglobin 7.7 gm/dL  LOW    Hematocrit 26.0 %  LOW    MCV 67.0 fL  LOW    MCH 19.8 pg  LOW    MCHC 29.6 gm/dL  LOW    RDW-CV 19.1 %  HI    Platelet 370 THOUSAND/mcL    Neutrophils 65 %    Abs Neut 4.2 THOUSAND/mcL    Segs NOT APPLICABLE    Lymph 21 %    Absolute Lymph 1.4 THOUSAND/mcL    Monocytes 9 %    Abs Mono 0.5 THOUSAND/mcL    Eosinophils 4 %    Absolute Eos 0.2 THOUSAND/mcL    Basophils 1 %    Absolute Baso 0.1 THOUSAND/mcL     to/from sit;Goal Met   PT: Transfers Modified independent;Sit to/from stand;Assistive device;Within precautions;Goal Met   PT: Gait Modified independent;Assistive device;Within precautions;Greater than 200 feet;Goal Met   PT: Stairs Supervision/stand-by assist;4 stairs;Assistive device;Within precautions;Goal Met   PT Discharge Planning   PT Plan PT: d/c   PT Discharge Recommendation (DC Rec)   (defer to ortho)   PT Rationale for DC Rec Pt has met IP PT goals, antiicpate safe for d/c home with assist of spouse. Pt has walker and crutches.   PT Brief overview of current status SBA with FWW   Physical Therapy Time and Intention   Total Session Time (sum of timed and untimed services) 10        Analyzer ANC NOT APPLICABLE    Percent Immature Granulocytes 0 %    Absolute Immature Granulocytes 0.0 THOUSAND/mcL    NRBC 0 /100 WBC   .   Radiology results:     , Bilateral lower extremity venous Doppler ultrasound: Dated July 31, 2020 at 0247 hours    Clinical history: Bilateral lower extremity pain/swelling.    Findings:  Gray scale, color flow and spectral Doppler evaluation of the lower extremity deep veins were performed.    Right: The common femoral, superficial femoral and popliteal veins are patent and compressible. Normal respiratory variation is noted. The calf veins to the extent visualized are patent. There is no evidence of occlusive or nonocclusive thrombus.    Left: The common femoral, superficial femoral and popliteal veins are patent and compressible. Normal respiratory variation is noted. The calf veins to the extent visualized are patent. There is no evidence of occlusive or nonocclusive thrombus.    Impression:  No evidence of deep venous thrombosis in both lower extremities.     .    Notes:  On lower extremity exam, has left greater than right lower extremity edema.  Mostly nonpitting.  Normal pulses.  Mild calf tenderness.  50-year-old female with left greater than right bilateral lower extremity edema.  - Screening for hepatic versus renal versus cardiac dysfunction/failure as potential etiology of her volume overload.   - We will also obtain an ultrasound to screen for DVTs given unilateral worsening.   - Suspect that this could be more of a dependent edema related to relative immobility.  If this is the case, will be discharged home with instructions to obtain compression stockings.   MDM/rationale as above. .     Impression and Plan   Diagnosis   Dependent edema (RNG75-UE R60.9, Discharge, Medical)   Plan   Counseled: Patient, Family, Regarding treatment plan.    Notes: Charting was performed by ED Demi Junior for Dr. Dunham.  I have reviewed the scribe's charting and agree  that the final signed note accurately reflects my personal performance of the history, physical exam, hospital course, and assessment and plan.   .

## 2024-12-05 NOTE — TELEPHONE ENCOUNTER
Reason for Call:  Form, our goal is to have forms completed with 7 days, however, some forms may require a visit or additional information.    Type of letter, form or note:   health [employer] wellness [employer] work form      Who is the form from?: Patient    Where did the form come from: Patient or family brought in       Phone number of person requesting form: [phone]  Can we leave a detailed message on this number:  Not Applicable    Desired completion date of form: ASAP      How will form be returned?:  picked up in clinic by Jean-Claude Mancia    Has the patient signed a consent form for release of information (may be included with form)? Not Applicable    Additional comments: None    Form was started and place in Provider Basket for provider review/ completion at [facility].

## 2024-12-07 ENCOUNTER — HOSPITAL ENCOUNTER (EMERGENCY)
Facility: CLINIC | Age: 54
Discharge: HOME | End: 2024-12-07
Attending: EMERGENCY MEDICINE | Admitting: EMERGENCY MEDICINE
Payer: COMMERCIAL

## 2024-12-07 ENCOUNTER — MYC MEDICAL ADVICE (OUTPATIENT)
Dept: ORTHOPEDICS | Facility: CLINIC | Age: 54
End: 2024-12-07
Payer: COMMERCIAL

## 2024-12-07 VITALS
BODY MASS INDEX: 33.7 KG/M2 | TEMPERATURE: 98.3 F | HEART RATE: 120 BPM | SYSTOLIC BLOOD PRESSURE: 116 MMHG | WEIGHT: 240.74 LBS | DIASTOLIC BLOOD PRESSURE: 77 MMHG | RESPIRATION RATE: 18 BRPM | HEIGHT: 71 IN | OXYGEN SATURATION: 99 %

## 2024-12-07 DIAGNOSIS — T81.31XA POSTOPERATIVE WOUND DEHISCENCE, INITIAL ENCOUNTER: ICD-10-CM

## 2024-12-07 PROCEDURE — 12020 TX SUPFC WND DEHSN SMPL CLSR: CPT

## 2024-12-07 PROCEDURE — 99283 EMERGENCY DEPT VISIT LOW MDM: CPT

## 2024-12-07 RX ORDER — LIDOCAINE HYDROCHLORIDE AND EPINEPHRINE 10; 10 MG/ML; UG/ML
INJECTION, SOLUTION INFILTRATION; PERINEURAL
Status: DISCONTINUED
Start: 2024-12-07 | End: 2024-12-07 | Stop reason: HOSPADM

## 2024-12-07 ASSESSMENT — ACTIVITIES OF DAILY LIVING (ADL)
ADLS_ACUITY_SCORE: 45

## 2024-12-07 ASSESSMENT — COLUMBIA-SUICIDE SEVERITY RATING SCALE - C-SSRS
6. HAVE YOU EVER DONE ANYTHING, STARTED TO DO ANYTHING, OR PREPARED TO DO ANYTHING TO END YOUR LIFE?: NO
2. HAVE YOU ACTUALLY HAD ANY THOUGHTS OF KILLING YOURSELF IN THE PAST MONTH?: NO
1. IN THE PAST MONTH, HAVE YOU WISHED YOU WERE DEAD OR WISHED YOU COULD GO TO SLEEP AND NOT WAKE UP?: NO

## 2024-12-07 NOTE — ED PROVIDER NOTES
Emergency Department Note      History of Present Illness     Chief Complaint   Post-op Problem    HPI   Thai Mancia is a 54 year old male with a history of asthma and hypertension presenting with post operative problem. The underwent a right knee replacement surgery on Wednesday (12/4/24). The bandage was replaced, cleaned, and closed with additional steri strips on Thursday (12/5/24) due to bleeding. At this time, his wife thought the wound was oozing from the bottom. He wore a knee immobilizer for the first 24 hours and then was wrapping the leg in an ACE wrap. Jean-Claude has continued to bleed through the bandage that was placed. He is not on a blood thinner, but takes aspirin regularly. The patient had a temperature of 99.8 F yesterday and endorses feeling diaphoretic upon arrival to the ED. No recent vomiting or diarrhea.     Independent Historian   Wife as detailed above.    Review of External Notes   I reviewed the operation note from 12/4/24.     Past Medical History     Medical History and Problem List   Past Medical History:   Diagnosis Date    Asthma     Diverticulitis     Essential hypertension     Obesity     Osteoarthritis      Medications   albuterol (PROAIR HFA/PROVENTIL HFA/VENTOLIN HFA) 108 (90 Base) MCG/ACT inhaler  amLODIPine (NORVASC) 2.5 MG tablet  aspirin 81 MG EC tablet  fluticasone-salmeterol (ADVAIR) 250-50 MCG/ACT inhaler  gabapentin (NEURONTIN) 100 MG capsule  losartan-hydrochlorothiazide (HYZAAR) 100-25 MG tablet  oxyCODONE (ROXICODONE) 5 MG tablet  senna-docusate (SENOKOT-S/PERICOLACE) 8.6-50 MG tablet        Surgical History   Past Surgical History:   Procedure Laterality Date    ARTHROPLASTY KNEE Right 12/04/2024    Procedure: Right total knee arthroplasty;  Surgeon: Ronan Arenas MD;  Location: [facility] OR    CIRCUMCISION N/A 11/18/2021    Procedure: CIRCUMCISION;  Surgeon: Toy Feliz MD;  Location: [facility] OR    COLONOSCOPY N/A 12/13/2021    Procedure: COLONOSCOPY;  Surgeon: Nam  "Claire Whitley MD;  Location:  GI    EXCISE GLAND SUBMANDIBULAR Left 06/03/2024    Procedure: excision of left submandibular gland;  Surgeon: Bassam Mendez MD;  Location: [facility] OR    KNEE SURGERY Left 1985     Physical Exam     Patient Vitals for the past 24 hrs:   BP Temp Temp src Pulse Resp SpO2 Height Weight   12/07/24 1531 116/77 98.3  F (36.8  C) Temporal 120 18 99 % 1.803 m (5' 11\") 109.2 kg (240 lb 11.9 oz)     Physical Exam  Nursing note and vitals reviewed.  Constitutional: Cooperative.   Pulmonary/Chest: Effort normal.   Abdominal: Normal appearance  Musculoskeletal: Expected swelling about the right knee with limited range of motion.  No edema in the lower extremity.   Neurological: Alert.  X 3  Skin: Skin is warm and dry. Surgical incision on the right anterior knee with small dehiscence to the distal aspect.  Venous oozing noted.  No erythema or warmth or other evidence of infection.   Psychiatric: Normal mood and affect.     Diagnostics     Lab Results   Labs Ordered and Resulted from Time of ED Arrival to Time of ED Departure - No data to display    Imaging   No orders to display     ED Course      Medications Administered   Medications   lidocaine 1% with EPINEPHrine 1:100,000 1 %-1:[id number] injection (has no administration in time range)     Procedures    Laceration/wound dehiscence repair      Procedure: Laceration/wound dehiscence repair    Indication: Wound dehiscence    Consent: Verbal    Tetanus status reviewed    Location: Right knee    Length: 0.6 cm dehiscence     Preparation: Irrigation with wound .    Anesthesia/Sedation: Lidocaine with Epinephrine - 1%      Treatment/Exploration: Wound explored, no foreign bodies found     Closure: The wound was closed with one layer. Skin/superficial layer was closed with 2 x 4-0 Nylon using Interrupted sutures.     Patient Status: The patient tolerated the procedure well: Yes. There were no complications.    Discussion of Management "   Orthopedics, Dr. Jabier Lowe    ED Course   ED Course as of 12/07/24 1720   Sat Dec 07, 2024   1605 I obtained the history and examined the patient as noted above.      1625 I spoke with Dr. Jabier Lowe from orthopedics regarding the patient's presentation and plan of care.       Additional Documentation  None    Medical Decision Making / Diagnosis     MDM   Thai Mancia is a 54 year old male who presents with bleeding from his postoperative right knee wound.  Exam consistent with small wound dehiscence with superficial venous vessel bleeding.  After discussion with the on-call orthopedic physician who touched base with the operating physician, we placed several sutures to close this dehiscence.  The wound was then covered with gauze and Aquacel dressing.  A pressure dressing was placed over the distal aspect of the wound with Coban on top of the Aquacel.  Patient to follow-up with orthopedics in clinic on Monday for recheck.  Advised patient to use knee immobilizer to limit range of motion as per orthopedic direction    Disposition   The patient was discharged.     Diagnosis     ICD-10-CM    1. Postoperative wound dehiscence (right knee) with bleeding, initial encounter  T81.31XA            Scribe Disclosure:  IChasidy, am serving as a scribe at 4:04 PM on 12/7/2024 to document services personally performed by Cricket Stoddard MD based on my observations and the provider's statements to me.        Cricket Stoddard MD  12/07/24 9053

## 2024-12-07 NOTE — ED TRIAGE NOTES
Pt arrives with wife, pt had full R knee replacement here Wednesday with no complications. Pt states his wife looked at knee dressing today, felt a small hematoma with quarter size area of dried red blood. Denies fever, chills, increasing swelling, or pain. Able to ambulate with walker.  Takes 5mg oxy q4 hours with relief. VSS      Triage Assessment (Adult)       Row Name 12/07/24 1532          Triage Assessment    Airway WDL WDL        Respiratory WDL    Respiratory WDL WDL        Cardiac WDL    Cardiac WDL WDL        Cognitive/Neuro/Behavioral WDL    Cognitive/Neuro/Behavioral WDL WDL

## 2024-12-09 ENCOUNTER — TELEPHONE (OUTPATIENT)
Dept: ORTHOPEDICS | Facility: CLINIC | Age: 54
End: 2024-12-09

## 2024-12-09 ENCOUNTER — OFFICE VISIT (OUTPATIENT)
Dept: ORTHOPEDICS | Facility: CLINIC | Age: 54
End: 2024-12-09
Payer: COMMERCIAL

## 2024-12-09 ENCOUNTER — PREP FOR PROCEDURE (OUTPATIENT)
Dept: ORTHOPEDICS | Facility: CLINIC | Age: 54
End: 2024-12-09

## 2024-12-09 DIAGNOSIS — Z96.652 S/P TOTAL KNEE ARTHROPLASTY, LEFT: Primary | ICD-10-CM

## 2024-12-09 PROCEDURE — 99024 POSTOP FOLLOW-UP VISIT: CPT | Performed by: STUDENT IN AN ORGANIZED HEALTH CARE EDUCATION/TRAINING PROGRAM

## 2024-12-09 RX ORDER — CEPHALEXIN 500 MG/1
500 CAPSULE ORAL 3 TIMES DAILY
Qty: 30 CAPSULE | Refills: 0 | Status: SHIPPED | OUTPATIENT
Start: 2024-12-09

## 2024-12-09 NOTE — LETTER
12/9/2024      Thai Mancia  [address]  [facility] [address]      Dear Colleague,    Thank you for referring your patient, Thai Mancia, to the [facility] [place]. Please see a copy of my visit note below.    CC: Incision check patient is a 54-year-old male now 5 days status post left total knee arthroplasty seen here today for incision check.     HPI:  He returned to the ER on Saturday for bleeding from his incision.  There is noted to be drainage from the inferior aspect of his incision.  The ER placed a nylon suture.  A new Aquacel was placed.  His knee immobilizer was resumed.  He has noted a very small spot of shadowing since Saturday.  He has been in his knee immobilizer.  He denies any numbness or tingling his foot.  He denies a significant pain    Objective:   PE:  LLE: No dehiscence of the incision noted.  Small amount of dark bloody drainage from the inferior third of the incision.  Moderate joint effusion.  Moderate ecchymosis.  No erythema.  Calf soft compressible.  Knee flexion to 30 degrees produces dark bloody drainage.  5/5 ankle plantar flex/dorsiflexion and EHL/FHL.  Sensation tact in superficial peroneal, deep peroneal, and tibial nerve distribution the foot.    A/P:  Patient is a 54-year-old male seen here today with his wife 5 days status post a left total knee arthroplasty.  He returned to the ER postop day 3 for bleeding from his incision.  He continues to have dark oozing bleeding from the inferior aspect of his incision.  I do not see any areas of wound dehiscence.  He has been in a knee immobilizer.  At this time point he continues to drain his hematoma out of his incision.  Is at a different location than before.  I recommend at this point return to the OR for I&D and poly exchange.  I discussed with him that I do not suspect this he is infected, but I do worry that continued bleeding may be a nidus for infection.  I would recommend a  prophylactic washout of his hematoma.  I will evaluate for any residual bleeding.  I would perform a polyethylene exchange to allow better evaluation of his knee joint and more complete I&D.  I described the operative technique.  We discussed the postop protocol.  I will plan to place a Prevena incisional VAC and keep him in a knee immobilizer for 7 days after surgery.  I will plan to keep him in the hospital for 24 hours for postoperative IV antibiotics.  I will plan to continue him on 7 days of p.o. Keflex after surgery.  We discussed the risk and benefits of operative intervention including but limited to bleeding, failure to prevent infection, stiffness, damage to any nerves or blood vessels, wound dehiscence, loss of limb and loss of life.  I the opportunity answer questions.  I will prescribe him p.o. Keflex to the outpatient pharmacy today.  We would like to get him scheduled for left knee I&D with polyethylene exchange and incisional VAC application tomorrow or Wednesday.    Ronan Arenas MD    [facility]   Department of Orthopedic Surgery      Disclaimer: This note consists of symbols derived from keyboarding, dictation and/or voice recognition software. As a result, there may be errors in the script that have gone undetected. Please consider this when interpreting information found in this chart.         Again, thank you for allowing me to participate in the care of your patient.        Sincerely,        Ronan Arenas MD

## 2024-12-09 NOTE — TELEPHONE ENCOUNTER
Teaching Flowsheet   Relevant Diagnosis:   S/P total knee arthroplasty, left [Z96.652]  - Prima     Teaching Topic: 12/11/24  IRRIGATION AND DEBRIDEMENT, KNEE, WITH INSERTION OF ANTIBIOTIC CEMENT BEADS, ANTIBIOTIC CEMENT SPACER, OR BOTH - Left      Person(s) involved in teaching:   Patient     Motivation Level:  Asks Questions: Yes  Eager to Learn: Yes  Cooperative: Yes  Receptive (willing/able to accept information): Yes  Any cultural factors/Congregational beliefs that may influence understanding or compliance? No     Patient demonstrates understanding of the following:  Reason for the appointment, diagnosis and treatment plan: Yes  Knowledge of proper use of medications and conditions for which they are ordered (with special attention to potential side effects or drug interactions): Yes  Which situations necessitate calling provider and whom to contact: Yes     Teaching Concerns Addressed: hematoma and dehiscence.  Hematoma doesn't cause infection.  The solid blood remnants are just good fodder for bacteria so we like to remove the potential risk.  He had a H&P within the last 30 days for his total joint surgery he had on 12/4.  Initial teaching was on 11/20.  It is still fresh in his mind. He has soap.       Proper use and care of  (medical equip, care aids, etc.): Yes  Nutritional needs and diet plan: Yes  Pain management techniques: Yes  Wound Care: Yes  How and/when to access community resources: Yes     Instructional Materials Used/Given: Reviewed surgery instructions with patient (NPO, showering, stoplight tool, need for pre-op H&P within 30 days of procedure, and responsible adult /person to stay with patient for 24 hours post surgery)

## 2024-12-09 NOTE — PHARMACY-ADMISSION MEDICATION HISTORY
Medication reconciliation interview completed by pre-admitting nurse, reviewed by pharmacy.   No further clarifications needed.     Prior to Admission medications    Medication Sig Last Dose Taking? Auth Provider Long Term End Date   albuterol (PROAIR HFA/PROVENTIL HFA/VENTOLIN HFA) 108 (90 Base) MCG/ACT inhaler Inhale 2 puffs into the lungs every 6 hours as needed for shortness of breath or wheezing.  Yes Sara Elias APRN CNP Yes    amLODIPine (NORVASC) 2.5 MG tablet Take 1 tablet (2.5 mg) by mouth daily  Yes Sara Elias APRN CNP Yes    aspirin 81 MG EC tablet Take 1 tablet (81 mg) by mouth 2 times daily.  Yes Brannon Martinez PA-C     cephALEXin (KEFLEX) 500 MG capsule Take 1 capsule (500 mg) by mouth 3 times daily.  Yes Ronan Arenas MD     fluticasone-salmeterol (ADVAIR) 250-50 MCG/ACT inhaler INHALE ONE PUFF BY MOUTH EVERY 12 HOURS  Yes Sara Elias APRN CNP Yes    gabapentin (NEURONTIN) 100 MG capsule Take 1 capsule (100 mg) by mouth at bedtime. Take one capsule by mouth at bedtime  Yes Brannon Martinez PA-C Yes    losartan-hydrochlorothiazide (HYZAAR) 100-25 MG tablet Take 1 tablet by mouth daily Note change in dose  Yes Sara Elias APRN CNP Yes    oxyCODONE (ROXICODONE) 5 MG tablet Take 1-2 tablets (5-10 mg) by mouth every 4 hours as needed for moderate to severe pain.  Yes Brannon Martinez PA-C     senna-docusate (SENOKOT-S/PERICOLACE) 8.6-50 MG tablet Take 1-2 tablets by mouth 2 times daily. Take while on oral narcotics to prevent or treat constipation.  Yes Brannon Martinez PA-C No

## 2024-12-09 NOTE — TELEPHONE ENCOUNTER
Patient has been scheduled for surgery. Details are below.    Date of Surgery: 12/11/24    Approximate Arrival Time: SURGERY CENTER WILL CALL 3/4 DAYS PRIOR TO CONFIRM A TIME   Surgeon:SATHISH CORDOBA PROVIDERS: Dr. Ronan Arenas    Procedure: IRRIGATION AND DEBRIDEMENT, KNEE, WITH INSERTION OF ANTIBIOTIC CEMENT BEADS, ANTIBIOTIC CEMENT SPACER, OR BOTH - Left     Location: [facility] surgery locations: [facility],  [address] [place] [address]  Surgery Consult: NA  PreOp Physical: HAD DONE 11/15/24  PostOp: 12/16/24  Packet Mailed/[employer] Sent: NO  Added to [place]: YES    Spoke to: LEFT DETAILED MESSAGE

## 2024-12-09 NOTE — PROGRESS NOTES
CC: Incision check patient is a 54-year-old male now 5 days status post left total knee arthroplasty seen here today for incision check.     HPI:  He returned to the ER on Saturday for bleeding from his incision.  There is noted to be drainage from the inferior aspect of his incision.  The ER placed a nylon suture.  A new Aquacel was placed.  His knee immobilizer was resumed.  He has noted a very small spot of shadowing since Saturday.  He has been in his knee immobilizer.  He denies any numbness or tingling his foot.  He denies a significant pain    Objective:   PE:  LLE: No dehiscence of the incision noted.  Small amount of dark bloody drainage from the inferior third of the incision.  Moderate joint effusion.  Moderate ecchymosis.  No erythema.  Calf soft compressible.  Knee flexion to 30 degrees produces dark bloody drainage.  5/5 ankle plantar flex/dorsiflexion and EHL/FHL.  Sensation tact in superficial peroneal, deep peroneal, and tibial nerve distribution the foot.    A/P:  Patient is a 54-year-old male seen here today with his wife 5 days status post a left total knee arthroplasty.  He returned to the ER postop day 3 for bleeding from his incision.  He continues to have dark oozing bleeding from the inferior aspect of his incision.  I do not see any areas of wound dehiscence.  He has been in a knee immobilizer.  At this time point he continues to drain his hematoma out of his incision.  Is at a different location than before.  I recommend at this point return to the OR for I&D and poly exchange.  I discussed with him that I do not suspect this he is infected, but I do worry that continued bleeding may be a nidus for infection.  I would recommend a prophylactic washout of his hematoma.  I will evaluate for any residual bleeding.  I would perform a polyethylene exchange to allow better evaluation of his knee joint and more complete I&D.  I described the operative technique.  We discussed the postop protocol.   I will plan to place a Prevena incisional VAC and keep him in a knee immobilizer for 7 days after surgery.  I will plan to keep him in the hospital for 24 hours for postoperative IV antibiotics.  I will plan to continue him on 7 days of p.o. Keflex after surgery.  We discussed the risk and benefits of operative intervention including but limited to bleeding, failure to prevent infection, stiffness, damage to any nerves or blood vessels, wound dehiscence, loss of limb and loss of life.  I the opportunity answer questions.  I will prescribe him p.o. Keflex to the outpatient pharmacy today.  We would like to get him scheduled for left knee I&D with polyethylene exchange and incisional VAC application tomorrow or Wednesday.    Ronan Arenas MD    [facility]   Department of Orthopedic Surgery      Disclaimer: This note consists of symbols derived from keyboarding, dictation and/or voice recognition software. As a result, there may be errors in the script that have gone undetected. Please consider this when interpreting information found in this chart.

## 2024-12-11 ENCOUNTER — ANESTHESIA EVENT (OUTPATIENT)
Dept: SURGERY | Facility: CLINIC | Age: 54
End: 2024-12-11
Payer: COMMERCIAL

## 2024-12-11 ENCOUNTER — HOSPITAL ENCOUNTER (OUTPATIENT)
Facility: CLINIC | Age: 54
Discharge: HOME | End: 2024-12-12
Attending: STUDENT IN AN ORGANIZED HEALTH CARE EDUCATION/TRAINING PROGRAM | Admitting: STUDENT IN AN ORGANIZED HEALTH CARE EDUCATION/TRAINING PROGRAM
Payer: COMMERCIAL

## 2024-12-11 ENCOUNTER — ANESTHESIA (OUTPATIENT)
Dept: SURGERY | Facility: CLINIC | Age: 54
End: 2024-12-11
Payer: COMMERCIAL

## 2024-12-11 DIAGNOSIS — Z78.9 DEEP VEIN THROMBOSIS (DVT) PROPHYLAXIS PRESCRIBED AT DISCHARGE: ICD-10-CM

## 2024-12-11 DIAGNOSIS — Z78.9: ICD-10-CM

## 2024-12-11 DIAGNOSIS — K59.03 DRUG-INDUCED CONSTIPATION: ICD-10-CM

## 2024-12-11 DIAGNOSIS — G89.18 ACUTE POST-OPERATIVE PAIN: ICD-10-CM

## 2024-12-11 DIAGNOSIS — Z96.651 S/P TKR (TOTAL KNEE REPLACEMENT) USING CEMENT, RIGHT: Primary | ICD-10-CM

## 2024-12-11 DIAGNOSIS — Z96.652 S/P TOTAL KNEE ARTHROPLASTY, LEFT: ICD-10-CM

## 2024-12-11 LAB
APTT PPP: 29 SECONDS (ref 22–38)
FIBRINOGEN PPP-MCNC: 640 MG/DL (ref 170–510)
GLUCOSE BLDC GLUCOMTR-MCNC: 100 MG/DL (ref 70–99)
HGB BLD-MCNC: 13.4 G/DL (ref 13.3–17.7)

## 2024-12-11 PROCEDURE — 258N000001 HC RX 258: Performed by: STUDENT IN AN ORGANIZED HEALTH CARE EDUCATION/TRAINING PROGRAM

## 2024-12-11 PROCEDURE — 85730 THROMBOPLASTIN TIME PARTIAL: CPT | Performed by: PHYSICIAN ASSISTANT

## 2024-12-11 PROCEDURE — 250N000009 HC RX 250: Performed by: STUDENT IN AN ORGANIZED HEALTH CARE EDUCATION/TRAINING PROGRAM

## 2024-12-11 PROCEDURE — 250N000011 HC RX IP 250 OP 636: Performed by: STUDENT IN AN ORGANIZED HEALTH CARE EDUCATION/TRAINING PROGRAM

## 2024-12-11 PROCEDURE — 36415 COLL VENOUS BLD VENIPUNCTURE: CPT | Performed by: STUDENT IN AN ORGANIZED HEALTH CARE EDUCATION/TRAINING PROGRAM

## 2024-12-11 PROCEDURE — 250N000013 HC RX MED GY IP 250 OP 250 PS 637: Performed by: STUDENT IN AN ORGANIZED HEALTH CARE EDUCATION/TRAINING PROGRAM

## 2024-12-11 PROCEDURE — 250N000011 HC RX IP 250 OP 636: Performed by: PHYSICIAN ASSISTANT

## 2024-12-11 PROCEDURE — 272N000001 HC OR GENERAL SUPPLY STERILE: Performed by: STUDENT IN AN ORGANIZED HEALTH CARE EDUCATION/TRAINING PROGRAM

## 2024-12-11 PROCEDURE — 710N000009 HC RECOVERY PHASE 1, LEVEL 1, PER MIN: Performed by: STUDENT IN AN ORGANIZED HEALTH CARE EDUCATION/TRAINING PROGRAM

## 2024-12-11 PROCEDURE — 258N000003 HC RX IP 258 OP 636: Performed by: PHYSICIAN ASSISTANT

## 2024-12-11 PROCEDURE — 250N000011 HC RX IP 250 OP 636: Performed by: NURSE ANESTHETIST, CERTIFIED REGISTERED

## 2024-12-11 PROCEDURE — 250N000013 HC RX MED GY IP 250 OP 250 PS 637: Performed by: PHYSICIAN ASSISTANT

## 2024-12-11 PROCEDURE — 85018 HEMOGLOBIN: CPT | Performed by: STUDENT IN AN ORGANIZED HEALTH CARE EDUCATION/TRAINING PROGRAM

## 2024-12-11 PROCEDURE — C1776 JOINT DEVICE (IMPLANTABLE): HCPCS | Performed by: STUDENT IN AN ORGANIZED HEALTH CARE EDUCATION/TRAINING PROGRAM

## 2024-12-11 PROCEDURE — 370N000017 HC ANESTHESIA TECHNICAL FEE, PER MIN: Performed by: STUDENT IN AN ORGANIZED HEALTH CARE EDUCATION/TRAINING PROGRAM

## 2024-12-11 PROCEDURE — 250N000009 HC RX 250: Performed by: NURSE ANESTHETIST, CERTIFIED REGISTERED

## 2024-12-11 PROCEDURE — 250N000025 HC SEVOFLURANE, PER MIN: Performed by: STUDENT IN AN ORGANIZED HEALTH CARE EDUCATION/TRAINING PROGRAM

## 2024-12-11 PROCEDURE — 258N000003 HC RX IP 258 OP 636: Performed by: ANESTHESIOLOGY

## 2024-12-11 PROCEDURE — 258N000003 HC RX IP 258 OP 636: Performed by: NURSE ANESTHETIST, CERTIFIED REGISTERED

## 2024-12-11 PROCEDURE — 250N000011 HC RX IP 250 OP 636: Performed by: ANESTHESIOLOGY

## 2024-12-11 PROCEDURE — 27486 REVISE/REPLACE KNEE JOINT: CPT | Mod: 78 | Performed by: STUDENT IN AN ORGANIZED HEALTH CARE EDUCATION/TRAINING PROGRAM

## 2024-12-11 PROCEDURE — 999N000141 HC STATISTIC PRE-PROCEDURE NURSING ASSESSMENT: Performed by: STUDENT IN AN ORGANIZED HEALTH CARE EDUCATION/TRAINING PROGRAM

## 2024-12-11 PROCEDURE — 36415 COLL VENOUS BLD VENIPUNCTURE: CPT | Performed by: PHYSICIAN ASSISTANT

## 2024-12-11 PROCEDURE — 360N000078 HC SURGERY LEVEL 5, PER MIN: Performed by: STUDENT IN AN ORGANIZED HEALTH CARE EDUCATION/TRAINING PROGRAM

## 2024-12-11 PROCEDURE — 85384 FIBRINOGEN ACTIVITY: CPT | Performed by: PHYSICIAN ASSISTANT

## 2024-12-11 DEVICE — IMPLANTABLE DEVICE
Type: IMPLANTABLE DEVICE | Site: KNEE | Status: FUNCTIONAL
Brand: PERSONA® VIVACIT-E®

## 2024-12-11 RX ORDER — ONDANSETRON 2 MG/ML
INJECTION INTRAMUSCULAR; INTRAVENOUS PRN
Status: DISCONTINUED | OUTPATIENT
Start: 2024-12-11 | End: 2024-12-11

## 2024-12-11 RX ORDER — ACETAMINOPHEN 325 MG/1
650 TABLET ORAL EVERY 4 HOURS PRN
Status: DISCONTINUED | OUTPATIENT
Start: 2024-12-14 | End: 2024-12-12 | Stop reason: HOSPADM

## 2024-12-11 RX ORDER — NALOXONE HYDROCHLORIDE 0.4 MG/ML
0.2 INJECTION, SOLUTION INTRAMUSCULAR; INTRAVENOUS; SUBCUTANEOUS
Status: DISCONTINUED | OUTPATIENT
Start: 2024-12-11 | End: 2024-12-12 | Stop reason: HOSPADM

## 2024-12-11 RX ORDER — HYDROMORPHONE HCL IN WATER/PF 6 MG/30 ML
0.2 PATIENT CONTROLLED ANALGESIA SYRINGE INTRAVENOUS EVERY 5 MIN PRN
Status: CANCELLED | OUTPATIENT
Start: 2024-12-11

## 2024-12-11 RX ORDER — GABAPENTIN 100 MG/1
100 CAPSULE ORAL
Qty: 14 CAPSULE | Refills: 0 | Status: SHIPPED | OUTPATIENT
Start: 2024-12-11

## 2024-12-11 RX ORDER — LIDOCAINE 40 MG/G
CREAM TOPICAL
Status: DISCONTINUED | OUTPATIENT
Start: 2024-12-11 | End: 2024-12-11 | Stop reason: HOSPADM

## 2024-12-11 RX ORDER — ONDANSETRON 2 MG/ML
4 INJECTION INTRAMUSCULAR; INTRAVENOUS EVERY 30 MIN PRN
Status: DISCONTINUED | OUTPATIENT
Start: 2024-12-11 | End: 2024-12-11 | Stop reason: HOSPADM

## 2024-12-11 RX ORDER — POLYETHYLENE GLYCOL 3350 17 G/17G
17 POWDER, FOR SOLUTION ORAL DAILY
Status: DISCONTINUED | OUTPATIENT
Start: 2024-12-12 | End: 2024-12-12 | Stop reason: HOSPADM

## 2024-12-11 RX ORDER — ACETAMINOPHEN 325 MG/1
975 TABLET ORAL EVERY 8 HOURS
Status: DISCONTINUED | OUTPATIENT
Start: 2024-12-11 | End: 2024-12-12 | Stop reason: HOSPADM

## 2024-12-11 RX ORDER — CEFAZOLIN SODIUM/WATER 2 G/20 ML
2 SYRINGE (ML) INTRAVENOUS SEE ADMIN INSTRUCTIONS
Status: DISCONTINUED | OUTPATIENT
Start: 2024-12-11 | End: 2024-12-11 | Stop reason: HOSPADM

## 2024-12-11 RX ORDER — HYDROMORPHONE HCL IN WATER/PF 6 MG/30 ML
0.2 PATIENT CONTROLLED ANALGESIA SYRINGE INTRAVENOUS
Status: DISCONTINUED | OUTPATIENT
Start: 2024-12-11 | End: 2024-12-12 | Stop reason: HOSPADM

## 2024-12-11 RX ORDER — DEXAMETHASONE SODIUM PHOSPHATE 4 MG/ML
4 INJECTION, SOLUTION INTRA-ARTICULAR; INTRALESIONAL; INTRAMUSCULAR; INTRAVENOUS; SOFT TISSUE
Status: DISCONTINUED | OUTPATIENT
Start: 2024-12-11 | End: 2024-12-11 | Stop reason: HOSPADM

## 2024-12-11 RX ORDER — LABETALOL HYDROCHLORIDE 5 MG/ML
INJECTION, SOLUTION INTRAVENOUS PRN
Status: DISCONTINUED | OUTPATIENT
Start: 2024-12-11 | End: 2024-12-11

## 2024-12-11 RX ORDER — HYDROMORPHONE HCL IN WATER/PF 6 MG/30 ML
0.4 PATIENT CONTROLLED ANALGESIA SYRINGE INTRAVENOUS
Status: DISCONTINUED | OUTPATIENT
Start: 2024-12-11 | End: 2024-12-12 | Stop reason: HOSPADM

## 2024-12-11 RX ORDER — HYDROMORPHONE HCL IN WATER/PF 6 MG/30 ML
0.4 PATIENT CONTROLLED ANALGESIA SYRINGE INTRAVENOUS EVERY 5 MIN PRN
Status: CANCELLED | OUTPATIENT
Start: 2024-12-11

## 2024-12-11 RX ORDER — BISACODYL 10 MG
10 SUPPOSITORY, RECTAL RECTAL DAILY PRN
Status: DISCONTINUED | OUTPATIENT
Start: 2024-12-14 | End: 2024-12-12 | Stop reason: HOSPADM

## 2024-12-11 RX ORDER — ALBUTEROL SULFATE 90 UG/1
2 INHALANT RESPIRATORY (INHALATION) EVERY 6 HOURS PRN
Status: DISCONTINUED | OUTPATIENT
Start: 2024-12-11 | End: 2024-12-12 | Stop reason: HOSPADM

## 2024-12-11 RX ORDER — FENTANYL CITRATE 50 UG/ML
50 INJECTION, SOLUTION INTRAMUSCULAR; INTRAVENOUS EVERY 5 MIN PRN
Status: CANCELLED | OUTPATIENT
Start: 2024-12-11

## 2024-12-11 RX ORDER — CLONIDINE 100 UG/ML
100 INJECTION, SOLUTION EPIDURAL
Status: COMPLETED | OUTPATIENT
Start: 2024-12-11 | End: 2024-12-11

## 2024-12-11 RX ORDER — AMOXICILLIN 250 MG
1 CAPSULE ORAL 2 TIMES DAILY
Status: DISCONTINUED | OUTPATIENT
Start: 2024-12-11 | End: 2024-12-12 | Stop reason: HOSPADM

## 2024-12-11 RX ORDER — PROCHLORPERAZINE MALEATE 5 MG/1
10 TABLET ORAL EVERY 6 HOURS PRN
Status: DISCONTINUED | OUTPATIENT
Start: 2024-12-11 | End: 2024-12-12 | Stop reason: HOSPADM

## 2024-12-11 RX ORDER — METHADONE HYDROCHLORIDE 10 MG/ML
INJECTION, SOLUTION INTRAMUSCULAR; INTRAVENOUS; SUBCUTANEOUS PRN
Status: DISCONTINUED | OUTPATIENT
Start: 2024-12-11 | End: 2024-12-11

## 2024-12-11 RX ORDER — LABETALOL HYDROCHLORIDE 5 MG/ML
10 INJECTION, SOLUTION INTRAVENOUS
Status: DISCONTINUED | OUTPATIENT
Start: 2024-12-11 | End: 2024-12-11 | Stop reason: HOSPADM

## 2024-12-11 RX ORDER — FLUTICASONE FUROATE AND VILANTEROL 100; 25 UG/1; UG/1
1 POWDER RESPIRATORY (INHALATION) DAILY
Status: DISCONTINUED | OUTPATIENT
Start: 2024-12-12 | End: 2024-12-12 | Stop reason: HOSPADM

## 2024-12-11 RX ORDER — AMLODIPINE BESYLATE 2.5 MG/1
2.5 TABLET ORAL DAILY
Status: DISCONTINUED | OUTPATIENT
Start: 2024-12-12 | End: 2024-12-12 | Stop reason: HOSPADM

## 2024-12-11 RX ORDER — HYDROXYZINE HYDROCHLORIDE 25 MG/1
25 TABLET, FILM COATED ORAL EVERY 6 HOURS PRN
Status: DISCONTINUED | OUTPATIENT
Start: 2024-12-11 | End: 2024-12-12 | Stop reason: HOSPADM

## 2024-12-11 RX ORDER — BUPIVACAINE HYDROCHLORIDE 5 MG/ML
INJECTION, SOLUTION PERINEURAL PRN
Status: DISCONTINUED | OUTPATIENT
Start: 2024-12-11 | End: 2024-12-11 | Stop reason: HOSPADM

## 2024-12-11 RX ORDER — NALOXONE HYDROCHLORIDE 0.4 MG/ML
0.4 INJECTION, SOLUTION INTRAMUSCULAR; INTRAVENOUS; SUBCUTANEOUS
Status: DISCONTINUED | OUTPATIENT
Start: 2024-12-11 | End: 2024-12-12 | Stop reason: HOSPADM

## 2024-12-11 RX ORDER — NALOXONE HYDROCHLORIDE 0.4 MG/ML
0.1 INJECTION, SOLUTION INTRAMUSCULAR; INTRAVENOUS; SUBCUTANEOUS
Status: DISCONTINUED | OUTPATIENT
Start: 2024-12-11 | End: 2024-12-11 | Stop reason: HOSPADM

## 2024-12-11 RX ORDER — VANCOMYCIN HYDROCHLORIDE 1 G/20ML
INJECTION, POWDER, LYOPHILIZED, FOR SOLUTION INTRAVENOUS PRN
Status: DISCONTINUED | OUTPATIENT
Start: 2024-12-11 | End: 2024-12-11 | Stop reason: HOSPADM

## 2024-12-11 RX ORDER — METHADONE HYDROCHLORIDE 10 MG/ML
2 INJECTION, SOLUTION INTRAMUSCULAR; INTRAVENOUS; SUBCUTANEOUS 3 TIMES DAILY PRN
Status: COMPLETED | OUTPATIENT
Start: 2024-12-11 | End: 2024-12-11

## 2024-12-11 RX ORDER — POLYETHYLENE GLYCOL 3350 17 G/17G
1 POWDER, FOR SOLUTION ORAL DAILY
Status: SHIPPED
Start: 2024-12-11

## 2024-12-11 RX ORDER — CEFAZOLIN SODIUM 2 G/100ML
2 INJECTION, SOLUTION INTRAVENOUS EVERY 8 HOURS
Status: COMPLETED | OUTPATIENT
Start: 2024-12-12 | End: 2024-12-12

## 2024-12-11 RX ORDER — SODIUM CHLORIDE, SODIUM LACTATE, POTASSIUM CHLORIDE, CALCIUM CHLORIDE 600; 310; 30; 20 MG/100ML; MG/100ML; MG/100ML; MG/100ML
INJECTION, SOLUTION INTRAVENOUS CONTINUOUS PRN
Status: DISCONTINUED | OUTPATIENT
Start: 2024-12-11 | End: 2024-12-11

## 2024-12-11 RX ORDER — LIDOCAINE 40 MG/G
CREAM TOPICAL
Status: DISCONTINUED | OUTPATIENT
Start: 2024-12-11 | End: 2024-12-12 | Stop reason: HOSPADM

## 2024-12-11 RX ORDER — LIDOCAINE HYDROCHLORIDE 20 MG/ML
INJECTION, SOLUTION INFILTRATION; PERINEURAL PRN
Status: DISCONTINUED | OUTPATIENT
Start: 2024-12-11 | End: 2024-12-11

## 2024-12-11 RX ORDER — HYDRALAZINE HYDROCHLORIDE 20 MG/ML
INJECTION INTRAMUSCULAR; INTRAVENOUS PRN
Status: DISCONTINUED | OUTPATIENT
Start: 2024-12-11 | End: 2024-12-11

## 2024-12-11 RX ORDER — DEXAMETHASONE SODIUM PHOSPHATE 4 MG/ML
INJECTION, SOLUTION INTRA-ARTICULAR; INTRALESIONAL; INTRAMUSCULAR; INTRAVENOUS; SOFT TISSUE PRN
Status: DISCONTINUED | OUTPATIENT
Start: 2024-12-11 | End: 2024-12-11

## 2024-12-11 RX ORDER — SODIUM CHLORIDE, SODIUM LACTATE, POTASSIUM CHLORIDE, CALCIUM CHLORIDE 600; 310; 30; 20 MG/100ML; MG/100ML; MG/100ML; MG/100ML
INJECTION, SOLUTION INTRAVENOUS CONTINUOUS
Status: DISCONTINUED | OUTPATIENT
Start: 2024-12-11 | End: 2024-12-12 | Stop reason: HOSPADM

## 2024-12-11 RX ORDER — ONDANSETRON 2 MG/ML
4 INJECTION INTRAMUSCULAR; INTRAVENOUS EVERY 6 HOURS PRN
Status: DISCONTINUED | OUTPATIENT
Start: 2024-12-11 | End: 2024-12-12 | Stop reason: HOSPADM

## 2024-12-11 RX ORDER — PROPOFOL 10 MG/ML
INJECTION, EMULSION INTRAVENOUS PRN
Status: DISCONTINUED | OUTPATIENT
Start: 2024-12-11 | End: 2024-12-11

## 2024-12-11 RX ORDER — TRANEXAMIC ACID 650 MG/1
1950 TABLET ORAL ONCE
Status: COMPLETED | OUTPATIENT
Start: 2024-12-11 | End: 2024-12-11

## 2024-12-11 RX ORDER — OXYCODONE HYDROCHLORIDE 10 MG/1
10 TABLET ORAL EVERY 4 HOURS PRN
Status: DISCONTINUED | OUTPATIENT
Start: 2024-12-11 | End: 2024-12-12 | Stop reason: HOSPADM

## 2024-12-11 RX ORDER — ACETAMINOPHEN 325 MG/1
650 TABLET ORAL EVERY 4 HOURS PRN
Qty: 100 TABLET | Refills: 0 | Status: SHIPPED | OUTPATIENT
Start: 2024-12-11 | End: 2024-12-12

## 2024-12-11 RX ORDER — CEFAZOLIN SODIUM/WATER 2 G/20 ML
2 SYRINGE (ML) INTRAVENOUS
Status: COMPLETED | OUTPATIENT
Start: 2024-12-11 | End: 2024-12-11

## 2024-12-11 RX ORDER — ONDANSETRON 4 MG/1
4 TABLET, ORALLY DISINTEGRATING ORAL EVERY 30 MIN PRN
Status: DISCONTINUED | OUTPATIENT
Start: 2024-12-11 | End: 2024-12-11 | Stop reason: HOSPADM

## 2024-12-11 RX ORDER — FENTANYL CITRATE 50 UG/ML
25 INJECTION, SOLUTION INTRAMUSCULAR; INTRAVENOUS EVERY 5 MIN PRN
Status: CANCELLED | OUTPATIENT
Start: 2024-12-11

## 2024-12-11 RX ORDER — LOSARTAN POTASSIUM AND HYDROCHLOROTHIAZIDE 25; 100 MG/1; MG/1
1 TABLET ORAL DAILY
Status: DISCONTINUED | OUTPATIENT
Start: 2024-12-12 | End: 2024-12-12 | Stop reason: HOSPADM

## 2024-12-11 RX ORDER — OXYCODONE HYDROCHLORIDE 5 MG/1
5-10 TABLET ORAL EVERY 4 HOURS PRN
Qty: 20 TABLET | Refills: 0 | Status: SHIPPED | OUTPATIENT
Start: 2024-12-11 | End: 2024-12-12

## 2024-12-11 RX ORDER — ONDANSETRON 4 MG/1
4 TABLET, ORALLY DISINTEGRATING ORAL EVERY 6 HOURS PRN
Status: DISCONTINUED | OUTPATIENT
Start: 2024-12-11 | End: 2024-12-12 | Stop reason: HOSPADM

## 2024-12-11 RX ORDER — AMOXICILLIN 250 MG
1-2 CAPSULE ORAL 2 TIMES DAILY
Status: SHIPPED
Start: 2024-12-11 | End: 2024-12-12

## 2024-12-11 RX ORDER — SODIUM CHLORIDE, SODIUM LACTATE, POTASSIUM CHLORIDE, CALCIUM CHLORIDE 600; 310; 30; 20 MG/100ML; MG/100ML; MG/100ML; MG/100ML
INJECTION, SOLUTION INTRAVENOUS CONTINUOUS
Status: DISCONTINUED | OUTPATIENT
Start: 2024-12-11 | End: 2024-12-11 | Stop reason: HOSPADM

## 2024-12-11 RX ORDER — OXYCODONE HYDROCHLORIDE 5 MG/1
5 TABLET ORAL EVERY 4 HOURS PRN
Status: DISCONTINUED | OUTPATIENT
Start: 2024-12-11 | End: 2024-12-12 | Stop reason: HOSPADM

## 2024-12-11 RX ADMIN — SODIUM CHLORIDE, POTASSIUM CHLORIDE, SODIUM LACTATE AND CALCIUM CHLORIDE: 600; 310; 30; 20 INJECTION, SOLUTION INTRAVENOUS at 20:43

## 2024-12-11 RX ADMIN — DEXAMETHASONE SODIUM PHOSPHATE 8 MG: 4 INJECTION, SOLUTION INTRA-ARTICULAR; INTRALESIONAL; INTRAMUSCULAR; INTRAVENOUS; SOFT TISSUE at 15:55

## 2024-12-11 RX ADMIN — ONDANSETRON 4 MG: 2 INJECTION INTRAMUSCULAR; INTRAVENOUS at 16:01

## 2024-12-11 RX ADMIN — METHADONE HYDROCHLORIDE 2 MG: 10 INJECTION, SOLUTION INTRAMUSCULAR; INTRAVENOUS; SUBCUTANEOUS at 17:57

## 2024-12-11 RX ADMIN — HYDRALAZINE HYDROCHLORIDE 4 MG: 20 INJECTION INTRAMUSCULAR; INTRAVENOUS at 17:19

## 2024-12-11 RX ADMIN — METHADONE HYDROCHLORIDE 2 MG: 10 INJECTION, SOLUTION INTRAMUSCULAR; INTRAVENOUS; SUBCUTANEOUS at 17:37

## 2024-12-11 RX ADMIN — METHADONE HYDROCHLORIDE 2 MG: 10 INJECTION, SOLUTION INTRAMUSCULAR; INTRAVENOUS; SUBCUTANEOUS at 17:47

## 2024-12-11 RX ADMIN — CLONIDINE HYDROCHLORIDE 100 MCG: 0.1 INJECTION, SOLUTION EPIDURAL at 18:01

## 2024-12-11 RX ADMIN — Medication 2 G: at 15:49

## 2024-12-11 RX ADMIN — MIDAZOLAM 2 MG: 1 INJECTION INTRAMUSCULAR; INTRAVENOUS at 15:49

## 2024-12-11 RX ADMIN — LABETALOL HYDROCHLORIDE 10 MG: 5 INJECTION, SOLUTION INTRAVENOUS at 17:09

## 2024-12-11 RX ADMIN — LABETALOL HYDROCHLORIDE 5 MG: 5 INJECTION, SOLUTION INTRAVENOUS at 16:33

## 2024-12-11 RX ADMIN — TRANEXAMIC ACID 1950 MG: 650 TABLET ORAL at 13:22

## 2024-12-11 RX ADMIN — LABETALOL HYDROCHLORIDE 5 MG: 5 INJECTION, SOLUTION INTRAVENOUS at 16:42

## 2024-12-11 RX ADMIN — SODIUM CHLORIDE, POTASSIUM CHLORIDE, SODIUM LACTATE AND CALCIUM CHLORIDE: 600; 310; 30; 20 INJECTION, SOLUTION INTRAVENOUS at 15:24

## 2024-12-11 RX ADMIN — Medication 5 MG: at 16:19

## 2024-12-11 RX ADMIN — SODIUM CHLORIDE, POTASSIUM CHLORIDE, SODIUM LACTATE AND CALCIUM CHLORIDE: 600; 310; 30; 20 INJECTION, SOLUTION INTRAVENOUS at 13:28

## 2024-12-11 RX ADMIN — Medication 10 MG: at 15:56

## 2024-12-11 RX ADMIN — HYDRALAZINE HYDROCHLORIDE 14 MG: 20 INJECTION INTRAMUSCULAR; INTRAVENOUS at 17:25

## 2024-12-11 RX ADMIN — OXYCODONE HYDROCHLORIDE 5 MG: 5 TABLET ORAL at 18:24

## 2024-12-11 RX ADMIN — HYDROMORPHONE HYDROCHLORIDE 0.2 MG: 0.2 INJECTION, SOLUTION INTRAMUSCULAR; INTRAVENOUS; SUBCUTANEOUS at 20:57

## 2024-12-11 RX ADMIN — SENNOSIDES AND DOCUSATE SODIUM 1 TABLET: 50; 8.6 TABLET ORAL at 20:57

## 2024-12-11 RX ADMIN — HYDRALAZINE HYDROCHLORIDE 2 MG: 20 INJECTION INTRAMUSCULAR; INTRAVENOUS at 17:14

## 2024-12-11 RX ADMIN — LIDOCAINE HYDROCHLORIDE 50 MG: 20 INJECTION, SOLUTION INFILTRATION; PERINEURAL at 15:55

## 2024-12-11 RX ADMIN — HYDROXYZINE HYDROCHLORIDE 25 MG: 25 TABLET, FILM COATED ORAL at 18:24

## 2024-12-11 RX ADMIN — LABETALOL HYDROCHLORIDE 10 MG: 5 INJECTION, SOLUTION INTRAVENOUS at 16:07

## 2024-12-11 RX ADMIN — Medication 5 MG: at 16:12

## 2024-12-11 RX ADMIN — ACETAMINOPHEN 975 MG: 325 TABLET, FILM COATED ORAL at 18:24

## 2024-12-11 RX ADMIN — SODIUM CHLORIDE, POTASSIUM CHLORIDE, SODIUM LACTATE AND CALCIUM CHLORIDE: 600; 310; 30; 20 INJECTION, SOLUTION INTRAVENOUS at 17:06

## 2024-12-11 RX ADMIN — PROPOFOL 200 MG: 10 INJECTION, EMULSION INTRAVENOUS at 15:55

## 2024-12-11 ASSESSMENT — ACTIVITIES OF DAILY LIVING (ADL)
ADLS_ACUITY_SCORE: 24
ADLS_ACUITY_SCORE: 22
ADLS_ACUITY_SCORE: 24

## 2024-12-11 ASSESSMENT — ENCOUNTER SYMPTOMS: DYSRHYTHMIAS: 0

## 2024-12-11 NOTE — ANESTHESIA PREPROCEDURE EVALUATION
Anesthesia Pre-Procedure Evaluation    Patient: Thai Mancia   MRN: [id number] : 1970        Procedure : Procedure(s):  Irrigation and debridement with insertion of antibiotic cement beads, antibiotic cement spacer or both          Past Medical History:   Diagnosis Date    Asthma     Diverticulitis     Essential hypertension     Obesity     Osteoarthritis       Past Surgical History:   Procedure Laterality Date    ARTHROPLASTY KNEE Right 2024    Procedure: Right total knee arthroplasty;  Surgeon: Ronan Arenas MD;  Location: [facility] OR    CIRCUMCISION N/A 2021    Procedure: CIRCUMCISION;  Surgeon: Toy Feliz MD;  Location: [facility] OR    COLONOSCOPY N/A 2021    Procedure: COLONOSCOPY;  Surgeon: Claire Browning MD;  Location:  GI    EXCISE GLAND SUBMANDIBULAR Left 2024    Procedure: excision of left submandibular gland;  Surgeon: Bassam Mendez MD;  Location: [facility] OR    KNEE SURGERY Left       Allergies   Allergen Reactions    Cats      Itchy eyes, runny nose, wheezing    Dogs      Itchy eyes, runny nose, wheezing    Pollen Extract      Runny nose      Social History     Tobacco Use    Smoking status: Never    Smokeless tobacco: Never   Substance Use Topics    Alcohol use: No     Alcohol/week: 0.0 standard drinks of alcohol     Comment: quit       Wt Readings from Last 1 Encounters:   24 106.7 kg (235 lb 3.2 oz)        Anesthesia Evaluation   Pt has had prior anesthetic. Type: General.    No history of anesthetic complications       ROS/MED HX  ENT/Pulmonary:     (+)                     Intermittent, asthma  Treatment: Inhaler prn,                 Neurologic:  - neg neurologic ROS     Cardiovascular:     (+)  hypertension- -   -  - -                                   (-) arrhythmias and valvular problems/murmurs   METS/Exercise Tolerance:     Hematologic:       Musculoskeletal: Comment: Ongoing bleeding/hematoma s/p TKA last week      GI/Hepatic:  -  "neg GI/hepatic ROS  (-) GERD   Renal/Genitourinary:  - neg Renal ROS     Endo:     (+)               Obesity,       Psychiatric/Substance Use:       Infectious Disease:       Malignancy:       Other:            Physical Exam    Airway        Mallampati: III   TM distance: > 3 FB   Neck ROM: full   Mouth opening: > 3 cm    Respiratory Devices and Support         Dental       (+) Minor Abnormalities - some fillings, tiny chips      Cardiovascular   cardiovascular exam normal          Pulmonary   pulmonary exam normal                OUTSIDE LABS:  CBC:   Lab Results   Component Value Date    WBC 6.2 05/24/2024    WBC 7.7 03/09/2024    HGB 13.4 12/11/2024    HGB 12.5 (L) 12/05/2024    HCT 44.2 05/24/2024    HCT 42.6 03/09/2024     05/24/2024     03/09/2024     BMP:   Lab Results   Component Value Date     12/05/2024     11/15/2024    POTASSIUM 4.3 12/05/2024    POTASSIUM 4.1 11/15/2024    CHLORIDE 105 12/05/2024    CHLORIDE 104 11/15/2024    CO2 24 12/05/2024    CO2 27 11/15/2024    BUN 12.1 12/05/2024    BUN 15.3 11/15/2024    CR 0.97 12/05/2024    CR 1.00 11/15/2024     (H) 12/11/2024     (H) 12/05/2024     COAGS: No results found for: \"PTT\", \"INR\", \"FIBR\"  POC: No results found for: \"BGM\", \"HCG\", \"HCGS\"  HEPATIC:   Lab Results   Component Value Date    ALBUMIN 4.0 05/24/2024    PROTTOTAL 7.1 05/24/2024    ALT 34 05/24/2024    AST 29 05/24/2024    ALKPHOS 78 05/24/2024    BILITOTAL 0.5 05/24/2024     OTHER:   Lab Results   Component Value Date    A1C 5.3 05/24/2024    FRANCK 8.2 (L) 12/05/2024    LIPASE 29 08/06/2022    TSH 1.03 05/24/2024       Anesthesia Plan    ASA Status:  2    NPO Status:  NPO Appropriate    Anesthesia Type: General.     - Airway: LMA   Induction: Intravenous.   Maintenance: Balanced.        Consents    Anesthesia Plan(s) and associated risks, benefits, and realistic alternatives discussed. Questions answered and patient/representative(s) expressed " "understanding.     - Discussed:     - Discussed with:  Patient, Spouse      - Extended Intubation/Ventilatory Support Discussed: No.      - Patient is DNR/DNI Status: No     Use of blood products discussed: No .     Postoperative Care    Pain management: IV analgesics, Oral pain medications, Multi-modal analgesia, Peripheral nerve block (Single Shot) (Block post-op PRN).   PONV prophylaxis: Dexamethasone or Solumedrol, Ondansetron (or other 5HT-3)     Comments:               Neyda Martinez MD    I have reviewed the pertinent notes and labs in the chart from the past 30 days and (re)examined the patient.  Any updates or changes from those notes are reflected in this note.             # Drug Induced Platelet Defect: home medication list includes an antiplatelet medication   # Hypertension: Noted on problem list           # Obesity: Estimated body mass index is 32.8 kg/m  as calculated from the following:    Height as of 12/7/24: 1.803 m (5' 11\").    Weight as of this encounter: 106.7 kg (235 lb 3.2 oz).       # Asthma: noted on problem list       "

## 2024-12-11 NOTE — ANESTHESIA PROCEDURE NOTES
Airway       Patient location during procedure: OR  Staff -        Anesthesiologist:  Padmini Goyal MD       CRNA: Yolanda Cotton APRN CRNA       Performed By: anesthesiologist and CRNA  Consent for Airway        Urgency: elective  Indications and Patient Condition       Indications for airway management: radha-procedural       Induction type:intravenous       Mask difficulty assessment: 1 - vent by mask    Final Airway Details       Final airway type: supraglottic airway    Supraglottic Airway Details        Type: LMA       Brand: I-Gel       LMA size: 5    Post intubation assessment        Placement verified by: capnometry, equal breath sounds and chest rise        Number of attempts at approach: 1       Number of other approaches attempted: 0       Secured with: commercial tube stock       Ease of procedure: easy       Dentition: Unchanged

## 2024-12-11 NOTE — BRIEF OP NOTE
[facility]    Brief Operative Note    Pre-operative diagnosis: S/P total knee arthroplasty, right [Z96.651]  Post-operative diagnosis Same as pre-operative diagnosis    Procedure: Right Knee Incision and Debridement with Polyethylene Exchange, Right - Knee    Surgeon: Surgeons and Role:     * Ronan Arenas MD - Primary     * Brannon Martinez PA-C - Assisting  Anesthesia: General with Block   Estimated Blood Loss: Less than 100 ml    Drains: None  Specimens: * No specimens in log *  Findings:   None.  Complications: None.  Implants:   Implant Name Type Inv. Item Serial No.  Lot No. LRB No. Used Action   INSERT TIBIAL CR 10MM VE R 8-11 EF - [id number] Total Joint Component/Insert INSERT TIBIAL CR 10MM VE R 8-11 EF  [employer] [id number] Right 1 Implanted   INSERT TIBIAL CR 10MM VE R 8-11 EF - [id number] Total Joint Component/Insert INSERT TIBIAL CR 10MM VE R 8-11 EF  [employer] [id number] Right 1 Explanted

## 2024-12-11 NOTE — ANESTHESIA POSTPROCEDURE EVALUATION
Patient: Thai Mancia    Procedure: Procedure(s):  Right Knee Incision and Debridement with Polyethylene Exchange       Anesthesia Type:  General    Note:  Disposition: Admission   Postop Pain Control: Uneventful            Sign Out: Well controlled pain   PONV: No   Neuro/Psych: Uneventful            Sign Out: Acceptable/Baseline neuro status   Airway/Respiratory: Uneventful            Sign Out: Acceptable/Baseline resp. status   CV/Hemodynamics: Uneventful            Sign Out: Acceptable CV status; No obvious hypovolemia; No obvious fluid overload   Other NRE: NONE   DID A NON-ROUTINE EVENT OCCUR? No           Last vitals:  Vitals Value Taken Time   /92 12/11/24 1730   Temp 98.06  F (36.7  C) 12/11/24 1733   Pulse 96 12/11/24 1733   Resp 18 12/11/24 1720   SpO2 100 % 12/11/24 1733   Vitals shown include unfiled device data.    Electronically Signed By: Padmini Goyal MD  December 11, 2024  5:34 PM

## 2024-12-11 NOTE — ANESTHESIA CARE TRANSFER NOTE
Patient: Thai Mancia    Procedure: Procedure(s):  Right Knee Incision and Debridement with Polyethylene Exchange       Diagnosis: S/P total knee arthroplasty, right [Z96.651]  Diagnosis Additional Information: No value filed.    Anesthesia Type:   General     Note:    Oropharynx: oropharynx clear of all foreign objects  Level of Consciousness: drowsy  Oxygen Supplementation: face mask  Level of Supplemental Oxygen (L/min / FiO2): 6  Independent Airway: airway patency satisfactory and stable  Dentition: dentition unchanged  Vital Signs Stable: post-procedure vital signs reviewed and stable  Report to RN Given: handoff report given  Patient transferred to: PACU    Handoff Report: Identifed the Patient, Identified the Reponsible Provider, Reviewed the pertinent medical history, Discussed the surgical course, Reviewed Intra-OP anesthesia mangement and issues during anesthesia, Set expectations for post-procedure period and Allowed opportunity for questions and acknowledgement of understanding      Vitals:  Vitals Value Taken Time   /104 12/11/24 1725   Temp 97.34  F (36.3  C) 12/11/24 1726   Pulse 91 12/11/24 1726   Resp 65 12/11/24 1718   SpO2 100 % 12/11/24 1726   Vitals shown include unfiled device data.    Electronically Signed By: LAYLA Nunn CRNA  December 11, 2024  5:27 PM

## 2024-12-12 ENCOUNTER — TELEPHONE (OUTPATIENT)
Dept: ORTHOPEDICS | Facility: CLINIC | Age: 54
End: 2024-12-12

## 2024-12-12 VITALS
RESPIRATION RATE: 18 BRPM | DIASTOLIC BLOOD PRESSURE: 68 MMHG | TEMPERATURE: 98.7 F | BODY MASS INDEX: 32.9 KG/M2 | SYSTOLIC BLOOD PRESSURE: 148 MMHG | WEIGHT: 235.89 LBS | HEART RATE: 84 BPM | OXYGEN SATURATION: 97 %

## 2024-12-12 LAB
ANION GAP SERPL CALCULATED.3IONS-SCNC: 13 MMOL/L (ref 7–15)
APTT PPP: 29 SECONDS (ref 22–38)
BASOPHILS # BLD AUTO: 0 10E3/UL (ref 0–0.2)
BASOPHILS NFR BLD AUTO: 0 %
BUN SERPL-MCNC: 13.3 MG/DL (ref 6–20)
CALCIUM SERPL-MCNC: 8.4 MG/DL (ref 8.8–10.4)
CHLORIDE SERPL-SCNC: 101 MMOL/L (ref 98–107)
CREAT SERPL-MCNC: 0.79 MG/DL (ref 0.67–1.17)
EGFRCR SERPLBLD CKD-EPI 2021: >90 ML/MIN/1.73M2
EOSINOPHIL # BLD AUTO: 0 10E3/UL (ref 0–0.7)
EOSINOPHIL NFR BLD AUTO: 0 %
ERYTHROCYTE [DISTWIDTH] IN BLOOD BY AUTOMATED COUNT: 13.5 % (ref 10–15)
GLUCOSE SERPL-MCNC: 138 MG/DL (ref 70–99)
HCO3 SERPL-SCNC: 23 MMOL/L (ref 22–29)
HCT VFR BLD AUTO: 33.9 % (ref 40–53)
HGB BLD-MCNC: 11.6 G/DL (ref 13.3–17.7)
IMM GRANULOCYTES # BLD: 0 10E3/UL
IMM GRANULOCYTES NFR BLD: 0 %
INR PPP: 1.17 (ref 0.85–1.15)
LYMPHOCYTES # BLD AUTO: 1.6 10E3/UL (ref 0.8–5.3)
LYMPHOCYTES NFR BLD AUTO: 14 %
MCH RBC QN AUTO: 30.4 PG (ref 26.5–33)
MCHC RBC AUTO-ENTMCNC: 34.2 G/DL (ref 31.5–36.5)
MCV RBC AUTO: 89 FL (ref 78–100)
MONOCYTES # BLD AUTO: 0.7 10E3/UL (ref 0–1.3)
MONOCYTES NFR BLD AUTO: 7 %
NEUTROPHILS # BLD AUTO: 9 10E3/UL (ref 1.6–8.3)
NEUTROPHILS NFR BLD AUTO: 79 %
NRBC # BLD AUTO: 0 10E3/UL
NRBC BLD AUTO-RTO: 0 /100
PLATELET # BLD AUTO: 460 10E3/UL (ref 150–450)
POTASSIUM SERPL-SCNC: 4 MMOL/L (ref 3.4–5.3)
RBC # BLD AUTO: 3.82 10E6/UL (ref 4.4–5.9)
SODIUM SERPL-SCNC: 137 MMOL/L (ref 135–145)
WBC # BLD AUTO: 11.4 10E3/UL (ref 4–11)

## 2024-12-12 PROCEDURE — 250N000011 HC RX IP 250 OP 636: Mod: JZ | Performed by: PHYSICIAN ASSISTANT

## 2024-12-12 PROCEDURE — 36415 COLL VENOUS BLD VENIPUNCTURE: CPT | Performed by: PHYSICIAN ASSISTANT

## 2024-12-12 PROCEDURE — 85610 PROTHROMBIN TIME: CPT | Performed by: PHYSICIAN ASSISTANT

## 2024-12-12 PROCEDURE — 250N000013 HC RX MED GY IP 250 OP 250 PS 637: Performed by: PHYSICIAN ASSISTANT

## 2024-12-12 PROCEDURE — 85048 AUTOMATED LEUKOCYTE COUNT: CPT | Performed by: PHYSICIAN ASSISTANT

## 2024-12-12 PROCEDURE — 82374 ASSAY BLOOD CARBON DIOXIDE: CPT | Performed by: PHYSICIAN ASSISTANT

## 2024-12-12 PROCEDURE — 82565 ASSAY OF CREATININE: CPT | Performed by: PHYSICIAN ASSISTANT

## 2024-12-12 PROCEDURE — 80048 BASIC METABOLIC PNL TOTAL CA: CPT | Performed by: PHYSICIAN ASSISTANT

## 2024-12-12 PROCEDURE — 85730 THROMBOPLASTIN TIME PARTIAL: CPT | Performed by: PHYSICIAN ASSISTANT

## 2024-12-12 PROCEDURE — 85004 AUTOMATED DIFF WBC COUNT: CPT | Performed by: PHYSICIAN ASSISTANT

## 2024-12-12 RX ORDER — ASPIRIN 81 MG/1
81 TABLET ORAL 2 TIMES DAILY
Qty: 60 TABLET | Refills: 0 | Status: SHIPPED | OUTPATIENT
Start: 2024-12-12

## 2024-12-12 RX ORDER — OXYCODONE HYDROCHLORIDE 5 MG/1
5-10 TABLET ORAL EVERY 4 HOURS PRN
Qty: 26 TABLET | Refills: 0 | Status: SHIPPED | OUTPATIENT
Start: 2024-12-12

## 2024-12-12 RX ORDER — AMOXICILLIN 250 MG
1-2 CAPSULE ORAL 2 TIMES DAILY
Qty: 24 TABLET | Refills: 0 | Status: SHIPPED | OUTPATIENT
Start: 2024-12-12

## 2024-12-12 RX ORDER — ACETAMINOPHEN 325 MG/1
650 TABLET ORAL EVERY 4 HOURS PRN
Qty: 100 TABLET | Refills: 0 | Status: SHIPPED | OUTPATIENT
Start: 2024-12-12

## 2024-12-12 RX ORDER — GABAPENTIN 100 MG/1
100 CAPSULE ORAL AT BEDTIME
Qty: 14 CAPSULE | Refills: 0 | Status: SHIPPED | OUTPATIENT
Start: 2024-12-12

## 2024-12-12 RX ADMIN — LOSARTAN POTASSIUM AND HYDROCHLOROTHIAZIDE 1 TABLET: 25; 100 TABLET ORAL at 09:32

## 2024-12-12 RX ADMIN — CEFAZOLIN SODIUM 2 G: 2 INJECTION, SOLUTION INTRAVENOUS at 09:32

## 2024-12-12 RX ADMIN — POLYETHYLENE GLYCOL 3350 17 G: 17 POWDER, FOR SOLUTION ORAL at 09:32

## 2024-12-12 RX ADMIN — AMLODIPINE BESYLATE 2.5 MG: 2.5 TABLET ORAL at 09:31

## 2024-12-12 RX ADMIN — SENNOSIDES AND DOCUSATE SODIUM 1 TABLET: 50; 8.6 TABLET ORAL at 09:32

## 2024-12-12 RX ADMIN — ACETAMINOPHEN 975 MG: 325 TABLET, FILM COATED ORAL at 09:31

## 2024-12-12 RX ADMIN — OXYCODONE HYDROCHLORIDE 5 MG: 5 TABLET ORAL at 10:37

## 2024-12-12 RX ADMIN — CEFAZOLIN SODIUM 2 G: 2 INJECTION, SOLUTION INTRAVENOUS at 00:26

## 2024-12-12 RX ADMIN — ACETAMINOPHEN 975 MG: 325 TABLET, FILM COATED ORAL at 01:48

## 2024-12-12 ASSESSMENT — ACTIVITIES OF DAILY LIVING (ADL)
ADLS_ACUITY_SCORE: 24

## 2024-12-12 NOTE — OP NOTE
Date of Surgery: December 12, 2024    Location: [facility]    Surgeon: Ronan Arenas MD     Assistants:   1. Dany Martinez PA-C    A skilled surgical assistant was required to assist with patient positioning, draping, and retraction during open portions of the case.    Pre-operative Diagnosis:   1) Right total knee postoperative bleeding    Post-operative Diagnosis:   1) Right total knee postoperative bleeding    Procedure:   1) Right Knee incision and debridement with polyethylene exchange - [id number]     Implants:    Implant Name Type Inv. Item Serial No.  Lot No. LRB No. Used Action   INSERT TIBIAL CR 10MM VE R 8-11 EF - [id number] Total Joint Component/Insert INSERT TIBIAL CR 10MM VE R 8-11 EF  [employer] [id number] Right 1 Implanted   INSERT TIBIAL CR 10MM VE R 8-11 EF - [id number] Total Joint Component/Insert INSERT TIBIAL CR 10MM VE R 8-11 EF  [employer] [id number] Right 1 Explanted        Anesthesia: General    Estimated Blood Loss: 50 ml       Complications: None       Specimen: None    Tourniquette Time: 0 min    Condition: Stable to PACU    Procedure Details   Indications for Procedure:  Patient is a 54-year-old male known to my practice now 7 days status post a right total knee arthroplasty.  Postoperatively he continued to have a small amount of bleeding from the inferior aspect of his incision.  He was placed in a knee immobilizer.  I saw him on postop day 5.  He continued to have a small amount of dark bleeding.  Suspect there is a resolving hematoma.  However I discussed with him that continued bleeding beyond postop day 5 is a risk for infection nidus.  We discussed operative and nonoperative options.  Assessed nonoperative management the form of continued knee immobilizer and incisional VAC application.  We discussed operative management in the form of right knee incision and debridement with and polyethylene exchange and revision closure.  I  discussed them that I would take out the polyethylene to better access the back of his knee.  I would prophylactically wash off his components.  I would then perform revision closure, placed him in knee immobilizer, and place an incisional wound VAC.  We discussed the operative technique.  We discussed the postop protocol.  Discussed risk and benefits of operative intervention including but not limited to bleeding, infection, failure to cure pain, continued bleeding from the incision site, postoperative DVT/PE, loss of limb and loss of life.  I the opportunity answers questions.  At this time he was to move forward operative intervention.    Procedure Details:  On the day of surgery patient is met in the preoperative holding area.  The correct limb was confirmed remarkable for skin marker.  Informed consent was again reviewed confirming the correct patient, site, procedure, surgeon.  I again had the opportunity discussed the risks and benefits of operative intervention as well as the nonoperative alternatives.  I had the opportunity to answer his questions.  He wished to move forward with operative intervention.    The patient was brought back to the operative suite, transferred from the hospital bed to the operative table without incident and secured using a belt.  General anesthesia was induced by my anesthesia colleagues.  All superficial incisions were removed from the right knee.  The right leg was sterilely prepped and draped in the normal fashion.  A final timeout was performed with all in the room in agreement the correct patient, site, procedure, and surgeon, as well as preoperative antibiotics and p.o.  TXA having been instilled.    The prior incision was reopened.  There was a pinpoint area of expressible bleeding from the inferior aspect of the incision.  All prior sutures were removed.  There is a mild amount of hematoma in the subcutaneous space.  No signs of purulence.  The lateral capsular rent was  healing appropriately.  The arthrotomy repair was intact.  There was a very small area on the inferior aspect of slight dehiscence of the arthrotomy repair.  I do not appreciate any active bleeding.  I again extended the skin flaps medially and laterally.  I open the capsule through the prior medial parapatellar incision.  Bloody resolving hematoma was expressed and removed.  I did not appreciate any active bleeding.  No purulence or necrotic tissue.  Patellar tendon was intact and robust.  Quad tendon was intact and robust.    At this time the polyethylene tibial component was removed.  The femoral and tibial component showed no signs of loosening or damage.  I evaluated the back of the knee.  I do not appreciate any active bleeding.  I do not appreciate any loose cement bodies.    At this time the components were thoroughly irrigated with 1 L of back to sure.  I again evaluated for bleeding.  Not appreciate any active bleeding.  I then irrigated with 1 L of normal saline.  I followed this with a 1 L Betadine dilute solution irrigation.  The knee was then thoroughly irrigated with 2 L of normal saline with pulse lavage.    At this time point I again evaluated the intra-articular and subcutaneous layers.  I do not appreciate any active bleeding.  A new 10 mm thickness polyethylene was reinserted.  The knee was brought through range of motion.  I was able to achieve 2 degrees of hyperextension to 130 degrees any flexion.  Patella tracked appropriately.  Quad tendon and patellar tendon intact without signs of injury.  At 30 views knee flexion he is stable to varus and valgus stress with 3 to 4 mm of laxity with firm endpoint.    At this time point, the arthrotomy was closed with #1 strata fix.  The skin was closed in a layered fashion with 0 Vicryl, 2-0 Vicryl, and staples.  A Prevena incisional VAC was placed.  Cast padding and Ace wrap was placed.  The drapes were taken down.  2+ dorsalis pedis was palpable.  The  patient was placed back in a knee immobilizer.    He was successfully awoken from general anesthesia.  He was transferred from the operative table to the hospital bed without incident.  He returned to PACU in stable condition.    All sponge, needle, and instrument counts were correct at the end of the case.    Post-Operative Plan:  Patient will be admitted for 24 hours for postoperative IV antibiotics.  We will order CBC, BMP, PTT, INR, and coagulation panel to evaluate for platelet dysfunction.  He is weightbearing as tolerated in his knee immobilizer.  He is remain in his knee immobilizer at all times except for clothing and showering for 1 week, until seen back in clinic.  Incisional VAC is removed remain on until seen back in clinic.  No range of motion exercises with physical therapy.  He will hold his aspirin until seen back in clinic.  He will continue his p.o. antibiotics for an additional 7 days once he is discharged from the hospital.    Ronan Arenas MD    [facility]   Department of Orthopedic Surgery      Disclaimer: This note consists of symbols derived from keyboarding, dictation and/or voice recognition software. As a result, there may be errors in the script that have gone undetected. Please consider this when interpreting information found in this chart.

## 2024-12-12 NOTE — TELEPHONE ENCOUNTER
Other: Dez Watkins is calling because he was told by  THAT HE WOULD BE SEEN ON 12/16  BUT THERE IS NOTHING AVAILABLE.pLEASE CALL DEZ.  AND HE NEEDS FMLA AND SHORT TERM PAPER WORK . IS IT READY    Could we send this information to you in [employer] or would you prefer to receive a phone call?:   Patient would prefer a phone call   Okay to leave a detailed message?: Yes at Cell number on file:    Telephone Information:   Mobile [phone]

## 2024-12-12 NOTE — PLAN OF CARE
Goal Outcome Evaluation:      Plan of Care Reviewed With: patient    Overall Patient Progress: improvingOverall Patient Progress: improving    Outcome Evaluation: PRN dilaudid given overnight. Voiding. Prevena drain patent. Knee immobilizer on at all times. Dangled at side of bed, stood up and took a few steps this shift. Possible DC home 12/12      Problem: Adult Inpatient Plan of Care  Goal: Plan of Care Review  Description: The Plan of Care Review/Shift note should be completed every shift.  The Outcome Evaluation is a brief statement about your assessment that the patient is improving, declining, or no change.  This information will be displayed automatically on your shift  note.  Outcome: Progressing  Flowsheets (Taken 12/12/2024 0506)  Outcome Evaluation: PRN dilaudid given overnight. Voding. Prevena drain patent. Knee immobilizer on at all times. Possible DC home 12/12  Plan of Care Reviewed With: patient  Overall Patient Progress: improving  Goal: Absence of Hospital-Acquired Illness or Injury  Intervention: Identify and Manage Fall Risk  Recent Flowsheet Documentation  Taken 12/11/2024 2101 by Samina Fitch RN  Safety Promotion/Fall Prevention: safety round/check completed  Intervention: Prevent and Manage VTE (Venous Thromboembolism) Risk  Recent Flowsheet Documentation  Taken 12/11/2024 2101 by Samina Fitch RN  VTE Prevention/Management: SCDs on (sequential compression devices)

## 2024-12-12 NOTE — TELEPHONE ENCOUNTER
Received message from Dany Martinez to put patient on Dr. Arenas's schedule for Monday 12/16/2024 at noon.  Appointment scheduled.    Arnold Son LAT

## 2024-12-12 NOTE — TELEPHONE ENCOUNTER
Huddled with Dany Martinez- he is going over to [facility] now to correct this.     Called number and was put on hold for 5+ minutes so disconnected call as provider is already heading to hospital to address this so no action needed.     Eunice Kaur, ATC

## 2024-12-12 NOTE — DISCHARGE SUMMARY
[facility]  Discharge Summary            Interval History:     54 year old male admitted postoperatively for Right TKA washout with wound vac placement  with Dr. Ronan rAenas due to continued wound bleeding following primary TKA unresponsive to non operative treatment.  There were no notable intraoperative complications.  Patient being discharged post op Day #1.  Thai Mancia received skilled nursing and SW inquiry.  There was no Hospitalist care during the stay.     Thai Mancia has progressed satisfactorily with ambulation and pain management and without medical complication.  Patient is confident in their discharge from previously meeting with OT and PT. Pt lives at home with wife and will be discharged to home based on home living conditions and level of support.  Thai Mancia leaves the hospital in stable condition with good chance for recovery.  Today: Jean-Claude has improved since surgery. He is wearing the knee immobilizer full time. He has been up and moving with nurse. Patient notes he is normally very active and is excited to be able to be up and moving around again. Pain is well controlled. Prevena wound vac present and working correctly.     Pain: mild  Nausea: none  Light headedness : none  Chest pain: none  Shortness of breath: none              Assessment and Plan:     S/P Right TKA washout with wound vac placement  Day #1.  Final Diagnosis: same  VSS, Hemodynamically asymptomatic, pain management adequate.    PLAN:  DVT prophylaxis with asa (held x7 days due to prior increased wound drainage) and mechanical movement, as patient has no history of VTE.  Pain management with Tylenol and Oxycodone  Abx: Keflex x7 days  Knee immobilizer full time x7 days  ACE wrap x7 days, will remove once a day for bandage check and re-wrap  Prevena wound vac present until post-op visit in x1 week  Bowel Regimen.  RICE  Assistive devices as determined by Physical therapy.  OP PT.  Patient  instructions attached to discharge.      Discharge to home  Follow up in clinic as previously scheduled, approx 7 days post op.    [facility]  [address]  [place]  [address]  [phone] fax  [phone] for scheduling                      Physical Exam:   Patient Vitals for the past 12 hrs:   BP Temp Temp src Pulse Resp SpO2   12/12/24 0300 (!) 150/66 98.6  F (37  C) Temporal 81 16 95 %   12/11/24 2301 135/71 98.3  F (36.8  C) Temporal 94 18 92 %   12/11/24 2147 133/69 99.1  F (37.3  C) Temporal 93 20 93 %     Hemoglobin   Date Value Ref Range Status   12/12/2024 11.6 (L) 13.3 - 17.7 g/dL Final   12/11/2024 13.4 13.3 - 17.7 g/dL Final   08/21/2019 13.8 13.3 - 17.7 g/dL Final   03/20/2019 14.7 13.3 - 17.7 g/dL Final       Patient is alert and oriented.  Vitals: stable  Neurovascular status: Grossly intact to light sensation  Dressing: clean and dry  Calves: soft and non tender          Connor Macdonald PA-C  [facility]    12/12/2024

## 2024-12-12 NOTE — OR NURSING
ALEX Lawson notified of patient continuing to run tachycardic (HR 90's-100's) despite IV labetalol, IV hydralazine and IV clonidine.  Pt asymptomatic and BPs stable.  No new orders at this time. Okay to transfer up to medical floor where they will continue to monitor.    Blanca Calle RN  7:12 PM

## 2024-12-12 NOTE — TELEPHONE ENCOUNTER
Other: Yoana with [facility] calling because patient is supposed to discharge today but his prescriptions have not been signed. Please call back, can speak with her or any nurse available.       Could we send this information to you in [employer] or would you prefer to receive a phone call?:   Patient would prefer a phone call   Okay to leave a detailed message?: Yes at Other phone number:  [phone]

## 2024-12-13 NOTE — TELEPHONE ENCOUNTER
Forms need patient completion.    Will be faxed out along with FMLA paperwork, see corresponding telephone encounter.    Placed in accordian file.    Isis Lama, Visit Facilitator

## 2024-12-13 NOTE — PLAN OF CARE
Goal Outcome Evaluation:     Discharge Note    Patient discharged to home via private vehicle  accompanied by significant other .  IV: Discontinued  Prescriptions e-prescribed to pharmacy.   Belongings reviewed and sent with patient.   Home medications returned to patient: NA  Equipment sent with: patient.   patient verbalizes understanding of discharge instructions. AVS given to patient.  Additional education completed?  Prevena wound vac

## 2024-12-13 NOTE — TELEPHONE ENCOUNTER
Form complete.    Placed in accordian folder, waiting for provider signature.    Isis Lama, Visit Facilitator

## 2024-12-13 NOTE — TELEPHONE ENCOUNTER
Other: Patient called and is wanting an update on his forms. Patient has an appointment on Monday and was hoping to pick them up then. Please call patient with info.     Could we send this information to you in [email or web address] or would you prefer to receive a phone call?:   Patient would prefer a phone call   Okay to leave a detailed message?: Yes at Cell number on file:    Telephone Information:   Mobile [phone]

## 2024-12-16 ENCOUNTER — OFFICE VISIT (OUTPATIENT)
Dept: ORTHOPEDICS | Facility: CLINIC | Age: 54
End: 2024-12-16
Payer: COMMERCIAL

## 2024-12-16 DIAGNOSIS — Z96.651 S/P TKR (TOTAL KNEE REPLACEMENT) USING CEMENT, RIGHT: Primary | ICD-10-CM

## 2024-12-16 PROCEDURE — 99024 POSTOP FOLLOW-UP VISIT: CPT | Performed by: STUDENT IN AN ORGANIZED HEALTH CARE EDUCATION/TRAINING PROGRAM

## 2024-12-16 NOTE — PROGRESS NOTES
CC: 5 days status post right knee I&D with polyethylene exchange    HPI: Patient is a 54-year-old male seen here today with his wife in follow-up 5 days status post right knee I&D and polyethylene exchange.  Patient was brought back to the alarm 1 week after total knee arthroplasty for continued bleeding from his incision site.  I&D with polyethylene exchange was performed.  He is placed in a knee immobilizer with an incisional VAC.  He has been in the knee immobilizer since then.  Back is show no drainage.  Overall he is states his pain is rapidly improving.  He is able to perform straight leg raise.  Coagulation labs were performed in hospital and were within normal limits.  We have held his aspirin for the first week.  He denies any significant pain in his calf.  He is completing his 10-day course of oral Keflex    Objective:   PE:  RLE: Incisional VAC was removed.  Incision is clean dry and intact.  Healing appropriately.  Mild soft tissue edema.  Ecchymosis improving from day of surgery.  Calf soft compressible.  5/5 ankle plantar flex/dorsiflexion.  Able to form straight leg raise.    A/P:  Patient is a 54-year-old male seen here today 5 days status post right knee I&D and polyethylene exchange for postoperative bleeding.  His incision is healing appropriately.  I removed his incisional VAC today.  He was placed in soft dressings.  He will maintain his knee immobilizer for 3 more days.  Will cancel his physical therapy this week.  He will hold his aspirin as I see him back at 2 weeks.  I will see him next week for incision check.  At that time I will plan to remove his staples, restart him on his aspirin, and restart physical therapy.    Ronan Arenas MD    [facility]   Department of Orthopedic Surgery      Disclaimer: This note consists of symbols derived from keyboarding, dictation and/or voice recognition software. As a result, there may be errors in the script that have  gone undetected. Please consider this when interpreting information found in this chart.

## 2024-12-16 NOTE — LETTER
12/16/2024      Thai Mancia  [address]  [facility] [address]      Dear Colleague,    Thank you for referring your patient, Thai Mancia, to the [facility] [place]. Please see a copy of my visit note below.    CC: 5 days status post right knee I&D with polyethylene exchange    HPI: Patient is a 54-year-old male seen here today with his wife in follow-up 5 days status post right knee I&D and polyethylene exchange.  Patient was brought back to the alarm 1 week after total knee arthroplasty for continued bleeding from his incision site.  I&D with polyethylene exchange was performed.  He is placed in a knee immobilizer with an incisional VAC.  He has been in the knee immobilizer since then.  Back is show no drainage.  Overall he is states his pain is rapidly improving.  He is able to perform straight leg raise.  Coagulation labs were performed in hospital and were within normal limits.  We have held his aspirin for the first week.  He denies any significant pain in his calf.  He is completing his 10-day course of oral Keflex    Objective:   PE:  RLE: Incisional VAC was removed.  Incision is clean dry and intact.  Healing appropriately.  Mild soft tissue edema.  Ecchymosis improving from day of surgery.  Calf soft compressible.  5/5 ankle plantar flex/dorsiflexion.  Able to form straight leg raise.    A/P:  Patient is a 54-year-old male seen here today 5 days status post right knee I&D and polyethylene exchange for postoperative bleeding.  His incision is healing appropriately.  I removed his incisional VAC today.  He was placed in soft dressings.  He will maintain his knee immobilizer for 3 more days.  Will cancel his physical therapy this week.  He will hold his aspirin as I see him back at 2 weeks.  I will see him next week for incision check.  At that time I will plan to remove his staples, restart him on his aspirin, and restart physical therapy.    Ronan Arenas,  MD    [facility]   Department of Orthopedic Surgery      Disclaimer: This note consists of symbols derived from keyboarding, dictation and/or voice recognition software. As a result, there may be errors in the script that have gone undetected. Please consider this when interpreting information found in this chart.         Again, thank you for allowing me to participate in the care of your patient.        Sincerely,        Ronan Arenas MD

## 2024-12-17 NOTE — TELEPHONE ENCOUNTER
Form was updated at patients request during appointment and faxed. Copy sent to scan and given to patient. Original placed in provider drawer for review.      Homero Scanlon ATC

## 2024-12-17 NOTE — TELEPHONE ENCOUNTER
Forms were completed and faxed to desired location. Copy was given to patient and also sent to scan. Original placed in drawer for review.      Homero Scanlon ATC

## 2024-12-18 ENCOUNTER — MYC MEDICAL ADVICE (OUTPATIENT)
Dept: ORTHOPEDICS | Facility: CLINIC | Age: 54
End: 2024-12-18

## 2024-12-21 ENCOUNTER — HEALTH MAINTENANCE LETTER (OUTPATIENT)
Age: 54
End: 2024-12-21

## 2024-12-23 ENCOUNTER — OFFICE VISIT (OUTPATIENT)
Dept: ORTHOPEDICS | Facility: CLINIC | Age: 54
End: 2024-12-23
Payer: COMMERCIAL

## 2024-12-23 VITALS — WEIGHT: 235 LBS | HEIGHT: 71 IN | BODY MASS INDEX: 32.9 KG/M2

## 2024-12-23 DIAGNOSIS — Z96.651 S/P TKR (TOTAL KNEE REPLACEMENT) USING CEMENT, RIGHT: Primary | ICD-10-CM

## 2024-12-23 PROCEDURE — 99024 POSTOP FOLLOW-UP VISIT: CPT | Performed by: STUDENT IN AN ORGANIZED HEALTH CARE EDUCATION/TRAINING PROGRAM

## 2024-12-23 NOTE — LETTER
12/23/2024      Thai Mancia  [address]  [place] [facility] [address]      Dear Colleague,    Thank you for referring your patient, Thai Mancia, to the [facility]. Please see a copy of my visit note below.    CC: 3 weeks status post right total knee arthroplasty, 2 weeks status post right knee I&D    HPI: Patient is a 54-year-old male known to my practice now 3 weeks status post a right total knee arthroplasty in 2 weeks status post right knee I&D with polyethylene exchange and revision closure for continued bleeding.  Since I saw him last Monday, he has discontinued his knee immobilizer as instructed.  He has held his aspirin.  He is ambulating with his walker.  He notes no bleeding or drainage from his incision.  His swelling has been improving.  Denies any numbness or tingling in his leg.  His range of motion continues to improve.  Is not yet started physical therapy but has it set up for the 26th.  He has completed oral antibiotic    Objective:   PE:  RLE: Surgical incision over the anterior aspect of the right knee healing appropriately.  No signs of erythema or drainage.  Moderate soft tissue edema.  Mild ecchymosis medially.  Able to form straight leg raise with approximately 5 degree extension lag.  Passive range of motion is 5 to 60 degrees any flexion.  Calf soft compressible.  5/5 ankle plantarflexion/dorsiflexion    A/P:  Patient is a 54-year-old male seen here today now 3 weeks status post right total knee arthroplasty 2 weeks status post right knee incision and debridement with polyethylene exchange and revision closure.  Incision is healing appropriately.  No signs of drainage.  Staples were removed today.  At this time point we will restart his aspirin for DVT prophylaxis.  He will restart physical therapy later this week for active and passive range of motion.  I will not place any range of motion restrictions on him.  He will follow-up with me in  approximately 4 weeks for 6-week follow-up and x-rays.    Ronan Arenas MD    [facility]   Department of Orthopedic Surgery      Disclaimer: This note consists of symbols derived from keyboarding, dictation and/or voice recognition software. As a result, there may be errors in the script that have gone undetected. Please consider this when interpreting information found in this chart.         Again, thank you for allowing me to participate in the care of your patient.        Sincerely,        Ronan Arenas MD    Electronically signed

## 2024-12-23 NOTE — PROGRESS NOTES
CC: 3 weeks status post right total knee arthroplasty, 2 weeks status post right knee I&D    HPI: Patient is a 54-year-old male known to my practice now 3 weeks status post a right total knee arthroplasty in 2 weeks status post right knee I&D with polyethylene exchange and revision closure for continued bleeding.  Since I saw him last Monday, he has discontinued his knee immobilizer as instructed.  He has held his aspirin.  He is ambulating with his walker.  He notes no bleeding or drainage from his incision.  His swelling has been improving.  Denies any numbness or tingling in his leg.  His range of motion continues to improve.  Is not yet started physical therapy but has it set up for the 26th.  He has completed oral antibiotic    Objective:   PE:  RLE: Surgical incision over the anterior aspect of the right knee healing appropriately.  No signs of erythema or drainage.  Moderate soft tissue edema.  Mild ecchymosis medially.  Able to form straight leg raise with approximately 5 degree extension lag.  Passive range of motion is 5 to 60 degrees any flexion.  Calf soft compressible.  5/5 ankle plantarflexion/dorsiflexion    A/P:  Patient is a 54-year-old male seen here today now 3 weeks status post right total knee arthroplasty 2 weeks status post right knee incision and debridement with polyethylene exchange and revision closure.  Incision is healing appropriately.  No signs of drainage.  Staples were removed today.  At this time point we will restart his aspirin for DVT prophylaxis.  He will restart physical therapy later this week for active and passive range of motion.  I will not place any range of motion restrictions on him.  He will follow-up with me in approximately 4 weeks for 6-week follow-up and x-rays.    Ronan Arenas MD    [facility]   Department of Orthopedic Surgery      Disclaimer: This note consists of symbols derived from keyboarding, dictation and/or voice  recognition software. As a result, there may be errors in the script that have gone undetected. Please consider this when interpreting information found in this chart.

## 2024-12-26 ENCOUNTER — THERAPY VISIT (OUTPATIENT)
Dept: PHYSICAL THERAPY | Facility: CLINIC | Age: 54
End: 2024-12-26
Payer: COMMERCIAL

## 2024-12-26 DIAGNOSIS — M17.11 PRIMARY OSTEOARTHRITIS OF RIGHT KNEE: ICD-10-CM

## 2024-12-26 DIAGNOSIS — Z96.651 S/P TOTAL KNEE ARTHROPLASTY, RIGHT: ICD-10-CM

## 2024-12-26 ASSESSMENT — ACTIVITIES OF DAILY LIVING (ADL)
AS_A_RESULT_OF_YOUR_KNEE_INJURY,_HOW_WOULD_YOU_RATE_YOUR_CURRENT_LEVEL_OF_DAILY_ACTIVITY?: SEVERELY ABNORMAL
STAND: ACTIVITY IS MINIMALLY DIFFICULT
LIMPING: THE SYMPTOM AFFECTS MY ACTIVITY SEVERELY
PAIN: THE SYMPTOM AFFECTS MY ACTIVITY MODERATELY
SIT WITH YOUR KNEE BENT: ACTIVITY IS FAIRLY DIFFICULT
SQUAT: I AM UNABLE TO DO THE ACTIVITY
HOW_WOULD_YOU_RATE_THE_OVERALL_FUNCTION_OF_YOUR_KNEE_DURING_YOUR_USUAL_DAILY_ACTIVITIES?: ABNORMAL
WALK: ACTIVITY IS VERY DIFFICULT
SIT WITH YOUR KNEE BENT: ACTIVITY IS FAIRLY DIFFICULT
AS_A_RESULT_OF_YOUR_KNEE_INJURY,_HOW_WOULD_YOU_RATE_YOUR_CURRENT_LEVEL_OF_DAILY_ACTIVITY?: SEVERELY ABNORMAL
SWELLING: THE SYMPTOM AFFECTS MY ACTIVITY SLIGHTLY
WEAKNESS: THE SYMPTOM AFFECTS MY ACTIVITY MODERATELY
LIMPING: THE SYMPTOM AFFECTS MY ACTIVITY SEVERELY
HOW_WOULD_YOU_RATE_THE_OVERALL_FUNCTION_OF_YOUR_KNEE_DURING_YOUR_USUAL_DAILY_ACTIVITIES?: ABNORMAL
STIFFNESS: THE SYMPTOM AFFECTS MY ACTIVITY SEVERELY
WALK: ACTIVITY IS VERY DIFFICULT
STIFFNESS: THE SYMPTOM AFFECTS MY ACTIVITY SEVERELY
STAND: ACTIVITY IS MINIMALLY DIFFICULT
SQUAT: I AM UNABLE TO DO THE ACTIVITY
HOW_WOULD_YOU_RATE_THE_CURRENT_FUNCTION_OF_YOUR_KNEE_DURING_YOUR_USUAL_DAILY_ACTIVITIES_ON_A_SCALE_FROM_0_TO_100_WITH_100_BEING_YOUR_LEVEL_OF_KNEE_FUNCTION_PRIOR_TO_YOUR_INJURY_AND_0_BEING_THE_INABILITY_TO_PERFORM_ANY_OF_YOUR_USUAL_DAILY_ACTIVITIES?: 40
KNEE_ACTIVITY_OF_DAILY_LIVING_SUM: 19
SWELLING: THE SYMPTOM AFFECTS MY ACTIVITY SLIGHTLY
HOW_WOULD_YOU_RATE_THE_CURRENT_FUNCTION_OF_YOUR_KNEE_DURING_YOUR_USUAL_DAILY_ACTIVITIES_ON_A_SCALE_FROM_0_TO_100_WITH_100_BEING_YOUR_LEVEL_OF_KNEE_FUNCTION_PRIOR_TO_YOUR_INJURY_AND_0_BEING_THE_INABILITY_TO_PERFORM_ANY_OF_YOUR_USUAL_DAILY_ACTIVITIES?: 40
PLEASE_INDICATE_YOR_PRIMARY_REASON_FOR_REFERRAL_TO_THERAPY:: KNEE
WEAKNESS: THE SYMPTOM AFFECTS MY ACTIVITY MODERATELY
PAIN: THE SYMPTOM AFFECTS MY ACTIVITY MODERATELY
GIVING WAY, BUCKLING OR SHIFTING OF KNEE: THE SYMPTOM AFFECTS MY ACTIVITY SLIGHTLY
GIVING WAY, BUCKLING OR SHIFTING OF KNEE: THE SYMPTOM AFFECTS MY ACTIVITY SLIGHTLY

## 2024-12-26 NOTE — PROGRESS NOTES
PHYSICAL THERAPY EVALUATION  Type of Visit: Evaluation       Fall Risk Screen:  Fall screen completed by: PT  Have you fallen 2 or more times in the past year?: No  Have you fallen and had an injury in the past year?: No  Is patient a fall risk?: No    Subjective         Presenting condition or subjective complaint: (Patient-Rptd) Rehabilitation of right knee from replacement surgery  Date of onset: 12/03/24    Relevant medical history: (Patient-Rptd) Arthritis; Asthma; High blood pressure; Migraines or headaches; Overweight; Sleep disorder like apnea   Dates & types of surgery: (Patient-Rptd) 6/3/24, salivary stones removed    Prior diagnostic imaging/testing results: (Patient-Rptd) MRI; X-ray     Prior therapy history for the same diagnosis, illness or injury: (Patient-Rptd) Yes (Patient-Rptd) 2016 injured same knee    Prior Level of Function  Transfers:   Ambulation:   ADL:   IADL:     Living Environment  Social support: (Patient-Rptd) With a significant other or spouse   Type of home: (Patient-Rptd) House   Stairs to enter the home: (Patient-Rptd) Yes (Patient-Rptd) 4 Is there a railing: (Patient-Rptd) No     Ramp: (Patient-Rptd) No   Stairs inside the home: (Patient-Rptd) Yes (Patient-Rptd) 13 Is there a railing: (Patient-Rptd) Yes     Help at home: (Patient-Rptd) Self Cares (home health aide/personal care attendant, family, etc)  Equipment owned: (Patient-Rptd) Straight Cane; Walker with wheels; Crutches; Grab bars; Raised toilet seat     Employment: (Patient-Rptd) Yes (Patient-Rptd) [employer]   Hobbies/Interests: (Patient-Rptd) Fishing, BBQ, sports fan    Patient goals for therapy: (Patient-Rptd) Get back to work    Physical Therapy Initial Evaluation: Subjective History    Surgical Procedure: R TKA  Date of Surgery: 12/4/24 initial arthroplasty; 12/11/24 would debridement.    Surgeon Name: Dr. Ronan Arenas  Precautions/Restrictions/MD instructions: Patient is a 54-year-old male seen here today now 3  weeks status post right total knee arthroplasty 2 weeks status post right knee incision and debridement with polyethylene exchange and revision closure. Incision is healing appropriately. No signs of drainage. Staples were removed today. At this time point we will restart his aspirin for DVT prophylaxis. He will restart physical therapy later this week for active and passive range of motion. I will not place any range of motion restrictions on him. He will follow-up with me in approximately 4 weeks for 6-week follow-up and x-rays.     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)  Desired Physical Activity (Goals): Walking, lifting    Red Flags: (Bold when present) - reviewed the following and denies  Malaise, unexplained weight loss, night pain, fever    Post-Operative Physical Therapy Examination    Physical Mobility Status  Gait: Step to pattern with flat foot initial contact on R with bilaterally axillary crutches  Transfers:  Independent - no assist needed    Anthropometric Measures     Right Left Difference   Joint ROM      Hyperextension      Extension Lacking 2 deg     Flexion 62 deg     Circumferential Measures      Joint Line      15 cm Prox      Effusion 3+         Quadriceps Muscle Activation Right Left   Isometric Quad Activation Fair Normal   Straight Leg Raising Unable to perform No  extensor lag     Status of Incision: Clean & healing      Assessment & Plan   CLINICAL IMPRESSIONS  Medical Diagnosis: S/P R TKA    Treatment Diagnosis: S/P R TKA   Impression/Assessment: Patient is a 54 year old male with right knee complaints.  The following significant findings have been identified: Pain, Decreased ROM/flexibility, Decreased joint mobility, Decreased strength, Impaired balance, Decreased proprioception, Impaired sensation, Inflammation, Edema, Impaired gait, Impaired muscle performance, Decreased activity tolerance, and Impaired posture. These impairments interfere with their ability to perform self care tasks, work tasks, recreational activities, household chores, driving , household mobility, and community mobility as compared to previous level of function.     Clinical Decision Making (Complexity):  Clinical Presentation: Stable/Uncomplicated  Clinical Presentation Rationale: based on medical and personal factors listed in PT evaluation  Clinical Decision Making (Complexity): Low complexity    PLAN OF CARE  Treatment Interventions:  Modalities: Cryotherapy, E-stim  Interventions: Gait Training, Manual Therapy, Neuromuscular Re-education, Therapeutic Activity, Therapeutic Exercise, Self-Care/Home Management    Long Term Goals     PT Goal 1  Goal Identifier: LTG1  Goal Description: Patient will be able to walk with a normal gait pattern and have 0/10 left knee pain when on feet for more than one hour  Rationale: to maximize safety and independence with performance of ADLs and functional tasks;to maximize safety and independence within the community;to maximize safety and independence within the home;to maximize safety and independence with transportation;to maximize safety and independence with self cares  Target Date: 02/20/25  PT Goal 2  Goal Identifier: LTG2  Goal Description: Patient will report an improvement of at least 7 on the KOS to demonstrate MCID  Rationale: to maximize safety and  independence with performance of ADLs and functional tasks;to maximize safety and independence within the home;to maximize safety and independence within the community;to maximize safety and independence with transportation;to maximize safety and independence with self cares  Target Date: 02/20/25      Frequency of Treatment: 2x/wk  Duration of Treatment: 8 weeks    Recommended Referrals to Other Professionals:   Education Assessment:   Learner/Method: Patient    Risks and benefits of evaluation/treatment have been explained.   Patient/Family/caregiver agrees with Plan of Care.     Evaluation Time:     PT Eval, Low Complexity Minutes (59680): 20       Signing Clinician: Hernandez Honeycutt PT

## 2024-12-30 ENCOUNTER — MYC MEDICAL ADVICE (OUTPATIENT)
Dept: ORTHOPEDICS | Facility: CLINIC | Age: 54
End: 2024-12-30
Payer: COMMERCIAL

## 2024-12-31 ENCOUNTER — THERAPY VISIT (OUTPATIENT)
Dept: PHYSICAL THERAPY | Facility: CLINIC | Age: 54
End: 2024-12-31
Payer: COMMERCIAL

## 2024-12-31 DIAGNOSIS — M17.11 PRIMARY OSTEOARTHRITIS OF RIGHT KNEE: Primary | ICD-10-CM

## 2024-12-31 DIAGNOSIS — Z96.651 S/P TOTAL KNEE ARTHROPLASTY, RIGHT: ICD-10-CM

## 2024-12-31 PROCEDURE — 97110 THERAPEUTIC EXERCISES: CPT | Mod: GP | Performed by: PHYSICAL THERAPIST

## 2025-01-02 ENCOUNTER — THERAPY VISIT (OUTPATIENT)
Dept: PHYSICAL THERAPY | Facility: CLINIC | Age: 55
End: 2025-01-02
Payer: COMMERCIAL

## 2025-01-02 DIAGNOSIS — Z96.652 S/P TOTAL KNEE ARTHROPLASTY, LEFT: ICD-10-CM

## 2025-01-02 DIAGNOSIS — Z96.651 S/P TKR (TOTAL KNEE REPLACEMENT) USING CEMENT, RIGHT: Primary | ICD-10-CM

## 2025-01-07 ENCOUNTER — THERAPY VISIT (OUTPATIENT)
Dept: PHYSICAL THERAPY | Facility: CLINIC | Age: 55
End: 2025-01-07
Payer: COMMERCIAL

## 2025-01-07 DIAGNOSIS — Z96.652 S/P TOTAL KNEE ARTHROPLASTY, LEFT: ICD-10-CM

## 2025-01-07 DIAGNOSIS — Z96.651 S/P TKR (TOTAL KNEE REPLACEMENT) USING CEMENT, RIGHT: Primary | ICD-10-CM

## 2025-01-07 PROCEDURE — 97140 MANUAL THERAPY 1/> REGIONS: CPT | Mod: GP | Performed by: PHYSICAL THERAPIST

## 2025-01-07 PROCEDURE — 97110 THERAPEUTIC EXERCISES: CPT | Mod: GP | Performed by: PHYSICAL THERAPIST

## 2025-01-09 ENCOUNTER — TELEPHONE (OUTPATIENT)
Dept: ORTHOPEDICS | Facility: CLINIC | Age: 55
End: 2025-01-09

## 2025-01-09 ENCOUNTER — THERAPY VISIT (OUTPATIENT)
Dept: PHYSICAL THERAPY | Facility: CLINIC | Age: 55
End: 2025-01-09
Payer: COMMERCIAL

## 2025-01-09 DIAGNOSIS — Z96.651 S/P TKR (TOTAL KNEE REPLACEMENT) USING CEMENT, RIGHT: Primary | ICD-10-CM

## 2025-01-09 DIAGNOSIS — Z96.652 S/P TOTAL KNEE ARTHROPLASTY, LEFT: ICD-10-CM

## 2025-01-15 ENCOUNTER — THERAPY VISIT (OUTPATIENT)
Dept: PHYSICAL THERAPY | Facility: CLINIC | Age: 55
End: 2025-01-15
Payer: COMMERCIAL

## 2025-01-15 DIAGNOSIS — Z96.652 S/P TOTAL KNEE ARTHROPLASTY, LEFT: ICD-10-CM

## 2025-01-15 DIAGNOSIS — Z96.651 S/P TKR (TOTAL KNEE REPLACEMENT) USING CEMENT, RIGHT: Primary | ICD-10-CM

## 2025-01-15 PROCEDURE — 97140 MANUAL THERAPY 1/> REGIONS: CPT | Mod: GP | Performed by: PHYSICAL THERAPIST

## 2025-01-15 PROCEDURE — 97110 THERAPEUTIC EXERCISES: CPT | Mod: GP | Performed by: PHYSICAL THERAPIST

## 2025-01-16 NOTE — PROGRESS NOTES
01/15/25 0500   Appointment Info   Signing clinician's name / credentials Chasidy Tam, DPT, ATC   Total/Authorized Visits E and T (16 planned)   Visits Used 5   Medical Diagnosis S/P R TKA   PT Tx Diagnosis S/P R TKA   Precautions/Limitations DOS: 12/3/24 - Wound Debridement Surgery 12/11/24   Other pertinent information Goes back to work Feb 17th, Works at a [employer] for [employer], he is on his feet alot and does repetative lifting up to 18lbs   Progress Note/Certification   Onset of illness/injury or Date of Surgery 12/03/24   Therapy Frequency 2x/wk   Predicted Duration 8 weeks   Progress Note Due Date 02/20/25   Progress Note Completed Date 12/26/24   GOALS   PT Goals 2   PT Goal 1   Goal Identifier [email or web address]   Goal Description Patient will be able to walk with a normal gait pattern and have 0/10 left knee pain when on feet for more than one hour   Rationale to maximize safety and independence with performance of ADLs and functional tasks;to maximize safety and independence within the community;to maximize safety and independence within the home;to maximize safety and independence with transportation;to maximize safety and independence with self cares   Target Date 02/20/25   PT Goal 2   Goal Identifier [email or web address]   Goal Description Patient will report an improvement of at least 7 on the KOS to demonstrate MCID   Rationale to maximize safety and independence with performance of ADLs and functional tasks;to maximize safety and independence within the home;to maximize safety and independence within the community;to maximize safety and independence with transportation;to maximize safety and independence with self cares   Target Date 02/20/25   Subjective Report   Subjective Report Going to [place]; wondering about pool access/ and travel.   Objective Measures   Objective Measures Objective Measure 1;Objective Measure 2   Objective Measure 1   Objective Measure R knee AAROM 0-95 start of session   Details ROM 0-101 by end  "of session   Objective Measure 2   Objective Measure SEC with gait; slow, heel strike. SLR with slight ext lag   Treatment Interventions (PT)   Interventions Therapeutic Procedure/Exercise;Gait Training;Manual Therapy   Therapeutic Procedure/Exercise   Therapeutic Procedures: strength, endurance, ROM, flexibility minutes (77079) 25   Therapeutic Procedures Ther Proc 2;Ther Proc 3;Ther Proc 4;Ther Proc 5   Ther Proc 1 Patient Education   Ther Proc 1 - Details Anatomy, pathophysiology, activity modifications, PT prognosis   Ther Proc 2 Nustep sh9 lvl1   Ther Proc 2 - Details x 5' warm up  (next visit with inc knee flex)   Ther Proc 3 deep knee bends   Ther Proc 3 - Details rev x 10; cued for ws forward and inc depth.   Ther Proc 4 12\" stair lunge knee flex   Ther Proc 4 - Details rev x 15   Ther Proc 5 Leg Press   Ther Proc 5 - Details sh2; 4.5 pl 2 x 10, first 10 warm up, second set with inc knee flex   PTRx Ther Proc 1 Passive Knee Extension Stretch   PTRx Ther Proc 1 - Details HEP - reviewed to perform throughout the day to help with extension   PTRx Ther Proc 2 Towel Stretch Gastroc   PTRx Ther Proc 2 - Details HEP    PTRx Ther Proc 3 Heel Slides   PTRx Ther Proc 3 - Details rev   PTRx Ther Proc 4 Functional Knee Extension with Tubing   PTRx Ther Proc 4 - Details HEP   PTRx Ther Proc 5 Seated Heel Slide with Foot on the Floor   PTRx Ther Proc 5 - Details HEP - reviewed today with sitting back in chair pull foot back then scoot body forwards for assistance of body weight   PTRx Ther Proc 6 Isometric Quad   PTRx Ther Proc 6 - Details HEP   PTRx Ther Proc 7 Short Arc Knee Extension (Long Sitting)   PTRx Ther Proc 7 - Details PT assist to tke; ecc lowering   PTRx Ther Proc 8 Short Arc Knee Extension   PTRx Ther Proc 8 - Details x 15   PTRx Ther Proc 10 Knee Bends   PTRx Ther Proc 10 - Details supported knee bend; inc depth x 15   PTRx Ther Proc 11 Stair Knee Flexion Stretch   PTRx Ther Proc 11 - Details verbal rev; pt " performing at home   PTRx Ther Proc 12 Knee Wall Slide   PTRx Ther Proc 12 - Details verbal rev; pt performing at home   Skilled Intervention Improve knee ROM, initiate quad activation, reduce swelling   Patient Response/Progress Patient tolerated well - some improvement  in ROM   Manual Therapy   Manual Therapy: Mobilization, MFR, MLD, friction massage minutes ([id number]) 13   Manual Therapy 1 clearing along incision, tibiofemoral joint, distal quad   Manual Therapy 1 - Details suprapatellar clearing   Patient Response/Progress pt understanding; slight improvement in knee flex   Manual Therapy 2 instr pt on self mob for patella and scar mobilization   Manual Therapy Manual Therapy 2   Education   Learner/Method Patient   Plan   Home program see PTRX   Updates to plan of care increased stiffness today   Plan for next session ROM, quad activation, decrease swelling, gait mechanics   Total Session Time   Timed Code Treatment Minutes 38   Total Treatment Time (sum of timed and untimed services) 38     PLAN  Increased stiffness in knee compared to post op status, return to MD for post operative care    Beginning/End Dates of Progress Note Reporting Period:  12/26/24 to 01/15/2025    Referring Provider:  Ronan Arenas

## 2025-01-18 ASSESSMENT — KOOS JR
RISING FROM SITTING: MILD
HOW SEVERE IS YOUR KNEE STIFFNESS AFTER FIRST WAKING IN MORNING: MODERATE
TWISING OR PIVOTING ON KNEE: MILD
KOOS JR SCORING: 76.33

## 2025-01-20 ENCOUNTER — OFFICE VISIT (OUTPATIENT)
Dept: ORTHOPEDICS | Facility: CLINIC | Age: 55
End: 2025-01-20
Payer: COMMERCIAL

## 2025-01-20 ENCOUNTER — ANCILLARY PROCEDURE (OUTPATIENT)
Dept: GENERAL RADIOLOGY | Facility: CLINIC | Age: 55
End: 2025-01-20
Attending: STUDENT IN AN ORGANIZED HEALTH CARE EDUCATION/TRAINING PROGRAM
Payer: COMMERCIAL

## 2025-01-20 VITALS — DIASTOLIC BLOOD PRESSURE: 83 MMHG | SYSTOLIC BLOOD PRESSURE: 157 MMHG

## 2025-01-20 DIAGNOSIS — Z96.651 S/P TKR (TOTAL KNEE REPLACEMENT) USING CEMENT, RIGHT: ICD-10-CM

## 2025-01-20 DIAGNOSIS — Z96.651 S/P TKR (TOTAL KNEE REPLACEMENT) USING CEMENT, RIGHT: Primary | ICD-10-CM

## 2025-01-20 PROCEDURE — 99024 POSTOP FOLLOW-UP VISIT: CPT | Mod: GC | Performed by: STUDENT IN AN ORGANIZED HEALTH CARE EDUCATION/TRAINING PROGRAM

## 2025-01-20 PROCEDURE — 73562 X-RAY EXAM OF KNEE 3: CPT | Mod: TC | Performed by: RADIOLOGY

## 2025-01-20 RX ORDER — AMOXICILLIN 500 MG/1
2000 CAPSULE ORAL ONCE
Qty: 4 CAPSULE | Refills: 0 | Status: SHIPPED | OUTPATIENT
Start: 2025-01-20 | End: 2025-01-20

## 2025-01-20 NOTE — LETTER
1/20/2025      Thai Mancia  [address]  [facility] [address]      Dear Colleague,    Thank you for referring your patient, Thai Mancia, to the [facility] [place]. Please see a copy of my visit note below.    CC: Follow up right knee    HPI: 53 yo male 6 weeks s/p right total knee arthroplasty with return to OR on POD 5 for I&D and poly exchange due to post operative bleeding. Overall he is doing very well. He has minimal knee pain. He has not had any new concerns with his incision. He has been working with PT twice a week. He has been ambulating with the use of a cane. He states his knee swelling is slowly improving. He has been using a tubigrip as well as ice to help with swelling.     Objective:   PE:  Right knee:  Well healed surgical incision  Moderate knee effusion  Knee ROM 0-100 degrees   Able to straight leg raise.   5/5 strength ankle plantarflexion and dorisflexion       Imaging:   3v xray right knee demonstrates stable total knee arthroplasty components. No periprosthetic fracture or dislocation.     A/P:  53 yo male 6 weeks s/p right total knee arthroplasty and 5 weeks status post right total knee arthroplasty 2 weeks status post right knee incision and debridement with polyethylene exchange and revision closure. Incision well healed. We discussed he may discontinue his aspirin for DVT prophylaxis. We discussed that we will prescribe antibiotics for any dental work to be done in the next year. Continue working with PT for knee range of motion. Follow up on March 3rd to discuss possible return to work.     Patient seen with Dr. Deepa Hayden MD PGY5    I have reviewed and agree with the plan.  Patient is doing quite well.  His incision is healed well.  No clinical signs of infection.  He has done a really nice job of improving his active and passive range of motion.  He is able to get the full extension.  He has completed his aspirin for DVT  prophylaxis.  We discussed oral antibiotic dental prophylaxis.  I will extend his his work leave until his 3-month follow-up appointment on March 3.  Discussed with her at that standpoint I will plan to clear him to return to work with unrestricted walking and standing, no kneeling, limiting repetitive squatting, and a 25 to 30 pound lifting restriction.  He will follow-up with me in 6 weeks.    Ronan Arenas MD    [facility]   Department of Orthopedic Surgery     Disclaimer: This note consists of symbols derived from keyboarding, dictation and/or voice recognition software. As a result, there may be errors in the script that have gone undetected. Please consider this when interpreting information found in this chart.      Again, thank you for allowing me to participate in the care of your patient.        Sincerely,        Ronan Arenas MD    Electronically signed

## 2025-01-20 NOTE — PROGRESS NOTES
CC: Follow up right knee    HPI: 55 yo male 6 weeks s/p right total knee arthroplasty with return to OR on POD 5 for I&D and poly exchange due to post operative bleeding. Overall he is doing very well. He has minimal knee pain. He has not had any new concerns with his incision. He has been working with PT twice a week. He has been ambulating with the use of a cane. He states his knee swelling is slowly improving. He has been using a tubigrip as well as ice to help with swelling.     Objective:   PE:  Right knee:  Well healed surgical incision  Moderate knee effusion  Knee ROM 0-100 degrees   Able to straight leg raise.   5/5 strength ankle plantarflexion and dorisflexion       Imaging:   3v xray right knee demonstrates stable total knee arthroplasty components. No periprosthetic fracture or dislocation.     A/P:  55 yo male 6 weeks s/p right total knee arthroplasty and 5 weeks status post right total knee arthroplasty 2 weeks status post right knee incision and debridement with polyethylene exchange and revision closure. Incision well healed. We discussed he may discontinue his aspirin for DVT prophylaxis. We discussed that we will prescribe antibiotics for any dental work to be done in the next year. Continue working with PT for knee range of motion. Follow up on March 3rd to discuss possible return to work.     Patient seen with Dr. Deepa Hayden MD PGY5    I have reviewed and agree with the plan.  Patient is doing quite well.  His incision is healed well.  No clinical signs of infection.  He has done a really nice job of improving his active and passive range of motion.  He is able to get the full extension.  He has completed his aspirin for DVT prophylaxis.  We discussed oral antibiotic dental prophylaxis.  I will extend his his work leave until his 3-month follow-up appointment on March 3.  Discussed with her at that standpoint I will plan to clear him to return to work with unrestricted walking and  standing, no kneeling, limiting repetitive squatting, and a 25 to 30 pound lifting restriction.  He will follow-up with me in 6 weeks.    Ronan Arenas MD    [facility]   Department of Orthopedic Surgery     Disclaimer: This note consists of symbols derived from keyboarding, dictation and/or voice recognition software. As a result, there may be errors in the script that have gone undetected. Please consider this when interpreting information found in this chart.

## 2025-01-21 ENCOUNTER — THERAPY VISIT (OUTPATIENT)
Dept: PHYSICAL THERAPY | Facility: CLINIC | Age: 55
End: 2025-01-21
Payer: COMMERCIAL

## 2025-01-21 DIAGNOSIS — Z96.652 S/P TOTAL KNEE ARTHROPLASTY, LEFT: ICD-10-CM

## 2025-01-21 DIAGNOSIS — Z96.651 S/P TKR (TOTAL KNEE REPLACEMENT) USING CEMENT, RIGHT: Primary | ICD-10-CM

## 2025-01-21 PROCEDURE — 97110 THERAPEUTIC EXERCISES: CPT | Mod: GP

## 2025-01-23 ENCOUNTER — THERAPY VISIT (OUTPATIENT)
Dept: PHYSICAL THERAPY | Facility: CLINIC | Age: 55
End: 2025-01-23
Payer: COMMERCIAL

## 2025-01-23 DIAGNOSIS — Z96.652 S/P TOTAL KNEE ARTHROPLASTY, LEFT: ICD-10-CM

## 2025-01-23 DIAGNOSIS — Z96.651 S/P TKR (TOTAL KNEE REPLACEMENT) USING CEMENT, RIGHT: Primary | ICD-10-CM

## 2025-01-28 ENCOUNTER — THERAPY VISIT (OUTPATIENT)
Dept: PHYSICAL THERAPY | Facility: CLINIC | Age: 55
End: 2025-01-28
Payer: COMMERCIAL

## 2025-01-28 DIAGNOSIS — Z96.651 S/P TKR (TOTAL KNEE REPLACEMENT) USING CEMENT, RIGHT: Primary | ICD-10-CM

## 2025-01-28 DIAGNOSIS — Z96.652 S/P TOTAL KNEE ARTHROPLASTY, LEFT: ICD-10-CM

## 2025-01-28 PROCEDURE — 97110 THERAPEUTIC EXERCISES: CPT | Mod: GP

## 2025-01-28 PROCEDURE — 97140 MANUAL THERAPY 1/> REGIONS: CPT | Mod: GP

## 2025-01-30 ENCOUNTER — THERAPY VISIT (OUTPATIENT)
Dept: PHYSICAL THERAPY | Facility: CLINIC | Age: 55
End: 2025-01-30
Payer: COMMERCIAL

## 2025-01-30 DIAGNOSIS — Z96.651 S/P TKR (TOTAL KNEE REPLACEMENT) USING CEMENT, RIGHT: Primary | ICD-10-CM

## 2025-01-30 DIAGNOSIS — Z96.652 S/P TOTAL KNEE ARTHROPLASTY, LEFT: ICD-10-CM

## 2025-02-05 ENCOUNTER — THERAPY VISIT (OUTPATIENT)
Dept: PHYSICAL THERAPY | Facility: CLINIC | Age: 55
End: 2025-02-05
Payer: COMMERCIAL

## 2025-02-05 ENCOUNTER — MYC MEDICAL ADVICE (OUTPATIENT)
Dept: ORTHOPEDICS | Facility: CLINIC | Age: 55
End: 2025-02-05

## 2025-02-05 DIAGNOSIS — Z96.651 S/P TKR (TOTAL KNEE REPLACEMENT) USING CEMENT, RIGHT: Primary | ICD-10-CM

## 2025-02-05 DIAGNOSIS — M25.561 RIGHT KNEE PAIN, UNSPECIFIED CHRONICITY: ICD-10-CM

## 2025-02-05 PROCEDURE — 97110 THERAPEUTIC EXERCISES: CPT | Mod: GP | Performed by: PHYSICAL THERAPIST

## 2025-02-06 NOTE — TELEPHONE ENCOUNTER
Patient is s/p right total knee arthroplasty with return to OR on POD 5 for I&D and poly exchange due to post operative bleeding.     See patient message and advise on recommendations, as there are no available appointments prior to patient's requested return to work date of 2/17/25.    Eunice Kaur, ATC

## 2025-02-07 ENCOUNTER — APPOINTMENT (OUTPATIENT)
Dept: ULTRASOUND IMAGING | Facility: CLINIC | Age: 55
End: 2025-02-07
Attending: EMERGENCY MEDICINE
Payer: COMMERCIAL

## 2025-02-07 ENCOUNTER — HOSPITAL ENCOUNTER (EMERGENCY)
Facility: CLINIC | Age: 55
Discharge: HOME | End: 2025-02-07
Attending: EMERGENCY MEDICINE | Admitting: EMERGENCY MEDICINE
Payer: COMMERCIAL

## 2025-02-07 VITALS
OXYGEN SATURATION: 99 % | SYSTOLIC BLOOD PRESSURE: 146 MMHG | WEIGHT: 263.6 LBS | HEIGHT: 71 IN | RESPIRATION RATE: 20 BRPM | BODY MASS INDEX: 36.9 KG/M2 | HEART RATE: 76 BPM | TEMPERATURE: 97.7 F | DIASTOLIC BLOOD PRESSURE: 83 MMHG

## 2025-02-07 DIAGNOSIS — I82.411 ACUTE DEEP VEIN THROMBOSIS (DVT) OF FEMORAL VEIN OF RIGHT LOWER EXTREMITY (H): ICD-10-CM

## 2025-02-07 LAB
ANION GAP SERPL CALCULATED.3IONS-SCNC: 11 MMOL/L (ref 7–15)
BASOPHILS # BLD AUTO: 0.1 10E3/UL (ref 0–0.2)
BASOPHILS NFR BLD AUTO: 1 %
BUN SERPL-MCNC: 22.4 MG/DL (ref 6–20)
CALCIUM SERPL-MCNC: 8.7 MG/DL (ref 8.8–10.4)
CHLORIDE SERPL-SCNC: 103 MMOL/L (ref 98–107)
CREAT SERPL-MCNC: 0.97 MG/DL (ref 0.67–1.17)
EGFRCR SERPLBLD CKD-EPI 2021: >90 ML/MIN/1.73M2
EOSINOPHIL # BLD AUTO: 0.3 10E3/UL (ref 0–0.7)
EOSINOPHIL NFR BLD AUTO: 5 %
ERYTHROCYTE [DISTWIDTH] IN BLOOD BY AUTOMATED COUNT: 13.5 % (ref 10–15)
GLUCOSE SERPL-MCNC: 99 MG/DL (ref 70–99)
HCO3 SERPL-SCNC: 24 MMOL/L (ref 22–29)
HCT VFR BLD AUTO: 40 % (ref 40–53)
HGB BLD-MCNC: 13.2 G/DL (ref 13.3–17.7)
HOLD SPECIMEN: NORMAL
IMM GRANULOCYTES # BLD: 0 10E3/UL
IMM GRANULOCYTES NFR BLD: 0 %
LYMPHOCYTES # BLD AUTO: 2.2 10E3/UL (ref 0.8–5.3)
LYMPHOCYTES NFR BLD AUTO: 30 %
MCH RBC QN AUTO: 29.5 PG (ref 26.5–33)
MCHC RBC AUTO-ENTMCNC: 33 G/DL (ref 31.5–36.5)
MCV RBC AUTO: 89 FL (ref 78–100)
MONOCYTES # BLD AUTO: 0.6 10E3/UL (ref 0–1.3)
MONOCYTES NFR BLD AUTO: 8 %
NEUTROPHILS # BLD AUTO: 4.1 10E3/UL (ref 1.6–8.3)
NEUTROPHILS NFR BLD AUTO: 57 %
NRBC # BLD AUTO: 0 10E3/UL
NRBC BLD AUTO-RTO: 0 /100
PLATELET # BLD AUTO: 286 10E3/UL (ref 150–450)
POTASSIUM SERPL-SCNC: 3.8 MMOL/L (ref 3.4–5.3)
RBC # BLD AUTO: 4.48 10E6/UL (ref 4.4–5.9)
SODIUM SERPL-SCNC: 138 MMOL/L (ref 135–145)
WBC # BLD AUTO: 7.3 10E3/UL (ref 4–11)

## 2025-02-07 PROCEDURE — 99284 EMERGENCY DEPT VISIT MOD MDM: CPT | Mod: 25

## 2025-02-07 PROCEDURE — 80048 BASIC METABOLIC PNL TOTAL CA: CPT | Performed by: EMERGENCY MEDICINE

## 2025-02-07 PROCEDURE — 36415 COLL VENOUS BLD VENIPUNCTURE: CPT | Performed by: EMERGENCY MEDICINE

## 2025-02-07 PROCEDURE — 82374 ASSAY BLOOD CARBON DIOXIDE: CPT | Performed by: EMERGENCY MEDICINE

## 2025-02-07 PROCEDURE — 85025 COMPLETE CBC W/AUTO DIFF WBC: CPT | Performed by: EMERGENCY MEDICINE

## 2025-02-07 PROCEDURE — 93971 EXTREMITY STUDY: CPT | Mod: RT

## 2025-02-07 RX ORDER — APIXABAN 5 MG (74)
KIT ORAL
Qty: 1 EACH | Refills: 0 | Status: SHIPPED | OUTPATIENT
Start: 2025-02-07 | End: 2025-03-09

## 2025-02-07 ASSESSMENT — ACTIVITIES OF DAILY LIVING (ADL)
ADLS_ACUITY_SCORE: 47
ADLS_ACUITY_SCORE: 47

## 2025-02-07 ASSESSMENT — COLUMBIA-SUICIDE SEVERITY RATING SCALE - C-SSRS
6. HAVE YOU EVER DONE ANYTHING, STARTED TO DO ANYTHING, OR PREPARED TO DO ANYTHING TO END YOUR LIFE?: NO
1. IN THE PAST MONTH, HAVE YOU WISHED YOU WERE DEAD OR WISHED YOU COULD GO TO SLEEP AND NOT WAKE UP?: NO
2. HAVE YOU ACTUALLY HAD ANY THOUGHTS OF KILLING YOURSELF IN THE PAST MONTH?: NO

## 2025-02-08 NOTE — ED PROVIDER NOTES
Emergency Department Note      History of Present Illness     Chief Complaint   Leg Pain (Pt had right total knee replacement on 12.4.24. Had some right calf pain last week but pain went away but today noticed a lump in his right upper thigh. Denies fevers.)    ALEXSANDRA Mancia is a 54 year old male with history of recent knee arthroplasty who presents to the ED for evaluation of right leg pain. Jean-Claude reports that he had a knee arthroplasty on 12/04, on 12/11 he needed to have a revisionary surgery due to fluid leaking.  The patient developed some pain in the right calf and has noticed swelling in the right calf and popliteal region. Jean-Claude denies shortness of breath, and he has no chest pain pleuritic or otherwise.    Independent Historian   None    Review of External Notes       Past Medical History     Medical History and Problem List   Asthma  Diverticulitis  Essential hypertension  Obesity  Osteoarthritis  Phimosis   Salivary gland stone     Medications   Aspirin 81 mg   Hyzaar    Surgical History   Past Surgical History:   Procedure Laterality Date    ARTHROPLASTY KNEE Right 12/04/2024    Procedure: Right total knee arthroplasty;  Surgeon: Ronan Arenas MD;  Location:  OR    CIRCUMCISION N/A 11/18/2021    Procedure: CIRCUMCISION;  Surgeon: Toy Feliz MD;  Location: [facility] OR    COLONOSCOPY N/A 12/13/2021    Procedure: COLONOSCOPY;  Surgeon: Claire Browning MD;  Location:  GI    EXCHANGE POLY COMPONENT ARTHROPLASTY KNEE Right 12/11/2024    Procedure: Right knee incision and debridement with polyethylene exchange;  Surgeon: Ronan Arenas MD;  Location: [facility] OR    EXCISE GLAND SUBMANDIBULAR Left 06/03/2024    Procedure: excision of left submandibular gland;  Surgeon: Bassam Mendez MD;  Location: [facility] OR    KNEE SURGERY Left 1985     Physical Exam     Patient Vitals for the past 24 hrs:   BP Temp Temp src Pulse Resp SpO2 Height Weight   02/07/25 2137 (!) 158/92 97.7  F (36.5  C) Oral 88  "18 99 % 1.803 m (5' 11\") 119.6 kg (263 lb 9.6 oz)     Physical Exam  General: Resting on the ED bed  Head:  The scalp, face, and head appear normal  Eyes:  The pupils are equal, round, and reactive to light    There is no nystagmus    Extraocular muscles are intact    Conjunctivae and sclerae are normal  ENT:    The nose is normal    Pinnae are normal    The oropharynx is normal  Neck:  Normal range of motion    There is no rigidity noted  CV:  Regular rate  Normal underlying rhythm     Normal S1/S2    No pathological murmur detected  Resp:  Lungs are clear    There is no tachypnea    Non-labored    No rales    No wheezing   GI:  Abdomen is soft, there is no rigidity    No distension/tympani    No rebound tenderness     Non-surgical without peritoneal features at this time  MS:  The right knee has a normal total knee arthroplasty incision centrally located.  The knee is slightly warm but not reddened and has no pain.  The posterior calf demonstrates increased swelling and tenderness involving the popliteal and posterior tibial vein region.  There is slight increase in pitting edema to the foot ankle and shin involving the right leg compared with the left.  The patient does have a tender area in the right groin as well, which is noted on ultrasound to be a slightly enlarged lymph node.  There is no cellulitis.    Skin:  No rash or acute skin lesions noted  Neuro:  Speech is normal and fluent, there is no aphasia    No motor deficits    Cranial nerves are intact  Psych: Awake. Alert.      Normal affect  Appropriate interactions    Diagnostics     Lab Results   Labs Ordered and Resulted from Time of ED Arrival to Time of ED Departure   BASIC METABOLIC PANEL - Abnormal       Result Value    Sodium 138      Potassium 3.8      Chloride 103      Carbon Dioxide (CO2) 24      Anion Gap 11      Urea Nitrogen 22.4 (*)     Creatinine 0.97      GFR Estimate >90      Calcium 8.7 (*)     Glucose 99     CBC WITH PLATELETS AND " DIFFERENTIAL - Abnormal    WBC Count 7.3      RBC Count 4.48      Hemoglobin 13.2 (*)     Hematocrit 40.0      MCV 89      MCH 29.5      MCHC 33.0      RDW 13.5      Platelet Count 286      % Neutrophils 57      % Lymphocytes 30      % Monocytes 8      % Eosinophils 5      % Basophils 1      % Immature Granulocytes 0      NRBCs per 100 WBC 0      Absolute Neutrophils 4.1      Absolute Lymphocytes 2.2      Absolute Monocytes 0.6      Absolute Eosinophils 0.3      Absolute Basophils 0.1      Absolute Immature Granulocytes 0.0      Absolute NRBCs 0.0       Imaging   US Lower Extremity Venous Duplex Right   Preliminary Result   IMPRESSION: Nonocclusive DVT in the right lower extremity as detailed above.      Findings discussed with Dr. Dawson 2/7/2025 10:33 PM CST        Independent Interpretation   None    ED Course      Medications Administered   Medications - No data to display    Procedures   Procedures     Discussion of Management   None    ED Course   ED Course as of 02/07/25 2308 Fri Feb 07, 2025 2245 I obtained history and examined the patient as noted above.      Additional Documentation  None    Medical Decision Making / Diagnosis     CMS Diagnoses: None    MIPS       None    MDM   Thai Mancia is a 54 year old male is status post total knee arthroplasty in December, who is still engaging in physical therapy on the right knee, he developed increasing swelling and calf pain.  Ultrasound is consistent with DVT as noted above.  He has no symptoms compatible with acute PE.  There is no dyspnea pleuritic chest pain hemoptysis tachycardia or other abnormality.  The patient will be started on Eliquis anticoagulation with a starter pack which she is to  this evening.  He will be on this for the next 30 days and then he will need a 2-month refill on the Eliquis via his regular providers for ongoing monitoring.  A follow-up ultrasound in the future will be indicated.  I did refer the patient back to  primary care, he can also see vascular medicine for coordination of care if he desires.  This is a provoked or precipitated DVT and the patient has no history otherwise of this.    Disposition   The patient was discharged.     Diagnosis     ICD-10-CM    1. Acute deep vein thrombosis (DVT) of femoral vein of right lower extremity (H)  I82.411          Discharge Medications   New Prescriptions    APIXABAN STARTER PACK (ELIQUIS DVT/PE STARTER PACK) 5 MG TBPK    Take 10 mg by mouth 2 times daily for 7 days, THEN 5 mg 2 times daily for 23 days.     IChasidy, am serving as a scribe at 10:43 PM on 2/7/2025 to document services personally performed by Connor Rivera MD based on my observations and the provider's statements to me.        Connor Rivera MD  02/07/25 8019

## 2025-02-08 NOTE — DISCHARGE INSTRUCTIONS
Start Eliquis starter pack today, take this for the next 1 month.  You will need to see your provider or a vascular medicine doctor to get the prescriptions for months 2 and 3.  You will likely be on therapy for 3 months.  You may continue with PT.  Return to the emergency department for significant increase in shortness of breath or chest pain.

## 2025-02-08 NOTE — ED TRIAGE NOTES
Pt had right total knee replacement on 12/4/24 and developed some right calf pain last week that has mostly resolved but last night noticed some tenderness in right upper thigh. Today noticed a painful lump there that is slightly reddened.     Triage Assessment (Adult)       Row Name 02/07/25 8466          Triage Assessment    Airway WDL WDL        Respiratory WDL    Respiratory WDL WDL        Skin Circulation/Temperature WDL    Skin Circulation/Temperature WDL X  painful lump in right upper thigh with some redness        Cardiac WDL    Cardiac WDL WDL        Peripheral/Neurovascular WDL    Peripheral Neurovascular WDL WDL        Cognitive/Neuro/Behavioral WDL    Cognitive/Neuro/Behavioral WDL WDL

## 2025-02-10 ENCOUNTER — MYC MEDICAL ADVICE (OUTPATIENT)
Dept: INTERNAL MEDICINE | Facility: CLINIC | Age: 55
End: 2025-02-10

## 2025-02-10 NOTE — TELEPHONE ENCOUNTER
"Please review MyLikes message and advise.   Thank you.    Per ED notes, pt is s/p TKA in December, still engaging in physical therapy on the right knee and developed increasing swelling and calf pain. Ultrasound is consistent with DVT.   Started on Eliquis starter pack x 30 days and then he will need a 2-month refill on the Eliquis via his regular providers for ongoing monitoring. A follow-up ultrasound in the future will be indicated. \"I did refer the patient back to primary care, he can also see vascular medicine for coordination of care if he desires. This is a provoked or precipitated DVT and the patient has no history otherwise of this.\"    "

## 2025-02-11 ENCOUNTER — OFFICE VISIT (OUTPATIENT)
Dept: OTOLARYNGOLOGY | Facility: CLINIC | Age: 55
End: 2025-02-11
Payer: COMMERCIAL

## 2025-02-11 VITALS
OXYGEN SATURATION: 98 % | WEIGHT: 255 LBS | HEIGHT: 71 IN | SYSTOLIC BLOOD PRESSURE: 164 MMHG | DIASTOLIC BLOOD PRESSURE: 82 MMHG | BODY MASS INDEX: 35.7 KG/M2 | HEART RATE: 78 BPM

## 2025-02-11 DIAGNOSIS — K11.5 SALIVARY GLAND STONE: Primary | ICD-10-CM

## 2025-02-11 PROCEDURE — 99213 OFFICE O/P EST LOW 20 MIN: CPT | Performed by: OTOLARYNGOLOGY

## 2025-02-11 ASSESSMENT — PAIN SCALES - GENERAL: PAINLEVEL_OUTOF10: MILD PAIN (3)

## 2025-02-11 NOTE — TELEPHONE ENCOUNTER
Nothing on lab surprising   Hgb lower but recovering from surgery   Would have him take the eliquis for 3 month and then US recheck   As it was provoked- or after a surgery - if all is good shoul only require the 3-4 month of blood thinners

## 2025-02-11 NOTE — LETTER
2025       RE: Thai Mancia  [address]  [facility] [address]     Dear Colleague,    Thank you for referring your patient, Thai Mancia, to the [facility] [place] [facility]. Please see a copy of my visit note below.      Otolaryngology Clinic    Name: Thai Mancia  MRN: [id number]  Age: 54 year old  : 2025      Chief Complaint:   Follow up     History of Present Illness:   Thai Mancia is a 54 year old male with a history of salivary gland stone who presents for follow up. He was last seen in ENT by me on 24. Today, he reports no new issues or concerns. He states he healed up pretty easily with no complaints. He had knee surgery recently.      Review of Systems:   Pertinent items are noted in HPI or as in patient entered ROS below, remainder of complete ROS is negative.       2024     6:57 AM    ENT ROS   Constitutional Appetite change    Problems with sleep   Eyes Double vision   Cardiopulmonary Wheezing   Gastrointestinal/Genitourinary Heartburn/indigestion    Unexplained nausea/vomiting   Musculoskeletal Sore or stiff joints    Swollen joints    Neck pain    Swollen legs/feet   Hematologic Lymph node swelling   Endocrine Thirst    Frequent urination   Skin Change in moles        Physical Exam:   There were no vitals taken for this visit.     PHYSICAL EXAMINATION:    Constitutional:  The patient was unaccompanied, well-groomed, and in no acute distress.    Skin:  Warm and pink.    Neurologic:  Alert and oriented x 3.  CN's III-XII within normal limits.  Voice normal.   Psychiatric:  The patient's affect was calm, cooperative, and appropriate.    Respiratory:  Breathing comfortably without stridor or exertion of accessory muscles.    Eyes: Extraocular movement intact.    Head:  Normocephalic and atraumatic.  No lesions or scars.    OC/OP:  Normal tongue,  floor of mouth, buccal mucosa, and palate.  No lesions or masses on inspection or palpation.  No abnormal lymph tissue in the oropharynx.  The pterygoid region is non-tender.  The scar from previous surgery is almost non-visible and he has healed very well.   Neck:  Supple with normal laryngeal and tracheal landmarks.  The parotid beds were without masses.  No palpable thyroid.  Lymphatic:  There is no palpable lymphadenopathy in the neck.      Assessment and Plan:  No diagnosis found.  Thai Mancia is a 54 year old male with a history of salivary gland stone who presents for follow up. He is healed very well. I have recommended having lemon water or sour candies every now and then to help with his salivary gland. Overall, everything looks great.     Follow-up: No follow-ups on file.         Scribe Disclosure:  I, Jacklyn Tinoco, am serving as a scribe to document services personally performed by Bassam Mendez MD at this visit, based upon the provider's statements to me. All documentation has been reviewed by the aforementioned provider prior to being entered into the official medical record.         Again, thank you for allowing me to participate in the care of your patient.      Sincerely,    Bassam Mendez MD

## 2025-02-11 NOTE — PROGRESS NOTES
[facility]tol[facility]ology Clinic    Name: Thai Mancia  MRN: [id number]  Age: 54 year old  : [birth year]25      Chief Complaint:   Follow up     History of Present Illness:   Thai Mancia is a 54 year old male with a history of salivary gland stone who presents for follow up. He was last seen in ENT by me on 24. Today, he reports no new issues or concerns. He states he healed up pretty easily with no complaints. He had knee surgery recently.      Review of Systems:   Pertinent items are noted in HPI or as in patient entered ROS below, remainder of complete ROS is negative.       2024     6:57 AM    ENT ROS   Constitutional Appetite change    Problems with sleep   Eyes Double vision   Cardiopulmonary Wheezing   Gastrointestinal/Genitourinary Heartburn/indigestion    Unexplained nausea/vomiting   Musculoskeletal Sore or stiff joints    Swollen joints    Neck pain    Swollen legs/feet   Hematologic Lymph node swelling   Endocrine Thirst    Frequent urination   Skin Change in moles        Physical Exam:   There were no vitals taken for this visit.     PHYSICAL EXAMINATION:    Constitutional:  The patient was unaccompanied, well-groomed, and in no acute distress.    Skin:  Warm and pink.    Neurologic:  Alert and oriented x 3.  CN's III-XII within normal limits.  Voice normal.   Psychiatric:  The patient's affect was calm, cooperative, and appropriate.    Respiratory:  Breathing comfortably without stridor or exertion of accessory muscles.    Eyes: Extraocular movement intact.    Head:  Normocephalic and atraumatic.  No lesions or scars.    OC/OP:  Normal tongue, floor of mouth, buccal mucosa, and palate.  No lesions or masses on inspection or palpation.  No abnormal lymph tissue in the oropharynx.  The pterygoid region is non-tender.  The scar from previous surgery is almost non-visible and he has healed very well.   Neck:  Supple with normal laryngeal and tracheal landmarks.  The parotid beds  were without masses.  No palpable thyroid.  Lymphatic:  There is no palpable lymphadenopathy in the neck.      Assessment and Plan:  No diagnosis found.  Thai Mancia is a 54 year old male with a history of salivary gland stone who presents for follow up. He is healed very well. I have recommended having lemon water or sour candies every now and then to help with his salivary gland. Overall, everything looks great.     Follow-up: No follow-ups on file.         Scribe Disclosure:  I, Jacklyn Tinoco, am serving as a scribe to document services personally performed by Bassam Mendez MD at this visit, based upon the provider's statements to me. All documentation has been reviewed by the aforementioned provider prior to being entered into the official medical record.

## 2025-02-12 ENCOUNTER — THERAPY VISIT (OUTPATIENT)
Dept: PHYSICAL THERAPY | Facility: CLINIC | Age: 55
End: 2025-02-12
Payer: COMMERCIAL

## 2025-02-12 DIAGNOSIS — Z96.651 S/P TKR (TOTAL KNEE REPLACEMENT) USING CEMENT, RIGHT: Primary | ICD-10-CM

## 2025-02-12 PROCEDURE — 97110 THERAPEUTIC EXERCISES: CPT | Mod: GP

## 2025-02-14 ENCOUNTER — TELEPHONE (OUTPATIENT)
Dept: ORTHOPEDICS | Facility: CLINIC | Age: 55
End: 2025-02-14

## 2025-02-14 NOTE — TELEPHONE ENCOUNTER
Workability paperwork received from patient.    Patient would like to complete paperwork during today's appointment (2/14/2025)    Isis Lama, Visit Facilitator

## 2025-02-14 NOTE — TELEPHONE ENCOUNTER
Form reviewed and completed by provider.    Original copy given to patient.  Copy sent to scan, and copy placed in provider's completed forms folder.    No further action needed. Please sign encounter.  Isis Lama, Visit Facilitator

## 2025-03-10 ENCOUNTER — MYC MEDICAL ADVICE (OUTPATIENT)
Dept: INTERNAL MEDICINE | Facility: CLINIC | Age: 55
End: 2025-03-10
Payer: COMMERCIAL

## 2025-03-10 DIAGNOSIS — I82.411 ACUTE DEEP VEIN THROMBOSIS (DVT) OF FEMORAL VEIN OF RIGHT LOWER EXTREMITY (H): Primary | ICD-10-CM

## 2025-03-10 NOTE — TELEPHONE ENCOUNTER
See note from ER discharge.     Pt did not schedule follow up with PCP ok to RF?    The patient will be started on Eliquis anticoagulation with a starter pack which she is to  this evening.  He will be on this for the next 30 days and then he will need a 2-month refill on the Eliquis via his regular providers for ongoing monitoring.  A follow-up ultrasound in the future will be indicated.  I did refer the patient back to primary care, he can also see vascular medicine for coordination of care if he desires.  This is a provoked or precipitated DVT and the patient has no history otherwise of this.       Ximena Rodrigez RN

## 2025-05-06 ENCOUNTER — PATIENT OUTREACH (OUTPATIENT)
Dept: CARE COORDINATION | Facility: CLINIC | Age: 55
End: 2025-05-06

## 2025-05-06 ENCOUNTER — MYC MEDICAL ADVICE (OUTPATIENT)
Dept: INTERNAL MEDICINE | Facility: CLINIC | Age: 55
End: 2025-05-06

## 2025-05-06 DIAGNOSIS — I82.411 ACUTE DEEP VEIN THROMBOSIS (DVT) OF FEMORAL VEIN OF RIGHT LOWER EXTREMITY (H): ICD-10-CM

## 2025-05-06 DIAGNOSIS — I82.4Z1 ACUTE DEEP VEIN THROMBOSIS (DVT) OF DISTAL VEIN OF RIGHT LOWER EXTREMITY (H): Primary | ICD-10-CM

## 2025-05-06 NOTE — TELEPHONE ENCOUNTER
Sent [employer] message to patient.    Thank you,  Tigre, Triage RN Ed Bass    4:20 PM 5/6/2025

## (undated) DEVICE — PREP POVIDONE-IODINE 7.5% SCRUB 4OZ BOTTLE MDS093945

## (undated) DEVICE — SU SILK 2-0 SH 30" K833H

## (undated) DEVICE — SET HANDPIECE INTERPULSE W/COAXIAL FAN SPRAY TIP 0210118000

## (undated) DEVICE — BAG CLEAR TRASH 1.3M 39X33" P4040C

## (undated) DEVICE — PITCHER STERILE 1000ML  SSK9004A

## (undated) DEVICE — SUCTION MANIFOLD NEPTUNE 2 SYS 4 PORT 0702-020-000

## (undated) DEVICE — SU VICRYL 4-0 RB-1 27" UND J214H

## (undated) DEVICE — ESU PENCIL SMOKE EVAC W/ROCKER SWITCH 0703-047-000

## (undated) DEVICE — GLOVE PROTEXIS POWDER FREE SMT 7.5  2D72PT75X

## (undated) DEVICE — GLOVE BIOGEL PI SZ 7.5 40875

## (undated) DEVICE — SU VICRYL 5-0 P-3 18" UND J493G

## (undated) DEVICE — SU STRATAFIX PDS PLUS 1 CT-1 12" SXPP1A443

## (undated) DEVICE — PREP SKIN SCRUB TRAY 4461A

## (undated) DEVICE — LINEN TOWEL PACK X5 5464

## (undated) DEVICE — SOL NACL 0.9% IRRIG 500ML BOTTLE 2F7123

## (undated) DEVICE — GLOVE BIOGEL PI MICRO INDICATOR UNDERGLOVE SZ 8.0 48980

## (undated) DEVICE — PREP POVIDONE IODINE SOLUTION 10% 4OZ BOTTLE 29906-004

## (undated) DEVICE — SU MONOCRYL 5-0 P-3 18" UND Y493G

## (undated) DEVICE — RX BACITRACIN OINTMENT 0.9G 1/32OZ CUR001109

## (undated) DEVICE — LINEN ORTHO ACL PACK 5447

## (undated) DEVICE — BLADE SAW SAGITTAL STRK 21X85X1.2MM 4/5/6/2000 4221-120-085

## (undated) DEVICE — DRSG WND NEGATIVE PRESSURE PREVENA 20CM PRE1055US.S

## (undated) DEVICE — SUTURE MONOCRYL+ 3-0 PS-1 27" UNDYED MCP936H

## (undated) DEVICE — SYR BULB IRRIG 50ML LATEX FREE 0035280

## (undated) DEVICE — DRAIN JACKSON PRATT RESERVOIR 100ML SU130-1305

## (undated) DEVICE — SOL WATER IRRIG 500ML BOTTLE 2F7113

## (undated) DEVICE — LINEN HALF SHEET 5512

## (undated) DEVICE — PACK MINOR CUSTOM ASC

## (undated) DEVICE — TOURNIQUET SGL  BLADDER 30"X4" BLUE 5921030135

## (undated) DEVICE — CAST PADDING 6IN COTTON WEBRIL STERILE 2554

## (undated) DEVICE — HOOD SURG T7PLUS PEEL AWAY FACE SHIELD STRL LF 0416-801-100

## (undated) DEVICE — SU ETHILON 3-0 PS-1 18" 1663H

## (undated) DEVICE — ESU ELEC NDL 1" COATED/INSULATED E1465

## (undated) DEVICE — BLADE SAW SAGITTAL STRK 18X90X1.27MM HD SYS 6 6118-127-090

## (undated) DEVICE — ESU GROUND PAD ADULT W/CORD E7507

## (undated) DEVICE — SOL NACL 0.9% IRRIG 3000ML BAG 2B7477

## (undated) DEVICE — SUCTION MANIFOLD NEPTUNE 2 SYS 1 PORT 702-025-000

## (undated) DEVICE — SU SILK 2-0 TIE 12X30" A305H

## (undated) DEVICE — PACK TOTAL KNEE BOXED LATEX FREE PO15TKFCT

## (undated) DEVICE — TRAY PREP DRY SKIN SCRUB 067

## (undated) DEVICE — DRAPE LAP PEDS DISP 29492

## (undated) DEVICE — DRAPE STERI U 1015

## (undated) DEVICE — SPONGE KITTNER 30-101

## (undated) DEVICE — DRSG AQUACEL AG 3.5X14"  422607

## (undated) DEVICE — SU SILK 3-0 TIE 12X30" A304H

## (undated) DEVICE — DRSG STERI STRIP 1/2X4" R1547

## (undated) DEVICE — LINEN FULL SHEET 5511

## (undated) DEVICE — DRAIN JACKSON PRATT 10FR ROUND SU130-1321

## (undated) DEVICE — SU VICRYL 0 CT-1 27" J340H

## (undated) DEVICE — GLOVE PROTEXIS W/NEU-THERA 7.5  2D73TE75

## (undated) DEVICE — SOL BACTISURE WOUND LAVAGE 1L BAG 00-8887-002-00

## (undated) DEVICE — SYR BULB IRRIG DOVER 60 ML LATEX FREE 67000

## (undated) DEVICE — BONE CEMENT MIXEVAC III HI VAC KIT  0206-015-000

## (undated) DEVICE — ESU CORD BIPOLAR GREEN 10-4000

## (undated) DEVICE — ESU ELEC BLADE 2.75" COATED/INSULATED E1455

## (undated) DEVICE — SU SILK 2-0 SH CR 5X18" C0125

## (undated) DEVICE — SU VICRYL 3-0 SH 8X18" UND J864D

## (undated) DEVICE — SU SILK 4-0 TIE 12X30" A303H

## (undated) DEVICE — BNDG ELASTIC 6" DBL LENGTH UNSTERILE 6611-16

## (undated) DEVICE — PACK ENT MINOR CUSTOM ASC

## (undated) DEVICE — PREP CHLORAPREP 26ML TINTED HI-LITE ORANGE 930815

## (undated) DEVICE — PUMP UNIT NEGATIVE PRESSURE PREVENA PLUS PRE4010

## (undated) DEVICE — SU VICRYL 2-0 CT-1 27" UND J259H

## (undated) DEVICE — DRSG GAUZE 4X4" 3033

## (undated) DEVICE — GLOVE BIOGEL PI MICRO SZ 8.0 48580

## (undated) DEVICE — BNDG KLING 2" 2231

## (undated) DEVICE — Device

## (undated) DEVICE — SUPPORTER ATHLETIC LG LATEX 202636

## (undated) RX ORDER — LABETALOL HYDROCHLORIDE 5 MG/ML
INJECTION, SOLUTION INTRAVENOUS
Status: DISPENSED
Start: 2024-12-04

## (undated) RX ORDER — PROPOFOL 10 MG/ML
INJECTION, EMULSION INTRAVENOUS
Status: DISPENSED
Start: 2024-06-03

## (undated) RX ORDER — ACETAMINOPHEN 325 MG/1
TABLET ORAL
Status: DISPENSED
Start: 2024-06-03

## (undated) RX ORDER — LIDOCAINE HYDROCHLORIDE AND EPINEPHRINE 10; 10 MG/ML; UG/ML
INJECTION, SOLUTION INFILTRATION; PERINEURAL
Status: DISPENSED
Start: 2024-12-07

## (undated) RX ORDER — FENTANYL CITRATE 50 UG/ML
INJECTION, SOLUTION INTRAMUSCULAR; INTRAVENOUS
Status: DISPENSED
Start: 2024-12-04

## (undated) RX ORDER — ONDANSETRON 2 MG/ML
INJECTION INTRAMUSCULAR; INTRAVENOUS
Status: DISPENSED
Start: 2024-06-03

## (undated) RX ORDER — CEFAZOLIN SODIUM/WATER 2 G/20 ML
SYRINGE (ML) INTRAVENOUS
Status: DISPENSED
Start: 2024-12-11

## (undated) RX ORDER — FENTANYL CITRATE 50 UG/ML
INJECTION, SOLUTION INTRAMUSCULAR; INTRAVENOUS
Status: DISPENSED
Start: 2024-06-03

## (undated) RX ORDER — HYDRALAZINE HYDROCHLORIDE 20 MG/ML
INJECTION INTRAMUSCULAR; INTRAVENOUS
Status: DISPENSED
Start: 2024-12-11

## (undated) RX ORDER — LABETALOL HYDROCHLORIDE 5 MG/ML
INJECTION, SOLUTION INTRAVENOUS
Status: DISPENSED
Start: 2024-12-11

## (undated) RX ORDER — SEVOFLURANE 250 ML/250ML
LIQUID RESPIRATORY (INHALATION)
Status: DISPENSED
Start: 2024-12-04

## (undated) RX ORDER — CEFAZOLIN SODIUM 1 G/3ML
INJECTION, POWDER, FOR SOLUTION INTRAMUSCULAR; INTRAVENOUS
Status: DISPENSED
Start: 2024-12-04

## (undated) RX ORDER — FENTANYL CITRATE 50 UG/ML
INJECTION, SOLUTION INTRAMUSCULAR; INTRAVENOUS
Status: DISPENSED
Start: 2021-12-13

## (undated) RX ORDER — DEXAMETHASONE SODIUM PHOSPHATE 4 MG/ML
INJECTION, SOLUTION INTRA-ARTICULAR; INTRALESIONAL; INTRAMUSCULAR; INTRAVENOUS; SOFT TISSUE
Status: DISPENSED
Start: 2021-11-18

## (undated) RX ORDER — METHYLPREDNISOLONE ACETATE 40 MG/ML
INJECTION, SUSPENSION INTRA-ARTICULAR; INTRALESIONAL; INTRAMUSCULAR; SOFT TISSUE
Status: DISPENSED
Start: 2024-12-04

## (undated) RX ORDER — VANCOMYCIN HYDROCHLORIDE 1 G/20ML
INJECTION, POWDER, LYOPHILIZED, FOR SOLUTION INTRAVENOUS
Status: DISPENSED
Start: 2024-12-04

## (undated) RX ORDER — BUPIVACAINE HYDROCHLORIDE 2.5 MG/ML
INJECTION, SOLUTION EPIDURAL; INFILTRATION; INTRACAUDAL
Status: DISPENSED
Start: 2024-12-11

## (undated) RX ORDER — CEFAZOLIN SODIUM/WATER 2 G/20 ML
SYRINGE (ML) INTRAVENOUS
Status: DISPENSED
Start: 2024-12-04

## (undated) RX ORDER — METHADONE HYDROCHLORIDE 10 MG/ML
INJECTION, SOLUTION INTRAMUSCULAR; INTRAVENOUS; SUBCUTANEOUS
Status: DISPENSED
Start: 2024-12-11

## (undated) RX ORDER — PROPOFOL 10 MG/ML
INJECTION, EMULSION INTRAVENOUS
Status: DISPENSED
Start: 2024-12-04

## (undated) RX ORDER — HYDROXYZINE HYDROCHLORIDE 25 MG/1
TABLET, FILM COATED ORAL
Status: DISPENSED
Start: 2024-12-11

## (undated) RX ORDER — ONDANSETRON 2 MG/ML
INJECTION INTRAMUSCULAR; INTRAVENOUS
Status: DISPENSED
Start: 2021-11-18

## (undated) RX ORDER — LIDOCAINE HYDROCHLORIDE 10 MG/ML
INJECTION, SOLUTION EPIDURAL; INFILTRATION; INTRACAUDAL; PERINEURAL
Status: DISPENSED
Start: 2018-07-30

## (undated) RX ORDER — LIDOCAINE HYDROCHLORIDE AND EPINEPHRINE 10; 10 MG/ML; UG/ML
INJECTION, SOLUTION INFILTRATION; PERINEURAL
Status: DISPENSED
Start: 2024-06-03

## (undated) RX ORDER — LIDOCAINE HYDROCHLORIDE 20 MG/ML
INJECTION, SOLUTION EPIDURAL; INFILTRATION; INTRACAUDAL; PERINEURAL
Status: DISPENSED
Start: 2021-11-18

## (undated) RX ORDER — HYDRALAZINE HYDROCHLORIDE 20 MG/ML
INJECTION INTRAMUSCULAR; INTRAVENOUS
Status: DISPENSED
Start: 2024-12-04

## (undated) RX ORDER — GABAPENTIN 300 MG/1
CAPSULE ORAL
Status: DISPENSED
Start: 2024-06-03

## (undated) RX ORDER — ONDANSETRON 2 MG/ML
INJECTION INTRAMUSCULAR; INTRAVENOUS
Status: DISPENSED
Start: 2024-12-04

## (undated) RX ORDER — ACETAMINOPHEN 325 MG/1
TABLET ORAL
Status: DISPENSED
Start: 2024-12-11

## (undated) RX ORDER — CEFAZOLIN SODIUM 2 G/50ML
SOLUTION INTRAVENOUS
Status: DISPENSED
Start: 2024-06-03

## (undated) RX ORDER — VANCOMYCIN HYDROCHLORIDE 1 G/20ML
INJECTION, POWDER, LYOPHILIZED, FOR SOLUTION INTRAVENOUS
Status: DISPENSED
Start: 2024-12-11

## (undated) RX ORDER — TRANEXAMIC ACID 650 MG/1
TABLET ORAL
Status: DISPENSED
Start: 2024-12-04

## (undated) RX ORDER — HYDROMORPHONE HYDROCHLORIDE 1 MG/ML
INJECTION, SOLUTION INTRAMUSCULAR; INTRAVENOUS; SUBCUTANEOUS
Status: DISPENSED
Start: 2024-06-03

## (undated) RX ORDER — ACETAMINOPHEN 325 MG/1
TABLET ORAL
Status: DISPENSED
Start: 2024-12-04

## (undated) RX ORDER — KETOROLAC TROMETHAMINE 30 MG/ML
INJECTION, SOLUTION INTRAMUSCULAR; INTRAVENOUS
Status: DISPENSED
Start: 2021-11-18

## (undated) RX ORDER — CHLORHEXIDINE GLUCONATE ORAL RINSE 1.2 MG/ML
SOLUTION DENTAL
Status: DISPENSED
Start: 2024-06-03

## (undated) RX ORDER — TRANEXAMIC ACID 650 MG/1
TABLET ORAL
Status: DISPENSED
Start: 2024-12-11

## (undated) RX ORDER — OXYCODONE HYDROCHLORIDE 5 MG/1
TABLET ORAL
Status: DISPENSED
Start: 2024-06-03

## (undated) RX ORDER — HYDROXYZINE HYDROCHLORIDE 25 MG/1
TABLET, FILM COATED ORAL
Status: DISPENSED
Start: 2024-12-04

## (undated) RX ORDER — OXYCODONE HYDROCHLORIDE 5 MG/1
TABLET ORAL
Status: DISPENSED
Start: 2024-12-04

## (undated) RX ORDER — ACETAMINOPHEN 325 MG/1
TABLET ORAL
Status: DISPENSED
Start: 2021-11-18

## (undated) RX ORDER — CEFAZOLIN SODIUM 1 G/3ML
INJECTION, POWDER, FOR SOLUTION INTRAMUSCULAR; INTRAVENOUS
Status: DISPENSED
Start: 2021-11-18

## (undated) RX ORDER — OXYCODONE HYDROCHLORIDE 5 MG/1
TABLET ORAL
Status: DISPENSED
Start: 2024-12-11

## (undated) RX ORDER — GINSENG 100 MG
CAPSULE ORAL
Status: DISPENSED
Start: 2018-07-30

## (undated) RX ORDER — CLONIDINE 100 UG/ML
INJECTION, SOLUTION EPIDURAL
Status: DISPENSED
Start: 2024-12-11

## (undated) RX ORDER — PROPOFOL 10 MG/ML
INJECTION, EMULSION INTRAVENOUS
Status: DISPENSED
Start: 2021-11-18

## (undated) RX ORDER — DEXAMETHASONE SODIUM PHOSPHATE 4 MG/ML
INJECTION, SOLUTION INTRA-ARTICULAR; INTRALESIONAL; INTRAMUSCULAR; INTRAVENOUS; SOFT TISSUE
Status: DISPENSED
Start: 2024-06-03

## (undated) RX ORDER — LIDOCAINE HYDROCHLORIDE 10 MG/ML
INJECTION, SOLUTION EPIDURAL; INFILTRATION; INTRACAUDAL; PERINEURAL
Status: DISPENSED
Start: 2021-11-18